# Patient Record
Sex: MALE | Race: WHITE | Employment: OTHER | URBAN - NONMETROPOLITAN AREA
[De-identification: names, ages, dates, MRNs, and addresses within clinical notes are randomized per-mention and may not be internally consistent; named-entity substitution may affect disease eponyms.]

---

## 2016-12-05 LAB
ALT SERPL-CCNC: 8 U/L
BASOPHILS ABSOLUTE: NORMAL /ΜL
BASOPHILS RELATIVE PERCENT: NORMAL %
BUN BLDV-MCNC: 20 MG/DL
CALCIUM SERPL-MCNC: 9 MG/DL
CHLORIDE BLD-SCNC: 106 MMOL/L
CO2: 23 MMOL/L
CREAT SERPL-MCNC: 1 MG/DL
EOSINOPHILS ABSOLUTE: NORMAL /ΜL
EOSINOPHILS RELATIVE PERCENT: NORMAL %
GFR CALCULATED: NORMAL
GLUCOSE BLD-MCNC: 126 MG/DL
HCT VFR BLD CALC: 43.1 % (ref 41–53)
HEMOGLOBIN: 14 G/DL (ref 13.5–17.5)
LYMPHOCYTES ABSOLUTE: 1.3 /ΜL
LYMPHOCYTES RELATIVE PERCENT: 20 %
MCH RBC QN AUTO: 30.6 PG
MCHC RBC AUTO-ENTMCNC: 32.5 G/DL
MCV RBC AUTO: 94.1 FL
MONOCYTES ABSOLUTE: NORMAL /ΜL
MONOCYTES RELATIVE PERCENT: NORMAL %
NEUTROPHILS ABSOLUTE: NORMAL /ΜL
NEUTROPHILS RELATIVE PERCENT: NORMAL %
PLATELET # BLD: 199 K/ΜL
PMV BLD AUTO: 7.2 FL
POTASSIUM SERPL-SCNC: 4.4 MMOL/L
RBC # BLD: 13.4 10^6/ΜL
SODIUM BLD-SCNC: 141 MMOL/L
TOTAL CK: 106 U/L
WBC # BLD: 6.5 10^3/ML

## 2016-12-08 LAB — HBA1C MFR BLD: 5.4 %

## 2017-01-03 ENCOUNTER — ANTI-COAG VISIT (OUTPATIENT)
Dept: OTHER | Age: 82
End: 2017-01-03

## 2017-01-03 VITALS
BODY MASS INDEX: 25.89 KG/M2 | SYSTOLIC BLOOD PRESSURE: 158 MMHG | DIASTOLIC BLOOD PRESSURE: 80 MMHG | HEART RATE: 87 BPM | WEIGHT: 160.4 LBS

## 2017-01-03 DIAGNOSIS — I63.9 CEREBROVASCULAR ACCIDENT (CVA), UNSPECIFIED MECHANISM (HCC): ICD-10-CM

## 2017-01-03 DIAGNOSIS — I82.4Y9 DEEP VEIN THROMBOSIS (DVT) OF PROXIMAL LOWER EXTREMITY, UNSPECIFIED CHRONICITY, UNSPECIFIED LATERALITY (HCC): ICD-10-CM

## 2017-01-03 LAB — POC INR: 3.9 (ref 0.8–1.2)

## 2017-01-03 PROCEDURE — 85610 PROTHROMBIN TIME: CPT | Performed by: NURSE PRACTITIONER

## 2017-01-03 PROCEDURE — 99999 PR OFFICE/OUTPT VISIT,PROCEDURE ONLY: CPT | Performed by: NURSE PRACTITIONER

## 2017-01-03 ASSESSMENT — ENCOUNTER SYMPTOMS
SHORTNESS OF BREATH: 0
BLOOD IN STOOL: 0
DIARRHEA: 0
CONSTIPATION: 0

## 2017-01-11 ENCOUNTER — TELEPHONE (OUTPATIENT)
Dept: OTHER | Age: 82
End: 2017-01-11

## 2017-01-16 ENCOUNTER — ANTI-COAG VISIT (OUTPATIENT)
Dept: OTHER | Age: 82
End: 2017-01-16

## 2017-01-16 VITALS
DIASTOLIC BLOOD PRESSURE: 65 MMHG | WEIGHT: 159.6 LBS | HEART RATE: 82 BPM | BODY MASS INDEX: 25.76 KG/M2 | SYSTOLIC BLOOD PRESSURE: 132 MMHG

## 2017-01-16 DIAGNOSIS — I82.4Y9 DEEP VEIN THROMBOSIS (DVT) OF PROXIMAL LOWER EXTREMITY, UNSPECIFIED CHRONICITY, UNSPECIFIED LATERALITY (HCC): ICD-10-CM

## 2017-01-16 DIAGNOSIS — I63.9 CEREBROVASCULAR ACCIDENT (CVA), UNSPECIFIED MECHANISM (HCC): ICD-10-CM

## 2017-01-16 LAB — POC INR: 2.9 (ref 0.8–1.2)

## 2017-01-16 PROCEDURE — 85610 PROTHROMBIN TIME: CPT | Performed by: PHARMACIST

## 2017-01-16 PROCEDURE — 1036F TOBACCO NON-USER: CPT | Performed by: PHARMACIST

## 2017-01-16 PROCEDURE — 99999 PR OFFICE/OUTPT VISIT,PROCEDURE ONLY: CPT | Performed by: PHARMACIST

## 2017-01-16 ASSESSMENT — ENCOUNTER SYMPTOMS
SHORTNESS OF BREATH: 0
CONSTIPATION: 0
BLOOD IN STOOL: 0
DIARRHEA: 0

## 2017-01-30 ENCOUNTER — ANTI-COAG VISIT (OUTPATIENT)
Dept: OTHER | Age: 82
End: 2017-01-30

## 2017-01-30 VITALS
BODY MASS INDEX: 25.76 KG/M2 | SYSTOLIC BLOOD PRESSURE: 139 MMHG | DIASTOLIC BLOOD PRESSURE: 68 MMHG | HEART RATE: 78 BPM | WEIGHT: 159.6 LBS

## 2017-01-30 DIAGNOSIS — I82.4Y9 DEEP VEIN THROMBOSIS (DVT) OF PROXIMAL LOWER EXTREMITY, UNSPECIFIED CHRONICITY, UNSPECIFIED LATERALITY (HCC): ICD-10-CM

## 2017-01-30 LAB
INTERNATIONAL NORMALIZATION RATIO, POC: 5
PROTHROMBIN TIME, POC: NORMAL

## 2017-01-30 PROCEDURE — G8420 CALC BMI NORM PARAMETERS: HCPCS | Performed by: NURSE PRACTITIONER

## 2017-01-30 PROCEDURE — G8598 ASA/ANTIPLAT THER USED: HCPCS | Performed by: NURSE PRACTITIONER

## 2017-01-30 PROCEDURE — G8482 FLU IMMUNIZE ORDER/ADMIN: HCPCS | Performed by: NURSE PRACTITIONER

## 2017-01-30 PROCEDURE — 1123F ACP DISCUSS/DSCN MKR DOCD: CPT | Performed by: NURSE PRACTITIONER

## 2017-01-30 PROCEDURE — 4040F PNEUMOC VAC/ADMIN/RCVD: CPT | Performed by: NURSE PRACTITIONER

## 2017-01-30 PROCEDURE — 99212 OFFICE O/P EST SF 10 MIN: CPT | Performed by: NURSE PRACTITIONER

## 2017-01-30 PROCEDURE — 1036F TOBACCO NON-USER: CPT | Performed by: NURSE PRACTITIONER

## 2017-01-30 PROCEDURE — 85610 PROTHROMBIN TIME: CPT | Performed by: NURSE PRACTITIONER

## 2017-01-30 PROCEDURE — G8427 DOCREV CUR MEDS BY ELIG CLIN: HCPCS | Performed by: NURSE PRACTITIONER

## 2017-01-30 ASSESSMENT — ENCOUNTER SYMPTOMS
CONSTIPATION: 0
BLOOD IN STOOL: 0
DIARRHEA: 0
SHORTNESS OF BREATH: 0

## 2017-02-01 ENCOUNTER — ANTI-COAG VISIT (OUTPATIENT)
Dept: OTHER | Age: 82
End: 2017-02-01

## 2017-02-01 VITALS
BODY MASS INDEX: 25.66 KG/M2 | DIASTOLIC BLOOD PRESSURE: 82 MMHG | SYSTOLIC BLOOD PRESSURE: 142 MMHG | HEART RATE: 75 BPM | WEIGHT: 159 LBS

## 2017-02-01 DIAGNOSIS — I82.4Y9 DEEP VEIN THROMBOSIS (DVT) OF PROXIMAL LOWER EXTREMITY, UNSPECIFIED CHRONICITY, UNSPECIFIED LATERALITY (HCC): ICD-10-CM

## 2017-02-01 LAB — POC INR: 1.7 (ref 0.8–1.2)

## 2017-02-01 PROCEDURE — G8427 DOCREV CUR MEDS BY ELIG CLIN: HCPCS | Performed by: NURSE PRACTITIONER

## 2017-02-01 PROCEDURE — G8598 ASA/ANTIPLAT THER USED: HCPCS | Performed by: NURSE PRACTITIONER

## 2017-02-01 PROCEDURE — G8482 FLU IMMUNIZE ORDER/ADMIN: HCPCS | Performed by: NURSE PRACTITIONER

## 2017-02-01 PROCEDURE — 4040F PNEUMOC VAC/ADMIN/RCVD: CPT | Performed by: NURSE PRACTITIONER

## 2017-02-01 PROCEDURE — 85610 PROTHROMBIN TIME: CPT | Performed by: NURSE PRACTITIONER

## 2017-02-01 PROCEDURE — 99212 OFFICE O/P EST SF 10 MIN: CPT | Performed by: NURSE PRACTITIONER

## 2017-02-01 PROCEDURE — G8420 CALC BMI NORM PARAMETERS: HCPCS | Performed by: NURSE PRACTITIONER

## 2017-02-01 PROCEDURE — 1036F TOBACCO NON-USER: CPT | Performed by: NURSE PRACTITIONER

## 2017-02-01 PROCEDURE — 1123F ACP DISCUSS/DSCN MKR DOCD: CPT | Performed by: NURSE PRACTITIONER

## 2017-02-01 ASSESSMENT — ENCOUNTER SYMPTOMS
DIARRHEA: 0
CONSTIPATION: 0
BLOOD IN STOOL: 0
SHORTNESS OF BREATH: 0

## 2017-02-08 ENCOUNTER — ANTI-COAG VISIT (OUTPATIENT)
Dept: OTHER | Age: 82
End: 2017-02-08

## 2017-02-08 VITALS
SYSTOLIC BLOOD PRESSURE: 166 MMHG | BODY MASS INDEX: 25.76 KG/M2 | HEART RATE: 86 BPM | WEIGHT: 159.6 LBS | DIASTOLIC BLOOD PRESSURE: 57 MMHG

## 2017-02-08 DIAGNOSIS — I63.9 CEREBROVASCULAR ACCIDENT (CVA), UNSPECIFIED MECHANISM (HCC): ICD-10-CM

## 2017-02-08 DIAGNOSIS — I82.4Y9 DEEP VEIN THROMBOSIS (DVT) OF PROXIMAL LOWER EXTREMITY, UNSPECIFIED CHRONICITY, UNSPECIFIED LATERALITY (HCC): ICD-10-CM

## 2017-02-08 LAB — POC INR: 4.1 (ref 0.8–1.2)

## 2017-02-08 PROCEDURE — 4040F PNEUMOC VAC/ADMIN/RCVD: CPT | Performed by: NURSE PRACTITIONER

## 2017-02-08 PROCEDURE — G8598 ASA/ANTIPLAT THER USED: HCPCS | Performed by: NURSE PRACTITIONER

## 2017-02-08 PROCEDURE — 99999 PR OFFICE/OUTPT VISIT,PROCEDURE ONLY: CPT | Performed by: NURSE PRACTITIONER

## 2017-02-08 PROCEDURE — G8420 CALC BMI NORM PARAMETERS: HCPCS | Performed by: NURSE PRACTITIONER

## 2017-02-08 PROCEDURE — G8428 CUR MEDS NOT DOCUMENT: HCPCS | Performed by: NURSE PRACTITIONER

## 2017-02-08 PROCEDURE — 85610 PROTHROMBIN TIME: CPT | Performed by: NURSE PRACTITIONER

## 2017-02-08 ASSESSMENT — ENCOUNTER SYMPTOMS
CONSTIPATION: 0
BLOOD IN STOOL: 0
SHORTNESS OF BREATH: 0
DIARRHEA: 0

## 2017-02-14 ENCOUNTER — ANTI-COAG VISIT (OUTPATIENT)
Dept: OTHER | Age: 82
End: 2017-02-14

## 2017-02-14 VITALS
SYSTOLIC BLOOD PRESSURE: 145 MMHG | WEIGHT: 159.4 LBS | DIASTOLIC BLOOD PRESSURE: 80 MMHG | BODY MASS INDEX: 25.73 KG/M2 | HEART RATE: 80 BPM

## 2017-02-14 DIAGNOSIS — I63.9 CEREBROVASCULAR ACCIDENT (CVA), UNSPECIFIED MECHANISM (HCC): ICD-10-CM

## 2017-02-14 DIAGNOSIS — I82.4Y9 DEEP VEIN THROMBOSIS (DVT) OF PROXIMAL LOWER EXTREMITY, UNSPECIFIED CHRONICITY, UNSPECIFIED LATERALITY (HCC): ICD-10-CM

## 2017-02-14 LAB — POC INR: 3.6 (ref 0.8–1.2)

## 2017-02-14 PROCEDURE — 4040F PNEUMOC VAC/ADMIN/RCVD: CPT | Performed by: NURSE PRACTITIONER

## 2017-02-14 PROCEDURE — G8598 ASA/ANTIPLAT THER USED: HCPCS | Performed by: NURSE PRACTITIONER

## 2017-02-14 PROCEDURE — G8420 CALC BMI NORM PARAMETERS: HCPCS | Performed by: NURSE PRACTITIONER

## 2017-02-14 PROCEDURE — 99999 PR OFFICE/OUTPT VISIT,PROCEDURE ONLY: CPT | Performed by: NURSE PRACTITIONER

## 2017-02-14 PROCEDURE — G8427 DOCREV CUR MEDS BY ELIG CLIN: HCPCS | Performed by: NURSE PRACTITIONER

## 2017-02-14 PROCEDURE — 85610 PROTHROMBIN TIME: CPT | Performed by: NURSE PRACTITIONER

## 2017-02-14 ASSESSMENT — ENCOUNTER SYMPTOMS
SHORTNESS OF BREATH: 0
CONSTIPATION: 0
BLOOD IN STOOL: 0
DIARRHEA: 0

## 2017-02-27 ENCOUNTER — ANTI-COAG VISIT (OUTPATIENT)
Dept: OTHER | Age: 82
End: 2017-02-27

## 2017-02-27 VITALS
SYSTOLIC BLOOD PRESSURE: 137 MMHG | WEIGHT: 159.2 LBS | DIASTOLIC BLOOD PRESSURE: 69 MMHG | HEART RATE: 64 BPM | BODY MASS INDEX: 25.7 KG/M2

## 2017-02-27 DIAGNOSIS — I82.4Y9 DEEP VEIN THROMBOSIS (DVT) OF PROXIMAL LOWER EXTREMITY, UNSPECIFIED CHRONICITY, UNSPECIFIED LATERALITY (HCC): ICD-10-CM

## 2017-02-27 DIAGNOSIS — I63.9 CEREBROVASCULAR ACCIDENT (CVA), UNSPECIFIED MECHANISM (HCC): ICD-10-CM

## 2017-02-27 LAB — POC INR: 3.9 (ref 0.8–1.2)

## 2017-02-27 PROCEDURE — 85610 PROTHROMBIN TIME: CPT

## 2017-02-27 PROCEDURE — 4040F PNEUMOC VAC/ADMIN/RCVD: CPT

## 2017-02-27 PROCEDURE — G8427 DOCREV CUR MEDS BY ELIG CLIN: HCPCS

## 2017-02-27 PROCEDURE — G8598 ASA/ANTIPLAT THER USED: HCPCS

## 2017-02-27 PROCEDURE — G8420 CALC BMI NORM PARAMETERS: HCPCS

## 2017-02-27 PROCEDURE — 99999 PR OFFICE/OUTPT VISIT,PROCEDURE ONLY: CPT

## 2017-02-27 ASSESSMENT — ENCOUNTER SYMPTOMS
CONSTIPATION: 0
BLOOD IN STOOL: 1
DIARRHEA: 1
SHORTNESS OF BREATH: 0

## 2017-03-06 ENCOUNTER — ANTI-COAG VISIT (OUTPATIENT)
Dept: OTHER | Age: 82
End: 2017-03-06

## 2017-03-06 VITALS
HEART RATE: 91 BPM | WEIGHT: 159.2 LBS | DIASTOLIC BLOOD PRESSURE: 74 MMHG | BODY MASS INDEX: 25.7 KG/M2 | SYSTOLIC BLOOD PRESSURE: 136 MMHG

## 2017-03-06 DIAGNOSIS — I82.4Y9 DEEP VEIN THROMBOSIS (DVT) OF PROXIMAL LOWER EXTREMITY, UNSPECIFIED CHRONICITY, UNSPECIFIED LATERALITY (HCC): ICD-10-CM

## 2017-03-06 LAB — POC INR: 1.8 (ref 0.8–1.2)

## 2017-03-06 PROCEDURE — G8427 DOCREV CUR MEDS BY ELIG CLIN: HCPCS | Performed by: NURSE PRACTITIONER

## 2017-03-06 PROCEDURE — 85610 PROTHROMBIN TIME: CPT | Performed by: NURSE PRACTITIONER

## 2017-03-06 PROCEDURE — G8598 ASA/ANTIPLAT THER USED: HCPCS | Performed by: NURSE PRACTITIONER

## 2017-03-06 PROCEDURE — 1036F TOBACCO NON-USER: CPT | Performed by: NURSE PRACTITIONER

## 2017-03-06 PROCEDURE — G8420 CALC BMI NORM PARAMETERS: HCPCS | Performed by: NURSE PRACTITIONER

## 2017-03-06 PROCEDURE — 99212 OFFICE O/P EST SF 10 MIN: CPT | Performed by: NURSE PRACTITIONER

## 2017-03-06 PROCEDURE — G8482 FLU IMMUNIZE ORDER/ADMIN: HCPCS | Performed by: NURSE PRACTITIONER

## 2017-03-06 PROCEDURE — 4040F PNEUMOC VAC/ADMIN/RCVD: CPT | Performed by: NURSE PRACTITIONER

## 2017-03-06 PROCEDURE — 1123F ACP DISCUSS/DSCN MKR DOCD: CPT | Performed by: NURSE PRACTITIONER

## 2017-03-06 ASSESSMENT — ENCOUNTER SYMPTOMS
BLOOD IN STOOL: 0
SHORTNESS OF BREATH: 0
DIARRHEA: 0
CONSTIPATION: 0

## 2017-03-09 DIAGNOSIS — I82.4Y9 DEEP VEIN THROMBOSIS (DVT) OF PROXIMAL LOWER EXTREMITY, UNSPECIFIED CHRONICITY, UNSPECIFIED LATERALITY (HCC): ICD-10-CM

## 2017-03-09 DIAGNOSIS — I63.9 CEREBROVASCULAR ACCIDENT (CVA), UNSPECIFIED MECHANISM (HCC): Primary | ICD-10-CM

## 2017-03-09 DIAGNOSIS — Z79.01 ANTICOAGULATED ON COUMADIN: ICD-10-CM

## 2017-03-13 ENCOUNTER — ANTI-COAG VISIT (OUTPATIENT)
Dept: OTHER | Age: 82
End: 2017-03-13

## 2017-03-13 VITALS
DIASTOLIC BLOOD PRESSURE: 99 MMHG | SYSTOLIC BLOOD PRESSURE: 138 MMHG | BODY MASS INDEX: 25.7 KG/M2 | WEIGHT: 159.2 LBS | HEART RATE: 75 BPM

## 2017-03-13 DIAGNOSIS — Z79.01 ANTICOAGULATED ON COUMADIN: ICD-10-CM

## 2017-03-13 DIAGNOSIS — I63.9 CEREBROVASCULAR ACCIDENT (CVA), UNSPECIFIED MECHANISM (HCC): ICD-10-CM

## 2017-03-13 DIAGNOSIS — I82.4Y9 DEEP VEIN THROMBOSIS (DVT) OF PROXIMAL LOWER EXTREMITY, UNSPECIFIED CHRONICITY, UNSPECIFIED LATERALITY (HCC): ICD-10-CM

## 2017-03-13 LAB — POC INR: 3 (ref 0.8–1.2)

## 2017-03-20 ENCOUNTER — ANTI-COAG VISIT (OUTPATIENT)
Dept: OTHER | Age: 82
End: 2017-03-20

## 2017-03-20 VITALS
HEART RATE: 89 BPM | DIASTOLIC BLOOD PRESSURE: 65 MMHG | WEIGHT: 159.6 LBS | BODY MASS INDEX: 25.76 KG/M2 | SYSTOLIC BLOOD PRESSURE: 141 MMHG

## 2017-03-20 DIAGNOSIS — Z79.01 ANTICOAGULATED ON COUMADIN: ICD-10-CM

## 2017-03-20 DIAGNOSIS — I63.9 CEREBROVASCULAR ACCIDENT (CVA), UNSPECIFIED MECHANISM (HCC): ICD-10-CM

## 2017-03-20 DIAGNOSIS — I82.4Y9 DEEP VEIN THROMBOSIS (DVT) OF PROXIMAL LOWER EXTREMITY, UNSPECIFIED CHRONICITY, UNSPECIFIED LATERALITY (HCC): ICD-10-CM

## 2017-03-20 LAB — POC INR: 2.6 (ref 0.8–1.2)

## 2017-03-20 ASSESSMENT — ENCOUNTER SYMPTOMS
DIARRHEA: 0
CONSTIPATION: 0
SHORTNESS OF BREATH: 0
BLOOD IN STOOL: 0

## 2017-03-27 ENCOUNTER — ANTI-COAG VISIT (OUTPATIENT)
Dept: OTHER | Age: 82
End: 2017-03-27

## 2017-03-27 VITALS
BODY MASS INDEX: 26.05 KG/M2 | WEIGHT: 161.4 LBS | HEART RATE: 77 BPM | DIASTOLIC BLOOD PRESSURE: 68 MMHG | SYSTOLIC BLOOD PRESSURE: 140 MMHG

## 2017-03-27 DIAGNOSIS — Z79.01 ANTICOAGULATED ON COUMADIN: ICD-10-CM

## 2017-03-27 DIAGNOSIS — I82.4Y9 DEEP VEIN THROMBOSIS (DVT) OF PROXIMAL LOWER EXTREMITY, UNSPECIFIED CHRONICITY, UNSPECIFIED LATERALITY (HCC): ICD-10-CM

## 2017-03-27 DIAGNOSIS — I63.9 CEREBROVASCULAR ACCIDENT (CVA), UNSPECIFIED MECHANISM (HCC): ICD-10-CM

## 2017-03-27 LAB — POC INR: 2.8 (ref 0.8–1.2)

## 2017-03-27 ASSESSMENT — ENCOUNTER SYMPTOMS
BLOOD IN STOOL: 0
SHORTNESS OF BREATH: 0
DIARRHEA: 0
CONSTIPATION: 0

## 2017-04-10 ENCOUNTER — ANTI-COAG VISIT (OUTPATIENT)
Dept: OTHER | Age: 82
End: 2017-04-10

## 2017-04-10 VITALS
SYSTOLIC BLOOD PRESSURE: 108 MMHG | DIASTOLIC BLOOD PRESSURE: 69 MMHG | HEART RATE: 91 BPM | WEIGHT: 159.5 LBS | BODY MASS INDEX: 25.74 KG/M2

## 2017-04-10 DIAGNOSIS — Z79.01 ANTICOAGULATED ON COUMADIN: ICD-10-CM

## 2017-04-10 DIAGNOSIS — I63.9 CEREBROVASCULAR ACCIDENT (CVA), UNSPECIFIED MECHANISM (HCC): ICD-10-CM

## 2017-04-10 DIAGNOSIS — I82.4Y9 DEEP VEIN THROMBOSIS (DVT) OF PROXIMAL LOWER EXTREMITY, UNSPECIFIED CHRONICITY, UNSPECIFIED LATERALITY (HCC): ICD-10-CM

## 2017-04-10 LAB — POC INR: 3.6 (ref 0.8–1.2)

## 2017-04-10 ASSESSMENT — ENCOUNTER SYMPTOMS
CONSTIPATION: 0
BLOOD IN STOOL: 0
DIARRHEA: 0
SHORTNESS OF BREATH: 0

## 2017-04-25 ENCOUNTER — ANTI-COAG VISIT (OUTPATIENT)
Dept: OTHER | Age: 82
End: 2017-04-25

## 2017-04-25 VITALS
BODY MASS INDEX: 25.66 KG/M2 | WEIGHT: 159 LBS | SYSTOLIC BLOOD PRESSURE: 142 MMHG | DIASTOLIC BLOOD PRESSURE: 69 MMHG | HEART RATE: 74 BPM

## 2017-04-25 DIAGNOSIS — I63.9 CEREBROVASCULAR ACCIDENT (CVA), UNSPECIFIED MECHANISM (HCC): ICD-10-CM

## 2017-04-25 DIAGNOSIS — Z79.01 ANTICOAGULATED ON COUMADIN: ICD-10-CM

## 2017-04-25 DIAGNOSIS — I82.4Y9 DEEP VEIN THROMBOSIS (DVT) OF PROXIMAL LOWER EXTREMITY, UNSPECIFIED CHRONICITY, UNSPECIFIED LATERALITY (HCC): ICD-10-CM

## 2017-04-25 LAB — POC INR: 3.6 (ref 0.8–1.2)

## 2017-04-25 ASSESSMENT — ENCOUNTER SYMPTOMS
SHORTNESS OF BREATH: 0
DIARRHEA: 0
CONSTIPATION: 0
BLOOD IN STOOL: 0

## 2017-05-08 ENCOUNTER — ANTI-COAG VISIT (OUTPATIENT)
Dept: OTHER | Age: 82
End: 2017-05-08

## 2017-05-08 VITALS
DIASTOLIC BLOOD PRESSURE: 65 MMHG | WEIGHT: 160 LBS | HEART RATE: 73 BPM | SYSTOLIC BLOOD PRESSURE: 122 MMHG | BODY MASS INDEX: 25.82 KG/M2

## 2017-05-08 DIAGNOSIS — I82.4Y9 DEEP VEIN THROMBOSIS (DVT) OF PROXIMAL LOWER EXTREMITY, UNSPECIFIED CHRONICITY, UNSPECIFIED LATERALITY (HCC): ICD-10-CM

## 2017-05-08 DIAGNOSIS — Z79.01 ANTICOAGULATED ON COUMADIN: ICD-10-CM

## 2017-05-08 DIAGNOSIS — I63.9 CEREBROVASCULAR ACCIDENT (CVA), UNSPECIFIED MECHANISM (HCC): ICD-10-CM

## 2017-05-08 LAB — POC INR: 3.4 (ref 0.8–1.2)

## 2017-05-08 ASSESSMENT — ENCOUNTER SYMPTOMS
BLOOD IN STOOL: 0
CONSTIPATION: 0
SHORTNESS OF BREATH: 0
DIARRHEA: 0

## 2017-05-30 ENCOUNTER — ANTI-COAG VISIT (OUTPATIENT)
Dept: OTHER | Age: 82
End: 2017-05-30

## 2017-05-30 VITALS
BODY MASS INDEX: 25.66 KG/M2 | DIASTOLIC BLOOD PRESSURE: 71 MMHG | SYSTOLIC BLOOD PRESSURE: 143 MMHG | HEART RATE: 86 BPM | WEIGHT: 159 LBS

## 2017-05-30 DIAGNOSIS — I82.4Y9 DEEP VEIN THROMBOSIS (DVT) OF PROXIMAL LOWER EXTREMITY, UNSPECIFIED CHRONICITY, UNSPECIFIED LATERALITY (HCC): ICD-10-CM

## 2017-05-30 DIAGNOSIS — I63.9 CEREBROVASCULAR ACCIDENT (CVA), UNSPECIFIED MECHANISM (HCC): ICD-10-CM

## 2017-05-30 DIAGNOSIS — Z79.01 ANTICOAGULATED ON COUMADIN: ICD-10-CM

## 2017-05-30 LAB — POC INR: 2.7 (ref 0.8–1.2)

## 2017-05-30 ASSESSMENT — ENCOUNTER SYMPTOMS
BLOOD IN STOOL: 0
DIARRHEA: 0
SHORTNESS OF BREATH: 0
CONSTIPATION: 0

## 2017-06-08 ENCOUNTER — TELEPHONE (OUTPATIENT)
Dept: OTHER | Age: 82
End: 2017-06-08

## 2017-06-12 ENCOUNTER — ANTI-COAG VISIT (OUTPATIENT)
Dept: OTHER | Age: 82
End: 2017-06-12

## 2017-06-12 VITALS
WEIGHT: 159.2 LBS | HEART RATE: 86 BPM | SYSTOLIC BLOOD PRESSURE: 133 MMHG | BODY MASS INDEX: 25.7 KG/M2 | DIASTOLIC BLOOD PRESSURE: 69 MMHG

## 2017-06-12 DIAGNOSIS — Z79.01 ANTICOAGULATED ON COUMADIN: ICD-10-CM

## 2017-06-12 DIAGNOSIS — I82.4Y9 DEEP VEIN THROMBOSIS (DVT) OF PROXIMAL LOWER EXTREMITY, UNSPECIFIED CHRONICITY, UNSPECIFIED LATERALITY (HCC): ICD-10-CM

## 2017-06-12 DIAGNOSIS — I63.9 CEREBROVASCULAR ACCIDENT (CVA), UNSPECIFIED MECHANISM (HCC): ICD-10-CM

## 2017-06-12 LAB — POC INR: 2.8 (ref 0.8–1.2)

## 2017-06-12 ASSESSMENT — ENCOUNTER SYMPTOMS
SHORTNESS OF BREATH: 0
CONSTIPATION: 0
BLOOD IN STOOL: 0
DIARRHEA: 0

## 2017-06-26 ENCOUNTER — ANTI-COAG VISIT (OUTPATIENT)
Dept: OTHER | Age: 82
End: 2017-06-26

## 2017-06-26 VITALS
SYSTOLIC BLOOD PRESSURE: 121 MMHG | BODY MASS INDEX: 25.34 KG/M2 | HEART RATE: 78 BPM | WEIGHT: 157 LBS | DIASTOLIC BLOOD PRESSURE: 68 MMHG

## 2017-06-26 DIAGNOSIS — I63.9 CEREBROVASCULAR ACCIDENT (CVA), UNSPECIFIED MECHANISM (HCC): ICD-10-CM

## 2017-06-26 DIAGNOSIS — I82.4Y9 DEEP VEIN THROMBOSIS (DVT) OF PROXIMAL LOWER EXTREMITY, UNSPECIFIED CHRONICITY, UNSPECIFIED LATERALITY (HCC): ICD-10-CM

## 2017-06-26 DIAGNOSIS — Z79.01 ANTICOAGULATED ON COUMADIN: ICD-10-CM

## 2017-06-26 LAB
INTERNATIONAL NORMALIZATION RATIO, POC: 3.6
POC INR: 3.6 (ref 0.8–1.2)

## 2017-06-26 RX ORDER — BENZTROPINE MESYLATE 1 MG/1
2 TABLET ORAL DAILY
Status: ON HOLD | COMMUNITY
Start: 2017-06-12 | End: 2017-09-28 | Stop reason: HOSPADM

## 2017-06-26 ASSESSMENT — ENCOUNTER SYMPTOMS
DIARRHEA: 0
CONSTIPATION: 0
BLOOD IN STOOL: 0
SHORTNESS OF BREATH: 0

## 2017-07-10 ENCOUNTER — ANTI-COAG VISIT (OUTPATIENT)
Dept: OTHER | Age: 82
End: 2017-07-10

## 2017-07-10 VITALS
BODY MASS INDEX: 25.53 KG/M2 | HEART RATE: 81 BPM | DIASTOLIC BLOOD PRESSURE: 78 MMHG | WEIGHT: 158.2 LBS | SYSTOLIC BLOOD PRESSURE: 138 MMHG

## 2017-07-10 DIAGNOSIS — I82.4Y9 DEEP VEIN THROMBOSIS (DVT) OF PROXIMAL LOWER EXTREMITY, UNSPECIFIED CHRONICITY, UNSPECIFIED LATERALITY (HCC): ICD-10-CM

## 2017-07-10 DIAGNOSIS — I63.9 CEREBROVASCULAR ACCIDENT (CVA), UNSPECIFIED MECHANISM (HCC): ICD-10-CM

## 2017-07-10 DIAGNOSIS — Z79.01 ANTICOAGULATED ON COUMADIN: ICD-10-CM

## 2017-07-10 LAB — POC INR: 2.7 (ref 0.8–1.2)

## 2017-07-10 ASSESSMENT — ENCOUNTER SYMPTOMS
DIARRHEA: 0
SHORTNESS OF BREATH: 0
BLOOD IN STOOL: 0
CONSTIPATION: 0

## 2017-07-31 ENCOUNTER — HOSPITAL ENCOUNTER (OUTPATIENT)
Dept: PHARMACY | Age: 82
Setting detail: THERAPIES SERIES
Discharge: HOME | End: 2017-07-31
Payer: MEDICARE

## 2017-07-31 VITALS
HEART RATE: 76 BPM | WEIGHT: 156.4 LBS | SYSTOLIC BLOOD PRESSURE: 123 MMHG | DIASTOLIC BLOOD PRESSURE: 66 MMHG | BODY MASS INDEX: 25.24 KG/M2

## 2017-07-31 DIAGNOSIS — I82.4Y9 DEEP VEIN THROMBOSIS (DVT) OF PROXIMAL LOWER EXTREMITY, UNSPECIFIED CHRONICITY, UNSPECIFIED LATERALITY (HCC): ICD-10-CM

## 2017-07-31 DIAGNOSIS — I63.9 CEREBROVASCULAR ACCIDENT (CVA), UNSPECIFIED MECHANISM (HCC): ICD-10-CM

## 2017-07-31 LAB — POC INR: 4.3 (ref 0.8–1.2)

## 2017-07-31 PROCEDURE — 99211 OFF/OP EST MAY X REQ PHY/QHP: CPT | Performed by: PHARMACIST

## 2017-07-31 PROCEDURE — 85610 PROTHROMBIN TIME: CPT | Performed by: PHARMACIST

## 2017-07-31 PROCEDURE — 36415 COLL VENOUS BLD VENIPUNCTURE: CPT | Performed by: PHARMACIST

## 2017-07-31 ASSESSMENT — ENCOUNTER SYMPTOMS
DIARRHEA: 0
BLOOD IN STOOL: 0
SHORTNESS OF BREATH: 0
CONSTIPATION: 0

## 2017-08-14 ENCOUNTER — HOSPITAL ENCOUNTER (OUTPATIENT)
Dept: PHARMACY | Age: 82
Setting detail: THERAPIES SERIES
Discharge: HOME | End: 2017-08-14
Payer: MEDICARE

## 2017-08-14 VITALS
WEIGHT: 155.4 LBS | HEART RATE: 72 BPM | BODY MASS INDEX: 25.08 KG/M2 | SYSTOLIC BLOOD PRESSURE: 108 MMHG | DIASTOLIC BLOOD PRESSURE: 62 MMHG

## 2017-08-14 DIAGNOSIS — I82.4Y9 DEEP VEIN THROMBOSIS (DVT) OF PROXIMAL LOWER EXTREMITY, UNSPECIFIED CHRONICITY, UNSPECIFIED LATERALITY (HCC): ICD-10-CM

## 2017-08-14 DIAGNOSIS — I63.9 CEREBROVASCULAR ACCIDENT (CVA), UNSPECIFIED MECHANISM (HCC): ICD-10-CM

## 2017-08-14 DIAGNOSIS — I82.4Z9 DEEP VEIN THROMBOSIS (DVT) OF DISTAL VEIN OF LOWER EXTREMITY, UNSPECIFIED CHRONICITY, UNSPECIFIED LATERALITY (HCC): ICD-10-CM

## 2017-08-14 LAB — POC INR: 3.9 (ref 0.8–1.2)

## 2017-08-14 PROCEDURE — 85610 PROTHROMBIN TIME: CPT

## 2017-08-14 PROCEDURE — 99211 OFF/OP EST MAY X REQ PHY/QHP: CPT

## 2017-08-14 PROCEDURE — 36415 COLL VENOUS BLD VENIPUNCTURE: CPT

## 2017-08-14 RX ORDER — WARFARIN SODIUM 7.5 MG/1
TABLET ORAL
Qty: 105 TABLET | Refills: 3 | Status: ON HOLD | OUTPATIENT
Start: 2017-08-14 | End: 2017-11-24 | Stop reason: SDUPTHER

## 2017-08-14 RX ORDER — WARFARIN SODIUM 5 MG/1
TABLET ORAL
Qty: 45 TABLET | Refills: 11 | Status: SHIPPED | OUTPATIENT
Start: 2017-08-14 | End: 2017-10-05

## 2017-08-28 ENCOUNTER — HOSPITAL ENCOUNTER (OUTPATIENT)
Dept: PHARMACY | Age: 82
Setting detail: THERAPIES SERIES
Discharge: HOME | End: 2017-08-28
Payer: MEDICARE

## 2017-08-28 VITALS
WEIGHT: 156.8 LBS | BODY MASS INDEX: 25.31 KG/M2 | HEART RATE: 85 BPM | SYSTOLIC BLOOD PRESSURE: 111 MMHG | DIASTOLIC BLOOD PRESSURE: 67 MMHG

## 2017-08-28 DIAGNOSIS — I82.4Y9 DEEP VEIN THROMBOSIS (DVT) OF PROXIMAL LOWER EXTREMITY, UNSPECIFIED CHRONICITY, UNSPECIFIED LATERALITY (HCC): ICD-10-CM

## 2017-08-28 DIAGNOSIS — I63.9 CEREBROVASCULAR ACCIDENT (CVA), UNSPECIFIED MECHANISM (HCC): ICD-10-CM

## 2017-08-28 LAB — POC INR: 3 (ref 0.8–1.2)

## 2017-08-28 PROCEDURE — 99211 OFF/OP EST MAY X REQ PHY/QHP: CPT | Performed by: PHARMACIST

## 2017-08-28 PROCEDURE — 36416 COLLJ CAPILLARY BLOOD SPEC: CPT | Performed by: PHARMACIST

## 2017-09-13 ENCOUNTER — TELEPHONE (OUTPATIENT)
Dept: PHARMACY | Age: 82
End: 2017-09-13

## 2017-09-16 ENCOUNTER — APPOINTMENT (OUTPATIENT)
Dept: CT IMAGING | Age: 82
DRG: 034 | End: 2017-09-16
Payer: MEDICARE

## 2017-09-16 ENCOUNTER — APPOINTMENT (OUTPATIENT)
Dept: GENERAL RADIOLOGY | Age: 82
DRG: 034 | End: 2017-09-16
Payer: MEDICARE

## 2017-09-16 ENCOUNTER — HOSPITAL ENCOUNTER (INPATIENT)
Age: 82
LOS: 12 days | Discharge: NURSING, REHAB OR LONG-TERM CARE | DRG: 034 | End: 2017-09-28
Attending: INTERNAL MEDICINE | Admitting: INTERNAL MEDICINE
Payer: MEDICARE

## 2017-09-16 DIAGNOSIS — R06.01 ORTHOPNEA: ICD-10-CM

## 2017-09-16 DIAGNOSIS — J90 LOCULATED PLEURAL EFFUSION: ICD-10-CM

## 2017-09-16 DIAGNOSIS — I48.91 ATRIAL FIBRILLATION, UNSPECIFIED TYPE (HCC): ICD-10-CM

## 2017-09-16 DIAGNOSIS — I50.9 CONGESTIVE HEART FAILURE, UNSPECIFIED CONGESTIVE HEART FAILURE CHRONICITY, UNSPECIFIED CONGESTIVE HEART FAILURE TYPE: ICD-10-CM

## 2017-09-16 DIAGNOSIS — R07.9 CHEST PAIN, UNSPECIFIED TYPE: Primary | ICD-10-CM

## 2017-09-16 PROBLEM — R06.02 SHORTNESS OF BREATH: Status: ACTIVE | Noted: 2017-09-16

## 2017-09-16 PROBLEM — I50.33 ACUTE ON CHRONIC DIASTOLIC HEART FAILURE (HCC): Status: ACTIVE | Noted: 2017-09-16

## 2017-09-16 PROBLEM — J94.8 PLEURAL MASS: Status: ACTIVE | Noted: 2017-09-16

## 2017-09-16 LAB
ALBUMIN SERPL-MCNC: 4.2 G/DL (ref 3.5–5.1)
ALP BLD-CCNC: 129 U/L (ref 38–126)
ALT SERPL-CCNC: < 5 U/L (ref 11–66)
ANION GAP SERPL CALCULATED.3IONS-SCNC: 13 MEQ/L (ref 8–16)
ANISOCYTOSIS: ABNORMAL
APTT: 43.4 SECONDS (ref 22–38)
AST SERPL-CCNC: 12 U/L (ref 5–40)
BASOPHILS # BLD: 0.7 %
BASOPHILS ABSOLUTE: 0 THOU/MM3 (ref 0–0.1)
BILIRUB SERPL-MCNC: 1.3 MG/DL (ref 0.3–1.2)
BILIRUBIN DIRECT: 0.3 MG/DL (ref 0–0.3)
BUN BLDV-MCNC: 27 MG/DL (ref 7–22)
CALCIUM SERPL-MCNC: 9.4 MG/DL (ref 8.5–10.5)
CHLORIDE BLD-SCNC: 103 MEQ/L (ref 98–111)
CO2: 26 MEQ/L (ref 23–33)
CREAT SERPL-MCNC: 0.8 MG/DL (ref 0.4–1.2)
EKG ATRIAL RATE: 81 BPM
EKG Q-T INTERVAL: 374 MS
EKG QRS DURATION: 98 MS
EKG QTC CALCULATION (BAZETT): 450 MS
EKG R AXIS: 27 DEGREES
EKG T AXIS: 123 DEGREES
EKG VENTRICULAR RATE: 87 BPM
EOSINOPHIL # BLD: 1.4 %
EOSINOPHILS ABSOLUTE: 0.1 THOU/MM3 (ref 0–0.4)
GFR SERPL CREATININE-BSD FRML MDRD: > 90 ML/MIN/1.73M2
GLUCOSE BLD-MCNC: 113 MG/DL (ref 70–108)
HCT VFR BLD CALC: 42.5 % (ref 42–52)
HEMOGLOBIN: 14 GM/DL (ref 14–18)
INR BLD: 4.46 (ref 0.85–1.13)
LYMPHOCYTES # BLD: 12.9 %
LYMPHOCYTES ABSOLUTE: 0.9 THOU/MM3 (ref 1–4.8)
MCH RBC QN AUTO: 28.6 PG (ref 27–31)
MCHC RBC AUTO-ENTMCNC: 32.9 GM/DL (ref 33–37)
MCV RBC AUTO: 86.8 FL (ref 80–94)
MONOCYTES # BLD: 8.7 %
MONOCYTES ABSOLUTE: 0.6 THOU/MM3 (ref 0.4–1.3)
NUCLEATED RED BLOOD CELLS: 0 /100 WBC
OSMOLALITY CALCULATION: 289 MOSMOL/KG (ref 275–300)
PDW BLD-RTO: 15.4 % (ref 11.5–14.5)
PLATELET # BLD: 163 THOU/MM3 (ref 130–400)
PMV BLD AUTO: 8.8 MCM (ref 7.4–10.4)
POTASSIUM SERPL-SCNC: 4 MEQ/L (ref 3.5–5.2)
PRO-BNP: 2231 PG/ML (ref 0–1800)
PROCALCITONIN: 0.05 NG/ML (ref 0.01–0.09)
RBC # BLD: 4.89 MILL/MM3 (ref 4.7–6.1)
RBC # BLD: NORMAL 10*6/UL
SEG NEUTROPHILS: 76.3 %
SEGMENTED NEUTROPHILS ABSOLUTE COUNT: 5.1 THOU/MM3 (ref 1.8–7.7)
SODIUM BLD-SCNC: 142 MEQ/L (ref 135–145)
TOTAL PROTEIN: 7 G/DL (ref 6.1–8)
TROPONIN T: < 0.01 NG/ML
TSH SERPL DL<=0.05 MIU/L-ACNC: 2.46 UIU/ML (ref 0.4–4.2)
WBC # BLD: 6.7 THOU/MM3 (ref 4.8–10.8)

## 2017-09-16 PROCEDURE — 71020 XR CHEST STANDARD TWO VW: CPT

## 2017-09-16 PROCEDURE — 99222 1ST HOSP IP/OBS MODERATE 55: CPT | Performed by: INTERNAL MEDICINE

## 2017-09-16 PROCEDURE — 2580000003 HC RX 258: Performed by: INTERNAL MEDICINE

## 2017-09-16 PROCEDURE — 83880 ASSAY OF NATRIURETIC PEPTIDE: CPT

## 2017-09-16 PROCEDURE — 84443 ASSAY THYROID STIM HORMONE: CPT

## 2017-09-16 PROCEDURE — 84145 PROCALCITONIN (PCT): CPT

## 2017-09-16 PROCEDURE — 71250 CT THORAX DX C-: CPT

## 2017-09-16 PROCEDURE — 2500000003 HC RX 250 WO HCPCS: Performed by: NURSE PRACTITIONER

## 2017-09-16 PROCEDURE — 85730 THROMBOPLASTIN TIME PARTIAL: CPT

## 2017-09-16 PROCEDURE — 6370000000 HC RX 637 (ALT 250 FOR IP): Performed by: INTERNAL MEDICINE

## 2017-09-16 PROCEDURE — 93005 ELECTROCARDIOGRAM TRACING: CPT

## 2017-09-16 PROCEDURE — 99285 EMERGENCY DEPT VISIT HI MDM: CPT

## 2017-09-16 PROCEDURE — 36415 COLL VENOUS BLD VENIPUNCTURE: CPT

## 2017-09-16 PROCEDURE — 85610 PROTHROMBIN TIME: CPT

## 2017-09-16 PROCEDURE — 99223 1ST HOSP IP/OBS HIGH 75: CPT | Performed by: INTERNAL MEDICINE

## 2017-09-16 PROCEDURE — 2500000003 HC RX 250 WO HCPCS: Performed by: INTERNAL MEDICINE

## 2017-09-16 PROCEDURE — 82248 BILIRUBIN DIRECT: CPT

## 2017-09-16 PROCEDURE — 80053 COMPREHEN METABOLIC PANEL: CPT

## 2017-09-16 PROCEDURE — 84484 ASSAY OF TROPONIN QUANT: CPT

## 2017-09-16 PROCEDURE — 1200000003 HC TELEMETRY R&B

## 2017-09-16 PROCEDURE — 85025 COMPLETE CBC W/AUTO DIFF WBC: CPT

## 2017-09-16 RX ORDER — ISOSORBIDE MONONITRATE 30 MG/1
30 TABLET, EXTENDED RELEASE ORAL DAILY
Status: DISCONTINUED | OUTPATIENT
Start: 2017-09-17 | End: 2017-09-19

## 2017-09-16 RX ORDER — NITROGLYCERIN 0.4 MG/1
0.4 TABLET SUBLINGUAL EVERY 5 MIN PRN
Status: DISCONTINUED | OUTPATIENT
Start: 2017-09-16 | End: 2017-09-28 | Stop reason: HOSPADM

## 2017-09-16 RX ORDER — BUMETANIDE 0.25 MG/ML
1 INJECTION, SOLUTION INTRAMUSCULAR; INTRAVENOUS ONCE
Status: COMPLETED | OUTPATIENT
Start: 2017-09-16 | End: 2017-09-16

## 2017-09-16 RX ORDER — ASPIRIN 81 MG/1
324 TABLET, CHEWABLE ORAL ONCE
Status: COMPLETED | OUTPATIENT
Start: 2017-09-16 | End: 2017-09-16

## 2017-09-16 RX ORDER — OXYCODONE HYDROCHLORIDE 5 MG/1
5 TABLET ORAL EVERY 4 HOURS PRN
Status: DISCONTINUED | OUTPATIENT
Start: 2017-09-16 | End: 2017-09-20

## 2017-09-16 RX ORDER — CARBIDOPA AND LEVODOPA 50; 200 MG/1; MG/1
1 TABLET, EXTENDED RELEASE ORAL 2 TIMES DAILY
Status: DISCONTINUED | OUTPATIENT
Start: 2017-09-16 | End: 2017-09-19

## 2017-09-16 RX ORDER — ATORVASTATIN CALCIUM 40 MG/1
40 TABLET, FILM COATED ORAL
Status: DISCONTINUED | OUTPATIENT
Start: 2017-09-16 | End: 2017-09-28 | Stop reason: HOSPADM

## 2017-09-16 RX ORDER — IPRATROPIUM BROMIDE AND ALBUTEROL SULFATE 2.5; .5 MG/3ML; MG/3ML
1 SOLUTION RESPIRATORY (INHALATION) EVERY 4 HOURS PRN
Status: DISCONTINUED | OUTPATIENT
Start: 2017-09-16 | End: 2017-09-28 | Stop reason: HOSPADM

## 2017-09-16 RX ORDER — ONDANSETRON 2 MG/ML
4 INJECTION INTRAMUSCULAR; INTRAVENOUS EVERY 6 HOURS PRN
Status: DISCONTINUED | OUTPATIENT
Start: 2017-09-16 | End: 2017-09-28 | Stop reason: HOSPADM

## 2017-09-16 RX ORDER — SODIUM CHLORIDE 0.9 % (FLUSH) 0.9 %
10 SYRINGE (ML) INJECTION PRN
Status: DISCONTINUED | OUTPATIENT
Start: 2017-09-16 | End: 2017-09-20 | Stop reason: SDUPTHER

## 2017-09-16 RX ORDER — SODIUM CHLORIDE 0.9 % (FLUSH) 0.9 %
10 SYRINGE (ML) INJECTION EVERY 12 HOURS SCHEDULED
Status: DISCONTINUED | OUTPATIENT
Start: 2017-09-16 | End: 2017-09-20 | Stop reason: SDUPTHER

## 2017-09-16 RX ORDER — LABETALOL HYDROCHLORIDE 5 MG/ML
10 INJECTION, SOLUTION INTRAVENOUS EVERY 4 HOURS PRN
Status: DISCONTINUED | OUTPATIENT
Start: 2017-09-16 | End: 2017-09-17

## 2017-09-16 RX ORDER — ASPIRIN 81 MG/1
81 TABLET, CHEWABLE ORAL DAILY
Status: DISCONTINUED | OUTPATIENT
Start: 2017-09-17 | End: 2017-09-28 | Stop reason: HOSPADM

## 2017-09-16 RX ORDER — BUMETANIDE 0.25 MG/ML
1 INJECTION, SOLUTION INTRAMUSCULAR; INTRAVENOUS 2 TIMES DAILY
Status: DISCONTINUED | OUTPATIENT
Start: 2017-09-16 | End: 2017-09-19

## 2017-09-16 RX ORDER — ACETAMINOPHEN 325 MG/1
650 TABLET ORAL EVERY 4 HOURS PRN
Status: DISCONTINUED | OUTPATIENT
Start: 2017-09-16 | End: 2017-09-27 | Stop reason: SDUPTHER

## 2017-09-16 RX ORDER — BENZTROPINE MESYLATE 2 MG/1
2 TABLET ORAL DAILY
Status: DISCONTINUED | OUTPATIENT
Start: 2017-09-16 | End: 2017-09-19

## 2017-09-16 RX ORDER — PANTOPRAZOLE SODIUM 40 MG/1
40 TABLET, DELAYED RELEASE ORAL
Status: DISCONTINUED | OUTPATIENT
Start: 2017-09-17 | End: 2017-09-28 | Stop reason: HOSPADM

## 2017-09-16 RX ADMIN — ATORVASTATIN CALCIUM 40 MG: 40 TABLET, FILM COATED ORAL at 20:05

## 2017-09-16 RX ADMIN — Medication 10 ML: at 20:06

## 2017-09-16 RX ADMIN — METOPROLOL TARTRATE 25 MG: 25 TABLET ORAL at 20:05

## 2017-09-16 RX ADMIN — ASPIRIN 81 MG 324 MG: 81 TABLET ORAL at 10:46

## 2017-09-16 RX ADMIN — BUMETANIDE 1 MG: 0.25 INJECTION, SOLUTION INTRAMUSCULAR; INTRAVENOUS at 10:48

## 2017-09-16 RX ADMIN — BUMETANIDE 1 MG: 0.25 INJECTION, SOLUTION INTRAMUSCULAR; INTRAVENOUS at 20:05

## 2017-09-16 RX ADMIN — CARBIDOPA AND LEVODOPA 1 TABLET: 50; 200 TABLET, EXTENDED RELEASE ORAL at 20:05

## 2017-09-16 ASSESSMENT — ENCOUNTER SYMPTOMS
SHORTNESS OF BREATH: 1
ABDOMINAL PAIN: 0
VOMITING: 0
BACK PAIN: 0
COUGH: 1
NAUSEA: 1

## 2017-09-16 NOTE — ED TRIAGE NOTES
In to assess pt in room 1. Pt presents to ED with c/o shortness of breath. Pt states that throughout the night he was unable to \"catch his breath\". Pt states he had to get up into the chair so he could breath. Pt states if he lays on his back or his left side he is unable to catch his breath. Pt states if he lays on his left side it isn't as bad but he breathes easier while sitting up. Currently pt is in a semi-maki's position. Pt is alert, oriented. Respirations easy, unlabored. Pt currently denies pain or sob. Pt's bilateral lower extremities is pitting edema, left worse than right.

## 2017-09-16 NOTE — PROGRESS NOTES
Clinical Pharmacy Note    Judah Mauricio is a 80 y.o. male for whom pharmacy has been asked to manage warfarin therapy. Reason for Admission: acute on chronic diastolic heart failure    Consulting Physician: Dr. Buffy Owens  Warfarin dose prior to admission: 5 mg MWF, 7.5 mg TTHSS  Warfarin indication: h/o DVT/PE  Target INR range: 2-3   Outpatient warfarin provider: [facility]    Past Medical History:   Diagnosis Date    CAD (coronary artery disease)     GERD (gastroesophageal reflux disease)     Hypertension     Myocardial infarct ([address]     Pulmonary embolism (HCC)     PVC's (premature ventricular contractions)     Retinal artery thrombosis     Thrombophlebitis     Weakness                 Recent Labs      09/16/17   0858   INR  4.46*     Recent Labs      09/16/17   0858   HGB  14.0   HCT  42.5   PLT  163       Current warfarin drug-drug interactions: aspirin      Date INR Warfarin Dose   9/16/17 4.46 No Coumadin                                   Daily PT/INR until stable within therapeutic range. Thank you for the consult.

## 2017-09-16 NOTE — IP AVS SNAPSHOT
warfarin 7.5 MG tablet   Commonly known as:  COUMADIN   Indications: Cerebrovascular Accident or Stroke Take as directed by [facility]. What changed:  additional instructions   Notes to Patient:    Date           Warfarin Dose  9/28/17       5 mg (already given at hospital)  9/29/17       INR check at [facility]                  5 mg on 9/28/2017  4:29 PM                                 These are medications you told us you were taking at home, CONTINUE taking them after you leave the hospital        Last Dose    Next Dose Due AM NOON PM NIGHT    acetaminophen 500 MG tablet   Commonly known as:  TYLENOL   Take 1,000 mg by mouth every 6 hours as needed. Don't take more than 3,000 mg per day. Indications: Pain                650 mg on 9/25/2017  9:40 PM     As Needed                       aspirin 325 MG tablet   Take 325 mg by mouth daily. With food  Indications: Anticoagulant Therapy                  Tomorrow  9/29/17                            atorvastatin 40 MG tablet   Commonly known as:  LIPITOR   Take 40 mg by mouth every 48 hours. Indications: Blood Cholesterol Abnormal                40 mg on 9/26/2017  7:49 PM     Tonight                          bismuth subsalicylate 108 SJ/28FL suspension   Commonly known as:  PEPTO BISMOL   Take 15 mLs by mouth every 6 hours as needed for Indigestion                  As Needed                       lansoprazole 15 MG delayed release capsule   Commonly known as:  PREVACID   Take 15 mg by mouth daily.  Indications: Gastroesophageal Reflux Disease                  Tomorrow  9/29/17                              These are the medications you have told us you were taking at home, STOP taking them after you leave the hospital     amLODIPine 10 MG tablet   Commonly known as:  NORVASC       benztropine 1 MG tablet   Commonly known as:  COGENTIN       carbidopa-levodopa  MG per extended release tablet   Commonly known as:  SINEMET CR Influenza Virus Vaccine 10/29/2012 -- -- --    Influenza Virus Vaccine 10/1/2011 -- -- --    Pneumococcal Conjugate 7-valent 2015 -- -- --      Last Vitals          Most Recent Value    Temp  98.4 °F (36.9 °C)    Pulse  72    Resp  18    BP  (!)  107/51         After Visit Summary    This summary was created for you. Thank you for entrusting your care to us. The following information includes details about your hospital/visit stay along with steps you should take to help with your recovery once you leave the hospital.  In this packet, you will find information about the topics listed below:    · Instructions about your medications including a list of your home medications  · A summary of your hospital visit  · Follow-up appointments once you have left the hospital  · Your care plan at home      You may receive a survey regarding the care you received during your stay. Your input is valuable to us. We encourage you to complete and return your survey in the envelope provided. We hope you will choose us in the future for your healthcare needs. Patient Information     Patient Name SIERRA Dunn 1930      Care Provided at:     Name Address Phone       [address] [phone]            Your Visit    Here you will find information about your visit, including the reason for your visit. Please take this sheet with you when you visit your doctor or other health care provider in the future. It will help determine the best possible medical care for you at that time. If you have any questions once you leave the hospital, please call the department phone number listed below.         Why you were here     Your primary diagnosis was:  Acute On Chronic Diastolic Heart Failure (Hcc)    Your diagnoses also included:  Loculated Pleural Effusion, Chest Pain, [facility]  [phone]          Follow up with Mike Arreaga MD On 10/23/2017. Specialty:  Neurology    Why:  at 8:45 am    Contact information:    [address] [facility] [place]          Follow up with Arelis Bhatti PA-C On 10/9/2017. Specialties:  Physician Assistant, Emergency Medicine    Why:  Monday October 9th, 2017 at 10:15 am    Contact information:    [place], [address] [address]  [phone]        Future Appointments     Around 9/28/2017     Imaging:  XR CHEST STANDARD (2 VW)        10/5/2017 2:00 PM     Appointment with Sae Santos MD at [facility] ([phone])   [address] [place] [address]  [address]  [facility] [address]       10/9/2017 10:15 AM     Appointment with Arelis Bhatti PA-C at [facility] ([phone])   Please arrive 15 minutes prior to appointment, bring insurance card and photo ID. Please arrive 15 minutes prior to appointment, bring insurance card and photo ID.   [address] [facility] [address]       10/23/2017 8:45 AM     Appointment with Mike Arreaga MD at [facility] ([phone])   Please arrive 15 minutes prior to appointment, bring photo ID and insurance card. [address] [facility] [address]  [facility] [address]       10/23/2017 3:00 PM     Appointment with Nohemi Solomon CNP at [address] [facility] [address] ([phone])   Please arrive 15 minutes prior to appointment, bring photo ID and insurance card. Please arrive 15 minutes prior to appointment, bring photo ID and insurance card.    [place] [address]  [address]  [facility] [address]         Preventive Care        Date Due    Tetanus Combination Vaccine (1 - Tdap) 9/18/1949    Zoster Vaccine 9/18/1990    Pneumococcal Vaccines (two) for all adults aged 72 and over (1 of 2 - PCV13) 9/18/1995                 Care Plan Once You Return Home    This section includes instructions you will need to follow once you leave  History of CVA (cerebrovascular accident)    Hx of CABG    Presence of coronary artery bypass graft stent    Dyslipidemia    Moderate aortic stenosis    Moderate mitral regurgitation    History of TIA (transient ischemic attack)    History of myocardial infarction    Aortic stenosis, severe    Anticoagulated on Coumadin    Left pleural mass    Chest pain    Shortness of breath    Atrial fibrillation (HCC)    Acute on chronic diastolic heart failure (HCC)    Elevated LFTs    Pleural effusion    Slurred speech, POA    Low vitamin D level    Congestive heart failure (HCC)    Protein-calorie malnutrition, severe (HCC)    Acute renal failure, not POA       Isolation/Infection:   Isolation     No Isolation            Nurse Assessment:  Last Vital Signs: /63  Pulse 91  Temp 98.2 °F (36.8 °C) (Oral)   Resp 20  Ht 5' 5.5\" (1.664 m)  Wt 145 lb 3.2 oz (65.9 kg)  SpO2 94%  BMI 23.8 kg/m2    Last documented pain score (0-10 scale): Pain Level: 0  Last Weight:   Wt Readings from Last 1 Encounters:   09/22/17 145 lb 3.2 oz (65.9 kg)     Mental Status:  disoriented     IV Access:  - None    Nursing Mobility/ADLs:  Walking   Assisted  Transfer  Assisted  Bathing  Assisted  Dressing  Assisted  Toileting  Assisted  Feeding  Independent  Med Admin  Assisted  Med Delivery   whole    Wound Care Documentation and Therapy:        Elimination:  Continence:   · Bowel: Yes  · Bladder: No  Urinary Catheter: Insertion Date: 09/28/2017   Colostomy/Ileostomy/Ileal Conduit: No    Date of Last BM: 09/28/2017    Intake/Output Summary (Last 24 hours) at 09/22/17 1218  Last data filed at 09/22/17 0645   Gross per 24 hour   Intake            399.2 ml   Output                0 ml   Net            399.2 ml     I/O last 3 completed shifts: In: 399.2 [P.O.:250; I.V.:149.2]  Out: 0     Safety Concerns:      At Risk for Falls    Impairments/Disabilities:      Speech    Nutrition Therapy:  Current Nutrition Therapy: Oral diet, dental soft with NO straws    Routes of Feeding: Oral  Liquids: Thin Liquids  Daily Fluid Restriction: no  Last Modified Barium Swallow with Video (Video Swallowing Test): not done    Treatments at the Time of Hospital Discharge:   Respiratory Treatments:   Oxygen Therapy:  is not on home oxygen therapy. Ventilator:    - No ventilator support    Rehab Therapies: Physical Therapy, Occupational Therapy and Speech/Language Therapy  Weight Bearing Status/Restrictions: No weight bearing restirctions  Other Medical Equipment (for information only, NOT a DME order):  walker  Other Treatments:       Patient's personal belongings (please select all that are sent with patient):  None    RN SIGNATURE:  Electronically signed by Usha Wick RN on 9/28/17 at 2:22 PM    PHYSICIAN SECTION    Prognosis: Good    Condition at Discharge: Stable    Rehab Potential (if transferring to Rehab): Good    Recommended Labs or Other Treatments After Discharge: PT/INR    Physician Certification: I certify the above information and transfer of Viviana Christy  is necessary for the continuing treatment of the diagnosis listed and that he requires [place] for less 30 days. Update Admission H&P: No change in H&P    PHYSICIAN SIGNATURE:  Electronically signed by Triny Redmond MD on 9/28/17 at 12:41 PM    CASE MANAGEMENT/SOCIAL WORK SECTION    Inpatient Status Date: 09/16/2017    [facility] Readmission Risk Assessment Score:  Risk Score: 14.75   (Score > 14= high risk for readmission)    Discharging to Facility/ Agency   · Name: [facility]  · Address: [address] [facility]  [address]  · Phone:[phone]  · Fax:[phone]    Dialysis Facility (if applicable)   · Name:  · Address:  · Dialysis Schedule:  · Phone:  · Fax:    / signature: Electronically signed by GABRIEL Banks on 9/22/17 at 1:29 PM    Follow up with Dr Cramenza Stevens 2 weeks after discharge ? Review your discharge instructions provided by your caregivers at discharge    Certain functionality such as prescription refills, scheduling appointments or sending messages to your provider are not activated if your provider does not use [employer] in his/her office    For questions regarding your [employer] account call [phone]. If you have a clinical question, please call your doctor's office. The information on all pages of the After Visit Summary has been reviewed with me, the patient and/or responsible adult, by my health care provider(s). I had the opportunity to ask questions regarding this information. I understand I should dispose of my armband safely at home to protect my health information. A complete copy of the After Visit Summary has been given to me, the patient and/or responsible adult. Patient Signature/Responsible Adult:____________________    Clinician Signature:_____________________    Date:_____________________    Time:_____________________        More Information           Heart Failure: Care Instructions  Your Care Instructions    Heart failure occurs when your heart does not pump as much blood as the body needs. Failure does not mean that the heart has stopped pumping but rather that it is not pumping as well as it should. Over time, this causes fluid buildup in your lungs and other parts of your body. Fluid buildup can cause shortness of breath, fatigue, swollen ankles, and other problems. By taking medicines regularly, reducing sodium (salt) in your diet, checking your weight every day, and making lifestyle changes, you can feel better and live longer. Follow-up care is a key part of your treatment and safety. Be sure to make and go to all appointments, and call your doctor if you are having problems. It's also a good idea to know your test results and keep a list of the medicines you take. How can you care for yourself at home?   Medicines suggest a different range of weight gain.) A sudden weight gain may mean that your heart failure is getting worse. Activity level  · Start light exercise (if your doctor says it is okay). Even if you can only do a small amount, exercise will help you get stronger, have more energy, and manage your weight and your stress. Walking is an easy way to get exercise. Start out by walking a little more than you did before. Bit by bit, increase the amount you walk. · When you exercise, watch for signs that your heart is working too hard. You are pushing yourself too hard if you cannot talk while you are exercising. If you become short of breath or dizzy or have chest pain, stop, sit down, and rest.  · If you feel \"wiped out\" the day after you exercise, walk slower or for a shorter distance until you can work up to a better pace. · Get enough rest at night. Sleeping with 1 or 2 pillows under your upper body and head may help you breathe easier. Lifestyle changes  · Do not smoke. Smoking can make a heart condition worse. If you need help quitting, talk to your doctor about stop-smoking programs and medicines. These can increase your chances of quitting for good. Quitting smoking may be the most important step you can take to protect your heart. · Limit alcohol to 2 drinks a day for men and 1 drink a day for women. Too much alcohol can cause health problems. · Avoid getting sick from colds and the flu. Get a pneumococcal vaccine shot. If you have had one before, ask your doctor whether you need another dose. Get a flu shot each year. If you must be around people with colds or the flu, wash your hands often. When should you call for help? Call 911 if you have symptoms of sudden heart failure such as:  · You have severe trouble breathing. · You cough up pink, foamy mucus. · You have a new irregular or rapid heartbeat.   Call your doctor now or seek immediate medical care if:

## 2017-09-16 NOTE — ED PROVIDER NOTES
[facility] Emergency Department    CHIEF COMPLAINT       Chief Complaint   Patient presents with    Shortness of Breath       Nurses Notes reviewed and I agree except as noted in the HPI. HISTORY OF PRESENT ILLNESS    Daphnie Estrada is a 80 y.o. male who presents to the ED for evaluation of shortness of breath and weakness for 2 days. He states that he did have left sided, non-radiating chest pain yesterday similar to chest pain that he has experienced in the past.  He did not take his nitroglycerin for the chest pain, but it went away on its own. He has also been feeling more lightheaded and dizzy and experienced nausea yesterday. He has also been experiencing orthopnea for 2 days. He states that he has had lower extremity edema for 35 years, worse in the left leg, but that it has been worse in the past few days. 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 His last appointment was 3 weeks ago and he states that he was in therapeutic range at that time. He has a history of hypercholesterolemia, multiple cardiac bypass surgeries, cardiac catherization, and cardiac stent placement. He has no history of smoking. His cardiologist is Dr. Gomez Boys    He denies abdominal pain, syncope, falls, headache, visual changes, fever, chills, congestion, emesis, neck or back pain    HPI was provided by the patient. REVIEW OF SYSTEMS     Review of Systems   Constitutional: Negative for chills and fever. HENT: Negative for congestion. Eyes: Negative for visual disturbance. Respiratory: Positive for cough and shortness of breath. Cardiovascular: Positive for chest pain and leg swelling. Gastrointestinal: Positive for nausea. Negative for abdominal pain and vomiting. Musculoskeletal: Negative for back pain and neck pain. Neurological: Positive for dizziness, weakness and light-headedness. Negative for syncope and headaches.    Hematological: Bruises/bleeds carbidopa-levodopa ER (SINEMET CR)  MG per tablet Take 1 tablet by mouth 2 times daily. Indications: Parkinson's Disease      aspirin 325 MG tablet Take 325 mg by mouth daily. With food  Indications: Anticoagulant Therapy      acetaminophen (TYLENOL) 500 MG tablet Take 1,000 mg by mouth every 6 hours as needed. Don't take more than 3,000 mg per day. Indications: Pain      nitroGLYCERIN (NITROSTAT) 0.4 MG SL tablet Place 0.4 mg under the tongue every 5 minutes as needed. Not to exceed 3 doses/15 min- seek medical attention if pain persists. Indications: Chest Pain      isosorbide mononitrate (IMDUR) 30 MG CR tablet Take 1 tablet by mouth daily. Qty: 90 tablet, Refills: 3      lansoprazole (PREVACID) 15 MG capsule Take 15 mg by mouth daily. Indications: Gastroesophageal Reflux Disease       !! - Potential duplicate medications found. Please discuss with provider. ALLERGIES     is allergic to iodine. FAMILY HISTORY     indicated that his mother is . He indicated that his father is . He indicated that both of his sisters are . He indicated that his maternal grandmother is . He indicated that his maternal grandfather is . He indicated that his paternal grandmother is . He indicated that his paternal grandfather is . family history includes Heart Disease in his father, mother, sister, and sister. SOCIAL HISTORY      reports that he has never smoked. He does not have any smokeless tobacco history on file. He reports that he does not drink alcohol or use illicit drugs. PHYSICAL EXAM     INITIAL VITALS:  height is 5' 5.5\" (1.664 m) and weight is 149 lb 4.8 oz (67.7 kg). His oral temperature is 97.8 °F (36.6 °C). His blood pressure is 151/81 (abnormal) and his pulse is 87. His respiration is 20 and oxygen saturation is 94%. Physical Exam   Constitutional: He is oriented to person, place, and time.  He appears well-developed and well-nourished. No distress. HENT:   Head: Normocephalic and atraumatic. Right Ear: External ear normal.   Left Ear: External ear normal.   Eyes: Conjunctivae are normal. Right eye exhibits no discharge. Left eye exhibits no discharge. Neck: Normal range of motion. Cardiovascular: S2 normal and intact distal pulses. An irregularly irregular rhythm present. Exam reveals no gallop and no friction rub. Murmur heard. Systolic murmur is present with a grade of 3/6   Pulses:       Radial pulses are 2+ on the right side, and 2+ on the left side. Dorsalis pedis pulses are 2+ on the right side, and 2+ on the left side. Posterior tibial pulses are 1+ on the right side Left posterior tibial pulse not accessible. Left PT pulse could not be palpated secondary to lower extremity edema    Bilateral lower extremity pitting edema, left 2+, right 1+ from the level of the ankle up to the knee. No swelling present in the feet   Pulmonary/Chest: Effort normal. No respiratory distress. He has decreased breath sounds in the right upper field, the right middle field, the right lower field, the left upper field, the left middle field and the left lower field. He has no wheezes. He has no rales. He exhibits no tenderness and no bony tenderness. Abdominal: Soft. He exhibits no distension. There is no tenderness. There is no guarding. Musculoskeletal: Normal range of motion. He exhibits no edema, tenderness or deformity. Cervical back: Normal. He exhibits normal range of motion, no tenderness, no bony tenderness and no pain. Thoracic back: Normal. He exhibits normal range of motion, no tenderness, no bony tenderness and no pain. Lumbar back: Normal. He exhibits normal range of motion, no tenderness, no bony tenderness and no pain. Neurological: He is alert and oriented to person, place, and time. Resting tremor of the right hand   Skin: Skin is warm and dry. No rash noted.  He is not 32.9 (*)     RDW 15.4 (*)     Lymphocytes # 0.9 (*)     All other components within normal limits   APTT - Abnormal; Notable for the following:     aPTT 43.4 (*)     All other components within normal limits   PROTIME-INR - Abnormal; Notable for the following:     INR 4.46 (*)     All other components within normal limits   BASIC METABOLIC PANEL - Abnormal; Notable for the following:     Glucose 113 (*)     BUN 27 (*)     All other components within normal limits   HEPATIC FUNCTION PANEL - Abnormal; Notable for the following:      Total Bilirubin 1.3 (*)     Alkaline Phosphatase 129 (*)     ALT <5 (*)     All other components within normal limits   BRAIN NATRIURETIC PEPTIDE - Abnormal; Notable for the following:     Pro-BNP 2231.0 (*)     All other components within normal limits   TROPONIN   TSH WITHOUT REFLEX   GLOMERULAR FILTRATION RATE, ESTIMATED   OSMOLALITY   ANION GAP   TROPONIN   TROPONIN       EMERGENCY DEPARTMENT COURSE:   Vitals:    Vitals:    09/16/17 0827 09/16/17 1050 09/16/17 1144   BP: (!) 142/91 117/86 (!) 151/81   Pulse: 95 94 87   Resp: 18 20 20   Temp: 97.6 °F (36.4 °C)  97.8 °F (36.6 °C)   TempSrc: Oral  Oral   SpO2: 93% 94% 94%   Weight: 155 lb (70.3 kg)  149 lb 4.8 oz (67.7 kg)   Height: 5' 5.5\" (1.664 m)  5' 5.5\" (1.664 m)       MDM    Medications   atorvastatin (LIPITOR) tablet 40 mg (not administered)   benztropine (COGENTIN) tablet 2 mg (not administered)   bismuth subsalicylate (PEPTO BISMOL) 262 MG/15ML suspension 15 mL (not administered)   carbidopa-levodopa (SINEMET CR)  MG per extended release tablet 1 tablet (not administered)   isosorbide mononitrate (IMDUR) extended release tablet 30 mg (not administered)   pantoprazole (PROTONIX) tablet 40 mg (not administered)   sodium chloride flush 0.9 % injection 10 mL (not administered)   sodium chloride flush 0.9 % injection 10 mL (not administered)   acetaminophen (TYLENOL) tablet 650 mg (not administered)   oxyCODONE (ROXICODONE) immediate release tablet 5 mg (not administered)   magnesium hydroxide (MILK OF MAGNESIA) 400 MG/5ML suspension 30 mL (not administered)   ondansetron (ZOFRAN) injection 4 mg (not administered)   aspirin chewable tablet 81 mg (not administered)   nitroGLYCERIN (NITROSTAT) SL tablet 0.4 mg (not administered)   metoprolol tartrate (LOPRESSOR) tablet 25 mg (not administered)   labetalol (NORMODYNE;TRANDATE) injection 10 mg (not administered)   bumetanide (BUMEX) injection 1 mg (not administered)   bumetanide (BUMEX) injection 1 mg (1 mg Intravenous Given 9/16/17 1048)   aspirin chewable tablet 324 mg (324 mg Oral Given 9/16/17 1046)        The patient was seen adn evaluated within the ED today with shortness of breath and weakness. Within the department I observed the patient's vital signs to be within acceptable range. On exam, I appreciated an irregularly irregular rhythm with a holosystolic murmur, decreased breath sounds during ausculation with no adventitious sounds, 2+ left lower extremity pitting edema and 1+ right lower extremity pitting edema. Radiological studies within the department reveled worsening bilateral lung parenchyma that indicates bibasilar atelectasis or bibasilar pneumonia and possible loculated pleural effusions. Laboratory work reveled BNP 2231, INR 4.46 and WBC within normal limits. I spoke with Dr. Pilo Christensen and the decision was made to admit the patient. I observed the patient's condition to remain stable during the duration of the stay and I explained my proposed course of treatement to the patient, who was amenable to my decision. Patient care was transferred over to the [address] [place] [address].  Patient was seen independently by myself. The patient's final impression and disposition and plan was determined by myself. CRITICAL CARE:   None    CONSULTS:  Dr. Vivian Arango:  None    FINAL IMPRESSION      1. Chest pain, unspecified type    2. Orthopnea    3.  Congestive heart failure, unspecified congestive heart failure chronicity, unspecified congestive heart failure type ([facility] [address]    4. Atrial fibrillation, unspecified type [facility])          DISPOSITION/PLAN   Patient admitted to Dr. Charmaine Infante in stable condition. PATIENT REFERRED TO:  Nikunj Corral MD  [facility]  [address]  [facility], [place], operated by [facility]  [phone]            DISCHARGE MEDICATIONS:  Current Discharge Medication List          (Please note that portions of this note were completed with a voice recognition program.  Efforts were made to edit the dictations but occasionally words are mis-transcribed.)      Provider:  I personally performed the services described in the documentation, reviewed and edited the documentation which was dictated to the scribe in my presence, and it accurately records my words and actions.     Mc Mena, RUSLAN 09/16/17 12:43 PM    Jacqueline Mena, NP         Jacqueline Mena, NP  09/16/17 1250

## 2017-09-16 NOTE — H&P
left.    Activities: Up with assist  Prophylaxis: On coumadin  Code status: Full    ==========================================================  Chief complaint:  Chief Complaint   Patient presents with    Shortness of Breath       History of Presenting Illness: This is a pleasant 80 y.o. male with history of thrombosis (PE and DVT), on chronic coumadin therapy (elevated INR of 4.46 today) who presents to ER with complaints of worsening shortness of breath. He describes exertional chest pain and dyspnea, started about 2 days ago. He has had orthopnea, PND, as well as worsening of baseline lower extremity edema (he generally has bilateral lower extremity edema, worse on left from venous harvest for CABG in the past). He has not had new cough or fever or chills. 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 In the ER, he was noted to have new-onset atrial fibrillation. He is quite active at baseline. Past Medical History:      Diagnosis Date    CAD (coronary artery disease)     GERD (gastroesophageal reflux disease)     Hypertension     Myocardial infarct ([address]     Pulmonary embolism (HCC)     PVC's (premature ventricular contractions)     Retinal artery thrombosis     Thrombophlebitis     Weakness        Past Surgical History:      Procedure Laterality Date    APPENDECTOMY      CARDIAC CATHETERIZATION  01/16/2012    Status post successful PCI of SVG to OM2 branch with stent in the proximal area. Stent in the  mid area and balloon angioplasty of the distal anastomotic site.  CARDIAC CATHETERIZATION      CORONARY ARTERY BYPASS GRAFT      X2 1982 1984    HERNIA REPAIR      TRANSTHORACIC ECHOCARDIOGRAM  01/10/2012    LV was mildly dilated. Systolic function was normal. EF was estimated in  the range of 55-65%.  There were no regional wall motion abnormalities. Wall thickness was normal.       Medications (prior to admission):  Prior to Admission medications    Medication Sig Start Date End Date Taking? Authorizing Provider   warfarin (COUMADIN) 5 MG tablet Take as directed by [facility], 45 tablets for 90 days 8/14/17  Yes Sheial Rey MD   warfarin (COUMADIN) 7.5 MG tablet Indications: Cerebrovascular Accident or Stroke Take as directed by [facility]. 8/14/17 8/14/18 Yes Sheila Rey MD   benztropine (COGENTIN) 1 MG tablet Take 2 mg by mouth daily Indications: Patient is on this for a 30 day trial starting 6/12/17 6/12/17  Yes Historical Provider, MD   bismuth subsalicylate (PEPTO BISMOL) 262 MG/15ML suspension Take 15 mLs by mouth every 6 hours as needed for Indigestion   Yes Historical Provider, MD   atorvastatin (LIPITOR) 40 MG tablet Take 40 mg by mouth every 48 hours. Indications: Blood Cholesterol Abnormal 10/16/14  Yes Historical Provider, MD   amLODIPine (NORVASC) 10 MG tablet Take 10 mg by mouth daily. Indications: Raised Blood Pressure 1/10/15  Yes Historical Provider, MD   carbidopa-levodopa ER (SINEMET CR)  MG per tablet Take 1 tablet by mouth 2 times daily. Indications: Parkinson's Disease 12/9/13  Yes Historical Provider, MD   aspirin 325 MG tablet Take 325 mg by mouth daily. With food  Indications: Anticoagulant Therapy   Yes Historical Provider, MD   acetaminophen (TYLENOL) 500 MG tablet Take 1,000 mg by mouth every 6 hours as needed. Don't take more than 3,000 mg per day. Indications: Pain   Yes Historical Provider, MD   nitroGLYCERIN (NITROSTAT) 0.4 MG SL tablet Place 0.4 mg under the tongue every 5 minutes as needed. Not to exceed 3 doses/15 min- seek medical attention if pain persists. Indications: Chest Pain   Yes Historical Provider, MD   isosorbide mononitrate (IMDUR) 30 MG CR tablet Take 1 tablet by mouth daily.  4/26/12  Yes Nasir Mc MD   lansoprazole (PREVACID) 15 MG capsule Take 15 mg X 2.1 CM, POSSIBLY REPRESENTING LOCULATED PLEURAL EFFUSION THOUGH POORLY DEFINED. SOME CHARACTERISTICS OF REMOTE FRACTURES OF THE ADJACENT RIBS, DIFFICULT TO CONFIRM. AGE-INDETERMINATE COMPRESSION DEFORMITY OF THE T12 VERTEBRAL BODY. **This report has been created using voice recognition software. It may contain minor errors which are inherent in voice recognition technology. ** Final report electronically signed by Dr. Otelia Boast on 9/16/2017 9:35 AM    Ct Chest Wo Contrast  Result Date: 9/16/2017  BILATERAL PLEURAL EFFUSIONS INCLUDING CHARACTERISTICS OF PARTIALLY LOCULATED PLEURAL FLUID ON THE LEFT WITHIN THE POSTERIOR MAJOR FISSURE, CORRESPONDING TO THE AREA OF CONCERN ON THE PRIOR CHEST RADIOGRAPH. NONCONTRAST IMAGING LIMITS EVALUATION OF THE PLEURAL SURFACES SUCH THAT ANALYSIS OF THE PLEURAL EFFUSIONS IS SLIGHTLY LIMITED. COMPRESSION ATELECTASIS EVIDENT IN BOTH LUNGS AND THERE ARE AREAS OF POSSIBLE FIBROSIS IN THE LEFT LOWER LOBE. THERE ARE CALCIFIC PLEURAL PLAQUE IN THE INFERIOR LATERAL LEFT HEMITHORAX TO INDICATE SEQUELAE OF REMOTE PLEURAL INFLAMMATORY PROCESS. CHARACTERISTICS OF LIKELY CONGESTIVE HEART FAILURE WITHOUT DEFINITE PULMONARY EDEMA. **This report has been created using voice recognition software. It may contain minor errors which are inherent in voice recognition technology. ** Final report electronically signed by Dr. Otelia Boast on 9/16/2017 3:23 PM    EKG: Atrial fibrillation, rate 87 BPM. No ST/T changes. I reviewed EKG. Discussed with ER provider.     Thank you Fara Swain MD for the opportunity to be involved in this patient's care.    -----------------------------  Leana Concepcion MD  [facility] Emergency Department hospitalist

## 2017-09-16 NOTE — IP AVS SNAPSHOT
Patient Information     Patient Name SIERRA Dunn 1930      WARFARIN INFORMATION       What is the most important information you should know about warfarin? Warfarin is a medicine that you take to prevent blood clots. It is often called a blood thinner. Doctors give warfarin (such as Coumadin) to reduce the risk of blood clots. Warfarin/Coumadin Instructions:  ? It is important to take your anticoagulant medication as instructed, and notify your physician if you are unable to for any reason. ? Complete any blood tests ordered for monitoring your medication; such as PT/INR, so your physician can adjust the dose if necessary. ? Eat a consistent amount of foods with Vitamin K, such as leafy greens. ? Avoid major changes in your dietary habits, or notify your health professional before changing habits. ? Follow up with your health care provider or clinic as directed by your physician for monitoring your anticoagulant medication. ? Diet and medication can affect your anticoagulant and PT/INR blood test.   ? Do not take or discontinue any medication or over-the-counter medication except on the advice of your physician or pharmacist.   ? Anticoagulants can increase the risk of bleeding.

## 2017-09-17 ENCOUNTER — APPOINTMENT (OUTPATIENT)
Dept: ULTRASOUND IMAGING | Age: 82
DRG: 034 | End: 2017-09-17
Payer: MEDICARE

## 2017-09-17 ENCOUNTER — APPOINTMENT (OUTPATIENT)
Dept: CT IMAGING | Age: 82
DRG: 034 | End: 2017-09-17
Payer: MEDICARE

## 2017-09-17 PROBLEM — R79.89 ELEVATED LFTS: Status: ACTIVE | Noted: 2017-09-17

## 2017-09-17 LAB
ALBUMIN SERPL-MCNC: 4.1 G/DL (ref 3.5–5.1)
ALP BLD-CCNC: 139 U/L (ref 38–126)
ALT SERPL-CCNC: < 5 U/L (ref 11–66)
ANION GAP SERPL CALCULATED.3IONS-SCNC: 14 MEQ/L (ref 8–16)
ANISOCYTOSIS: ABNORMAL
AST SERPL-CCNC: 15 U/L (ref 5–40)
BASOPHILS # BLD: 0.9 %
BASOPHILS ABSOLUTE: 0.1 THOU/MM3 (ref 0–0.1)
BILIRUB SERPL-MCNC: 1.6 MG/DL (ref 0.3–1.2)
BUN BLDV-MCNC: 21 MG/DL (ref 7–22)
CALCIUM SERPL-MCNC: 9.3 MG/DL (ref 8.5–10.5)
CHLORIDE BLD-SCNC: 97 MEQ/L (ref 98–111)
CHOLESTEROL, TOTAL: 139 MG/DL (ref 100–199)
CO2: 28 MEQ/L (ref 23–33)
CREAT SERPL-MCNC: 0.9 MG/DL (ref 0.4–1.2)
EOSINOPHIL # BLD: 2.3 %
EOSINOPHILS ABSOLUTE: 0.1 THOU/MM3 (ref 0–0.4)
GFR SERPL CREATININE-BSD FRML MDRD: 80 ML/MIN/1.73M2
GLUCOSE BLD-MCNC: 87 MG/DL (ref 70–108)
HAV IGM SER IA-ACNC: NEGATIVE
HCT VFR BLD CALC: 43.3 % (ref 42–52)
HDLC SERPL-MCNC: 68 MG/DL
HEMOGLOBIN: 14.6 GM/DL (ref 14–18)
HEPATITIS B CORE IGM ANTIBODY: NEGATIVE
HEPATITIS B SURFACE ANTIGEN: NEGATIVE
HEPATITIS C ANTIBODY: NEGATIVE
INR BLD: 4.16 (ref 0.85–1.13)
LDL CHOLESTEROL CALCULATED: 60 MG/DL
LYMPHOCYTES # BLD: 11.5 %
LYMPHOCYTES ABSOLUTE: 0.7 THOU/MM3 (ref 1–4.8)
MAGNESIUM: 2.1 MG/DL (ref 1.6–2.4)
MCH RBC QN AUTO: 29 PG (ref 27–31)
MCHC RBC AUTO-ENTMCNC: 33.7 GM/DL (ref 33–37)
MCV RBC AUTO: 86 FL (ref 80–94)
MONOCYTES # BLD: 9.8 %
MONOCYTES ABSOLUTE: 0.6 THOU/MM3 (ref 0.4–1.3)
NUCLEATED RED BLOOD CELLS: 0 /100 WBC
PDW BLD-RTO: 15.3 % (ref 11.5–14.5)
PLATELET # BLD: 168 THOU/MM3 (ref 130–400)
PMV BLD AUTO: 8.9 MCM (ref 7.4–10.4)
POTASSIUM SERPL-SCNC: 3.8 MEQ/L (ref 3.5–5.2)
RBC # BLD: 5.03 MILL/MM3 (ref 4.7–6.1)
RBC # BLD: NORMAL 10*6/UL
SEG NEUTROPHILS: 75.5 %
SEGMENTED NEUTROPHILS ABSOLUTE COUNT: 4.9 THOU/MM3 (ref 1.8–7.7)
SODIUM BLD-SCNC: 139 MEQ/L (ref 135–145)
TOTAL PROTEIN: 7 G/DL (ref 6.1–8)
TRIGL SERPL-MCNC: 53 MG/DL (ref 0–199)
WBC # BLD: 6.5 THOU/MM3 (ref 4.8–10.8)

## 2017-09-17 PROCEDURE — 80053 COMPREHEN METABOLIC PANEL: CPT

## 2017-09-17 PROCEDURE — 83735 ASSAY OF MAGNESIUM: CPT

## 2017-09-17 PROCEDURE — 2580000003 HC RX 258: Performed by: INTERNAL MEDICINE

## 2017-09-17 PROCEDURE — 36415 COLL VENOUS BLD VENIPUNCTURE: CPT

## 2017-09-17 PROCEDURE — 1200000003 HC TELEMETRY R&B

## 2017-09-17 PROCEDURE — 70450 CT HEAD/BRAIN W/O DYE: CPT

## 2017-09-17 PROCEDURE — 99233 SBSQ HOSP IP/OBS HIGH 50: CPT | Performed by: INTERNAL MEDICINE

## 2017-09-17 PROCEDURE — 80074 ACUTE HEPATITIS PANEL: CPT

## 2017-09-17 PROCEDURE — 6370000000 HC RX 637 (ALT 250 FOR IP): Performed by: INTERNAL MEDICINE

## 2017-09-17 PROCEDURE — 99223 1ST HOSP IP/OBS HIGH 75: CPT | Performed by: INTERNAL MEDICINE

## 2017-09-17 PROCEDURE — 85610 PROTHROMBIN TIME: CPT

## 2017-09-17 PROCEDURE — 2500000003 HC RX 250 WO HCPCS: Performed by: INTERNAL MEDICINE

## 2017-09-17 PROCEDURE — 99231 SBSQ HOSP IP/OBS SF/LOW 25: CPT | Performed by: PHYSICIAN ASSISTANT

## 2017-09-17 PROCEDURE — 80061 LIPID PANEL: CPT

## 2017-09-17 PROCEDURE — 85025 COMPLETE CBC W/AUTO DIFF WBC: CPT

## 2017-09-17 RX ORDER — LISINOPRIL 5 MG/1
5 TABLET ORAL DAILY
Status: DISCONTINUED | OUTPATIENT
Start: 2017-09-17 | End: 2017-09-18

## 2017-09-17 RX ORDER — AMLODIPINE BESYLATE 5 MG/1
5 TABLET ORAL DAILY
Status: DISCONTINUED | OUTPATIENT
Start: 2017-09-17 | End: 2017-09-18

## 2017-09-17 RX ADMIN — CARBIDOPA AND LEVODOPA 1 TABLET: 50; 200 TABLET, EXTENDED RELEASE ORAL at 10:30

## 2017-09-17 RX ADMIN — BUMETANIDE 1 MG: 0.25 INJECTION, SOLUTION INTRAMUSCULAR; INTRAVENOUS at 19:05

## 2017-09-17 RX ADMIN — METOPROLOL TARTRATE 25 MG: 25 TABLET ORAL at 10:30

## 2017-09-17 RX ADMIN — Medication 10 ML: at 20:32

## 2017-09-17 RX ADMIN — ASPIRIN 81 MG: 81 TABLET, CHEWABLE ORAL at 10:30

## 2017-09-17 RX ADMIN — Medication 10 ML: at 10:39

## 2017-09-17 RX ADMIN — PANTOPRAZOLE SODIUM 40 MG: 40 TABLET, DELAYED RELEASE ORAL at 06:25

## 2017-09-17 RX ADMIN — BENZTROPINE MESYLATE 2 MG: 2 TABLET ORAL at 10:30

## 2017-09-17 RX ADMIN — LISINOPRIL 5 MG: 5 TABLET ORAL at 12:16

## 2017-09-17 RX ADMIN — BUMETANIDE 1 MG: 0.25 INJECTION, SOLUTION INTRAMUSCULAR; INTRAVENOUS at 10:30

## 2017-09-17 NOTE — PROGRESS NOTES
Hospital Medicine Progress Note     Date:  9/17/2017    PCP: Waqas Rodriguez MD (Tel: [phone])    Date of Admission: 9/16/2017      Assessment:  Principal Problem:    Acute on chronic diastolic heart failure (HCC)  Active Problems:    Left pleural mass    Atrial fibrillation (HCC)    Chest pain    Shortness of breath    CAD (coronary artery disease)    Essential hypertension    History of thrombosis - PE, DVT    History of CVA (cerebrovascular accident)    Hx of CABG    Presence of coronary artery bypass graft stent        Plan:  1. CHF exacerbation. Continue diuretics, monitor ins/out. Overall, diuresing well. On BB. Will add low dose ACEi today. 2. Bilateral pleural effusion, secondary to CHF. Does have noted loculated pleural effusion for which pulm has been consulted to assist with eval, although I suspect this is secondary to CHF still. Procalcitonin is normal, no leukocytosis or fever or any findings consistent with infection. 3. New-onset atrial fibrillation. Rate-controlled. On BB, chronically anticoagulated with coumadin. 4. Chest pain. Serial troponin negative. Cardio considering ischemia evaluation. On aspirin, BB and statin. LDL is at goal.  5. History of CAD s/p PCI, CABG. On ASA, BB, statin. 6. Hypertension. Continue current antihypertensives as ordered. 7. 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 Has baseline left lower ext > right. Coumadin on hold per supratherapeutic INR. Diet: DIET LOW SODIUM 2 GM; 2000 ml    Code status: Full Code     ----------  Subjective  Breathing is better. No new issues. Objective  Physical exam:  Vitals: /63  Pulse 67  Temp 97.8 °F (36.6 °C) (Oral)   Resp 14  Ht 5' 5.5\" (1.664 m)  Wt 140 lb (63.5 kg)  SpO2 92%  BMI 22.94 kg/m2  Gen: Not in distress. Alert. Head: Normocephalic. Atraumatic. Eyes: EOMI. Good acuity. ENT: Oral mucosa moist  Neck: No JVD. No obvious thyromegaly.   CVS: Nml S1S2, no MRG, RRR  Pulmomary: Occasional bibasilar crackles; mostly clear otherwise. Gastrointestinal: Soft, NT/ND. Positive bowel sounds. Musculoskeletal: Bilateral LE edema, L>R, improved. Warm  Neuro: No focal deficit. Moves extremity spontaneously. Psychiatry: Appropriate affect. Not agitated. Skin: Warm, dry with normal turgor. No rash      24HR INTAKE/OUTPUT:    Intake/Output Summary (Last 24 hours) at 09/17/17 0812  Last data filed at 09/17/17 0729   Gross per 24 hour   Intake              500 ml   Output             2775 ml   Net            -2275 ml     I/O last 3 completed shifts: In: 500 [P.O.:500]  Out: 2625 [Urine:2625]  I/O this shift:  In: -   Out: 150 [Urine:150]      Meds:    atorvastatin  40 mg Oral Q48H    benztropine  2 mg Oral Daily    carbidopa-levodopa  1 tablet Oral BID    isosorbide mononitrate  30 mg Oral Daily    pantoprazole  40 mg Oral QAM AC    sodium chloride flush  10 mL Intravenous 2 times per day    aspirin  81 mg Oral Daily    metoprolol tartrate  25 mg Oral BID    bumetanide  1 mg Intravenous BID    warfarin (COUMADIN) daily dosing (placeholder)   Other RX Placeholder       Infusions:        PRN Meds: bismuth subsalicylate, sodium chloride flush, acetaminophen, oxyCODONE, magnesium hydroxide, ondansetron, nitroGLYCERIN, labetalol, ipratropium-albuterol    Labs/imaging:  CBC:   Recent Labs      09/16/17   0858  09/17/17   0508   WBC  6.7  6.5   HGB  14.0  14.6   PLT  163  168         BMP:  Recent Labs      09/16/17   0858  09/17/17   0508   NA  142  139   K  4.0  3.8   CL  103  97*   CO2  26  28   BUN  27*  21   CREATININE  0.8  0.9   GLUCOSE  113*  87         Hepatic: Recent Labs      09/16/17   0858  09/17/17   0508   AST  12  15   ALT  <5*  <5*   BILITOT  1.3*  1.6*   ALKPHOS  129*  139*       INR:   Recent Labs      09/16/17   0858  09/17/17   0508   INR  4.46*  4.16*     Reviewed imaging and reports noted.     Gadiel Matos MD  -------------------------------  [facility] hospitalist

## 2017-09-17 NOTE — CONSULTS
for: PH, PO2, PCO2, HCO3, O2SAT  No results found for: IFIO2, MODE, SETTIDVOL, SETPEEP  CBC  Recent Labs      17   0858  17   0508   WBC  6.7  6.5   RBC  4.89  5.03   HGB  14.0  14.6   HCT  42.5  43.3   MCV  86.8  86.0   MCH  28.6  29.0   MCHC  32.9*  33.7   RDW  15.4*  15.3*   PLT  163  168   MPV  8.8  8.9      BMP  Recent Labs      17   0858  17   0508  17   0829   NA  142  139   --    K  4.0  3.8   --    CL  103  97*   --    CO2  26  28   --    BUN  27*  21   --    CREATININE  0.8  0.9   --    GLUCOSE  113*  87   --    MG   --    --   2.1   CALCIUM  9.4  9.3   --      LFT  Recent Labs      17   0858  17   0508   AST  12  15   ALT  <5*  <5*   BILITOT  1.3*  1.6*   ALKPHOS  129*  139*     TROP  Lab Results   Component Value Date    TROPONINT < 0.010 2017    TROPONINT < 0.010 2017    TROPONINT < 0.010 2017     BNP     2017 08:58   Pro-BNP 2231.0 (H)     Lactic Acid  No results for input(s): LACTA in the last 72 hours. INR  Recent Labs      17   0858  17   0508   INR  4.46*  4.16*     PTT  Recent Labs      17   0858   APTT  43.4*     Glucose  No results for input(s): POCGLU in the last 72 hours. UA No results for input(s): SPECGRAV, PHUR, COLORU, CLARITYU, MUCUS, PROTEINU, BLOODU, RBCUA, WBCUA, BACTERIA, NITRU, GLUCOSEU, BILIRUBINUR, UROBILINOGEN, KETUA, LABCAST, LABCASTTY, AMORPHOS in the last 72 hours. Invalid input(s): CRYSTALS.   PFTs   Per pt - Has never had any   Echo    2013  [address] [facility]  [address] [address], [address]  Phone: [phone]  Fax: [phone]    Transthoracic Echocardiogram  2D, M-mode, Doppler, and Color Doppler    Name: Lynette Patel  : [id number]  MR #: [id number]  Study date: 2013  Account #: [de-identified]  Ht-Wt-BSA: 66 in- 164.6 lb- 1.84 mÂ²    Reading Physician: Nicki Ibarra MD  Technologist: Oren Fonseca, [facility]  Referring Physician: Nicki Ibarra MD [place] for study: aortic stenosis    HISTORY: PRIOR HISTORY: CAD, HTN, PVC's, old MI, GERD, PE  /PROCEDURE: The procedure was performed in outpatient. This was a routine study. Diastolic blood pressure was 65 mmHg, at the start of the study. Image quality  was adequate. LEFT VENTRICLE: Size was normal. Systolic function was normal. Ejection  fraction was estimated to be 55 %. There were no regional wall motion  abnormalities. DOPPLER: Features were consistent with a pseudonormal left  ventricular filling pattern, with concomitant abnormal relaxation and increased  filling pressure (grade 2 diastolic dysfunction). AORTIC VALVE: The valve was trileaflet. Leaflets exhibited moderately increased  thickness, moderate calcification, and mildly reduced cuspal separation. DOPPLER: Transaortic velocity was increased due to valvular stenosis. There was  moderate stenosis. There was no regurgitation. AORTA: The root exhibited normal size. MITRAL VALVE: There was mild calcification of the anterior and posterior  leaflets. There was normal leaflet separation. DOPPLER: There was no evidence  for stenosis. There was moderate regurgitation. Regurgitation grade was 3+ on a  scale of 0 to 4+. The regurgitant jet was eccentric. LEFT ATRIUM: The atrium was mildly to moderately dilated. RIGHT VENTRICLE: The ventricle was mildly dilated. Systolic function was  normal. Wall thickness was normal.    PULMONIC VALVE: DOPPLER: There was mild regurgitation. PULMONARY ARTERY: DOPPLER: Systolic pressure was within the normal range. TRICUSPID VALVE: There was normal leaflet separation. DOPPLER: There was no  evidence for stenosis. There was mild regurgitation. RIGHT ATRIUM: Size was normal.    SYSTEMIC VEINS: IVC: The inferior vena cava was normal in size. PERICARDIUM: There was no pericardial effusion. The pericardium was normal in  appearance.     MEASUREMENT TABLES    DOPPLER MEASUREMENTS  Aortic valve   (Reference normals)  Peak sony   320 cm/s   (--)  Peak gradient   42 mmHg   (--)  Mean gradient   22 mmHg   (--)  Valve area, cont   0.84 cmÂ²   (--)    SYSTEM MEASUREMENT TABLES    2D mode  LA Dimension (2D): 4.4 cm  FS (2D-Cubed): 26 %  FS (2D-Teich): 26 %  IVSd (2D): 1 cm  IVSd; Mean chosen (2D): 1 cm  LVIDd (2D): 5.3 cm  LVIDs (2D): 3.9 cm  LVOT Area (2D): 3.1 cm2  LVPWd (2D): 1 cm  RVIDd (2D): 3.1 cm    M mode  AoR Diam (MM): 3.4 cm  AV Cusp Sep (MM): 1.6 cm  MV D-E Exc: 1.9 cm  MV EF Power (MM): 9.6 cm/s    Unspecified Scan Mode  Vmax; Antegrade Flow: 3.2 m/s  LVOT Diam: 2 cm  LVOT Vmax: 78.3 cm/s  MV Peak A Sony; Mean: 73.4 cm/s  MV Peak E Sony; Antegrade Flow: 117 cm/s  MVA (PHT): 3.6 cm2  VTI; Antegrade Flow: 34.9 cm  VTI; Mean; Antegrade Flow: 34.9 cm  Vmax; Antegrade Flow: 124 cm/s  TV Peak A Sony: 56.2 cm/s  TV Peak E Sony; Antegrade Flow: 71.3 cm/s    SUMMARY:    Left ventricle:  Size was normal.  Systolic function was normal. Ejection fraction was estimated to be 55 %. There were no regional wall motion abnormalities. Features were consistent with a pseudonormal left ventricular filling pattern,  with concomitant abnormal relaxation and increased filling pressure (grade 2  diastolic dysfunction). New echo ordered- results pending   Cultures    Procalcitonin  Lab Results   Component Value Date    PROCAL 0.05 09/16/2017        Radiology    CXR  Sep 16, 2017  INTERVAL WORSENING OF BILATERAL LUNG PARENCHYMA TO INDICATE BIBASILAR ATELECTASIS OR BIBASILAR PNEUMONIA OR COMBINATION. SMALL BILATERAL PLEURAL EFFUSIONS ARE ALSO PRESENT.       INTERVAL DEVELOPMENT OF A PLEURAL-BASED CRESCENT-SHAPED LESION ALONG THE LATERAL LEFT HEMITHORAX, 6.0 X 2.1 CM, POSSIBLY REPRESENTING LOCULATED PLEURAL EFFUSION THOUGH POORLY DEFINED. SOME CHARACTERISTICS OF REMOTE FRACTURES OF THE ADJACENT RIBS,    DIFFICULT TO CONFIRM. AGE-INDETERMINATE COMPRESSION DEFORMITY OF THE T12 VERTEBRAL BODY.        CT Scans    CT Chest WO contrast malignancy  Will continue current interventions with diuresis  Thoracentesis to be done if need it for lack of improvement or worsening in respiratory condition  Increase activity after cardiac w/u completed    Patient seen and examined independently by me. Above discussed and I agree with Reid Nick CNP note Also see my additional comments. Labs, cultures, and radiographs when available were reviewed. Changes were made in the orders as necessary. I discussed patient concerns with Karlee DAHL and instructions were given. Respiratory care issues addressed. Please see our orders for the updated patient care plan.     Electronically signed by     Amado Covarrubias MD on 9/17/2017 at 12:07 PM

## 2017-09-17 NOTE — PROGRESS NOTES
Cardiology Progress Note      Patient:  Lucio Washington  YOB: 1930  MRN: [id number]   Acct: [de-identified]  Admit Date:  9/16/2017  Primary Cardiologist: Mima Luna MD  Seen by dr Lizette Guan    Reason for consult - new afib, chf    hpi per dr Lizette Guan \"The patient is a 80 y.o. male patient who is seen foe evaluation of CHF. Per H&P, This is a pleasant 80 y. o. male with history of thrombosis (PE and DVT), on chronic coumadin therapy (elevated INR of 4.46 today) who presents to ER with complaints of worsening shortness of breath. He describes exertional chest pain and dyspnea, started about 2 days ago. He has had orthopnea, PND, as well as worsening of baseline lower extremity edema (he generally has bilateral lower extremity edema, worse on left from venous harvest for CABG in the past). He has not had new cough or fever or chills. 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 In the ER, he was noted to have new-onset atrial fibrillation. He is quite active at baseline\"    Subjective (Events in last 24 hours): pt awake and alert. NAD. Some improvement. Cp resolved.   Breathing improved but still some PND overnight he states    I/o -2.2 L  Weight down 9 lb    Objective:   /63  Pulse 67  Temp 97.8 °F (36.6 °C) (Oral)   Resp 14  Ht 5' 5.5\" (1.664 m)  Wt 140 lb (63.5 kg)  SpO2 92%  BMI 22.94 kg/m2       TELEMETRY: afib cvr    Physical Exam:  General Appearance: alert and oriented to person, place and time, in no acute distress  Cardiovascular: irregularly irregular, +murmur  Pulmonary/Chest: clear to auscultation bilaterally- no wheezes, rales or rhonchi, normal air movement, no respiratory distress, diminished BS at bases  Abdomen: soft, non-tender, non-distended, normal bowel sounds, no masses Extremities: trace edema  Skin: warm and dry, no right coronary artery graft proximally has 60-70 percent stenosis and distally at the  anastomosis the patient had 90-95 percent stenosis with aneurysmal appearance. Left internal mammary artery to left anterior descending is patent. Left internal mammary artery to left anterior  descending is a small vessel, close to 1-1.5 mm in size. Lab Data:    Cardiac Enzymes:  No results for input(s): CKTOTAL, CKMB, CKMBINDEX, TROPONINI in the last 72 hours.     CBC:   Lab Results   Component Value Date    WBC 6.5 09/17/2017    RBC 5.03 09/17/2017    RBC 3.74 01/17/2012    HGB 14.6 09/17/2017    HCT 43.3 09/17/2017     09/17/2017       CMP:  Lab Results   Component Value Date     09/17/2017    K 3.8 09/17/2017    CL 97 09/17/2017    CO2 28 09/17/2017    BUN 21 09/17/2017    CREATININE 0.9 09/17/2017    LABGLOM 80 09/17/2017    GLUCOSE 87 09/17/2017    GLUCOSE 111 01/17/2012    CALCIUM 9.3 09/17/2017       Hepatic Function Panel:  Lab Results   Component Value Date    ALKPHOS 139 09/17/2017    ALT <5 09/17/2017    AST 15 09/17/2017    PROT 7.0 09/17/2017    BILITOT 1.6 09/17/2017    BILIDIR 0.3 09/16/2017    LABALBU 4.1 09/17/2017    LABALBU 3.9 01/11/2012       Magnesium:    Lab Results   Component Value Date    MG 2.7 01/17/2012       PT/INR:    Lab Results   Component Value Date    PROTIME 3.06 11/30/2011    INR 4.16 09/17/2017       HgBA1c:    Lab Results   Component Value Date    LABA1C 5.4 12/08/2016       FLP:  Lab Results   Component Value Date    TRIG 53 09/17/2017    HDL 68 09/17/2017    LDLCALC 60 09/17/2017       TSH:    Lab Results   Component Value Date    TSH 2.460 09/16/2017         Assessment:    Acute diastolic CHF  New onset afib - CVR - already on coumadin  Ef 55 with grade 2 DDfx per echo 1/7/17  Mod MR and mod AS  Hx CAD - s/p CABG 1982 and 1984  S/p cath 1/12/12 -    of LAD/RCA/LCX  Severe disease of SVG - RCA  Severe disease of SVG-OM  S/P PCI to SVG-RCA with 2.75-15 and 3.0-18 mm JOI.  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

## 2017-09-17 NOTE — PROGRESS NOTES
Clinical Pharmacy Note    Warfarin consult follow-up    Recent Labs      09/17/17   0508   INR  4.16*     Recent Labs      09/16/17   0858  09/17/17   0508   HGB  14.0  14.6   HCT  42.5  43.3   PLT  163  168       Significant Drug-Drug Interactions:  New warfarin drug-drug interactions: none  Discontinued drug-drug interactions: none  Current warfarin drug-drug interactions: aspirin        Date INR Warfarin Dose   9/16/17 4.46 No Coumadin    9/17/2017  4.16 No Coumadin                                                Notes:                     Daily PT/INR until stable within therapeutic range.      Gaetano DrakeD, BCPS 9/17/2017 2:42 PM

## 2017-09-17 NOTE — CONSULTS
[facility]  Cardiology Consultation      Family Physician:  Summer Durand MD  Cardiologist:  None    CHIEF COMPLAINT:  Chest pain    HISTORY OF PRESENT ILLNESS:      The patient is a 80 y.o. male patient who is seen foe evaluation of CHF. Per H&P, This is a pleasant 80 y.o. male with history of thrombosis (PE and DVT), on chronic coumadin therapy (elevated INR of 4.46 today) who presents to ER with complaints of worsening shortness of breath. He describes exertional chest pain and dyspnea, started about 2 days ago. He has had orthopnea, PND, as well as worsening of baseline lower extremity edema (he generally has bilateral lower extremity edema, worse on left from venous harvest for CABG in the past). He has not had new cough or fever or chills. 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 In the ER, he was noted to have new-onset atrial fibrillation. He is quite active at baseline       Previous cardiac testing:     Coronary angiography: Not available. Echocardiogram 2013:  Left ventricle:  Size was normal.  Systolic function was normal. Ejection fraction was estimated to be 55 %. There were no regional wall motion abnormalities. Features were consistent with a pseudonormal left ventricular filling pattern,  with concomitant abnormal relaxation and increased filling pressure (grade 2  diastolic dysfunction). Left atrium:  The atrium was mildly to moderately dilated. Right ventricle: The ventricle was mildly dilated. Mitral valve: There was moderate regurgitation. Regurgitation grade was 3+ on a scale of 0 to 4+. The regurgitant jet was eccentric. Aortic valve: The valve was trileaflet. Leaflets exhibited moderately increased thickness,  moderate calcification, and mildly reduced cuspal separation.   Transaortic velocity was increased due to valvular stenosis. There was moderate stenosis. Valve mean gradient was 22 mmHg. Aortic valve area was 0.84 cmÂ² by the continuity equation. Tricuspid valve: There was mild regurgitation. Pulmonic valve: There was mild regurgitation. EKG: Atrial fibrillation with PVC's. NS ST-T changes. Past Medical History:    Past Medical History:   Diagnosis Date    CAD (coronary artery disease)     GERD (gastroesophageal reflux disease)     Hypertension     Myocardial infarct ([address]     Pulmonary embolism (HCC)     PVC's (premature ventricular contractions)     Retinal artery thrombosis     Thrombophlebitis     Weakness        Past Surgical History:    Past Surgical History:   Procedure Laterality Date    APPENDECTOMY      CARDIAC CATHETERIZATION  01/16/2012    Status post successful PCI of SVG to OM2 branch with stent in the proximal area. Stent in the  mid area and balloon angioplasty of the distal anastomotic site.  CARDIAC CATHETERIZATION      CORONARY ARTERY BYPASS GRAFT      X2 1982 1984    HERNIA REPAIR      TRANSTHORACIC ECHOCARDIOGRAM  01/10/2012    LV was mildly dilated. Systolic function was normal. EF was estimated in  the range of 55-65%. There were no regional wall motion abnormalities. Wall thickness was normal.       Medications Prior to Admission:    Prescriptions Prior to Admission: warfarin (COUMADIN) 5 MG tablet, Take as directed by [facility], 45 tablets for 90 days (Patient taking differently: Indications: M-W-F 5 mg Take as directed by [facility], 45 tablets for 90 days)  warfarin (COUMADIN) 7.5 MG tablet, Indications: Cerebrovascular Accident or Stroke Take as directed by [facility].  (Patient taking differently: Indications: Cerebrovascular Accident or Stroke, Sunday- Tues- thurs- Sat 7.5 mg Take as directed by [facility].)  benztropine (COGENTIN) 1 MG tablet, Take 2 mg by mouth daily Indications: Patient is on CREATININE 0.8 09/16/2017    CREATININE 1.0 12/05/2016    CREATININE 0.9 01/17/2012    CREATININE 0.9 01/16/2012    CREATININE 0.8 01/15/2012     Hepatic Function Panel:    Lab Results   Component Value Date    ALKPHOS 129 09/16/2017    ALT <5 09/16/2017    AST 12 09/16/2017    PROT 7.0 09/16/2017    BILITOT 1.3 09/16/2017    BILIDIR 0.3 09/16/2017    LABALBU 4.2 09/16/2017    LABALBU 3.9 01/11/2012       Warfarin PT/INR:   Lab Results   Component Value Date    INR 4.46 09/16/2017    INR 3.00 08/28/2017    INR 3.90 08/14/2017    INR 4.30 07/31/2017    INR 3.6 06/26/2017    INR 5.0 01/30/2017    INR 3.5 11/14/2016    INR 2.60 10/17/2016    INR 2.30 12/14/2015    PROTIME 3.06 11/30/2011    PROTIME 6.37 08/29/2011    PROTIME 4.16 08/06/2011     HgBA1c:    Lab Results   Component Value Date    LABA1C 5.4 12/08/2016     FLP:    Lab Results   Component Value Date    TRIG 122 06/02/2014    HDL 50 06/02/2014    LDLCALC 119 06/02/2014     TSH:    Lab Results   Component Value Date    TSH 2.460 09/16/2017       ASSESSMENT:    80 y/p patient with h/o CAD, CABG, admitted with SOB and KM, and CHF. He was found to be in atrial fibrillation. He states this is new. He states he did have PCI 4 years ago by Dr. Neeta Chang. We will try to locate cath reports.       Patient Active Problem List    Diagnosis Date Noted    Left pleural mass 09/16/2017     Priority: Low    Chest pain 09/16/2017     Priority: Low    Shortness of breath 09/16/2017     Priority: Low    Atrial fibrillation ([facility] 09/16/2017     Priority: Low    Acute on chronic diastolic heart failure ([facility] 09/16/2017     Priority: Low    Anticoagulated on Coumadin 11/18/2014     Priority: Low    History of TIA (transient ischemic attack) 02/28/2013     Priority: Low    History of myocardial infarction 02/28/2013     Priority: Low    Severe aortic stenosis 02/28/2013     Priority: Low    Hx of CABG 09/10/2012     Priority: Low    Presence of coronary artery bypass graft stent 09/10/2012     Priority: Low    Dyslipidemia 09/10/2012     Priority: Low    Moderate aortic stenosis 09/10/2012     Priority: Low    Moderate mitral regurgitation 09/10/2012     Priority: Low    History of thrombosis - PE, DVT 08/20/2012     Priority: Low    History of CVA (cerebrovascular accident) 08/20/2012     Priority: Low    Weakness      Priority: Low    CAD (coronary artery disease)      Priority: Low    Essential hypertension      Priority: Low    PVC's (premature ventricular contractions)      Priority: Low         PLAN:    Continue treatment of CHF as outlined by IM. Consider cardioversion into SR either pharmacologically (amiodarome), or by [facility] if he continues to be in atrial flutter. Consider work up for ischemia to rule out ischemic etiology of atrial fibrillation and CHF. Replace K and mag as needed.       Franki Sevilla MD, [facility] [place]  11:15 PM  9/16/2017

## 2017-09-18 ENCOUNTER — APPOINTMENT (OUTPATIENT)
Dept: MRI IMAGING | Age: 82
DRG: 034 | End: 2017-09-18
Payer: MEDICARE

## 2017-09-18 ENCOUNTER — APPOINTMENT (OUTPATIENT)
Dept: ULTRASOUND IMAGING | Age: 82
DRG: 034 | End: 2017-09-18
Payer: MEDICARE

## 2017-09-18 ENCOUNTER — APPOINTMENT (OUTPATIENT)
Dept: GENERAL RADIOLOGY | Age: 82
DRG: 034 | End: 2017-09-18
Payer: MEDICARE

## 2017-09-18 PROBLEM — R47.81 SLURRED SPEECH: Status: ACTIVE | Noted: 2017-09-18

## 2017-09-18 LAB
ALBUMIN SERPL-MCNC: 3.6 G/DL (ref 3.5–5.1)
ALP BLD-CCNC: 130 U/L (ref 38–126)
ALT SERPL-CCNC: 6 U/L (ref 11–66)
ANION GAP SERPL CALCULATED.3IONS-SCNC: 16 MEQ/L (ref 8–16)
ANISOCYTOSIS: ABNORMAL
AST SERPL-CCNC: 13 U/L (ref 5–40)
BASOPHILS # BLD: 0.4 %
BASOPHILS ABSOLUTE: 0 THOU/MM3 (ref 0–0.1)
BILIRUB SERPL-MCNC: 1.3 MG/DL (ref 0.3–1.2)
BILIRUBIN DIRECT: 0.3 MG/DL (ref 0–0.3)
BUN BLDV-MCNC: 25 MG/DL (ref 7–22)
CALCIUM SERPL-MCNC: 9.2 MG/DL (ref 8.5–10.5)
CHLORIDE BLD-SCNC: 97 MEQ/L (ref 98–111)
CO2: 25 MEQ/L (ref 23–33)
CREAT SERPL-MCNC: 0.8 MG/DL (ref 0.4–1.2)
EOSINOPHIL # BLD: 1 %
EOSINOPHILS ABSOLUTE: 0.1 THOU/MM3 (ref 0–0.4)
GFR SERPL CREATININE-BSD FRML MDRD: > 90 ML/MIN/1.73M2
GLUCOSE BLD-MCNC: 97 MG/DL (ref 70–108)
HCT VFR BLD CALC: 44.5 % (ref 42–52)
HEMOGLOBIN: 14.9 GM/DL (ref 14–18)
INR BLD: 3.4 (ref 0.85–1.13)
LV EF: 30 %
LVEF MODALITY: NORMAL
LYMPHOCYTES # BLD: 14.7 %
LYMPHOCYTES ABSOLUTE: 1.2 THOU/MM3 (ref 1–4.8)
MAGNESIUM: 2.1 MG/DL (ref 1.6–2.4)
MCH RBC QN AUTO: 28.8 PG (ref 27–31)
MCHC RBC AUTO-ENTMCNC: 33.5 GM/DL (ref 33–37)
MCV RBC AUTO: 86.2 FL (ref 80–94)
MONOCYTES # BLD: 11.2 %
MONOCYTES ABSOLUTE: 0.9 THOU/MM3 (ref 0.4–1.3)
NUCLEATED RED BLOOD CELLS: 0 /100 WBC
PDW BLD-RTO: 15 % (ref 11.5–14.5)
PHOSPHORUS: 4 MG/DL (ref 2.4–4.7)
PLATELET # BLD: 175 THOU/MM3 (ref 130–400)
PMV BLD AUTO: 9.2 MCM (ref 7.4–10.4)
POTASSIUM SERPL-SCNC: 3.6 MEQ/L (ref 3.5–5.2)
RBC # BLD: 5.16 MILL/MM3 (ref 4.7–6.1)
SEG NEUTROPHILS: 72.7 %
SEGMENTED NEUTROPHILS ABSOLUTE COUNT: 6.1 THOU/MM3 (ref 1.8–7.7)
SODIUM BLD-SCNC: 138 MEQ/L (ref 135–145)
TOTAL PROTEIN: 6.5 G/DL (ref 6.1–8)
WBC # BLD: 8.4 THOU/MM3 (ref 4.8–10.8)

## 2017-09-18 PROCEDURE — 85610 PROTHROMBIN TIME: CPT

## 2017-09-18 PROCEDURE — 85025 COMPLETE CBC W/AUTO DIFF WBC: CPT

## 2017-09-18 PROCEDURE — 99233 SBSQ HOSP IP/OBS HIGH 50: CPT | Performed by: INTERNAL MEDICINE

## 2017-09-18 PROCEDURE — 71020 XR CHEST STANDARD TWO VW: CPT

## 2017-09-18 PROCEDURE — 2500000003 HC RX 250 WO HCPCS: Performed by: PHYSICIAN ASSISTANT

## 2017-09-18 PROCEDURE — 93306 TTE W/DOPPLER COMPLETE: CPT

## 2017-09-18 PROCEDURE — 99232 SBSQ HOSP IP/OBS MODERATE 35: CPT | Performed by: INTERNAL MEDICINE

## 2017-09-18 PROCEDURE — 2580000003 HC RX 258: Performed by: INTERNAL MEDICINE

## 2017-09-18 PROCEDURE — 6360000002 HC RX W HCPCS: Performed by: ANESTHESIOLOGY

## 2017-09-18 PROCEDURE — 84460 ALANINE AMINO (ALT) (SGPT): CPT

## 2017-09-18 PROCEDURE — 36415 COLL VENOUS BLD VENIPUNCTURE: CPT

## 2017-09-18 PROCEDURE — 92610 EVALUATE SWALLOWING FUNCTION: CPT

## 2017-09-18 PROCEDURE — 83735 ASSAY OF MAGNESIUM: CPT

## 2017-09-18 PROCEDURE — 82248 BILIRUBIN DIRECT: CPT

## 2017-09-18 PROCEDURE — 84450 TRANSFERASE (AST) (SGOT): CPT

## 2017-09-18 PROCEDURE — 84155 ASSAY OF PROTEIN SERUM: CPT

## 2017-09-18 PROCEDURE — 76705 ECHO EXAM OF ABDOMEN: CPT

## 2017-09-18 PROCEDURE — 84075 ASSAY ALKALINE PHOSPHATASE: CPT

## 2017-09-18 PROCEDURE — 80069 RENAL FUNCTION PANEL: CPT

## 2017-09-18 PROCEDURE — 70551 MRI BRAIN STEM W/O DYE: CPT

## 2017-09-18 PROCEDURE — 82247 BILIRUBIN TOTAL: CPT

## 2017-09-18 PROCEDURE — 6370000000 HC RX 637 (ALT 250 FOR IP): Performed by: INTERNAL MEDICINE

## 2017-09-18 PROCEDURE — 99232 SBSQ HOSP IP/OBS MODERATE 35: CPT | Performed by: PHYSICIAN ASSISTANT

## 2017-09-18 PROCEDURE — 1200000003 HC TELEMETRY R&B

## 2017-09-18 RX ORDER — LISINOPRIL 2.5 MG/1
2.5 TABLET ORAL DAILY
Status: DISCONTINUED | OUTPATIENT
Start: 2017-09-19 | End: 2017-09-21

## 2017-09-18 RX ORDER — HALOPERIDOL 5 MG/ML
5 INJECTION INTRAMUSCULAR EVERY 6 HOURS PRN
Status: DISCONTINUED | OUTPATIENT
Start: 2017-09-18 | End: 2017-09-20

## 2017-09-18 RX ADMIN — BENZTROPINE MESYLATE 2 MG: 2 TABLET ORAL at 07:51

## 2017-09-18 RX ADMIN — Medication 10 ML: at 07:51

## 2017-09-18 RX ADMIN — HALOPERIDOL LACTATE 5 MG: 5 INJECTION, SOLUTION INTRAMUSCULAR at 23:37

## 2017-09-18 RX ADMIN — ATORVASTATIN CALCIUM 40 MG: 40 TABLET, FILM COATED ORAL at 21:18

## 2017-09-18 RX ADMIN — PANTOPRAZOLE SODIUM 40 MG: 40 TABLET, DELAYED RELEASE ORAL at 07:51

## 2017-09-18 RX ADMIN — CARBIDOPA AND LEVODOPA 1 TABLET: 50; 200 TABLET, EXTENDED RELEASE ORAL at 07:51

## 2017-09-18 RX ADMIN — Medication 10 ML: at 21:19

## 2017-09-18 RX ADMIN — HALOPERIDOL LACTATE 5 MG: 5 INJECTION, SOLUTION INTRAMUSCULAR at 03:38

## 2017-09-18 RX ADMIN — BUMETANIDE 1 MG: 0.25 INJECTION, SOLUTION INTRAMUSCULAR; INTRAVENOUS at 17:07

## 2017-09-18 RX ADMIN — Medication 0.5 MG: at 17:07

## 2017-09-18 RX ADMIN — ASPIRIN 81 MG: 81 TABLET, CHEWABLE ORAL at 07:51

## 2017-09-18 RX ADMIN — CARBIDOPA AND LEVODOPA 1 TABLET: 50; 200 TABLET, EXTENDED RELEASE ORAL at 21:18

## 2017-09-18 NOTE — PROGRESS NOTES
Cardiology Progress Note      Patient:  Moo Blank  YOB: 1930  MRN: [id number]   Acct: [de-identified]  Admit Date:  9/16/2017  Primary Cardiologist: Chi Blount MD  Seen by dr Russel Fajardo    Reason for consult - new afib, chf    hpi per dr Russel Fajardo \"The patient is a 80 y.o. male patient who is seen foe evaluation of CHF. Per H&P, This is a pleasant 80 y. o. male with history of thrombosis (PE and DVT), on chronic coumadin therapy (elevated INR of 4.46 today) who presents to ER with complaints of worsening shortness of breath. He describes exertional chest pain and dyspnea, started about 2 days ago. He has had orthopnea, PND, as well as worsening of baseline lower extremity edema (he generally has bilateral lower extremity edema, worse on left from venous harvest for CABG in the past). He has not had new cough or fever or chills. 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 In the ER, he was noted to have new-onset atrial fibrillation. He is quite active at baseline\"    Subjective (Events in last 24 hours):   Pt awake and alert. NAD. Sob improved but states not where he would like it to be yet.   No chest pains today or yesterday  bp meds held today due to sbp 110  BB stopped yesterday - nurse does not know why    I/o -2.2 L  Weight up 5 lb but now on standing scale and not bed scale like previous weight    Objective:   /60  Pulse 65  Temp 97.8 °F (36.6 °C) (Oral)   Resp 20  Ht 5' 5.5\" (1.664 m)  Wt 145 lb 1.6 oz (65.8 kg)  SpO2 95%  BMI 23.78 kg/m2       TELEMETRY: afib cvr    Physical Exam:  General Appearance: alert and oriented to person, place and time, in no acute distress  Cardiovascular: irregularly irregular, +murmur  Pulmonary/Chest: clear to auscultation bilaterally- no wheezes, rales or rhonchi, normal air movement, no respiratory distress, diminished BS at bases  Abdomen: soft, non-tender, non-distended, normal bowel sounds, no masses Extremities: no edema  Skin: warm and dry, no rash or erythema  Head: normocephalic and atraumatic  Eyes: pupils equal, round, and reactive to light  Neck: supple and non-tender without mass, no thyromegaly   Musculoskeletal: normal range of motion, no joint swelling, deformity or tenderness  Neurological: alert, oriented, normal speech, no focal findings or movement disorder noted    Medications:    warfarin  0.5 mg Oral Once    lisinopril  5 mg Oral Daily    amLODIPine  5 mg Oral Daily    atorvastatin  40 mg Oral Q48H    benztropine  2 mg Oral Daily    carbidopa-levodopa  1 tablet Oral BID    isosorbide mononitrate  30 mg Oral Daily    pantoprazole  40 mg Oral QAM AC    sodium chloride flush  10 mL Intravenous 2 times per day    aspirin  81 mg Oral Daily    bumetanide  1 mg Intravenous BID    warfarin (COUMADIN) daily dosing (placeholder)   Other RX Placeholder          haloperidol lactate 5 mg Q6H PRN   bismuth subsalicylate 15 mL G2Y PRN   sodium chloride flush 10 mL PRN   acetaminophen 650 mg Q4H PRN   oxyCODONE 5 mg Q4H PRN   magnesium hydroxide 30 mL Daily PRN   ondansetron 4 mg Q6H PRN   nitroGLYCERIN 0.4 mg Q5 Min PRN   ipratropium-albuterol 1 ampule Q4H PRN       Diagnostics:  Cath - 1/12/12  FINDINGS:  Left main artery is large and mild disease, bifurcates in to the left anterior descending and the left circumflex artery. Left circumflex artery has proximally severe disease and after obtuse marginal branch is totally occluded. Left anterior descending artery is totally occluded after the origin from the left main. Right coronary artery is totally occluded from the mid segment and proximally has severe 80-90 percent disease.   Saphenous vein graft to obtuse marginal 2 graft has proximal 50-60 percent stenosis, mid has 50-60 percent  stenosis and distally at the anastomosis

## 2017-09-18 NOTE — PLAN OF CARE
Problem: Cardiovascular  Goal: No DVT, peripheral vascular complications  Outcome: Ongoing  Pt on coumadin. INR being monitored. Problem: Skin Integrity/Risk  Goal: No skin breakdown during hospitalization  Outcome: Ongoing  No new problem areas noted to skin. Repositions self throughout the day    Problem: Musculor/Skeletal Functional Status  Goal: Absence of falls  Outcome: Ongoing  Call light within reach. Side rails up x2. Bed alarm on. Non skid slippers available. Problem: Pain:  Goal: Pain level will decrease  Pain level will decrease   Outcome: Ongoing  Pt has PRN medication available for pain management. Comments:   Care plan reviewed with patient.  Will review and discuss care plan with family when available

## 2017-09-18 NOTE — FLOWSHEET NOTE
09/17/17 2000   Encounter Summary   Services provided to: Patient   Referral/Consult From: Nurse   Support System Children   Continue Visiting Yes  (9/17/17-Check on AD)   Complexity of Encounter Moderate   Length of Encounter 15 minutes   Routine   Type Initial   Assessment Approachable   Intervention Prayer;Explored feelings, thoughts, concerns   Outcome Expressed gratitude   Spiritual/Hinduism   Type Spiritual support   Advance Directives (For Healthcare)   Healthcare Directive No, patient does not have an advance directive for healthcare treatment   Information on Healthcare Directives Requested Yes   Patient Requests Assistance Yes, referral made to    Advance Directives Documents explained;Documents given; Unable to complete   Healthcare Agent Appointed Adult Children   9/17/17-. Advance Directive Consult: Advance Directive materials were provided to patient and explained. Patient is not able to complete today as he is confused. Patient thought he was watching TV when the TV was off and he was sitting in the dark. He also had a sitter in the room with him earlier today. Patient was assessed by [facility] and determined to wait until tomorrow to complete the docs. Gave information for Spiritual Care to be contacted when further assistance is needed.

## 2017-09-18 NOTE — PROGRESS NOTES
from a bedside swallow evaluation alone. [x] Impaired   [] Delayed Swallow  [] Decreased Hyolaryngeal Elevation [x] Audible Swallow   [] Suspected Pharyngeal Residue due to spontaneous multiple swallow. [] OTHER:    SIGNS AND SYMPTOMS OF LARYNGEAL PENETRATION / ASPIRATION:  [x] NO sign/symptoms of aspiration evident with this evaluation, but cannot rule out silent aspiration. IMPRESSIONS: Pt presents with mild oropharyngeal dysphagia due to lack of dentition impacting mastication skills. Pt with prolonged mastication of toast, but eventual successful oral clearance and no overt aspiration. Decreased coordination was observed during pharyngeal phase for thin liquids. Recommend avoidance of straws to improve tolerance for thin liquids. No overt aspiration was noted with solids or liquids. Recommend pt remain on regular and thin diet with avoidance of straws. Plan to monitor diet tolerance and pulmonary status 1-2 times. RECOMMENDATIONS:     Modified Barium Swallow: [] Is indicated to further assess    [x] Is NOT indicated at this time; Will recommend as appropriate. DIET RECOMMENDATIONS:  Regular and thin, no straws    STRATEGIES: [] Strategies pending MBS results. [x] Full upright position  [x] Small bite/sip [x] No Straw [] Multiple Swallow  [] Chin tuck [] Head turn [] Pulmonary monitoring [] Oral care after all meals  [] Supervision  [] Medication in applesauce []Direct 1:1 Supervision  [] Spoon all liquids [] Alternate solid / liquid [] Limit distractions [] Monitor for fatigue  [] PMV in place for all po [] OTHER:      EDUCATION:   Learner: [x]Patient [] Significant other [] Son/Daughter [] Parent     [] Other:   Education: [x] Reviewed results and recommendations of this evaluation     [x] Reviewed diet and strategies     [x] Reviewed signs, symptoms and risk of aspiration     [] Demonstrated how to thick liquid appropriately.      [x] Reviewed goals and Plan of Care     []

## 2017-09-18 NOTE — CARE COORDINATION
9/18/17, 10:15 AM      Carmen Salas       Admitted from: ED 9/16/2017/ 0828 Hospital day: 2   Location: -02/002-A Reason for admit: Shortness of breath [R06.02] Status: IP  Admit order signed?: yes  PMH:  has a past medical history of CAD (coronary artery disease); GERD (gastroesophageal reflux disease); Hypertension; Myocardial infarct ([facility] [address] Pulmonary embolism ([facility] [address] PVC's (premature ventricular contractions); Retinal artery thrombosis; Thrombophlebitis; and Weakness. Medications:  Scheduled Meds:   warfarin  0.5 mg Oral Once    lisinopril  5 mg Oral Daily    amLODIPine  5 mg Oral Daily    atorvastatin  40 mg Oral Q48H    benztropine  2 mg Oral Daily    carbidopa-levodopa  1 tablet Oral BID    isosorbide mononitrate  30 mg Oral Daily    pantoprazole  40 mg Oral QAM AC    sodium chloride flush  10 mL Intravenous 2 times per day    aspirin  81 mg Oral Daily    bumetanide  1 mg Intravenous BID    warfarin (COUMADIN) daily dosing (placeholder)   Other RX Placeholder     Continuous Infusions:    Pertinent Info/Orders/Treatment Plan:   INR 3.40. Bumex IV. Echo pending. MRI Brain ordered. Pulmonary,Cardiology and Neurology consult. Telemetry. PT OT. Diet: DIET LOW SODIUM 2 GM; 2000 ml   DVT Prophylaxis: Coumadin  Smoking status:  reports that he has never smoked. He does not have any smokeless tobacco history on file.    Influenza Vaccination Screening Completed: yes  Pneumonia Vaccination Screening Completed: yes  Core measures: Heart failure Core measures:  Identify type-diastolic  EF:Echo pending   ACE/ARB-Lisinopril  Education-added to discharge     PCP: Cailin Jimenez MD  Readmission:   no  Risk Score: 14.75     Discharge Planning  Current Residence:  Private Residence  Living Arrangements:  Alone   Support Systems:  Children, Family Members  Current Services PTA:     Potential Assistance Needed:  N/A  Potential Assistance Purchasing Medications:  No  Does patient want to participate in local refill/ meds to beds program?  N/A  Type of Home Care Services:  None  Patient expects to be discharged to:  home  Expected Discharge date:  09/19/17  Follow Up Appointment: Best Day/ Time: Monday AM    Discharge Plan: Talked with pt. He plans return home alone. Declines HH. Denies needs.      Evaluation: not at this time

## 2017-09-18 NOTE — PROGRESS NOTES
Hospital Medicine Progress Note     Date:  9/18/2017    PCP: Kim Bajwa MD (Tel: [phone])    Date of Admission: 9/16/2017      Assessment:  Principal Problem:    Acute on chronic diastolic heart failure (HCC)  Active Problems:    Left pleural mass    Atrial fibrillation (HCC)    Chest pain    Shortness of breath    Coronary artery disease due to lipid rich plaque    Essential hypertension    History of thrombosis - PE, DVT    History of CVA (cerebrovascular accident)    Hx of CABG    Presence of coronary artery bypass graft stent    Elevated LFTs    Pleural effusion        Plan:  1. CHF exacerbation. Continue diuretics, monitor ins/out. Overall, diuresing well. On lo dose ACEi. BB discontinued by Dr. Jameson Favre per bradycardia. 2. Bilateral pleural effusion, secondary to CHF. Does have noted loculated pleural effusion for which pulm has been consulted to assist with eval, although I suspect this is secondary to CHF still. Procalcitonin is normal, no leukocytosis or fever or any findings consistent with infection. 3. New-onset atrial fibrillation. Rate-controlled. Was on BB; now on hold per bradycardia. Anticoagulated with coumadin. 4. Chest pain. Serial troponin negative. Cardio considering ischemia evaluation - stress test ordered. On aspirin, BB and statin. LDL is at goal.  5. Slurred speech. Does have some speech tremor - suspect could be related to Parkinson's. Neuro consulted to assist with eval, adjustment of anti-Parkinson's meds. CT-head, MRI-brain with no acute findings. 6. Abnormal LFTs with slightly elevated alkaline phosphatase 130s, elevated bili 1.3 and normal AST/ALT. So far, unclear etiology. Abdominal US with biliary sludge. Viral hepatitis profile negative. Consider repeat LFTs as outpatient, in about a month. 7. History of CAD s/p PCI, CABG. On ASA, BB, statin. 8. Hypertension. Continue current antihypertensives as ordered. 9. 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

## 2017-09-18 NOTE — PROGRESS NOTES
[facility]    Patient - Yusef Goodson   MRN -  [id number]   [facility] # - [de-identified]   - 1930      Date of Admission -  2017  8:28 AM  Date of evaluation -  2017  Room - -002-A   Hospital Day - 2  Consulting - Eric Joya MD Primary Care Physician - Braydon Samson MD   Chief Complaint   SOB  Active Hospital Problem List      Active Hospital Problems    Diagnosis Date Noted    [facility] [R47.81] 2017    Elevated LFTs [R94.5] 2017    Pleural effusion [J90]     Left pleural mass [R22.2] 2017    Chest pain [R07.9] 2017    Shortness of breath [R06.02] 2017    Atrial fibrillation ([address] [I48.91] 2017    Acute on chronic diastolic heart failure (HCC) [I50.33] 2017    Hx of CABG [Z95.1] 09/10/2012    Presence of coronary artery bypass graft stent [Z95.5, Z95.1] 09/10/2012    History of thrombosis - PE, DVT [Z86.718] 2012    History of CVA (cerebrovascular accident) [Z86.73] 2012    Essential hypertension [I10]     Coronary artery disease due to lipid rich plaque [I25.10, I25.83]      HPI   Yusef Goodson is a 80 y.o. male admitted for CHF. Had been feeling short of breath for past 2 days with exertional chest pain. Has history of CAD, s/p CABG  and , history of PE and DVT. Chronic warfarin use. Denies fever, chills, cough, or malaise. Has had mild swelling of legs. Had new onset of atrial fibrillation in ED. Denies any pulmonary history. Denies tobacco use or history of use. Currently oxygenating well on room air, states SOB has resolved with diuretics. CT of chest completed revealing bilateral pleural effusions, possibly loculated and pulmonary has been consulted for further evaluation.     Events of last 24h:    Feels better less SOB  Afebrile  ECHO completed report pending  MRI completed  CXR better  Fluid balance not correct      Meds    Current Medications    warfarin  0.5 mg Oral Once    [START ON 9/19/2017] lisinopril  2.5 mg Oral Daily    atorvastatin  40 mg Oral Q48H    benztropine  2 mg Oral Daily    carbidopa-levodopa  1 tablet Oral BID    isosorbide mononitrate  30 mg Oral Daily    pantoprazole  40 mg Oral QAM AC    sodium chloride flush  10 mL Intravenous 2 times per day    aspirin  81 mg Oral Daily    bumetanide  1 mg Intravenous BID    warfarin (COUMADIN) daily dosing (placeholder)   Other RX Placeholder     haloperidol lactate, bismuth subsalicylate, sodium chloride flush, acetaminophen, oxyCODONE, magnesium hydroxide, ondansetron, nitroGLYCERIN, ipratropium-albuterol  IV Drips/Infusions     Vitals    Vitals    height is 5' 5.5\" (1.664 m) and weight is 145 lb 1.6 oz (65.8 kg). His oral temperature is 97.9 °F (36.6 °C). His blood pressure is 117/57 (abnormal) and his pulse is 58. His respiration is 18 and oxygen saturation is 96%. I/O    Intake/Output Summary (Last 24 hours) at 09/18/17 1656  Last data filed at 09/18/17 1424   Gross per 24 hour   Intake              720 ml   Output              250 ml   Net              470 ml     Patient Vitals for the past 96 hrs (Last 3 readings):   Weight   09/18/17 0402 145 lb 1.6 oz (65.8 kg)   09/17/17 0300 140 lb (63.5 kg)   09/16/17 1144 149 lb 4.8 oz (67.7 kg)     Exam   General Appearance  Looks a little confused, not in distress  Neck - No JVD  Lungs - Diminished air movement in the bases  Cardiovascular - Irrg. Abdomen - Soft (+) BS, non tender  Neurologic - .  Speech impairment , resting tremor   Extremities - No cyanosis, clubbing or edema  Labs   ABG  No results found for: PH, PO2, PCO2, HCO3, O2SAT  No results found for: TIA Aden  CBC  Recent Labs      09/16/17   0858  09/17/17   0508  09/18/17   0643   WBC  6.7  6.5  8.4   RBC  4.89  5.03  5.16   HGB  14.0  14.6  14.9   HCT  42.5  43.3  44.5   MCV  86.8  86.0  86.2   MCH  28.6  29.0  28.8   MCHC  32.9* 33.7  33.5   RDW  15.4*  15.3*  15.0*   PLT  163  168  175   MPV  8.8  8.9  9.2      BMP  Recent Labs      17   0858  17   0508  17   0829  17   0428   NA  142  139   --   138   K  4.0  3.8   --   3.6   CL  103  97*   --   97*   CO2  26  28   --   25   BUN  27*  21   --   25*   CREATININE  0.8  0.9   --   0.8   GLUCOSE  113*  87   --   97   MG   --    --   2.1  2.1   PHOS   --    --    --   4.0   CALCIUM  9.4  9.3   --   9.2     LFT  Recent Labs      17   0858  17   05017   AST  12  15  13   ALT  <5*  <5*  6*   BILITOT  1.3*  1.6*  1.3*   ALKPHOS  129*  139*  130*     TROP  Lab Results   Component Value Date    TROPONINT < 0.010 2017    TROPONINT < 0.010 2017    TROPONINT < 0.010 2017     BNP     2017 08:58   Pro-BNP 2231.0 (H)     Lactic Acid  No results for input(s): LACTA in the last 72 hours. INR  Recent Labs      178   INR  4.46*  4.16*  3.40*     PTT  Recent Labs      17   APTT  43.4*     Glucose  No results for input(s): POCGLU in the last 72 hours. UA No results for input(s): SPECGRAV, PHUR, COLORU, CLARITYU, MUCUS, PROTEINU, BLOODU, RBCUA, WBCUA, BACTERIA, NITRU, GLUCOSEU, BILIRUBINUR, UROBILINOGEN, KETUA, LABCAST, LABCASTTY, AMORPHOS in the last 72 hours. Invalid input(s): CRYSTALS.   PFTs   Per pt - Has never had any   Echo    2013  [facility]  [address] [facility], [address]  Phone: [phone]  Fax: [phone]    Transthoracic Echocardiogram  2D, M-mode, Doppler, and Color Doppler    Name: Shaquille Thapa  : [id number]  MR #: [id number]  Study date: 2013  Account #: [de-identified]  Ht-Wt-BSA: 77 in- 164.6 lb- 1.84 mÂ²    Reading Physician: Alondra Forrester MD  Technologist: Rayford Lesches, SOPHIA,RVT,RDMS  Referring Physician: Alondra Vaca for study: aortic stenosis    HISTORY: PRIOR HISTORY: CAD, HTN, PVC's, old MI, GERD, PE  /PROCEDURE: The procedure was performed in outpatient. This was a routine study. Diastolic blood pressure was 65 mmHg, at the start of the study. Image quality  was adequate. LEFT VENTRICLE: Size was normal. Systolic function was normal. Ejection  fraction was estimated to be 55 %. There were no regional wall motion  abnormalities. DOPPLER: Features were consistent with a pseudonormal left  ventricular filling pattern, with concomitant abnormal relaxation and increased  filling pressure (grade 2 diastolic dysfunction). AORTIC VALVE: The valve was trileaflet. Leaflets exhibited moderately increased  thickness, moderate calcification, and mildly reduced cuspal separation. DOPPLER: Transaortic velocity was increased due to valvular stenosis. There was  moderate stenosis. There was no regurgitation. AORTA: The root exhibited normal size. MITRAL VALVE: There was mild calcification of the anterior and posterior  leaflets. There was normal leaflet separation. DOPPLER: There was no evidence  for stenosis. There was moderate regurgitation. Regurgitation grade was 3+ on a  scale of 0 to 4+. The regurgitant jet was eccentric. LEFT ATRIUM: The atrium was mildly to moderately dilated. RIGHT VENTRICLE: The ventricle was mildly dilated. Systolic function was  normal. Wall thickness was normal.    PULMONIC VALVE: DOPPLER: There was mild regurgitation. PULMONARY ARTERY: DOPPLER: Systolic pressure was within the normal range. TRICUSPID VALVE: There was normal leaflet separation. DOPPLER: There was no  evidence for stenosis. There was mild regurgitation. RIGHT ATRIUM: Size was normal.    SYSTEMIC VEINS: IVC: The inferior vena cava was normal in size. PERICARDIUM: There was no pericardial effusion. The pericardium was normal in  appearance.     MEASUREMENT TABLES    DOPPLER MEASUREMENTS  Aortic valve   (Reference normals)  Peak junior   320 cm/s   (--)  Peak gradient   42 mmHg   (--)  Mean gradient   22 mmHg   (--)  Valve area, cont   0.84 cmÂ²   (--)    SYSTEM MEASUREMENT TABLES    2D mode  LA Dimension (2D): 4.4 cm  FS (2D-Cubed): 26 %  FS (2D-Teich): 26 %  IVSd (2D): 1 cm  IVSd; Mean chosen (2D): 1 cm  LVIDd (2D): 5.3 cm  LVIDs (2D): 3.9 cm  LVOT Area (2D): 3.1 cm2  LVPWd (2D): 1 cm  RVIDd (2D): 3.1 cm    M mode  AoR Diam (MM): 3.4 cm  AV Cusp Sep (MM): 1.6 cm  MV D-E Exc: 1.9 cm  MV EF Thomas (MM): 9.6 cm/s    Unspecified Scan Mode  Vmax; Antegrade Flow: 3.2 m/s  LVOT Diam: 2 cm  LVOT Vmax: 78.3 cm/s  MV Peak A Sony; Mean: 73.4 cm/s  MV Peak E Sony; Antegrade Flow: 117 cm/s  MVA (PHT): 3.6 cm2  VTI; Antegrade Flow: 34.9 cm  VTI; Mean; Antegrade Flow: 34.9 cm  Vmax; Antegrade Flow: 124 cm/s  TV Peak A Sony: 56.2 cm/s  TV Peak E Sony; Antegrade Flow: 71.3 cm/s    SUMMARY:    Left ventricle:  Size was normal.  Systolic function was normal. Ejection fraction was estimated to be 55 %. There were no regional wall motion abnormalities. Features were consistent with a pseudonormal left ventricular filling pattern,  with concomitant abnormal relaxation and increased filling pressure (grade 2  diastolic dysfunction). New echo ordered- results pending   Cultures    Procalcitonin  Lab Results   Component Value Date    PROCAL 0.05 09/16/2017        Radiology    CXR  9/18/17      Impression   1. Mild cardiomegaly. Metallic sternotomy sutures. Relatively large hiatus hernia. 2.. Small bilateral pleural effusions. Mild pleural calcifications left side.  Overall appearance of chest has improved since prior.                 (See actual reports for details)    Assessment   Bilateral Pleural Effusions R>L improving CXR  Chronic pleuro parenchymal lung changes from previous thoracotomy--no need for intervention  Negative Procalcitonin, no fever, no WBC, no cough- doubt infectious process  Oxygenating well on room air- SOB improving with diuretics  CHF  New onset A-fib  Recommendations   Continue treatment and optimitization of CHF  Effusions likely caused by CHF--small and improving with therapy, no need to tap  Cardiology following  Stress test in am  No thoracentesis or pigtail catheter needed at this time- will consider if worsening oxygenation   Will need outpatient f/u with PA/LAT CXR in 3 weeks with Dr Hayden Ritter or  Reid Nick CNP  to monitor effusions. supplemental O2 PRN to keep sats >92%    Thank you for the consult and allowing us to participate in the care of your patient. Case discussed with nurse and patient/family. Questions and concerns addressed. Meds and Orders reviewed.     Electronically signed by     Amado Covarrubias MD on 9/18/2017 at 4:56 PM

## 2017-09-18 NOTE — PLAN OF CARE
Problem: Cardiovascular  Goal: No DVT, peripheral vascular complications  Outcome: Ongoing  Patient on coumadin    Problem: Skin Integrity/Risk  Goal: No skin breakdown during hospitalization  Outcome: Ongoing  Patient free from skin breakdown. Patient turns self and makes frequent positional changes. Will continue to monitor.'    Problem: Musculor/Skeletal Functional Status  Goal: Absence of falls  Outcome: Ongoing  Call light within reach. Side rails up x2. Bed alarm on. Non skid slippers available. Problem: Pain:  Goal: Pain level will decrease  Pain level will decrease   Outcome: Ongoing  Patient free from pain this shift. Pain rated on 0-10 pain rating scale. Will continue to reassess. Comments:   Care plan reviewed with patient. Patient verbalize understanding of the plan of care and contribute to goal setting.

## 2017-09-19 ENCOUNTER — APPOINTMENT (OUTPATIENT)
Dept: GENERAL RADIOLOGY | Age: 82
DRG: 034 | End: 2017-09-19
Payer: MEDICARE

## 2017-09-19 PROBLEM — R79.89 LOW VITAMIN D LEVEL: Status: ACTIVE | Noted: 2017-09-19

## 2017-09-19 LAB
ALBUMIN SERPL-MCNC: 3.6 G/DL (ref 3.5–5.1)
ALP BLD-CCNC: 129 U/L (ref 38–126)
ALT SERPL-CCNC: 8 U/L (ref 11–66)
AMMONIA: 27 UMOL/L (ref 11–60)
ANION GAP SERPL CALCULATED.3IONS-SCNC: 13 MEQ/L (ref 8–16)
ANISOCYTOSIS: ABNORMAL
AST SERPL-CCNC: 11 U/L (ref 5–40)
BACTERIA: ABNORMAL /HPF
BASOPHILS # BLD: 0.4 %
BASOPHILS ABSOLUTE: 0 THOU/MM3 (ref 0–0.1)
BILIRUB SERPL-MCNC: 1.2 MG/DL (ref 0.3–1.2)
BILIRUBIN DIRECT: 0.3 MG/DL (ref 0–0.3)
BILIRUBIN URINE: NEGATIVE
BLOOD, URINE: ABNORMAL
BUN BLDV-MCNC: 20 MG/DL (ref 7–22)
CALCIUM SERPL-MCNC: 9 MG/DL (ref 8.5–10.5)
CASTS 2: ABNORMAL /LPF
CASTS UA: ABNORMAL /LPF
CHARACTER, URINE: CLEAR
CHLORIDE BLD-SCNC: 99 MEQ/L (ref 98–111)
CO2: 27 MEQ/L (ref 23–33)
COLOR: YELLOW
CREAT SERPL-MCNC: 0.8 MG/DL (ref 0.4–1.2)
CRYSTALS, UA: ABNORMAL
EOSINOPHIL # BLD: 1.9 %
EOSINOPHILS ABSOLUTE: 0.2 THOU/MM3 (ref 0–0.4)
EPITHELIAL CELLS, UA: ABNORMAL /HPF
GFR SERPL CREATININE-BSD FRML MDRD: > 90 ML/MIN/1.73M2
GLUCOSE BLD-MCNC: 95 MG/DL (ref 70–108)
GLUCOSE URINE: NEGATIVE MG/DL
HCT VFR BLD CALC: 43.8 % (ref 42–52)
HEMOGLOBIN: 14.7 GM/DL (ref 14–18)
INR BLD: 2.47 (ref 0.85–1.13)
KETONES, URINE: NEGATIVE
LEUKOCYTE ESTERASE, URINE: NEGATIVE
LYMPHOCYTES # BLD: 14.1 %
LYMPHOCYTES ABSOLUTE: 1.2 THOU/MM3 (ref 1–4.8)
MAGNESIUM: 2.1 MG/DL (ref 1.6–2.4)
MCH RBC QN AUTO: 29 PG (ref 27–31)
MCHC RBC AUTO-ENTMCNC: 33.6 GM/DL (ref 33–37)
MCV RBC AUTO: 86.3 FL (ref 80–94)
MISCELLANEOUS 2: ABNORMAL
MONOCYTES # BLD: 12 %
MONOCYTES ABSOLUTE: 1 THOU/MM3 (ref 0.4–1.3)
NITRITE, URINE: NEGATIVE
NUCLEATED RED BLOOD CELLS: 0 /100 WBC
PDW BLD-RTO: 15.3 % (ref 11.5–14.5)
PH UA: 6
PHOSPHORUS: 3.8 MG/DL (ref 2.4–4.7)
PLATELET # BLD: 156 THOU/MM3 (ref 130–400)
PMV BLD AUTO: 9.4 MCM (ref 7.4–10.4)
POTASSIUM SERPL-SCNC: 3.7 MEQ/L (ref 3.5–5.2)
PROTEIN UA: NEGATIVE
RBC # BLD: 5.08 MILL/MM3 (ref 4.7–6.1)
RBC # BLD: NORMAL 10*6/UL
RBC URINE: ABNORMAL /HPF
RENAL EPITHELIAL, UA: ABNORMAL
SEG NEUTROPHILS: 71.6 %
SEGMENTED NEUTROPHILS ABSOLUTE COUNT: 6.2 THOU/MM3 (ref 1.8–7.7)
SODIUM BLD-SCNC: 139 MEQ/L (ref 135–145)
SPECIFIC GRAVITY, URINE: 1.01 (ref 1–1.03)
TOTAL PROTEIN: 6.3 G/DL (ref 6.1–8)
UROBILINOGEN, URINE: 0.2 EU/DL
VITAMIN D 25-HYDROXY: 21 NG/ML (ref 30–100)
WBC # BLD: 8.6 THOU/MM3 (ref 4.8–10.8)
WBC UA: ABNORMAL /HPF
YEAST: ABNORMAL

## 2017-09-19 PROCEDURE — 85025 COMPLETE CBC W/AUTO DIFF WBC: CPT

## 2017-09-19 PROCEDURE — 83735 ASSAY OF MAGNESIUM: CPT

## 2017-09-19 PROCEDURE — 6370000000 HC RX 637 (ALT 250 FOR IP): Performed by: PHYSICIAN ASSISTANT

## 2017-09-19 PROCEDURE — 2580000003 HC RX 258: Performed by: INTERNAL MEDICINE

## 2017-09-19 PROCEDURE — 84155 ASSAY OF PROTEIN SERUM: CPT

## 2017-09-19 PROCEDURE — 71020 XR CHEST STANDARD TWO VW: CPT

## 2017-09-19 PROCEDURE — 6360000002 HC RX W HCPCS: Performed by: ANESTHESIOLOGY

## 2017-09-19 PROCEDURE — 93005 ELECTROCARDIOGRAM TRACING: CPT | Performed by: INTERNAL MEDICINE

## 2017-09-19 PROCEDURE — 81001 URINALYSIS AUTO W/SCOPE: CPT

## 2017-09-19 PROCEDURE — 82140 ASSAY OF AMMONIA: CPT

## 2017-09-19 PROCEDURE — 82306 VITAMIN D 25 HYDROXY: CPT

## 2017-09-19 PROCEDURE — 84450 TRANSFERASE (AST) (SGOT): CPT

## 2017-09-19 PROCEDURE — 99232 SBSQ HOSP IP/OBS MODERATE 35: CPT | Performed by: PHYSICIAN ASSISTANT

## 2017-09-19 PROCEDURE — 94761 N-INVAS EAR/PLS OXIMETRY MLT: CPT

## 2017-09-19 PROCEDURE — 99232 SBSQ HOSP IP/OBS MODERATE 35: CPT | Performed by: INTERNAL MEDICINE

## 2017-09-19 PROCEDURE — 84075 ASSAY ALKALINE PHOSPHATASE: CPT

## 2017-09-19 PROCEDURE — 2500000003 HC RX 250 WO HCPCS: Performed by: PHYSICIAN ASSISTANT

## 2017-09-19 PROCEDURE — 82247 BILIRUBIN TOTAL: CPT

## 2017-09-19 PROCEDURE — 2700000000 HC OXYGEN THERAPY PER DAY

## 2017-09-19 PROCEDURE — 1200000003 HC TELEMETRY R&B

## 2017-09-19 PROCEDURE — 99233 SBSQ HOSP IP/OBS HIGH 50: CPT | Performed by: INTERNAL MEDICINE

## 2017-09-19 PROCEDURE — 36415 COLL VENOUS BLD VENIPUNCTURE: CPT

## 2017-09-19 PROCEDURE — 82248 BILIRUBIN DIRECT: CPT

## 2017-09-19 PROCEDURE — 85610 PROTHROMBIN TIME: CPT

## 2017-09-19 PROCEDURE — 84460 ALANINE AMINO (ALT) (SGPT): CPT

## 2017-09-19 PROCEDURE — 80069 RENAL FUNCTION PANEL: CPT

## 2017-09-19 PROCEDURE — 6370000000 HC RX 637 (ALT 250 FOR IP): Performed by: INTERNAL MEDICINE

## 2017-09-19 RX ORDER — DOCUSATE SODIUM 100 MG/1
100 CAPSULE, LIQUID FILLED ORAL 2 TIMES DAILY PRN
Status: DISCONTINUED | OUTPATIENT
Start: 2017-09-19 | End: 2017-09-28 | Stop reason: HOSPADM

## 2017-09-19 RX ORDER — DIPHENHYDRAMINE HCL 25 MG
25 TABLET ORAL NIGHTLY PRN
Status: DISCONTINUED | OUTPATIENT
Start: 2017-09-19 | End: 2017-09-28 | Stop reason: HOSPADM

## 2017-09-19 RX ORDER — POLYETHYLENE GLYCOL 3350 17 G/17G
17 POWDER, FOR SOLUTION ORAL DAILY PRN
Status: DISCONTINUED | OUTPATIENT
Start: 2017-09-19 | End: 2017-09-28 | Stop reason: HOSPADM

## 2017-09-19 RX ORDER — SODIUM CHLORIDE 0.9 % (FLUSH) 0.9 %
10 SYRINGE (ML) INJECTION PRN
Status: DISCONTINUED | OUTPATIENT
Start: 2017-09-19 | End: 2017-09-28 | Stop reason: HOSPADM

## 2017-09-19 RX ORDER — CARBIDOPA AND LEVODOPA 50; 200 MG/1; MG/1
1 TABLET, EXTENDED RELEASE ORAL 3 TIMES DAILY
Status: DISCONTINUED | OUTPATIENT
Start: 2017-09-19 | End: 2017-09-19

## 2017-09-19 RX ORDER — DIPHENHYDRAMINE HYDROCHLORIDE 50 MG/ML
25 INJECTION INTRAMUSCULAR; INTRAVENOUS ONCE
Status: COMPLETED | OUTPATIENT
Start: 2017-09-20 | End: 2017-09-21

## 2017-09-19 RX ORDER — 0.9 % SODIUM CHLORIDE 0.9 %
250 INTRAVENOUS SOLUTION INTRAVENOUS ONCE
Status: COMPLETED | OUTPATIENT
Start: 2017-09-19 | End: 2017-09-19

## 2017-09-19 RX ORDER — BUMETANIDE 1 MG/1
1 TABLET ORAL 2 TIMES DAILY
Status: DISCONTINUED | OUTPATIENT
Start: 2017-09-20 | End: 2017-09-22

## 2017-09-19 RX ORDER — SODIUM CHLORIDE 9 MG/ML
INJECTION, SOLUTION INTRAVENOUS CONTINUOUS
Status: DISCONTINUED | OUTPATIENT
Start: 2017-09-20 | End: 2017-09-22

## 2017-09-19 RX ORDER — METOPROLOL SUCCINATE 25 MG/1
12.5 TABLET, EXTENDED RELEASE ORAL DAILY
Status: DISCONTINUED | OUTPATIENT
Start: 2017-09-19 | End: 2017-09-21

## 2017-09-19 RX ORDER — SODIUM CHLORIDE 0.9 % (FLUSH) 0.9 %
10 SYRINGE (ML) INJECTION EVERY 12 HOURS SCHEDULED
Status: DISCONTINUED | OUTPATIENT
Start: 2017-09-20 | End: 2017-09-28 | Stop reason: HOSPADM

## 2017-09-19 RX ADMIN — ASPIRIN 81 MG: 81 TABLET, CHEWABLE ORAL at 09:19

## 2017-09-19 RX ADMIN — HALOPERIDOL LACTATE 5 MG: 5 INJECTION, SOLUTION INTRAMUSCULAR at 21:10

## 2017-09-19 RX ADMIN — Medication 10 ML: at 09:21

## 2017-09-19 RX ADMIN — BENZTROPINE MESYLATE 2 MG: 2 TABLET ORAL at 09:20

## 2017-09-19 RX ADMIN — LISINOPRIL 2.5 MG: 2.5 TABLET ORAL at 09:20

## 2017-09-19 RX ADMIN — CARBIDOPA AND LEVODOPA 1 TABLET: 50; 200 TABLET, EXTENDED RELEASE ORAL at 13:42

## 2017-09-19 RX ADMIN — VITAMIN D, TAB 1000IU (100/BT) 2000 UNITS: 25 TAB at 15:40

## 2017-09-19 RX ADMIN — Medication 10 ML: at 21:15

## 2017-09-19 RX ADMIN — BUMETANIDE 1 MG: 0.25 INJECTION, SOLUTION INTRAMUSCULAR; INTRAVENOUS at 09:21

## 2017-09-19 RX ADMIN — OXYCODONE HYDROCHLORIDE 5 MG: 5 TABLET ORAL at 23:23

## 2017-09-19 RX ADMIN — SODIUM CHLORIDE 250 ML: 9 INJECTION, SOLUTION INTRAVENOUS at 15:02

## 2017-09-19 RX ADMIN — CARBIDOPA AND LEVODOPA 1 TABLET: 50; 200 TABLET, EXTENDED RELEASE ORAL at 09:20

## 2017-09-19 RX ADMIN — ISOSORBIDE MONONITRATE 30 MG: 30 TABLET ORAL at 09:19

## 2017-09-19 ASSESSMENT — PAIN SCALES - GENERAL
PAINLEVEL_OUTOF10: 3
PAINLEVEL_OUTOF10: 6

## 2017-09-19 NOTE — PLAN OF CARE
Problem: Cardiovascular  Goal: No DVT, peripheral vascular complications  Outcome: Ongoing  Pt is on coumadin for DVT prophylaxis. Problem: Skin Integrity/Risk  Goal: No skin breakdown during hospitalization  Outcome: Ongoing  No areas of skin breakdown, monitor every shift and as needed Pt turns and repositions self often. Problem: Musculor/Skeletal Functional Status  Goal: Absence of falls  Outcome: Ongoing  Patient remained free from falls and accidental injury this shift. Wore non-skid socks when ambulating    Problem: Pain:  Goal: Pain level will decrease  Pain level will decrease   Outcome: Ongoing   Pain meds given prn per MAR. Pain rated on 0-10 pain rating scale. Will continue to reassess. Comments:   Care plan reviewed with patient.  Will review and discuss care plan with family when available

## 2017-09-19 NOTE — PROGRESS NOTES
Second head to toe assessment complete. Vitals Temp 97.9 PO, /56, HR 88, Resp 18, SPO2 96% RA. No change from previous assessment. Assisted pt up to bathroom, voided 300ml clear, dark yellow urine. Assisted pt up to chair. Chair alarm in place, call light in reach. Denies further needs at this time.

## 2017-09-19 NOTE — PROGRESS NOTES
Hospital Medicine Progress Note     Date:  9/19/2017    PCP: Stevie Hearn MD (Tel: [phone])    Date of Admission: 9/16/2017      Assessment:  Principal Problem:    Acute on chronic diastolic heart failure (HCC)  Active Problems:    Left pleural mass    Atrial fibrillation (HCC)    Chest pain    Shortness of breath    Coronary artery disease due to lipid rich plaque    Essential hypertension    History of thrombosis - PE, DVT    History of CVA (cerebrovascular accident)    Hx of CABG    Presence of coronary artery bypass graft stent    Elevated LFTs    Pleural effusion    Slurred speech, POA    Low vitamin D level        Plan:  1. CHF exacerbation. Appears euvolemic to slight dry today. Changed to PO diuretics. On ACEi, BB.  2. Bilateral pleural effusion, secondary to CHF. Does have noted loculated pleural effusion for which pulm has been consulted to assist with eval, although I suspect this is secondary to CHF still. Procalcitonin is normal, no leukocytosis or fever or any findings consistent with infection. 3. New-onset atrial fibrillation. Rate-controlled. Anticoagulated with coumadin. 4. Chest pain. Serial troponin negative. ECHO with low EF 30%. To cardiac cath tomorrow. 5. Slurred speech. Has had intermittent episodes; appears slightly better today. Per daughter, has had intermittent episodes of this in the past. MRI-brain with no acute findings. Awaiting neuro recs. 6. Abnormal LFTs with slightly elevated alkaline phosphatase 130s, elevated bili 1.3 and normal AST/ALT. So far, unclear etiology. Abdominal US with biliary sludge. Vitamin D low; hence, elevated alk phos. Viral hepatitis profile negative. Consider repeat LFTs as outpatient, in about a month. 7. Low vitamin D. Replacement ordered. 8. History of CAD s/p PCI, CABG. On ASA, BB, statin. 9. Hypertension. Continue current antihypertensives as ordered with hold parameters. 10. Hypotension.  Low BP today due to dehydration; meds

## 2017-09-19 NOTE — FLOWSHEET NOTE
When I saw the patient , there were no family present. Patient stated that he was tired. I offered words of encouragement.  Plan: Continued support would be helpful.      09/19/17 1312   Encounter Summary   Services provided to: Patient   Referral/Consult From: [facility]   [facility]   Continue Visiting Yes  (9/19/17)   Complexity of Encounter Low   Length of Encounter 15 minutes   Routine   Type Follow up   Assessment Spiritual struggle;Coping   Intervention Active listening;Nurtured hope   Outcome Comfort;Expressed gratitude;Coping   Spiritual/Latter day   Type Spiritual support

## 2017-09-19 NOTE — PROGRESS NOTES
atraumatic  Eyes: pupils equal, round, and reactive to light  Neck: supple and non-tender without mass, no thyromegaly   Musculoskeletal: normal range of motion, no joint swelling, deformity or tenderness  Neurological: alert, oriented, normal speech, no focal findings or movement disorder noted    Medications:    carbidopa-levodopa  1 tablet Oral TID    lisinopril  2.5 mg Oral Daily    atorvastatin  40 mg Oral Q48H    benztropine  2 mg Oral Daily    isosorbide mononitrate  30 mg Oral Daily    pantoprazole  40 mg Oral QAM AC    sodium chloride flush  10 mL Intravenous 2 times per day    aspirin  81 mg Oral Daily    bumetanide  1 mg Intravenous BID    warfarin (COUMADIN) daily dosing (placeholder)   Other RX Placeholder          haloperidol lactate 5 mg Q6H PRN   bismuth subsalicylate 15 mL G8A PRN   sodium chloride flush 10 mL PRN   acetaminophen 650 mg Q4H PRN   oxyCODONE 5 mg Q4H PRN   magnesium hydroxide 30 mL Daily PRN   ondansetron 4 mg Q6H PRN   nitroGLYCERIN 0.4 mg Q5 Min PRN   ipratropium-albuterol 1 ampule Q4H PRN       Diagnostics:  Cath - 1/12/12  FINDINGS:  Left main artery is large and mild disease, bifurcates in to the left anterior descending and the left circumflex artery. Left circumflex artery has proximally severe disease and after obtuse marginal branch is totally occluded. Left anterior descending artery is totally occluded after the origin from the left main. Right coronary artery is totally occluded from the mid segment and proximally has severe 80-90 percent disease. Saphenous vein graft to obtuse marginal 2 graft has proximal 50-60 percent stenosis, mid has 50-60 percent  stenosis and distally at the anastomosis has 90-95 percent stenosis. Saphenous vein graft to obtuse marginal 1 branch has a proximal 40-50 percent stenosis and mild diffuse disease. Anastomotic site is patent.   Saphenous vein graft to right coronary artery graft proximally has 60-70 percent stenosis and

## 2017-09-19 NOTE — PROGRESS NOTES
BP 75/50. Patient asymptomatic. Updated Dr Elizabeth Eldridge. Order for bolus and changed medications- see MAR. Updated cardiology.

## 2017-09-19 NOTE — PLAN OF CARE
Problem: Cardiovascular  Goal: No DVT, peripheral vascular complications  Outcome: Ongoing  Patient on coumadin     Problem: Skin Integrity/Risk  Goal: No skin breakdown during hospitalization  Outcome: Ongoing  Patient free from skin breakdown. Patient turns self and makes frequent positional changes. Will continue to monitor. Problem: Musculor/Skeletal Functional Status  Goal: Absence of falls  Outcome: Ongoing  Call light within reach. Side rails up x2. Bed alarm on. Non skid slippers available. Problem: Pain:  Goal: Pain level will decrease  Pain level will decrease   Outcome: Ongoing  Patient free from pain this shift. Pain rated on 0-10 pain rating scale. Will continue to reassess. Comments:   Care plan reviewed with patient. Patient verbalize understanding of the plan of care and contribute to goal setting.

## 2017-09-19 NOTE — PROGRESS NOTES
[place] [facility]    Patient - Rebecca Masters   MRN -  [id number]   [facility] # - [de-identified]   - 1930      Date of Admission -  2017  8:28 AM  Date of evaluation -  2017  Room - -002-A   Hospital Day - 3  Consulting - Bill Dean MD Primary Care Physician - Davy Mendez MD   Chief Complaint   Following for SOB  CC: less short of breath   [address] Problems    Diagnosis Date Noted    [facility] [R47.81] 2017    Elevated LFTs [R94.5] 2017    Pleural effusion [J90]     Left pleural mass [R22.2] 2017    Chest pain [R07.9] 2017    Shortness of breath [R06.02] 2017    Atrial fibrillation (HCC) [I48.91] 2017    Acute on chronic diastolic heart failure (HCC) [I50.33] 2017    Hx of CABG [Z95.1] 09/10/2012    Presence of coronary artery bypass graft stent [Z95.5, Z95.1] 09/10/2012    History of thrombosis - PE, DVT [Z86.718] 2012    History of CVA (cerebrovascular accident) [Z86.73] 2012    Essential hypertension [I10]     Coronary artery disease due to lipid rich plaque [I25.10, I25.83]      HPI   Rebecca Masters is a 80 y.o. male admitted for CHF. Had been feeling short of breath for past 2 days with exertional chest pain. Has history of CAD, s/p CABG  and , history of PE and DVT. Chronic warfarin use. Denies fever, chills, cough, or malaise. Has had mild swelling of legs. Had new onset of atrial fibrillation in ED. Denies any pulmonary history. Denies tobacco use or history of use. Currently oxygenating well on room air, states SOB has resolved with diuretics. CT of chest completed revealing bilateral pleural effusions, possibly loculated and pulmonary has been consulted for further evaluation.     Events of last 24h:  Less SOB  Remains on room air  Afebrile  Cardiology planning for cardiac cath tomorrow  Garbled speech- No cyanosis, clubbing or edema    Labs   ABG  No results found for: PH, PO2, PCO2, HCO3, O2SAT  No results found for: TIA Holden  CBC  Recent Labs      09/17/17   0508  09/18/17   0643  09/19/17 0448   WBC  6.5  8.4  8.6   RBC  5.03  5.16  5.08   HGB  14.6  14.9  14.7   HCT  43.3  44.5  43.8   MCV  86.0  86.2  86.3   MCH  29.0  28.8  29.0   MCHC  33.7  33.5  33.6   RDW  15.3*  15.0*  15.3*   PLT  168  175  156   MPV  8.9  9.2  9.4      BMP  Recent Labs      09/17/17   0508  09/17/17   0829  09/18/17 0428 09/19/17 0448   NA  139   --   138  139   K  3.8   --   3.6  3.7   CL  97*   --   97*  99   CO2  28   --   25  27   BUN  21   --   25*  20   CREATININE  0.9   --   0.8  0.8   GLUCOSE  87   --   97  95   MG   --   2.1  2.1  2.1   PHOS   --    --   4.0  3.8   CALCIUM  9.3   --   9.2  9.0     LFT  Recent Labs      09/17/17   0508 09/18/17 0428 09/19/17 0448   AST  15  13  11   ALT  <5*  6*  8*   BILITOT  1.6*  1.3*  1.2   ALKPHOS  139*  130*  129*     TROP  Lab Results   Component Value Date    TROPONINT < 0.010 09/16/2017    TROPONINT < 0.010 09/16/2017    TROPONINT < 0.010 09/16/2017     BNP     9/16/2017 08:58   Pro-BNP 2231.0 (H)     Lactic Acid  No results for input(s): LACTA in the last 72 hours. INR  Recent Labs      09/17/17   0508 09/18/17 0428 09/19/17 0448   INR  4.16*  3.40*  2.47*     PTT  No results for input(s): APTT in the last 72 hours. Glucose  No results for input(s): POCGLU in the last 72 hours. UA No results for input(s): SPECGRAV, PHUR, COLORU, CLARITYU, MUCUS, PROTEINU, BLOODU, RBCUA, WBCUA, BACTERIA, NITRU, GLUCOSEU, BILIRUBINUR, UROBILINOGEN, KETUA, LABCAST, LABCASTTY, AMORPHOS in the last 72 hours. Invalid input(s): CRYSTALS.   PFTs   Per pt - Has never had any   Echo    Summary 9/18/17   Left ventricle size mildly dilated and systolic function treduced.   Ejection fraction was estimated at 30%.   Left atrial size was moderately dilated.  [facility] aortic valve was calcified with reduced excursion. DOPPLER: TUTU   0.9-1.0 cm2   The mitral valve structure was calcified with adequate leaflet separation.   DOPPLER: The transmitral velocity was within the normal range with no   evidence for mitral stenosis. There was no evidence of moderate mitral   regurgitation.      Signature      ----------------------------------------------------------------   Electronically signed by Luc Reyes MD (Interpreting   [email or web address]) on 09/18/2017 at 06:46 PM   ----------------------------------------------------------------  Cultures    Procalcitonin  Lab Results   Component Value Date    PROCAL 0.05 09/16/2017        Radiology    CXR  9/18/17      Impression   1. Mild cardiomegaly. Metallic sternotomy sutures. Relatively large hiatus hernia. 2.. Small bilateral pleural effusions. Mild pleural calcifications left side. Overall appearance of chest has improved since prior.             MRI brain 9/18/17     1. No evidence of an acute infarct. 2. Mild-moderate severity chronic small vessel ischemic changes. 3. Old hemorrhage in the right temporal lobe. There are also a few old microhemorrhages.         (See actual reports for details)    Assessment   Bilateral Pleural Effusions R>L improving CXR  Chronic pleuro parenchymal lung changes from previous thoracotomy--no need for intervention  Negative Procalcitonin, no fever, no WBC, no cough- doubt infectious process  Oxygenating well on room air- SOB improving with diuretics  CHF  New onset A-fib- cardiology following   Recommendations   Continue treatment and optimitization of CHF  Effusions likely caused by CHF--small and improving with therapy, no need to tap  Cardiology following  Cardiac cath tomorrow   Will need outpatient f/u with PA/LAT CXR in 3 weeks with Dr Nakia Scott or  Radha Yun CNP  to monitor effusions.   supplemental O2 PRN to keep sats >92%  Pulmonary will sign off, call PRN    Case discussed with Dr Charmaine Infante with

## 2017-09-20 LAB
ABO: NORMAL
ALBUMIN SERPL-MCNC: 3.7 G/DL (ref 3.5–5.1)
ANION GAP SERPL CALCULATED.3IONS-SCNC: 14 MEQ/L (ref 8–16)
ANISOCYTOSIS: ABNORMAL
ANTIBODY SCREEN: NORMAL
APTT: 125.6 SECONDS (ref 22–38)
APTT: 32.2 SECONDS (ref 22–38)
BASOPHILS # BLD: 0.3 %
BASOPHILS ABSOLUTE: 0 THOU/MM3 (ref 0–0.1)
BUN BLDV-MCNC: 20 MG/DL (ref 7–22)
CALCIUM SERPL-MCNC: 9.4 MG/DL (ref 8.5–10.5)
CHLORIDE BLD-SCNC: 98 MEQ/L (ref 98–111)
CO2: 28 MEQ/L (ref 23–33)
CREAT SERPL-MCNC: 0.8 MG/DL (ref 0.4–1.2)
EKG ATRIAL RATE: 85 BPM
EKG Q-T INTERVAL: 386 MS
EKG QRS DURATION: 106 MS
EKG QTC CALCULATION (BAZETT): 479 MS
EKG R AXIS: 44 DEGREES
EKG T AXIS: 118 DEGREES
EKG VENTRICULAR RATE: 93 BPM
EOSINOPHIL # BLD: 1.6 %
EOSINOPHILS ABSOLUTE: 0.1 THOU/MM3 (ref 0–0.4)
GFR SERPL CREATININE-BSD FRML MDRD: > 90 ML/MIN/1.73M2
GLUCOSE BLD-MCNC: 127 MG/DL (ref 70–108)
HCT VFR BLD CALC: 41.6 % (ref 42–52)
HEMOGLOBIN: 14.4 GM/DL (ref 14–18)
INR BLD: 1.95 (ref 0.85–1.13)
LYMPHOCYTES # BLD: 12.7 %
LYMPHOCYTES ABSOLUTE: 0.9 THOU/MM3 (ref 1–4.8)
MAGNESIUM: 2.1 MG/DL (ref 1.6–2.4)
MCH RBC QN AUTO: 29.8 PG (ref 27–31)
MCHC RBC AUTO-ENTMCNC: 34.6 GM/DL (ref 33–37)
MCV RBC AUTO: 85.9 FL (ref 80–94)
MONOCYTES # BLD: 10.9 %
MONOCYTES ABSOLUTE: 0.8 THOU/MM3 (ref 0.4–1.3)
NUCLEATED RED BLOOD CELLS: 0 /100 WBC
PDW BLD-RTO: 15.7 % (ref 11.5–14.5)
PHOSPHORUS: 3.6 MG/DL (ref 2.4–4.7)
PLATELET # BLD: 151 THOU/MM3 (ref 130–400)
PMV BLD AUTO: 9.1 MCM (ref 7.4–10.4)
POTASSIUM SERPL-SCNC: 3.6 MEQ/L (ref 3.5–5.2)
RBC # BLD: 4.84 MILL/MM3 (ref 4.7–6.1)
RBC # BLD: NORMAL 10*6/UL
RH FACTOR: NORMAL
SEG NEUTROPHILS: 74.5 %
SEGMENTED NEUTROPHILS ABSOLUTE COUNT: 5.3 THOU/MM3 (ref 1.8–7.7)
SODIUM BLD-SCNC: 140 MEQ/L (ref 135–145)
WBC # BLD: 7.1 THOU/MM3 (ref 4.8–10.8)

## 2017-09-20 PROCEDURE — 86901 BLOOD TYPING SEROLOGIC RH(D): CPT

## 2017-09-20 PROCEDURE — 1200000003 HC TELEMETRY R&B

## 2017-09-20 PROCEDURE — 85730 THROMBOPLASTIN TIME PARTIAL: CPT

## 2017-09-20 PROCEDURE — 80069 RENAL FUNCTION PANEL: CPT

## 2017-09-20 PROCEDURE — 6370000000 HC RX 637 (ALT 250 FOR IP): Performed by: INTERNAL MEDICINE

## 2017-09-20 PROCEDURE — 99233 SBSQ HOSP IP/OBS HIGH 50: CPT | Performed by: INTERNAL MEDICINE

## 2017-09-20 PROCEDURE — 6360000002 HC RX W HCPCS

## 2017-09-20 PROCEDURE — 99223 1ST HOSP IP/OBS HIGH 75: CPT | Performed by: INTERNAL MEDICINE

## 2017-09-20 PROCEDURE — 83735 ASSAY OF MAGNESIUM: CPT

## 2017-09-20 PROCEDURE — 2580000003 HC RX 258: Performed by: ANESTHESIOLOGY

## 2017-09-20 PROCEDURE — 93350 STRESS TTE ONLY: CPT

## 2017-09-20 PROCEDURE — 86850 RBC ANTIBODY SCREEN: CPT

## 2017-09-20 PROCEDURE — 36415 COLL VENOUS BLD VENIPUNCTURE: CPT

## 2017-09-20 PROCEDURE — 2580000003 HC RX 258: Performed by: PHYSICIAN ASSISTANT

## 2017-09-20 PROCEDURE — 6370000000 HC RX 637 (ALT 250 FOR IP): Performed by: FAMILY MEDICINE

## 2017-09-20 PROCEDURE — 2500000003 HC RX 250 WO HCPCS

## 2017-09-20 PROCEDURE — 6360000002 HC RX W HCPCS: Performed by: INTERNAL MEDICINE

## 2017-09-20 PROCEDURE — 86900 BLOOD TYPING SEROLOGIC ABO: CPT

## 2017-09-20 PROCEDURE — 6360000002 HC RX W HCPCS: Performed by: ANESTHESIOLOGY

## 2017-09-20 PROCEDURE — 93017 CV STRESS TEST TRACING ONLY: CPT | Performed by: INTERNAL MEDICINE

## 2017-09-20 PROCEDURE — 85025 COMPLETE CBC W/AUTO DIFF WBC: CPT

## 2017-09-20 PROCEDURE — 85610 PROTHROMBIN TIME: CPT

## 2017-09-20 RX ORDER — HEPARIN SODIUM 1000 [USP'U]/ML
40 INJECTION, SOLUTION INTRAVENOUS; SUBCUTANEOUS PRN
Status: DISCONTINUED | OUTPATIENT
Start: 2017-09-20 | End: 2017-09-22

## 2017-09-20 RX ORDER — HALOPERIDOL 5 MG/ML
10 INJECTION INTRAMUSCULAR ONCE
Status: COMPLETED | OUTPATIENT
Start: 2017-09-20 | End: 2017-09-20

## 2017-09-20 RX ORDER — HEPARIN SODIUM 1000 [USP'U]/ML
80 INJECTION, SOLUTION INTRAVENOUS; SUBCUTANEOUS PRN
Status: DISCONTINUED | OUTPATIENT
Start: 2017-09-20 | End: 2017-09-22

## 2017-09-20 RX ORDER — ZIPRASIDONE MESYLATE 20 MG/ML
20 INJECTION, POWDER, LYOPHILIZED, FOR SOLUTION INTRAMUSCULAR ONCE
Status: COMPLETED | OUTPATIENT
Start: 2017-09-20 | End: 2017-09-20

## 2017-09-20 RX ORDER — HYDRALAZINE HYDROCHLORIDE 20 MG/ML
10 INJECTION INTRAMUSCULAR; INTRAVENOUS ONCE
Status: DISCONTINUED | OUTPATIENT
Start: 2017-09-20 | End: 2017-09-22

## 2017-09-20 RX ORDER — HEPARIN SODIUM 1000 [USP'U]/ML
80 INJECTION, SOLUTION INTRAVENOUS; SUBCUTANEOUS ONCE
Status: COMPLETED | OUTPATIENT
Start: 2017-09-20 | End: 2017-09-20

## 2017-09-20 RX ORDER — HEPARIN SODIUM 10000 [USP'U]/100ML
18 INJECTION, SOLUTION INTRAVENOUS CONTINUOUS
Status: DISCONTINUED | OUTPATIENT
Start: 2017-09-20 | End: 2017-09-22

## 2017-09-20 RX ADMIN — WATER 1.2 ML: 1 INJECTION INTRAMUSCULAR; INTRAVENOUS; SUBCUTANEOUS at 03:55

## 2017-09-20 RX ADMIN — DIPHENHYDRAMINE HCL 25 MG: 25 TABLET ORAL at 00:50

## 2017-09-20 RX ADMIN — ATORVASTATIN CALCIUM 40 MG: 40 TABLET, FILM COATED ORAL at 21:18

## 2017-09-20 RX ADMIN — PREDNISONE 50 MG: 10 TABLET ORAL at 23:56

## 2017-09-20 RX ADMIN — HEPARIN SODIUM AND DEXTROSE 18 UNITS/KG/HR: 10000; 5 INJECTION INTRAVENOUS at 17:12

## 2017-09-20 RX ADMIN — ZIPRASIDONE MESYLATE 20 MG: 20 INJECTION, POWDER, LYOPHILIZED, FOR SOLUTION INTRAMUSCULAR at 03:55

## 2017-09-20 RX ADMIN — HEPARIN SODIUM 5300 UNITS: 1000 INJECTION, SOLUTION INTRAVENOUS; SUBCUTANEOUS at 17:41

## 2017-09-20 RX ADMIN — BUMETANIDE 1 MG: 1 TABLET ORAL at 21:18

## 2017-09-20 RX ADMIN — SODIUM CHLORIDE: 9 INJECTION, SOLUTION INTRAVENOUS at 06:17

## 2017-09-20 RX ADMIN — HALOPERIDOL LACTATE 10 MG: 5 INJECTION, SOLUTION INTRAMUSCULAR at 01:54

## 2017-09-20 ASSESSMENT — PAIN SCALES - GENERAL
PAINLEVEL_OUTOF10: 0

## 2017-09-20 NOTE — CARE COORDINATION
9/20/17, 3:04 PM      Zaira Ano day: 4  Location: [facility]02/002-A Reason for admit: Shortness of breath [R06.02]   Procedure: 9/20Dobutamine Stress Echo  Treatment Plan of Care: Confused and combative last night. INR 1.95. Heart cath tomorrow. Telemetry. PT OT  PCP: Claudean Masker, MD  Discharge Plan: Home alone.  Family assists

## 2017-09-20 NOTE — PROGRESS NOTES
Hospital Medicine Progress Note     Date:  9/20/2017    PCP: Phillips Curling, MD (Tel: [phone])    Date of Admission: 9/16/2017      Assessment:  Principal Problem:    Acute on chronic diastolic heart failure (HCC)  Active Problems:    Left pleural mass    Atrial fibrillation (HCC)    Chest pain    Shortness of breath    Coronary artery disease due to lipid rich plaque    Essential hypertension    History of thrombosis - PE, DVT    History of CVA (cerebrovascular accident)    Hx of CABG    Presence of coronary artery bypass graft stent    Elevated LFTs    Pleural effusion    Slurred speech, POA    Low vitamin D level        Plan:  1. CHF exacerbation. Appears euvolemic currently. To resume PO bumex once awake. On ACEi, BB.  2. Bilateral pleural effusion, secondary to CHF. Does have noted loculated pleural effusion; evaluated by pulm and we agreed this is likely secondary to CHF. He will have follow up with pulm, repeat imaging as outpatient. 3. New-onset atrial fibrillation. Rate-controlled. Was already on anticoagulation for hx of DVT prior to admission. 4. Chest pain. Serial troponin negative. ECHO with low EF 30%. Dobutamine stress to evaluate valve function today, cardiac cath tomorrow. 5. Slurred speech. Has had intermittent episodes; appears slightly better today. Per daughter, has had intermittent episodes of this in the past. MRI-brain with no acute findings. Neuro recommended stopping sinemet and benztropine. We will monitor closely. 6. Abnormal LFTs with slightly elevated alkaline phosphatase 130s, elevated bili 1.3 and normal AST/ALT. So far, unclear etiology. Abdominal US with biliary sludge. Vitamin D low; hence, elevated alk phos. Viral hepatitis profile negative. Consider repeat LFTs as outpatient, in about a month. 7. Low vitamin D. Replacement ordered. 8. History of CAD s/p PCI, CABG. On ASA, BB, statin. 9. Hypertension.  Continue current antihypertensives as ordered with hold Once    sodium chloride flush  10 mL Intravenous 2 times per day    diphenhydrAMINE  25 mg Intravenous Once    hydrocortisone sodium succinate PF  100 mg Intravenous Once    metoprolol succinate  12.5 mg Oral Daily    vitamin D  2,000 Units Oral Daily    bumetanide  1 mg Oral BID    lisinopril  2.5 mg Oral Daily    atorvastatin  40 mg Oral Q48H    pantoprazole  40 mg Oral QAM AC    aspirin  81 mg Oral Daily       Infusions:    sodium chloride 75 mL/hr at 09/20/17 0617         PRN Meds: sodium chloride flush, docusate sodium, polyethylene glycol, diphenhydrAMINE, bismuth subsalicylate, acetaminophen, magnesium hydroxide, ondansetron, nitroGLYCERIN, ipratropium-albuterol    Labs/imaging:  CBC:   Recent Labs      09/18/17   0643  09/19/17 0448  09/20/17   0416   WBC  8.4  8.6  7.1   HGB  14.9  14.7  14.4   PLT  175  156  151         BMP:    Recent Labs      09/18/17   0428  09/19/17 0448  09/20/17   0416   NA  138  139  140   K  3.6  3.7  3.6   CL  97*  99  98   CO2  25  27  28   BUN  25*  20  20   CREATININE  0.8  0.8  0.8   GLUCOSE  97  95  127*         Hepatic:   Recent Labs      09/18/17 0428  09/19/17   0448   AST  13  11   ALT  6*  8*   BILITOT  1.3*  1.2   ALKPHOS  130*  129*       INR:   Recent Labs      09/18/17 0428  09/19/17 0448  09/20/17   0416   INR  3.40*  2.47*  1.95*     Reviewed imaging and reports noted.     Donal Diaz MD  -------------------------------  Rounding hospitalist

## 2017-09-20 NOTE — PROGRESS NOTES
Pt alert and oriented x4. Pupils round reactive to light 3mm to 2mm. Skin turgor and cap refill less than 3 seconds. S1S2 heart sounds ascultated. Lung sounds clear. Upper extremitied warm tan and dry. Denies tingling sensation. Hand grasp strong equal bilateral. Bowel sounds active X4. Pt denied abdomin tenderness. Pt complains of a sore on the middle of his back along the spine. He rated this 6/10. Pt stated this pain is constant and has been there for 6 months. This location on his back is tan warm and intact. Denies pain meds. Lower extremities warm tan and dry. Denies tingling sensation. Pedal push and pull strong bilateral. Pedal pulses weak bilateral. Bony prominences warm and intact.

## 2017-09-20 NOTE — PROGRESS NOTES
BP-132/64 upper right arm, HR-84 irregular apically, Resp 18 Temp-98.2 oral SPO2-95% RA. Alert and oriented to name, place and birthday. Speech thick and appropriate. Respirations unlabored. Denies SOB. No cough noted. Denies chest pain. Cap refill & skin turgor < 3 seconds. No edema noted. Continent of urine and bowel. Denies S/S of UTI. Bowel sounds active x4. Hand grasp & pedal push and pull strong and equal bilaterlly. Skin warm and dry. IV site in left antecubital dry and intact. No redness, warmth or pain noted. Denies any needs at this time. Visiting with family at bedside. Call light and water within reach.

## 2017-09-20 NOTE — PLAN OF CARE
Problem: Cardiovascular  Goal: No DVT, peripheral vascular complications  Outcome: Ongoing  No signs of DVT. Patient on coumadin. Coumadin on hold due to elevated INR. Problem: Skin Integrity/Risk  Goal: No skin breakdown during hospitalization  Outcome: Ongoing  Monitoring for any signs of skin breakdown. No skin breakdown seen. Will monitor. Problem: Musculor/Skeletal Functional Status  Goal: Absence of falls  Outcome: Ongoing  Bed alarm on, Falling star program in place. Call light within reach. Ordered tele sitter for patient safety         Problem: Pain:  Goal: Pain level will decrease  Pain level will decrease   Outcome: Met This Shift  Patient sleeping. No signs of any pain. Goal: Control of acute pain  Control of acute pain   Outcome: Met This Shift  Goal: Control of chronic pain  Control of chronic pain   Outcome: Met This Shift    Problem: Falls - Risk of  Goal: Absence of falls  Outcome: Ongoing  Bed alarm on, Falling star program in place. Call light within reach. Ordered tele sitter for patient safety         Comments:   Care plan reviewed with patient. Patient verbalize understanding of the plan of care and contribute to goal setting.

## 2017-09-20 NOTE — PROGRESS NOTES
[address] [address]  SPEECH THERAPY MISSED TREATMENT NOTE  [facility] RENAL TELEMETRY 6K      Date: 2017  Patient Name: Chapincito Prasad        MRN: [id number]    : 1930  (80 y.o.)    REASON FOR MISSED TREATMENT:    Order written by physician for MBS. Pt currently NPO for multiple procedures/testing this date. Will plan on MBS for .     Vero Childs M.S. [id number]

## 2017-09-20 NOTE — PROGRESS NOTES
Patient arouses to name and falls right back to sleep. Medication given last night due to patient being combative. Vital signs stable. Will monitor.

## 2017-09-21 ENCOUNTER — PREP FOR PROCEDURE (OUTPATIENT)
Dept: CARDIOLOGY CLINIC | Age: 82
End: 2017-09-21

## 2017-09-21 ENCOUNTER — APPOINTMENT (OUTPATIENT)
Dept: CARDIAC CATH/INVASIVE PROCEDURES | Age: 82
DRG: 034 | End: 2017-09-21
Payer: MEDICARE

## 2017-09-21 LAB
ALBUMIN SERPL-MCNC: 4.3 G/DL (ref 3.5–5.1)
ANION GAP SERPL CALCULATED.3IONS-SCNC: 22 MEQ/L (ref 8–16)
ANISOCYTOSIS: ABNORMAL
APTT: 32.1 SECONDS (ref 22–38)
APTT: 32.8 SECONDS (ref 22–38)
APTT: 70.7 SECONDS (ref 22–38)
BASOPHILS # BLD: 0.3 %
BASOPHILS ABSOLUTE: 0 THOU/MM3 (ref 0–0.1)
BUN BLDV-MCNC: 17 MG/DL (ref 7–22)
CALCIUM SERPL-MCNC: 9.4 MG/DL (ref 8.5–10.5)
CHLORIDE BLD-SCNC: 95 MEQ/L (ref 98–111)
CO2: 24 MEQ/L (ref 23–33)
COLLECTED BY:: ABNORMAL
CREAT SERPL-MCNC: 1 MG/DL (ref 0.4–1.2)
EOSINOPHIL # BLD: 0.1 %
EOSINOPHILS ABSOLUTE: 0 THOU/MM3 (ref 0–0.4)
GFR SERPL CREATININE-BSD FRML MDRD: 71 ML/MIN/1.73M2
GLUCOSE BLD-MCNC: 142 MG/DL (ref 70–108)
HCT VFR BLD CALC: 49.4 % (ref 42–52)
HEMOGLOBIN: 16.6 GM/DL (ref 14–18)
INR BLD: 1.57 (ref 0.85–1.13)
LYMPHOCYTES # BLD: 4.4 %
LYMPHOCYTES ABSOLUTE: 0.5 THOU/MM3 (ref 1–4.8)
MAGNESIUM: 2.2 MG/DL (ref 1.6–2.4)
MCH RBC QN AUTO: 29.2 PG (ref 27–31)
MCHC RBC AUTO-ENTMCNC: 33.6 GM/DL (ref 33–37)
MCV RBC AUTO: 86.9 FL (ref 80–94)
MONOCYTES # BLD: 2.4 %
MONOCYTES ABSOLUTE: 0.3 THOU/MM3 (ref 0.4–1.3)
NUCLEATED RED BLOOD CELLS: 0 /100 WBC
PDW BLD-RTO: 15.2 % (ref 11.5–14.5)
PHOSPHORUS: 3.2 MG/DL (ref 2.4–4.7)
PLATELET # BLD: 188 THOU/MM3 (ref 130–400)
PMV BLD AUTO: 9.2 MCM (ref 7.4–10.4)
POC O2 SATURATION: 69 % (ref 94–97)
POC O2 SATURATION: 70 % (ref 94–97)
POC O2 SATURATION: 98 % (ref 94–97)
POTASSIUM SERPL-SCNC: 3.9 MEQ/L (ref 3.5–5.2)
RBC # BLD: 5.69 MILL/MM3 (ref 4.7–6.1)
RBC # BLD: NORMAL 10*6/UL
SEG NEUTROPHILS: 92.8 %
SEGMENTED NEUTROPHILS ABSOLUTE COUNT: 10.4 THOU/MM3 (ref 1.8–7.7)
SODIUM BLD-SCNC: 141 MEQ/L (ref 135–145)
SOURCE, BLOOD GAS: ABNORMAL
WBC # BLD: 11.2 THOU/MM3 (ref 4.8–10.8)

## 2017-09-21 PROCEDURE — 99223 1ST HOSP IP/OBS HIGH 75: CPT | Performed by: THORACIC SURGERY (CARDIOTHORACIC VASCULAR SURGERY)

## 2017-09-21 PROCEDURE — 2580000003 HC RX 258: Performed by: PHYSICIAN ASSISTANT

## 2017-09-21 PROCEDURE — B2151ZZ FLUOROSCOPY OF LEFT HEART USING LOW OSMOLAR CONTRAST: ICD-10-PCS | Performed by: INTERNAL MEDICINE

## 2017-09-21 PROCEDURE — 85610 PROTHROMBIN TIME: CPT

## 2017-09-21 PROCEDURE — 6370000000 HC RX 637 (ALT 250 FOR IP): Performed by: PHYSICIAN ASSISTANT

## 2017-09-21 PROCEDURE — 2780000010 HC IMPLANT OTHER

## 2017-09-21 PROCEDURE — 6360000002 HC RX W HCPCS

## 2017-09-21 PROCEDURE — 99223 1ST HOSP IP/OBS HIGH 75: CPT | Performed by: PSYCHIATRY & NEUROLOGY

## 2017-09-21 PROCEDURE — 6370000000 HC RX 637 (ALT 250 FOR IP): Performed by: INTERNAL MEDICINE

## 2017-09-21 PROCEDURE — 80069 RENAL FUNCTION PANEL: CPT

## 2017-09-21 PROCEDURE — 4A023N8 MEASUREMENT OF CARDIAC SAMPLING AND PRESSURE, BILATERAL, PERCUTANEOUS APPROACH: ICD-10-PCS | Performed by: INTERNAL MEDICINE

## 2017-09-21 PROCEDURE — 85730 THROMBOPLASTIN TIME PARTIAL: CPT

## 2017-09-21 PROCEDURE — 6360000002 HC RX W HCPCS: Performed by: PHYSICIAN ASSISTANT

## 2017-09-21 PROCEDURE — 85025 COMPLETE CBC W/AUTO DIFF WBC: CPT

## 2017-09-21 PROCEDURE — 2720000010 HC SURG SUPPLY STERILE

## 2017-09-21 PROCEDURE — C1769 GUIDE WIRE: HCPCS

## 2017-09-21 PROCEDURE — 83735 ASSAY OF MAGNESIUM: CPT

## 2017-09-21 PROCEDURE — C1894 INTRO/SHEATH, NON-LASER: HCPCS

## 2017-09-21 PROCEDURE — A6258 TRANSPARENT FILM >16<=48 IN: HCPCS

## 2017-09-21 PROCEDURE — 99233 SBSQ HOSP IP/OBS HIGH 50: CPT | Performed by: INTERNAL MEDICINE

## 2017-09-21 PROCEDURE — B2131ZZ FLUOROSCOPY OF MULTIPLE CORONARY ARTERY BYPASS GRAFTS USING LOW OSMOLAR CONTRAST: ICD-10-PCS | Performed by: INTERNAL MEDICINE

## 2017-09-21 PROCEDURE — B2181ZZ FLUOROSCOPY OF LEFT INTERNAL MAMMARY BYPASS GRAFT USING LOW OSMOLAR CONTRAST: ICD-10-PCS | Performed by: INTERNAL MEDICINE

## 2017-09-21 PROCEDURE — 82810 BLOOD GASES O2 SAT ONLY: CPT

## 2017-09-21 PROCEDURE — 2500000003 HC RX 250 WO HCPCS

## 2017-09-21 PROCEDURE — B3101ZZ FLUOROSCOPY OF THORACIC AORTA USING LOW OSMOLAR CONTRAST: ICD-10-PCS | Performed by: INTERNAL MEDICINE

## 2017-09-21 PROCEDURE — 1200000003 HC TELEMETRY R&B

## 2017-09-21 PROCEDURE — 93457 R HRT ART/GRFT ANGIO: CPT

## 2017-09-21 PROCEDURE — 36415 COLL VENOUS BLD VENIPUNCTURE: CPT

## 2017-09-21 RX ORDER — WARFARIN SODIUM 3 MG/1
6 TABLET ORAL DAILY
Status: DISCONTINUED | OUTPATIENT
Start: 2017-09-22 | End: 2017-09-21 | Stop reason: ALTCHOICE

## 2017-09-21 RX ORDER — WARFARIN SODIUM 7.5 MG/1
7.5 TABLET ORAL ONCE
Status: DISCONTINUED | OUTPATIENT
Start: 2017-09-21 | End: 2017-09-22

## 2017-09-21 RX ORDER — METOPROLOL SUCCINATE 25 MG/1
25 TABLET, EXTENDED RELEASE ORAL DAILY
Status: DISCONTINUED | OUTPATIENT
Start: 2017-09-22 | End: 2017-09-28 | Stop reason: HOSPADM

## 2017-09-21 RX ORDER — WARFARIN SODIUM 7.5 MG/1
7.5 TABLET ORAL
Status: DISCONTINUED | OUTPATIENT
Start: 2017-09-21 | End: 2017-09-21 | Stop reason: ALTCHOICE

## 2017-09-21 RX ORDER — LISINOPRIL 2.5 MG/1
2.5 TABLET ORAL ONCE
Status: COMPLETED | OUTPATIENT
Start: 2017-09-21 | End: 2017-09-21

## 2017-09-21 RX ORDER — LISINOPRIL 5 MG/1
5 TABLET ORAL DAILY
Status: DISCONTINUED | OUTPATIENT
Start: 2017-09-22 | End: 2017-09-22

## 2017-09-21 RX ORDER — METOPROLOL SUCCINATE 25 MG/1
12.5 TABLET, EXTENDED RELEASE ORAL ONCE
Status: COMPLETED | OUTPATIENT
Start: 2017-09-21 | End: 2017-09-21

## 2017-09-21 RX ORDER — QUETIAPINE FUMARATE 25 MG/1
25 TABLET, FILM COATED ORAL 2 TIMES DAILY
Status: DISCONTINUED | OUTPATIENT
Start: 2017-09-21 | End: 2017-09-22

## 2017-09-21 RX ADMIN — ASPIRIN 81 MG: 81 TABLET, CHEWABLE ORAL at 10:48

## 2017-09-21 RX ADMIN — LISINOPRIL 2.5 MG: 2.5 TABLET ORAL at 20:43

## 2017-09-21 RX ADMIN — Medication 10 ML: at 20:44

## 2017-09-21 RX ADMIN — METOPROLOL SUCCINATE 12.5 MG: 25 TABLET, FILM COATED, EXTENDED RELEASE ORAL at 10:48

## 2017-09-21 RX ADMIN — DIPHENHYDRAMINE HYDROCHLORIDE 25 MG: 50 INJECTION, SOLUTION INTRAMUSCULAR; INTRAVENOUS at 10:25

## 2017-09-21 RX ADMIN — METOPROLOL SUCCINATE 12.5 MG: 25 TABLET, FILM COATED, EXTENDED RELEASE ORAL at 20:42

## 2017-09-21 RX ADMIN — HYDROCORTISONE SODIUM SUCCINATE 100 MG: 100 INJECTION, POWDER, FOR SOLUTION INTRAMUSCULAR; INTRAVENOUS at 10:28

## 2017-09-21 RX ADMIN — LISINOPRIL 2.5 MG: 2.5 TABLET ORAL at 10:48

## 2017-09-21 RX ADMIN — PREDNISONE 50 MG: 10 TABLET ORAL at 03:17

## 2017-09-21 ASSESSMENT — PAIN SCALES - GENERAL
PAINLEVEL_OUTOF10: 0

## 2017-09-21 NOTE — CONSULTS
in the lateral leads  Echo:   Results for orders placed during the hospital encounter of 09/16/17   Echocardiogram 2D/ M-Mode/ Colorflow/ Do    Narrative Transthoracic Echocardiography Report (TTE)     Demographics      Patient Name    Alissa Oshea  Gender               Male                   E      MR #            [id number]       Race                                                       Ethnicity      Account #       [de-identified]       Room Number          0002      Accession       [id number]       Date of Study        09/18/2017   Number      Date of Birth   09/18/1930      Referring Physician  Paolo Roman MD      Age             80 year(s)      Mamta Kowalski MD                                   Physician     Procedure    Type of Study      TTE procedure:ECHOCARDIOGRAM COMPLETE 2D W DOPPLER W COLOR. Procedure Date  Date: 09/18/2017 Start: 02:25 PM    Study Location: Bedside  Technical Quality: Adequate visualization    Indications:Shortness of breath. Additional Medical History:Hypertension, PVC's, pulmonary embolism, CAD,  remote MI, GERD, remote CVA, CABG, family history of CAD, atrial  fibrillation, CHF, moderate aortic stenosis, moderate mitral regurgitation    Patient Status: Routine    Height: 65.35 inches Weight: 145.51 pounds BSA: 1.73 m^2 BMI: 23.95 kg/m^2    BP: 136/64 mmHg     Conclusions      Summary   Left ventricle size mildly dilated and systolic function treduced. Ejection fraction was estimated at 30%. Left atrial size was moderately dilated. The aortic valve was calcified with reduced excursion. DOPPLER: TUTU   0.9-1.0 cm2   The mitral valve structure was calcified with adequate leaflet separation. DOPPLER: The transmitral velocity was within the normal range with no   evidence for mitral stenosis. There was no evidence of moderate mitral   regurgitation. Signature      ----------------------------------------------------------------   Electronically signed by Hilary Jasso MD (Interpreting   physician) on 09/18/2017 at 06:46 PM   ----------------------------------------------------------------      Findings      Mitral Valve   The mitral valve structure was calcified with adequate leaflet separation. DOPPLER: The transmitral velocity was within the normal range with no   evidence for mitral stenosis. There was no evidence of moderate mitral   regurgitation. Aortic Valve   The aortic valve was calcified with reduced excursion. DOPPLER: TUTU   0.9-1.0 cm2      Tricuspid Valve   The tricuspid valve structure was normal with normal leaflet separation. DOPPLER: There was no evidence of tricuspid stenosis. There was no   evidence of tricuspid regurgitation. Pulmonic Valve   The pulmonic valve leaflets exhibited normal thickness, no calcification,   and normal cuspal separation. DOPPLER: The transpulmonic velocity was   within the normal range with no evidence for regurgitation. Left Atrium   Left atrial size was moderately dilated. Left Ventricle   Left ventricle size mildly dilated and systolic function treduced. Ejection fraction was estimated at 30%. Right Atrium   Right atrial size was normal.      Right Ventricle   The right ventricular size was normal with normal systolic function and   wall thickness. Pericardial Effusion   The pericardium was normal in appearance with no evidence of a pericardial   effusion. Pleural Effusion   No evidence of pleural effusion. Aorta / Great Vessels   -Aortic root dimension within normal limits.   -The Pulmonary artery is within normal limits. -IVC size is within normal limits with normal respiratory phasic changes.      M-Mode/2D Measurements & Calculations      LV Diastolic   LV Systolic Dimension:    AV Cusp Separation: 1.3 cmLA   Dimension: 4.1 3.4 cm                    Dimension: 5.3 cmAO Root   cm             LV Volume Diastolic: 99.8 Dimension: 3.8 cmLA Area: 30.5   LV FS:17.1 %   ml                        cm^2   LV PW          LV Volume Systolic: 63.4   Diastolic: 0.8 ml   cm             LV EDV/LV EDV Index: 74.2   Septum         ml/43 m^2LV ESV/LV ESV    RV Diastolic Dimension: 3.4 cm   Diastolic: 1   Index: 03.0 ml/27 m^2   cm             EF Calculated: 36.1 %     LA/Aorta: 1.39                     LV Length: 8 cm           LA volume/Index: 119.6 ml /69m^2      LV Area        LVOT: 2 cm   Diastolic:   67.6 cm^2   LV Area   Systolic: 85.3   cm^2     Doppler Measurements & Calculations      MV Peak E-Wave: 120 AV Peak Velocity: 293    LVOT Peak Velocity: 93.3 cm/s   cm/s                cm/s                     LVOT Mean Velocity: 72.2 cm/s                       AV Peak Gradient: 34.34  LVOT Peak Gradient: 3   MV Peak Gradient:   mmHg                     mmHgLVOT Mean Gradient: 2   5.76 mmHg           AV Mean Velocity: 233    mmHg                       cm/s   MV Deceleration     AV Mean Gradient: 24     TV Peak E-Wave: 52.3 cm/s   Time: 194 msec      mmHg                       AV VTI: 67.6 cm          TV Peak Gradient: 1.09 mmHg                       AV Area                  TR Velocity:263 cm/s                       (Continuity):0.95 cm^2   TR Gradient:27.67 mmHg                                                PV Peak Velocity: 74 cm/s                       LVOT VTI: 20.5 cm        PV Peak Gradient: 2.19 mmHg   MR Velocity: 461    AV P1/2t: 595 msec   cm/s                                                [place] ED Velocity: 132 cm/s                          AV DVI (VTI): 0.3AV DVI                       (Vmax):0.32     http://[employer]/MDWeb? DocKey=[id number]%3d       Physical Exam:  Vitals:    09/20/17 2000   BP: (!) 142/64   Pulse: 88

## 2017-09-21 NOTE — PROGRESS NOTES
[address] [address]  SPEECH THERAPY MISSED TREATMENT NOTE  [facility] RENAL TELEMETRY 6K      Date: 2017  Patient Name: Jeremiah Armijo        MRN: [id number]    : 1930  (80 y.o.)    REASON FOR MISSED TREATMENT:  ST plans for MBS to be completed this date at 36. Spoke with RN, Curtis Wolfe who reports pt with plans for heart cath this date. Pt to remain NPO prior to procedure and required to lay flat for 4 hours after procedure. Further MBS will need to hold until tomorrow, .      Jose Ortiz M.S. Kalin Katz 2017

## 2017-09-21 NOTE — PLAN OF CARE
Problem: DISCHARGE BARRIERS  Goal: Patients continuum of care needs are met  Outcome: Ongoing  Anticipated need for ECF for rehab

## 2017-09-21 NOTE — PROGRESS NOTES
[address] 60  OCCUPATIONAL THERAPY MISSED TREATMENT NOTE  ACUTE CARE    Date: 2017  Patient Name: Duke Ortiz        CSN: [id number]   : 1930  (80 y.o.)  Gender: male                REASON FOR MISSED TREATMENT:  Hold treatment per nursing request.  Pt had a heart cath this date and is on bedrest until 530. Will check back on 17.

## 2017-09-21 NOTE — PROGRESS NOTES
Hospital Medicine Progress Note     Date:  9/21/2017    PCP: Cindy De La Torre MD (Tel: [phone])    Date of Admission: 9/16/2017      Assessment:  Principal Problem:    Acute on chronic diastolic heart failure (HCC)  Active Problems:    Left pleural mass    Atrial fibrillation (HCC)    Chest pain    Shortness of breath    Coronary artery disease due to lipid rich plaque    Essential hypertension    History of thrombosis - PE, DVT    History of CVA (cerebrovascular accident)    Hx of CABG    Presence of coronary artery bypass graft stent    Elevated LFTs    Pleural effusion    Slurred speech, POA    Low vitamin D level        Plan:  1. CHF exacerbation. Appears euvolemic currently. S/p appropriate diuresis via IV route. On bumex, ACEi, BB.  2. Bilateral pleural effusion, secondary to CHF. Does have noted loculated pleural effusion; evaluated by pulm and we agreed this is likely secondary to CHF. He will have follow up with pulm, repeat imaging as outpatient. 3. New-onset atrial fibrillation. Rate-controlled. Was already on anticoagulation for hx of DVT prior to admission. 4. Chest pain. Serial troponin negative. ECHO with low EF 30%. Dobutamine stress result pending. To cardiac cath, hopefully today. 5. Slurred speech. Has had intermittent episodes; appears slightly better today. Per daughter, has had intermittent episodes of this in the past. MRI-brain with no acute findings. Neuro recommended stopping sinemet and benztropine. We will monitor closely. 6. Abnormal LFTs with slightly elevated alkaline phosphatase 130s, elevated bili 1.3 and normal AST/ALT. So far, unclear etiology. Abdominal US with biliary sludge. Vitamin D low; hence, elevated alk phos. Viral hepatitis profile negative. Consider repeat LFTs as outpatient, in about a month. 7. Low vitamin D. Replacement ordered. 8. History of CAD s/p PCI, CABG. On ASA, BB, statin. 9. Hypertension.  Continue current antihypertensives as ordered with hold parameters. 10. 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 INR on hold for pending procedure - continue heparin infusion; to hold for 4 hours prior to cardiac cath. 11. Intermittent episodes of confusion. Suspect sundowning. Reported to have had these usually at night. Per daughter, has had this in the past. MRI-brain, CXR, UA, ammonia unremarkable. We will avoid use of anxiolytics, haldol or geodon. Instead, sitter to bedside, reorientation may be considered. Continue seroquel as ordered. Diet: DIET LOW SODIUM 2 GM; 2000 ml    Code status: Full Code     ----------  Subjective  Per nursing staff, had some confusion last night; improved with sitter to bedside. Did not sleep most of the night. Objective  Physical exam:  Vitals: BP (!) 154/89  Pulse 96  Temp 97.3 °F (36.3 °C) (Oral)   Resp 18  Ht 5' 5.5\" (1.664 m)  Wt 145 lb 9.6 oz (66 kg)  SpO2 96%  BMI 23.86 kg/m2  Gen/overall appearance: Alert, has some confusion. Head: Normocephalic, atraumatic  Eyes: Pupils equal bilaterally  ENT:- Oral mucosa dry  Neck: No JVD, thyromegaly  CVS: Nml S1S2, no MRG, RRR  Pulm: Clear bilaterally. No crackles/wheezes  Gastrointestinal: Soft, NT/ND, +BS  Musculoskeletal: No edema. Warm  Neuro: No focal deficits. Moves all extremities. Psychiatry: Not agitated. Affect is normal.  Skin: No obvious rashes noted      24HR INTAKE/OUTPUT:      Intake/Output Summary (Last 24 hours) at 09/21/17 7498  Last data filed at 09/21/17 5865   Gross per 24 hour   Intake           404.45 ml   Output              100 ml   Net           304.45 ml     I/O last 3 completed shifts:   In: 341.5 [P.O.:160; I.V.:181.5]  Out: -   I/O this shift:  In: 61 [I.V.:63]  Out: 100 [Urine:100]      Meds:    hydrALAZINE  10 mg Intravenous Once    predniSONE  50 mg Oral Once    sodium chloride flush  10 mL Intravenous 2 times per day    diphenhydrAMINE  25 mg Intravenous Once    hydrocortisone sodium succinate PF  100 mg Intravenous Once  metoprolol succinate  12.5 mg Oral Daily    vitamin D  2,000 Units Oral Daily    bumetanide  1 mg Oral BID    lisinopril  2.5 mg Oral Daily    atorvastatin  40 mg Oral Q48H    pantoprazole  40 mg Oral QAM AC    aspirin  81 mg Oral Daily       Infusions:    heparin (porcine) 15 Units/kg/hr (09/21/17 0034)    sodium chloride 75 mL/hr at 09/20/17 0617         PRN Meds: heparin (porcine), heparin (porcine), sodium chloride flush, docusate sodium, polyethylene glycol, diphenhydrAMINE, bismuth subsalicylate, acetaminophen, magnesium hydroxide, ondansetron, nitroGLYCERIN, ipratropium-albuterol    Labs/imaging:  CBC:   Recent Labs      09/19/17 0448 09/20/17 0416 09/21/17   0353   WBC  8.6  7.1  11.2*   HGB  14.7  14.4  16.6   PLT  156  151  188         BMP:    Recent Labs      09/19/17 0448 09/20/17 0416 09/21/17   0353   NA  139  140  141   K  3.7  3.6  3.9   CL  99  98  95*   CO2  27  28  24   BUN  20 20  17   CREATININE  0.8  0.8  1.0   GLUCOSE  95  127*  142*         Hepatic:   Recent Labs      09/19/17 0448   AST  11   ALT  8*   BILITOT  1.2   ALKPHOS  129*       INR:   Recent Labs      09/19/17 0448 09/20/17 0416 09/21/17   0353   INR  2.47*  1.95*  1.57*     Reviewed imaging and reports noted.     Daryl De La Torre MD  -------------------------------  Rounding hospitalist

## 2017-09-21 NOTE — CONSULTS
Myocardial infarct ([address]     Pulmonary embolism (HCC)     PVC's (premature ventricular contractions)     Retinal artery thrombosis     Thrombophlebitis     Weakness      Past Surgical History:   Procedure Laterality Date    APPENDECTOMY      CARDIAC CATHETERIZATION  01/16/2012    Status post successful PCI of SVG to OM2 branch with stent in the proximal area. Stent in the  mid area and balloon angioplasty of the distal anastomotic site.  CARDIAC CATHETERIZATION      CORONARY ARTERY BYPASS GRAFT      X2 1982 1984    HERNIA REPAIR      TRANSTHORACIC ECHOCARDIOGRAM  01/10/2012    LV was mildly dilated. Systolic function was normal. EF was estimated in  the range of 55-65%. There were no regional wall motion abnormalities.  Wall thickness was normal.     Current Facility-Administered Medications   Medication Dose Route Frequency Provider Last Rate Last Dose    QUEtiapine (SEROQUEL) tablet 25 mg  25 mg Oral BID Antionette Meier MD        hydrALAZINE (APRESOLINE) injection 10 mg  10 mg Intravenous Once Orestes Will MD        heparin (porcine) injection 5,300 Units  80 Units/kg Intravenous PRN Antionette Meier MD        heparin (porcine) injection 2,600 Units  40 Units/kg Intravenous PRN Antionette Meier MD        heparin 25,000 units in dextrose 5% 250 mL infusion  18 Units/kg/hr Intravenous Continuous Antionette Meier MD 9.9 mL/hr at 09/21/17 0034 15 Units/kg/hr at 09/21/17 0034    predniSONE (DELTASONE) tablet 50 mg  50 mg Oral Once Orestes Will MD        0.9 % sodium chloride infusion   Intravenous Continuous Vladimir Regulus, PA-C 75 mL/hr at 09/20/17 0617      sodium chloride flush 0.9 % injection 10 mL  10 mL Intravenous 2 times per day Vladimir Regulus, PA-C        sodium chloride flush 0.9 % injection 10 mL  10 mL Intravenous PRN Vladimir Regulus, PA-C        metoprolol succinate (TOPROL XL) extended release tablet 12.5 mg  12.5 mg Oral Daily Vladimir Regulus, PA-C   12.5 mg Intravenous Once    predniSONE  50 mg Oral Once    sodium chloride flush  10 mL Intravenous 2 times per day    metoprolol succinate  12.5 mg Oral Daily    vitamin D  2,000 Units Oral Daily    bumetanide  1 mg Oral BID    lisinopril  2.5 mg Oral Daily    atorvastatin  40 mg Oral Q48H    pantoprazole  40 mg Oral QAM AC    aspirin  81 mg Oral Daily     Continuous Infusions:   heparin (porcine) 15 Units/kg/hr (09/21/17 0034)    sodium chloride 75 mL/hr at 09/20/17 0617     PRN Meds:.heparin (porcine), heparin (porcine), sodium chloride flush, docusate sodium, polyethylene glycol, diphenhydrAMINE, bismuth subsalicylate, acetaminophen, magnesium hydroxide, ondansetron, nitroGLYCERIN, ipratropium-albuterol    Allergies   Allergen Reactions    Iodine Hives     Family History   Problem Relation Age of Onset    Heart Disease Mother     Heart Disease Father     Heart Disease Sister     Heart Disease Sister      Social History     Social History    Marital status:      Spouse name: N/A    Number of children: N/A    Years of education: N/A     Occupational History    Not on file. Social History Main Topics    Smoking status: Never Smoker    Smokeless tobacco: Not on file    Alcohol use No    Drug use: No    Sexual activity: Not on file     Other Topics Concern    Not on file     Social History Narrative     ROS: Obtained via family  Constitutional: Denies any recent wt change, fever or fatigue  Eyes:  Denies any blurring or double vision, no glaucoma  Ears/Nose/Mouth/Throat:  Denies any chronic sinus/rhinitis, nosebleeds or bleeding gums  Cardiovascular:  As described above.   Denies any orthopnea, paroxysmal nocturnal dyspnea or claudication  Respiratory:  Denies any frequent cough, wheezing or coughing up blood  Genitourinary:  Denies difficulty with urination and kidney stones  Gastrointestinal:  Denies any chronic problems with abdominal pain, nausea, vomiting or diarrhea  Musculoskeletal: Signature      ----------------------------------------------------------------   Electronically signed by Cristhian Marks MD (Interpreting   physician) on 09/18/2017 at 06:46 PM   ----------------------------------------------------------------      Findings      Mitral Valve   The mitral valve structure was calcified with adequate leaflet separation. DOPPLER: The transmitral velocity was within the normal range with no   evidence for mitral stenosis. There was no evidence of moderate mitral   regurgitation. Aortic Valve   The aortic valve was calcified with reduced excursion. DOPPLER: TUTU   0.9-1.0 cm2      Tricuspid Valve   The tricuspid valve structure was normal with normal leaflet separation. DOPPLER: There was no evidence of tricuspid stenosis. There was no   evidence of tricuspid regurgitation. Pulmonic Valve   The pulmonic valve leaflets exhibited normal thickness, no calcification,   and normal cuspal separation. DOPPLER: The transpulmonic velocity was   within the normal range with no evidence for regurgitation. Left Atrium   Left atrial size was moderately dilated. Left Ventricle   Left ventricle size mildly dilated and systolic function treduced. Ejection fraction was estimated at 30%. Right Atrium   Right atrial size was normal.      Right Ventricle   The right ventricular size was normal with normal systolic function and   wall thickness. Pericardial Effusion   The pericardium was normal in appearance with no evidence of a pericardial   effusion. Pleural Effusion   No evidence of pleural effusion. Aorta / Great Vessels   -Aortic root dimension within normal limits.   -The Pulmonary artery is within normal limits. -IVC size is within normal limits with normal respiratory phasic changes.      M-Mode/2D Measurements & Calculations      LV Diastolic   LV Systolic Dimension:    AV Cusp Separation: 1.3 cmLA   Dimension: 4.1 3.4 cm                    Dimension: 5.3 cmAO Root   cm             LV Volume Diastolic: 91.8 Dimension: 3.8 cmLA Area: 30.5   LV FS:17.1 %   ml                        cm^2   LV PW          LV Volume Systolic: 48.4   Diastolic: 0.8 ml   cm             LV EDV/LV EDV Index: 74.2   Septum         ml/43 m^2LV ESV/LV ESV    RV Diastolic Dimension: 3.4 cm   Diastolic: 1   Index: 13.1 ml/27 m^2   cm             EF Calculated: 36.1 %     LA/Aorta: 1.39                     LV Length: 8 cm           LA volume/Index: 119.6 ml /69m^2      LV Area        LVOT: 2 cm   Diastolic:   99.5 cm^2   LV Area   Systolic: 39.4   cm^2     Doppler Measurements & Calculations      MV Peak E-Wave: 120 AV Peak Velocity: 293    LVOT Peak Velocity: 93.3 cm/s   cm/s                cm/s                     LVOT Mean Velocity: 72.2 cm/s                       AV Peak Gradient: 34.34  LVOT Peak Gradient: 3   MV Peak Gradient:   mmHg                     mmHgLVOT Mean Gradient: 2   5.76 mmHg           AV Mean Velocity: 233    mmHg                       cm/s   MV Deceleration     AV Mean Gradient: 24     TV Peak E-Wave: 52.3 cm/s   Time: 194 msec      mmHg                       AV VTI: 67.6 cm          TV Peak Gradient: 1.09 mmHg                       AV Area                  TR Velocity:263 cm/s                       (Continuity):0.95 cm^2   TR Gradient:27.67 mmHg                                                PV Peak Velocity: 74 cm/s                       LVOT VTI: 20.5 cm        PV Peak Gradient: 2.19 mmHg   MR Velocity: 461    AV P1/2t: 595 msec   cm/s                                                VA ED Velocity: 132 cm/s                          AV DVI (VTI): 0.3AV DVI                       (Vmax):0.32     [id number]       Physical Exam:  Vitals:    09/21/17 1629   BP: (!) 175/81   Pulse: 100   Resp: 18   Temp:    SpO2:         Intake/Output Summary (Last 24 hours) at 09/21/17 1733  Last data filed at 09/21/17 1516   Gross per 24 hour   Intake           391.23 ml   Output              100 ml   Net           291.23 ml      General:  Sleeping in no acute distress  Neck: Supple, no JVD, no carotid bruits  Heart: Irreg-irreg, normal S1 and S2, no rubs, or gallops; 3/6 ABDOULAYE in the 2nd ANDREA, radiates to bilateral carotids, no systolic ejection click, no associated diastolic murmur  Lungs: Mild crackles bilaterally  Abdomen: positive bowel sounds, soft, non-tender, non-distended, no bruits, no masses  Extremities:no clubbing, cyanosis; 1-2+ edema bilaterally of the LE  Neurologic: sleeping, unable to participate in the exam  Skin: No rashes    Assessment/Plan:    80y.o. year-old male with severe symptomatic aortic stenosis. Patient clearly is at high risk for surgical aortic valve replacement, for reasons of frailty, severe protein-calorie malnutrition, and propensity for in-hospital confusion. Recommend proceed with evaluation for TAVR.       Electronically signed by Alber Saldivar MD on 9/21/2017 at 5:33 PM

## 2017-09-21 NOTE — CARE COORDINATION
DISCHARGE BARRIERS  9/21/17, 2:42 PM    Reason for Referral: discharge planning  Mental Status: pt is pleasantly confused at this time  Decision Making: pt normally is capable of making own decisions, daughter Leanne Villa  Family/Social/Home Environment: pt resides alone in a one story home. Assessment completed with pts two daughters Paige Melo and Lanette Zhu. Daughters report that pt was quite independent prior to admission. 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 Pt is independent of cooking, cleaning and personal care. Pt has 2 daughters and one son. Spouse passed away 7 years ago. Current Services: none  Current Equipment:none  Payment Source:Medicare  Concerns or Barriers to Discharge: daughter report that pt becomes very confused when hospitalized- has happened in the past  Collabrative List of ECF/HH were provided:[facility] provided lists for [facility] E[facility]. Teach Back Method used with pt regarding care plan and ECF vs HH  Patient and daughter Saige Hawkins verbalize understanding of the plan of care and contribute to goal setting. Anticipated Needs/Discharge Plan: SW met with pts george, Natasha & Maria Eugenia- discussed discharge needs. Paige Melo stated that pt becomes confused every time he is hospitalized, but is beto to get back to normal once home. SW discussed pt going home with 24 hr supervision to see if he would go back to [facility]. [facility]RA[facility]N explained Medicare ECF benefit that pt could go to an ECF from home within 30 days of hospitalization. Saige Hawkins would like to discuss options and meet with SW 9/22. CM Andreas Dandy updated.      Electronically signed by GABRIEL Sadler on 9/21/2017 at 2:42 PM

## 2017-09-21 NOTE — PROGRESS NOTES
1:1 attention provided by this nurse for confusion. No change in assessment. Sitting in chair watching tv.

## 2017-09-21 NOTE — CARE COORDINATION
9/21/17, 10:29 AM    DISCHARGE BARRIERS    SW order received for discharge planning. Pt is pleasantly confused. SW met briefly with pts daughter Wagner Marcus and daughter in law. Pt is scheduled for heart cath today. SW discussed ECF for rehab , explained Medicare benefits. Wagner Marcus requested ECF lists for [facility] and [facility]. SW will revisit post heart cath to finalize discharge POC. Will complete full assessment at that time.

## 2017-09-21 NOTE — PLAN OF CARE
Problem: Cardiovascular  Goal: No DVT, peripheral vascular complications  Outcome: Ongoing  Pt on heparin gtt. Aptt being drawn q 6hrs. Will continue to monitor    Problem: Skin Integrity/Risk  Goal: No skin breakdown during hospitalization  Outcome: Ongoing  Patient able to make changes in position independently. Encouraged to reposition often to prevent pressure ulcer formation. Problem: Musculor/Skeletal Functional Status  Goal: Absence of falls  Outcome: Ongoing  Patient remained free from falls and accidental injury this shift. Heavy x2 assist. Sitter at bedside. Problem: Pain:  Goal: Pain level will decrease  Pain level will decrease   Outcome: Ongoing  Pt pain free at this time. PRN medication available for pain management. Problem: Falls - Risk of  Goal: Absence of falls  Outcome: Ongoing  Patient remained free from falls and accidental injury this shift. Heavy x2 assist. Sitter at bedside. Comments:   Care plan reviewed with patient.  Will review and discuss care plan with family when available

## 2017-09-22 ENCOUNTER — APPOINTMENT (OUTPATIENT)
Dept: GENERAL RADIOLOGY | Age: 82
DRG: 034 | End: 2017-09-22
Payer: MEDICARE

## 2017-09-22 PROBLEM — N17.9 ACUTE RENAL FAILURE (ARF) (HCC): Status: ACTIVE | Noted: 2017-09-22

## 2017-09-22 PROBLEM — E43 SEVERE MALNUTRITION (HCC): Status: ACTIVE | Noted: 2017-09-22

## 2017-09-22 PROBLEM — J90 LOCULATED PLEURAL EFFUSION: Status: ACTIVE | Noted: 2017-09-16

## 2017-09-22 PROBLEM — Z91.89 AT RISK FOR ASPIRATION: Status: ACTIVE | Noted: 2017-09-22

## 2017-09-22 LAB
ALBUMIN SERPL-MCNC: 4 G/DL (ref 3.5–5.1)
ANION GAP SERPL CALCULATED.3IONS-SCNC: 17 MEQ/L (ref 8–16)
ANISOCYTOSIS: ABNORMAL
APTT: 85.8 SECONDS (ref 22–38)
BASOPHILS # BLD: 0.4 %
BASOPHILS ABSOLUTE: 0 THOU/MM3 (ref 0–0.1)
BUN BLDV-MCNC: 37 MG/DL (ref 7–22)
CALCIUM SERPL-MCNC: 9.3 MG/DL (ref 8.5–10.5)
CHLORIDE BLD-SCNC: 99 MEQ/L (ref 98–111)
CO2: 24 MEQ/L (ref 23–33)
CREAT SERPL-MCNC: 1.3 MG/DL (ref 0.4–1.2)
EOSINOPHIL # BLD: 0.1 %
EOSINOPHILS ABSOLUTE: 0 THOU/MM3 (ref 0–0.4)
GFR SERPL CREATININE-BSD FRML MDRD: 52 ML/MIN/1.73M2
GLUCOSE BLD-MCNC: 120 MG/DL (ref 70–108)
HCT VFR BLD CALC: 44.7 % (ref 42–52)
HEMOGLOBIN: 15 GM/DL (ref 14–18)
INR BLD: 1.77 (ref 0.85–1.13)
LYMPHOCYTES # BLD: 13.5 %
LYMPHOCYTES ABSOLUTE: 1.5 THOU/MM3 (ref 1–4.8)
MAGNESIUM: 2.4 MG/DL (ref 1.6–2.4)
MCH RBC QN AUTO: 29.3 PG (ref 27–31)
MCHC RBC AUTO-ENTMCNC: 33.5 GM/DL (ref 33–37)
MCV RBC AUTO: 87.5 FL (ref 80–94)
MONOCYTES # BLD: 13.9 %
MONOCYTES ABSOLUTE: 1.6 THOU/MM3 (ref 0.4–1.3)
NUCLEATED RED BLOOD CELLS: 0 /100 WBC
PDW BLD-RTO: 15.3 % (ref 11.5–14.5)
PHOSPHORUS: 4.8 MG/DL (ref 2.4–4.7)
PLATELET # BLD: 167 THOU/MM3 (ref 130–400)
PMV BLD AUTO: 9.5 MCM (ref 7.4–10.4)
POTASSIUM SERPL-SCNC: 4.1 MEQ/L (ref 3.5–5.2)
RBC # BLD: 5.11 MILL/MM3 (ref 4.7–6.1)
RBC # BLD: NORMAL 10*6/UL
SEG NEUTROPHILS: 72.1 %
SEGMENTED NEUTROPHILS ABSOLUTE COUNT: 8.1 THOU/MM3 (ref 1.8–7.7)
SODIUM BLD-SCNC: 140 MEQ/L (ref 135–145)
WBC # BLD: 11.3 THOU/MM3 (ref 4.8–10.8)

## 2017-09-22 PROCEDURE — 83735 ASSAY OF MAGNESIUM: CPT

## 2017-09-22 PROCEDURE — 85025 COMPLETE CBC W/AUTO DIFF WBC: CPT

## 2017-09-22 PROCEDURE — 97162 PT EVAL MOD COMPLEX 30 MIN: CPT

## 2017-09-22 PROCEDURE — 74230 X-RAY XM SWLNG FUNCJ C+: CPT

## 2017-09-22 PROCEDURE — 97116 GAIT TRAINING THERAPY: CPT

## 2017-09-22 PROCEDURE — 80069 RENAL FUNCTION PANEL: CPT

## 2017-09-22 PROCEDURE — 85730 THROMBOPLASTIN TIME PARTIAL: CPT

## 2017-09-22 PROCEDURE — 1200000003 HC TELEMETRY R&B

## 2017-09-22 PROCEDURE — G8979 MOBILITY GOAL STATUS: HCPCS

## 2017-09-22 PROCEDURE — 2500000003 HC RX 250 WO HCPCS: Performed by: INTERNAL MEDICINE

## 2017-09-22 PROCEDURE — 6370000000 HC RX 637 (ALT 250 FOR IP)

## 2017-09-22 PROCEDURE — 92611 MOTION FLUOROSCOPY/SWALLOW: CPT

## 2017-09-22 PROCEDURE — G8978 MOBILITY CURRENT STATUS: HCPCS

## 2017-09-22 PROCEDURE — 36415 COLL VENOUS BLD VENIPUNCTURE: CPT

## 2017-09-22 PROCEDURE — 6360000002 HC RX W HCPCS: Performed by: INTERNAL MEDICINE

## 2017-09-22 PROCEDURE — 2580000003 HC RX 258: Performed by: INTERNAL MEDICINE

## 2017-09-22 PROCEDURE — 85610 PROTHROMBIN TIME: CPT

## 2017-09-22 PROCEDURE — 6370000000 HC RX 637 (ALT 250 FOR IP): Performed by: INTERNAL MEDICINE

## 2017-09-22 PROCEDURE — 99232 SBSQ HOSP IP/OBS MODERATE 35: CPT | Performed by: NURSE PRACTITIONER

## 2017-09-22 PROCEDURE — 2580000003 HC RX 258: Performed by: PHYSICIAN ASSISTANT

## 2017-09-22 RX ORDER — SODIUM CHLORIDE 9 MG/ML
INJECTION, SOLUTION INTRAVENOUS CONTINUOUS
Status: DISCONTINUED | OUTPATIENT
Start: 2017-09-22 | End: 2017-09-25

## 2017-09-22 RX ORDER — WARFARIN SODIUM 5 MG/1
5 TABLET ORAL ONCE
Status: COMPLETED | OUTPATIENT
Start: 2017-09-22 | End: 2017-09-22

## 2017-09-22 RX ADMIN — VITAMIN D, TAB 1000IU (100/BT) 2000 UNITS: 25 TAB at 08:24

## 2017-09-22 RX ADMIN — QUETIAPINE FUMARATE 25 MG: 25 TABLET ORAL at 08:24

## 2017-09-22 RX ADMIN — ENOXAPARIN SODIUM 70 MG: 80 INJECTION SUBCUTANEOUS at 08:36

## 2017-09-22 RX ADMIN — BARIUM SULFATE 40 ML: 400 SUSPENSION ORAL at 09:51

## 2017-09-22 RX ADMIN — ATORVASTATIN CALCIUM 40 MG: 40 TABLET, FILM COATED ORAL at 21:10

## 2017-09-22 RX ADMIN — HEPARIN SODIUM AND DEXTROSE 18 UNITS/KG/HR: 10000; 5 INJECTION INTRAVENOUS at 02:40

## 2017-09-22 RX ADMIN — BUMETANIDE 1 MG: 1 TABLET ORAL at 08:24

## 2017-09-22 RX ADMIN — ACETAMINOPHEN 650 MG: 325 TABLET ORAL at 21:15

## 2017-09-22 RX ADMIN — ASPIRIN 81 MG: 81 TABLET, CHEWABLE ORAL at 08:24

## 2017-09-22 RX ADMIN — Medication 10 ML: at 12:39

## 2017-09-22 RX ADMIN — BARIUM SULFATE 40 ML: 0.81 POWDER, FOR SUSPENSION ORAL at 09:51

## 2017-09-22 RX ADMIN — WARFARIN SODIUM 5 MG: 5 TABLET ORAL at 16:42

## 2017-09-22 RX ADMIN — BARIUM SULFATE 20 ML: 400 PASTE ORAL at 09:51

## 2017-09-22 RX ADMIN — ENOXAPARIN SODIUM 70 MG: 80 INJECTION SUBCUTANEOUS at 21:10

## 2017-09-22 RX ADMIN — Medication 10 ML: at 21:11

## 2017-09-22 RX ADMIN — SODIUM CHLORIDE: 9 INJECTION, SOLUTION INTRAVENOUS at 12:38

## 2017-09-22 RX ADMIN — METOPROLOL SUCCINATE 25 MG: 25 TABLET, FILM COATED, EXTENDED RELEASE ORAL at 08:24

## 2017-09-22 RX ADMIN — LISINOPRIL 5 MG: 5 TABLET ORAL at 08:24

## 2017-09-22 ASSESSMENT — PAIN SCALES - PAIN ASSESSMENT IN ADVANCED DEMENTIA (PAINAD)
BODYLANGUAGE: 0
BREATHING: 0
TOTALSCORE: 0
BODYLANGUAGE: 0
BODYLANGUAGE: 0
CONSOLABILITY: 0
TOTALSCORE: 0
CONSOLABILITY: 0
BODYLANGUAGE: 0
TOTALSCORE: 0
BREATHING: 0
FACIALEXPRESSION: 0
NEGVOCALIZATION: 0
FACIALEXPRESSION: 0
BREATHING: 0
TOTALSCORE: 0
FACIALEXPRESSION: 0
NEGVOCALIZATION: 0
BODYLANGUAGE: 0
CONSOLABILITY: 0
BREATHING: 0
CONSOLABILITY: 0
FACIALEXPRESSION: 0
TOTALSCORE: 0
NEGVOCALIZATION: 0
NEGVOCALIZATION: 0
BREATHING: 0
CONSOLABILITY: 0
NEGVOCALIZATION: 0
FACIALEXPRESSION: 0
CONSOLABILITY: 0
FACIALEXPRESSION: 0
BREATHING: 0
CONSOLABILITY: 0
NEGVOCALIZATION: 0
TOTALSCORE: 0
FACIALEXPRESSION: 0
BODYLANGUAGE: 0
BREATHING: 0
TOTALSCORE: 0
NEGVOCALIZATION: 0
BODYLANGUAGE: 0

## 2017-09-22 ASSESSMENT — PAIN SCALES - GENERAL
PAINLEVEL_OUTOF10: 0
PAINLEVEL_OUTOF10: 6
PAINLEVEL_OUTOF10: 0

## 2017-09-22 NOTE — PROGRESS NOTES
alkaline phosphatase 130s, elevated bili 1.3 and normal AST/ALT. So far, unclear etiology. Abdominal US with biliary sludge. Vitamin D low; hence, elevated alk phos. Viral hepatitis profile negative. Consider repeat LFTs as outpatient, in about a month. 8. Hypovitaminosis D. Replacement ordered. 9. History of CAD s/p PCI, CABG. On ASA, BB, statin. 10. Hypertension. Continue current antihypertensives as ordered with hold parameters. 11. 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 Full dose SC lovenox pending therapeutic INR on coumadin. 12. Intermittent episodes of confusion. Suspect sundowning. Reported to have had these usually at night. Per daughter, has had this in the past. MRI-brain, CXR, UA, ammonia unremarkable. We will avoid use of anxiolytics, haldol or geodon. Instead, sitter to bedside, reorientation may be considered. Will stop seroquel, given increased sleepiness. Diet: DIET DYSPHAGIA II MECHANICALLY ALTERED; Daily Fluid Restriction: 2000 ml    Code status: Full Code     ----------  Subjective  Did not sleep most of the night. Objective  Physical exam:  Vitals: /63  Pulse 91  Temp 98.2 °F (36.8 °C) (Oral)   Resp 20  Ht 5' 5.5\" (1.664 m)  Wt 145 lb 3.2 oz (65.9 kg)  SpO2 94%  BMI 23.8 kg/m2  Gen/overall appearance: Sleeping but occasionally responds to questions - did not sleep much overnight  Head: Normocephalic, atraumatic  Eyes: Pupils equal bilaterally  ENT:- Oral mucosa dry  Neck: No JVD, thyromegaly  CVS: Nml S1S2, no MRG, RRR  Pulm: Clear bilaterally. No crackles/wheezes  Gastrointestinal: Soft, NT/ND, +BS  Musculoskeletal: No edema.  Warm  Neuro: Unable to assess at this time  Psychiatry: Unable to assess  Skin: No obvious rashes noted      24HR INTAKE/OUTPUT:      Intake/Output Summary (Last 24 hours) at 09/22/17 1310  Last data filed at 09/22/17 0645   Gross per 24 hour   Intake            399.2 ml   Output                0 ml   Net            399.2 ml     I/O

## 2017-09-22 NOTE — PROGRESS NOTES
Nutrition Assessment    Type and Reason for Visit: Initial (Length of Stay)    Nutrition Recommendations: Further diet recommendations per SLP. Continue ONS. Consider a multivitamin. Malnutrition Assessment:  · Malnutrition Status: Meets the criteria for severe malnutrition  · Context: Acute illness or injury  · Findings of the 6 clinical characteristics of malnutrition (Minimum of 2 out of 6 clinical characteristics is required to make the diagnosis of moderate or severe Protein Calorie Malnutrition based on AND/ASPEN Guidelines):  1. Energy Intake-Less than or equal to 50%, greater than or equal to 5 days    2. Weight Loss-5% loss or greater (7.4% loss ), in 1 month  3. Fat Loss-Moderate subcutaneous fat loss, Orbital  4. Muscle Loss-Moderate muscle mass loss, Temples (temporalis muscle), Clavicles (pectoralis and deltoids)    Nutrition Diagnosis:   · Problem: Severe malnutrition, in context of acute illness or injury  · Etiology: related to Insufficient energy/nutrient consumption     Signs and symptoms:  as evidenced by Diet history of poor intake, Weight loss, Moderate muscle loss, Moderate loss of subcutaneous fat    Nutrition Assessment:  · Subjective Assessment: Pt. seen, 2 daughters present. He has had some confusion in the last few days,typically isn't, still works as a . Pt. typically eats 2 meals/day at home. Decline in oral intake since admit due to confusion. Usual weight~ 155-160# (generally carries some edema in lower  extremities), realize diuresis this admit but also lost true wt. Daughters report he physically looks thinner. Ate a little better today. Will provide magic cup TID. Note SLP-did MBS today-rec Dysphagia II, thin liquids, no straw. Planning ECF at discharge.   · Wound Type: None  · Current Nutrition Therapies:  · Oral Diet Orders: Dysphagia 2 (thin liquids, NO straw)   · Oral Diet intake: 0%, 1-25%, 51-75%  · Oral Nutrition Supplement (ONS) Orders: Frozen Oral Supplement (magic cup TID)  · ONS intake: Unable to assess (just initiated)  · Anthropometric Measures:  · Ht: 5' 5.5\" (166.4 cm)   · Current Body Wt: 145 lb 3.2 oz (65.9 kg) (9/22/17 no edema, wt. loss-diuresis and poor oral intake)  · Admission Body Wt: 149 lb 4.8 oz (67.7 kg) (9/16/17 +3-4 LE edema, trace upper)  · Usual Body Wt:  (155-160# per daughters. 7/31/17:156#6. 4oz, 8/28/17:156#12.8oz)  · % Weight Change: 7.4% loss (some likely edema/fluid loss, but true wt. loss as well) ,  1 month  · Ideal Body Wt: 139 lb (63 kg),   · BMI Classification: BMI 18.5 - 24.9 Normal Weight  · Comparative Standards (Estimated Nutrition Needs):  · Estimated Daily Total Kcal: 4240-9486 kcals  · Estimated Daily Protein (g): 53-79 grams pending renal status    Estimated Intake vs Estimated Needs: Intake Improving    Nutrition Risk Level: High    Nutrition Interventions:   Continue current diet, Start ONS  Continued Inpatient Monitoring, Education Initiated (Encouraged oral intake. Discussed ONS options and home use after ECF stay as needed.)    Nutrition Evaluation:   · Evaluation: Goals set   · Goals: Pt. will safely consume 75% or more of meals during LOS. · Monitoring: Meal Intake, Supplement Intake, Diet Tolerance, Ascites/Edema, Mental Status/Confusion, Weight, Pertinent Labs, Chewing/Swallowing    See Adult Nutrition Doc Flowsheet for more detail.      Electronically signed by Lou Cottrell RD, LD on 9/22/17 at 2:54 PM    Contact Number: [phone]

## 2017-09-22 NOTE — PROGRESS NOTES
[address]    General:       Pain:  Denies. Pain Assessment  Pain Level: 0       Social/Functional History:    Lives With: Alone  Type of Home: House  Home Layout: One level  Home Equipment:  (no AD used PTA)     Ambulation Assistance: Independent  Transfer Assistance: Independent       Additional Comments: was working at [employer] PTA    Objective:       RLE AROM: WFL         LLE AROM : WFL      Strength RLE: WFL  Comment: grossly generalized weakness, around 4/5    Strength LLE: WFL  Comment: grossly generalized weakness, around 4/5              Balance  Sitting - Static: Good  Sitting - Dynamic: Fair  Standing - Static: Fair  Standing - Dynamic: Fair, -    Supine to Sit: Minimal assistance (per nursing assist of 2 with heavy assist initially and then pt becoming more alert and less assist)    Transfers  Sit to Stand: Minimal Assistance, Contact guard assistance (x2, bedside chair, cues for hand placement)  Stand to sit: Contact guard assistance, Minimal Assistance (x2, cues to turn fully to chair and for hadn placement prior to sitting)       Ambulation 1  Surface: level tile  Device: No Device  Assistance: Contact guard assistance (x2, to minAx2 with 1 LOB)  Quality of Gait: pt with fwd flexed posture, decreased step length and heelstrike, when surface changed from tile to low carpet pt with LOB and when turning outside from the doorframe of room to hallway with minAx1-2 to correct balance  Distance: 75'x1, see below for increased distance          Exercises:  Comments: none          Activity Tolerance:  Activity Tolerance: Patient Tolerated treatment well    Treatment Initiated: amb 75'x1, 10'x1 without AD and CGAx2 see above for deviations    Assessment:   Body structures, Functions, Activity limitations: Decreased functional mobility , Decreased balance, Decreased strength  Assessment: pt with improving cognition, decreased balance with 2 LOB during amb, increased need for assist with mobility, recommend cont PT to improve strength/balance/endurance for increased independence with functional mobility  Prognosis: Good    Clinical Presentation: Moderate - Evolving with Changing Characteristics: pt with increase confusion, unsteady, generalized weakness and decreased balance requiring increased assist for safe mobility and cont PT    Decision Making: High Complexitybased on patient assessment and decision making process of determining plan of care and establishing reasonable expectations for measurable functional outcomes    REQUIRES PT FOLLOW UP: Yes  Discharge Recommendations: Continue to assess pending progress, Patient would benefit from continued therapy after discharge    Patient Education:  Patient Education: POC    Equipment Recommendations:  Equipment Needed: No    Safety:  Type of devices:  All fall risk precautions in place, Gait belt, Nurse notified, Patient at risk for falls, Call light within reach, Chair alarm in place, Left in chair, Telesitter in use    Plan:  Times per week: 3-5X GM  Times per day: Daily  Specific instructions for Next Treatment: therex and mobility  Current Treatment Recommendations: Strengthening, Transfer Training, Endurance Training, Patient/Caregiver Education & Training, Equipment Evaluation, Education, & procurement, Home Exercise Program, Safety Education & Training, Stair training, Functional Mobility Training, Balance Training, Gait Training    Goals:  Patient goals : return home  Short term goals  Time Frame for Short term goals: 1 week  Short term goal 1: bed mobility with  S to get in/out of bed  Short term goal 2: transfer with SBA to get in/out of chairs  Short term goal 3: amb 100'x1 with/without AD and SBA to walk safely in home  Long term goals  Time Frame for Long term goals : no LTGs set secondary to short ELOS    Evaluation Complexity: Based on the findings of patient history, examination, clinical presentation, and decision making during this evaluation, the evaluation of Linda Dias  is of medium complexity. PT G-Codes  Functional Assessment Tool Used: professional judgement  Functional Limitation: Mobility: Walking and moving around  Mobility: Walking and Moving Around Current Status (): At least 20 percent but less than 40 percent impaired, limited or restricted  Mobility: Walking and Moving Around Goal Status ():  At least 1 percent but less than 20 percent impaired, limited or restricted

## 2017-09-22 NOTE — PROGRESS NOTES
Clinical Pharmacy Note    Warfarin consult follow-up    Recent Labs      09/22/17   0756   INR  1.77*     Recent Labs      09/20/17   0416  09/21/17   0353  09/22/17   0756   HGB  14.4  16.6  15.0   HCT  41.6*  49.4  44.7   PLT  151  188  167       Significant Drug-Drug Interactions:  New warfarin drug-drug interactions: enoxaparin   Discontinued drug-drug interactions: none  Current warfarin drug-drug interactions: aspirin (home), enoxaparin     Date INR Warfarin Dose   9/16/2017 4.46 No Coumadin   9/17/2017 4.16 No Coumadin   9/18/2017 3.40 0.5 mg   9/19/2017 2.47 No Coumadin   9/20/2017 1.95 No Coumadin   9/21/2017 1.57  - Patient spit out   9/22/2017 1.77 5 mg       Notes:                     Daily PT/INR until stable within therapeutic range.

## 2017-09-22 NOTE — FLOWSHEET NOTE
09/22/17 1037   Provider Notification   Reason for Communication Review case   Provider Name Dr Nova Palacio   Provider Notification Physician   Method of Communication Page   Response Waiting for response   Notification Time [id number]   Sent message to clarify if ok for discharge.

## 2017-09-22 NOTE — PLAN OF CARE
Problem: Cardiovascular  Goal: No DVT, peripheral vascular complications  Outcome: Ongoing  Noted that EF is now 30% patient [facility] seeing     Problem: Skin Integrity/Risk  Goal: No skin breakdown during hospitalization  Outcome: Ongoing  No sign of skin breakdown present    Problem: Musculor/Skeletal Functional Status  Goal: Absence of falls  Outcome: Ongoing  Fall protocol present falling star at door and fall risk band on    Problem: Discharge Planning  Intervention: Discharge to appropriate level of care  Family at bedside talking with social work regarding home going plans tentative plans to go to nursing home then home      Problem: Pain:  Goal: Pain level will decrease  Pain level will decrease   Outcome: Ongoing  No pain at this time    Problem: Nutrition  Intervention: Swallowing evaluation  Swallow test done and pt able to swallow do not use straw      Problem: Falls - Risk of  Goal: Absence of falls  Outcome: Ongoing  Fall protocol present falling star at door and fall risk band on

## 2017-09-22 NOTE — PROGRESS NOTES
enters the airway, passes below the vocal folds, and is ejected into the larynx or out of the airway  [] 7 = Material enters the airway, passes below the vocal folds, and is not ejected from the trachea despite effort  [] 8 = Material enters the airway, passes below the vocal folds, and no effort is made to eject    ESOPHAGEAL PHASE:   [] No significant findings  [x] See Radiology Report for details  [] Recommend further esophageal testing    ATTEMPTED TECHNIQUES:      Comments:  [x]Small bolus size [x] Effective [] Not Effective    []Straw [] Effective [x] Not Effective    [x]Cup [x] Effective [] Not Effective    []Chin tuck [] Effective [] Not Effective    []Head Turn [] Effective [] Not Effective    []Spoon presentations [] Effective [] Not Effective    [] Volitional cough [] Effective [] Not Effective    [] Spontaneous cough [] Effective [] Not Effective    []OTHER: [] Effective [] Not Effective      DIAGNOSTIC IMPRESSIONS:  Pt presents with mild to moderate oral pharyngeal dysphagia with poor mastication skills and poor bolus formation orally. Delayed pharyngeal trigger with premature spillage to the pyriforms prior to swallow trigger. Diffuse residue in pyriforms, vallecula, and posterior pharyngeal wall observed with all consistencies. Liquid wash was partially successful in clearing residue. Thin liquids by cup with 1 instance of penetration. Thin liquids by straw with consistent penetration and at least one episode of audible aspiration. No aspiration was noted with puree or soft solids although build up of pharygneal residue does place pt at risk for aspiration of this residue. Recommend downgrade to Dysphagia II and thin liquids with strict avoidance of straws. Also recommend supervision and assist with meals with use of alternating solids and liquids to reduce pharyngeal residue.             DIET RECOMMENDATIONS:  Dysphagia II and thin liquids, NO STRAWS    STRATEGIES:   [x] Full upright [id number]

## 2017-09-22 NOTE — PLAN OF CARE
Problem: Cardiovascular  Goal: No DVT, peripheral vascular complications  Outcome: Ongoing  No DVT noted. Problem: Skin Integrity/Risk  Goal: No skin breakdown during hospitalization  Outcome: Ongoing  No skin breakdown noted. Patient moves self in bed as needed. Problem: Musculor/Skeletal Functional Status  Goal: Absence of falls  Outcome: Ongoing  Falling star program in place. Call light within reach. Bed alarm on at this time. Tele-sitter in room. Bed rails up x2. Over bed table within reach. Personal possessions in reach. Problem: Pain:  Goal: Pain level will decrease  Pain level will decrease   Outcome: Ongoing  No c/o pain noted. No s/s of pain noted. Goal: Control of acute pain  Control of acute pain   Outcome: Ongoing  Goal: Control of chronic pain  Control of chronic pain   Outcome: Ongoing    Problem: Falls - Risk of  Goal: Absence of falls  Outcome: Ongoing  Falling star program in place. Call light within reach. Bed alarm on at this time. Tele-sitter in room. Bed rails up x2. Over bed table within reach. Personal possessions in reach. Problem: DISCHARGE BARRIERS  Goal: Patients continuum of care needs are met  Outcome: Ongoing    Comments:   Care plan reviewed with patient. Patient verbalize understanding of the plan of care and contribute to goal setting.

## 2017-09-22 NOTE — PROGRESS NOTES
Clinical Pharmacy Note    Warfarin consult follow-up    Recent Labs      09/21/17   0353   INR  1.57*     Recent Labs      09/19/17   0448  09/20/17   0416  09/21/17   0353   HGB  14.7  14.4  16.6   HCT  43.8  41.6*  49.4   PLT  156  151  188       Significant Drug-Drug Interactions:  New warfarin drug-drug interactions: Heparin gtt  Discontinued drug-drug interactions: none  Current warfarin drug-drug interactions: aspirin (home)    Date INR Warfarin Dose   9/16/2017 4.46 No Coumadin   9/17/2017 4.16 No Coumadin   9/18/2017 3.40 0.5 mg   9/19/2017  2.47   No Coumadin    9/20/2017  1.95  No Coumadin    9/21/2017 1.57  7.5 mg                Notes: s/p heart cath 9/21/17         Daily PT/INR until stable within therapeutic range.      Tom Dyer, PharmD 9/21/2017  8:52 PM

## 2017-09-22 NOTE — PROGRESS NOTES
Ventricle   Left ventricle size mildly dilated and systolic function treduced.   Ejection fraction was estimated at 30%.     Right Atrium   Right atrial size was normal.      Right Ventricle   The right ventricular size was normal with normal systolic function and   wall thickness.      Pericardial Effusion   The pericardium was normal in appearance with no evidence of a pericardial   effusion.      Pleural Effusion   No evidence of pleural effusion.      Aorta / Great Vessels   -Aortic root dimension within normal limits.   -The Pulmonary artery is within normal limits.   -IVC size is within normal limits with normal respiratory phasic changes        Stress: Summary   This was a low-dose DSE for the evaluation of aortic stenosis.   At this workload, there were no stress-induced ischemic ECG changes or   wall motion abnormalities. There was no LV dilation observed with stress.   There is severe aortic stenosis (low flow, low gradient).  LVEF and LV contractility improved at the peak dose of dobutamine for the   low-dose protocol.      Signature      ----------------------------------------------------------------   Electronically signed by Jody Greenfield MD (Interpreting  Noman Callahan) on 09/21/2017         Left Heart Cath: not dictated but - per note  LHC - Patent grafts with restenosis of prior intervention. Graft disease is heavy and risk of intervention with loss of grafts with manipulation is high. Given that there is flow in the grafts, will treat medically for now    Lab Data:    Cardiac Enzymes:  No results for input(s): CKTOTAL, CKMB, CKMBINDEX, TROPONINI in the last 72 hours.     CBC:   Lab Results   Component Value Date    WBC 11.3 09/22/2017    RBC 5.11 09/22/2017    RBC 3.74 01/17/2012    HGB 15.0 09/22/2017    HCT 44.7 09/22/2017     09/22/2017       CMP:  Lab Results   Component Value Date     09/22/2017    K 4.1 09/22/2017    CL 99 09/22/2017    CO2 24 09/22/2017    BUN 37 09/22/2017 CREATININE 1.3 09/22/2017    LABGLOM 52 09/22/2017    GLUCOSE 120 09/22/2017    GLUCOSE 111 01/17/2012    CALCIUM 9.3 09/22/2017       Hepatic Function Panel:  Lab Results   Component Value Date    ALKPHOS 129 09/19/2017    ALT 8 09/19/2017    AST 11 09/19/2017    PROT 6.3 09/19/2017    BILITOT 1.2 09/19/2017    BILIDIR 0.3 09/19/2017    LABALBU 4.0 09/22/2017    LABALBU 3.9 01/11/2012       Magnesium:    Lab Results   Component Value Date    MG 2.4 09/22/2017       PT/INR:    Lab Results   Component Value Date    PROTIME 3.06 11/30/2011    INR 1.77 09/22/2017       HgBA1c:    Lab Results   Component Value Date    LABA1C 5.4 12/08/2016       FLP:  Lab Results   Component Value Date    TRIG 53 09/17/2017    HDL 68 09/17/2017    LDLCALC 60 09/17/2017       TSH:    Lab Results   Component Value Date    TSH 2.460 09/16/2017         Assessment:    Admission with Acute systolic chf   Resolved     New AFB of unknown duration - rate control    [id number]  At least 6  On Coumadin for PE / DVT    Severe AS with  valve area 0.9-1.0 cm2   For heart team approach as OP    ABIMAEL   likely combination of cath procedure / ACE    S\p cath Patent grafts with restenosis of prior intervention. Graft disease is heavy and risk of intervention with loss of grafts with manipulation is high. 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     Plan:  Stop ACE at present DT ABIMAEL    Hold diuretics for ABIMAEL    Gentle fliuds- watch for fluid overload    Re-evaluate in am    Bmp am    Rate control AFB for now         Electronically signed by Luis M Gore CNP on 9/22/2017 at 10:33 AM

## 2017-09-22 NOTE — PROGRESS NOTES
Change heparin drip to full dose SC lovenox. On coumadin - monitor INR. Will likely need SNF at this point.   SIGNED: Antionette Meier MD . 9/22/2017

## 2017-09-22 NOTE — CARE COORDINATION
9/22/17, 12:16 PM    DISCHARGE BARRIERS    SW met with pt and both daughters Jad Dorman and Roro Peters, they are wanting referral to [facility]. SW completed referral with Ravinder Carey with [facility] for [facility], face sheet faxed for them to review. Await call back. Anticipated weekend discharge.

## 2017-09-22 NOTE — PROGRESS NOTES
Larry [address]ino 60  PHYSICAL THERAPY MISSED TREATMENT NOTE  ACUTE CARE    Date: 2017  Patient Name: Daphnie Estrada        MRN: [id number]   : 1930  (80 y.o.)  Gender: male   Referring Practitioner: Dr. Ayanna Saeed  Diagnosis: SOB- Short of breath         REASON FOR MISSED TREATMENT:  Missed Treat. Attempted to see the patient 2x this date for PT evaluation. On the first attempt at [facility], the patient had just gotten breakfast and wanted to finish eating. On the second attempt at [facility], the patient was very lethargic and unable to be aroused. RN notified. Will re-attempt PT evaluation at a later time when able.

## 2017-09-22 NOTE — PROCEDURES
anastomosis of the  SVG-RCA into the aorta. However, this was present previously and does  Not result in any impediment to flow. LIMA to LAD patent without any  ostial, mid, or anastomotic lesions noted. Could not directly  cannulate the LIMA due to tortuous subclavian artery and aorta. 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 Patent LIMA to LAD without any disease. Extremely  complex venous graft disease that was previously intervened upon, but  has had progression of disease despite intervention. At this point,  any further intervention is likely to result in graft loss. Given that all of the vein grafts while they may have disease are patent and  provide reasonable flow and the fact that the LIMA to LAD is open, we  would opt for medical therapy and further management of the aortic  stenosis. PLAN:  1. Manual pull for the arterial sheath and 4 to 6 hours bed rest  post hemostasis. 2.  Continued inpatient management of medical issues including  delirium. 3.  Discussed with the family regarding TAVR and management of his  aortic stenosis. 4.  Continue optimal medical therapy for coronary artery disease. 5.  At this point, we will hold on any revascularization of his graft  disease given the previous interventions, the moderate disease, the  risk of graft loss with the complexity of the plaque and lesions. 6.  CT Surgery consultation for TAVR evaluation. All the above was explained to the patient/the patient's family and  they were agreeable and amenable to the plan.         Ron Black    D: 09/22/2017 7:47:27       T: 09/22/2017 9:05:24     [email or web address]  Job#: [id number]     Doc#: [id number]

## 2017-09-23 ENCOUNTER — APPOINTMENT (OUTPATIENT)
Dept: CT IMAGING | Age: 82
DRG: 034 | End: 2017-09-23
Payer: MEDICARE

## 2017-09-23 ENCOUNTER — APPOINTMENT (OUTPATIENT)
Dept: ULTRASOUND IMAGING | Age: 82
DRG: 034 | End: 2017-09-23
Payer: MEDICARE

## 2017-09-23 ENCOUNTER — APPOINTMENT (OUTPATIENT)
Dept: GENERAL RADIOLOGY | Age: 82
DRG: 034 | End: 2017-09-23
Payer: MEDICARE

## 2017-09-23 LAB
ALBUMIN SERPL-MCNC: 3.9 G/DL (ref 3.5–5.1)
ANION GAP SERPL CALCULATED.3IONS-SCNC: 17 MEQ/L (ref 8–16)
ANISOCYTOSIS: ABNORMAL
BACTERIA: ABNORMAL /HPF
BASOPHILS # BLD: 0.3 %
BASOPHILS ABSOLUTE: 0 THOU/MM3 (ref 0–0.1)
BILIRUBIN URINE: ABNORMAL
BLOOD, URINE: ABNORMAL
BUN BLDV-MCNC: 45 MG/DL (ref 7–22)
CALCIUM SERPL-MCNC: 9.3 MG/DL (ref 8.5–10.5)
CASTS 2: ABNORMAL /LPF
CASTS UA: ABNORMAL /LPF
CHARACTER, URINE: ABNORMAL
CHLORIDE BLD-SCNC: 101 MEQ/L (ref 98–111)
CO2: 26 MEQ/L (ref 23–33)
COLOR: ABNORMAL
CREAT SERPL-MCNC: 1.5 MG/DL (ref 0.4–1.2)
CRYSTALS, UA: ABNORMAL
EOSINOPHIL # BLD: 0 %
EOSINOPHILS ABSOLUTE: 0 THOU/MM3 (ref 0–0.4)
EPITHELIAL CELLS, UA: ABNORMAL /HPF
GFR SERPL CREATININE-BSD FRML MDRD: 44 ML/MIN/1.73M2
GLUCOSE BLD-MCNC: 133 MG/DL (ref 70–108)
GLUCOSE URINE: NEGATIVE MG/DL
HCT VFR BLD CALC: 44.4 % (ref 42–52)
HEMOGLOBIN: 14.9 GM/DL (ref 14–18)
ICTOTEST: NEGATIVE
INR BLD: 2.64 (ref 0.85–1.13)
KETONES, URINE: ABNORMAL
LEUKOCYTE ESTERASE, URINE: ABNORMAL
LYMPHOCYTES # BLD: 6.5 %
LYMPHOCYTES ABSOLUTE: 0.8 THOU/MM3 (ref 1–4.8)
MAGNESIUM: 2.4 MG/DL (ref 1.6–2.4)
MCH RBC QN AUTO: 29.2 PG (ref 27–31)
MCHC RBC AUTO-ENTMCNC: 33.5 GM/DL (ref 33–37)
MCV RBC AUTO: 87.3 FL (ref 80–94)
MISCELLANEOUS 2: ABNORMAL
MONOCYTES # BLD: 11.6 %
MONOCYTES ABSOLUTE: 1.4 THOU/MM3 (ref 0.4–1.3)
NITRITE, URINE: POSITIVE
NUCLEATED RED BLOOD CELLS: 0 /100 WBC
PDW BLD-RTO: 15.4 % (ref 11.5–14.5)
PH UA: 5
PHOSPHORUS: 5.6 MG/DL (ref 2.4–4.7)
PLATELET # BLD: 157 THOU/MM3 (ref 130–400)
PMV BLD AUTO: 9.8 MCM (ref 7.4–10.4)
POTASSIUM SERPL-SCNC: 3.8 MEQ/L (ref 3.5–5.2)
PROCALCITONIN: 0.08 NG/ML (ref 0.01–0.09)
PROTEIN UA: 30
RBC # BLD: 5.08 MILL/MM3 (ref 4.7–6.1)
RBC # BLD: NORMAL 10*6/UL
RBC URINE: > 200 /HPF
RENAL EPITHELIAL, UA: ABNORMAL
SEG NEUTROPHILS: 81.6 %
SEGMENTED NEUTROPHILS ABSOLUTE COUNT: 10.1 THOU/MM3 (ref 1.8–7.7)
SODIUM BLD-SCNC: 144 MEQ/L (ref 135–145)
SPECIFIC GRAVITY, URINE: 1.03 (ref 1–1.03)
UROBILINOGEN, URINE: 0.2 EU/DL
WBC # BLD: 12.4 THOU/MM3 (ref 4.8–10.8)
WBC UA: ABNORMAL /HPF
YEAST: ABNORMAL

## 2017-09-23 PROCEDURE — 85025 COMPLETE CBC W/AUTO DIFF WBC: CPT

## 2017-09-23 PROCEDURE — 6370000000 HC RX 637 (ALT 250 FOR IP): Performed by: INTERNAL MEDICINE

## 2017-09-23 PROCEDURE — 84145 PROCALCITONIN (PCT): CPT

## 2017-09-23 PROCEDURE — B3251ZZ COMPUTERIZED TOMOGRAPHY (CT SCAN) OF BILATERAL COMMON CAROTID ARTERIES USING LOW OSMOLAR CONTRAST: ICD-10-PCS | Performed by: RADIOLOGY

## 2017-09-23 PROCEDURE — B3201ZZ COMPUTERIZED TOMOGRAPHY (CT SCAN) OF THORACIC AORTA USING LOW OSMOLAR CONTRAST: ICD-10-PCS | Performed by: RADIOLOGY

## 2017-09-23 PROCEDURE — 6360000004 HC RX CONTRAST MEDICATION: Performed by: INTERNAL MEDICINE

## 2017-09-23 PROCEDURE — 6360000002 HC RX W HCPCS: Performed by: INTERNAL MEDICINE

## 2017-09-23 PROCEDURE — 81001 URINALYSIS AUTO W/SCOPE: CPT

## 2017-09-23 PROCEDURE — 87086 URINE CULTURE/COLONY COUNT: CPT

## 2017-09-23 PROCEDURE — 36415 COLL VENOUS BLD VENIPUNCTURE: CPT

## 2017-09-23 PROCEDURE — 1200000003 HC TELEMETRY R&B

## 2017-09-23 PROCEDURE — 2580000003 HC RX 258: Performed by: INTERNAL MEDICINE

## 2017-09-23 PROCEDURE — 83735 ASSAY OF MAGNESIUM: CPT

## 2017-09-23 PROCEDURE — B32R1ZZ COMPUTERIZED TOMOGRAPHY (CT SCAN) OF INTRACRANIAL ARTERIES USING LOW OSMOLAR CONTRAST: ICD-10-PCS | Performed by: RADIOLOGY

## 2017-09-23 PROCEDURE — 80069 RENAL FUNCTION PANEL: CPT

## 2017-09-23 PROCEDURE — B32G1ZZ COMPUTERIZED TOMOGRAPHY (CT SCAN) OF BILATERAL VERTEBRAL ARTERIES USING LOW OSMOLAR CONTRAST: ICD-10-PCS | Performed by: RADIOLOGY

## 2017-09-23 PROCEDURE — B325ZZZ COMPUTERIZED TOMOGRAPHY (CT SCAN) OF BILATERAL COMMON CAROTID ARTERIES: ICD-10-PCS | Performed by: RADIOLOGY

## 2017-09-23 PROCEDURE — B320ZZZ COMPUTERIZED TOMOGRAPHY (CT SCAN) OF THORACIC AORTA: ICD-10-PCS | Performed by: RADIOLOGY

## 2017-09-23 PROCEDURE — 70496 CT ANGIOGRAPHY HEAD: CPT

## 2017-09-23 PROCEDURE — 71010 XR CHEST PORTABLE: CPT

## 2017-09-23 PROCEDURE — B32GZZZ COMPUTERIZED TOMOGRAPHY (CT SCAN) OF BILATERAL VERTEBRAL ARTERIES: ICD-10-PCS | Performed by: RADIOLOGY

## 2017-09-23 PROCEDURE — 99024 POSTOP FOLLOW-UP VISIT: CPT | Performed by: INTERNAL MEDICINE

## 2017-09-23 PROCEDURE — B3281ZZ COMPUTERIZED TOMOGRAPHY (CT SCAN) OF BILATERAL INTERNAL CAROTID ARTERIES USING LOW OSMOLAR CONTRAST: ICD-10-PCS | Performed by: RADIOLOGY

## 2017-09-23 PROCEDURE — 99221 1ST HOSP IP/OBS SF/LOW 40: CPT | Performed by: INTERNAL MEDICINE

## 2017-09-23 PROCEDURE — B32RZZZ COMPUTERIZED TOMOGRAPHY (CT SCAN) OF INTRACRANIAL ARTERIES: ICD-10-PCS | Performed by: RADIOLOGY

## 2017-09-23 PROCEDURE — 99233 SBSQ HOSP IP/OBS HIGH 50: CPT | Performed by: INTERNAL MEDICINE

## 2017-09-23 PROCEDURE — 6360000002 HC RX W HCPCS: Performed by: RADIOLOGY

## 2017-09-23 PROCEDURE — B328ZZZ COMPUTERIZED TOMOGRAPHY (CT SCAN) OF BILATERAL INTERNAL CAROTID ARTERIES: ICD-10-PCS | Performed by: RADIOLOGY

## 2017-09-23 PROCEDURE — 6370000000 HC RX 637 (ALT 250 FOR IP): Performed by: PHARMACIST

## 2017-09-23 PROCEDURE — 85610 PROTHROMBIN TIME: CPT

## 2017-09-23 PROCEDURE — 70498 CT ANGIOGRAPHY NECK: CPT

## 2017-09-23 RX ORDER — METHYLPREDNISOLONE SODIUM SUCCINATE 125 MG/2ML
125 INJECTION, POWDER, LYOPHILIZED, FOR SOLUTION INTRAMUSCULAR; INTRAVENOUS ONCE
Status: DISCONTINUED | OUTPATIENT
Start: 2017-09-23 | End: 2017-09-23

## 2017-09-23 RX ORDER — METHYLPREDNISOLONE SODIUM SUCCINATE 40 MG/ML
40 INJECTION, POWDER, LYOPHILIZED, FOR SOLUTION INTRAMUSCULAR; INTRAVENOUS ONCE
Status: COMPLETED | OUTPATIENT
Start: 2017-09-23 | End: 2017-09-23

## 2017-09-23 RX ORDER — DIPHENHYDRAMINE HYDROCHLORIDE 50 MG/ML
50 INJECTION INTRAMUSCULAR; INTRAVENOUS ONCE
Status: COMPLETED | OUTPATIENT
Start: 2017-09-23 | End: 2017-09-23

## 2017-09-23 RX ORDER — DIPHENHYDRAMINE HYDROCHLORIDE 50 MG/ML
25 INJECTION INTRAMUSCULAR; INTRAVENOUS ONCE
Status: DISCONTINUED | OUTPATIENT
Start: 2017-09-23 | End: 2017-09-23

## 2017-09-23 RX ORDER — WARFARIN SODIUM 2 MG/1
2 TABLET ORAL
Status: COMPLETED | OUTPATIENT
Start: 2017-09-23 | End: 2017-09-23

## 2017-09-23 RX ADMIN — SODIUM CHLORIDE: 9 INJECTION, SOLUTION INTRAVENOUS at 16:30

## 2017-09-23 RX ADMIN — PANTOPRAZOLE SODIUM 40 MG: 40 TABLET, DELAYED RELEASE ORAL at 04:01

## 2017-09-23 RX ADMIN — IOPAMIDOL 80 ML: 755 INJECTION, SOLUTION INTRAVENOUS at 21:58

## 2017-09-23 RX ADMIN — ASPIRIN 81 MG: 81 TABLET, CHEWABLE ORAL at 08:45

## 2017-09-23 RX ADMIN — METHYLPREDNISOLONE SODIUM SUCCINATE 40 MG: 40 INJECTION, POWDER, FOR SOLUTION INTRAMUSCULAR; INTRAVENOUS at 16:30

## 2017-09-23 RX ADMIN — VITAMIN D, TAB 1000IU (100/BT) 1000 UNITS: 25 TAB at 08:45

## 2017-09-23 RX ADMIN — ONDANSETRON 4 MG: 2 INJECTION INTRAMUSCULAR; INTRAVENOUS at 10:49

## 2017-09-23 RX ADMIN — ENOXAPARIN SODIUM 70 MG: 80 INJECTION SUBCUTANEOUS at 08:44

## 2017-09-23 RX ADMIN — WARFARIN SODIUM 2 MG: 2 TABLET ORAL at 16:31

## 2017-09-23 RX ADMIN — SODIUM CHLORIDE: 9 INJECTION, SOLUTION INTRAVENOUS at 04:01

## 2017-09-23 RX ADMIN — DIPHENHYDRAMINE HYDROCHLORIDE 50 MG: 50 INJECTION, SOLUTION INTRAMUSCULAR; INTRAVENOUS at 16:30

## 2017-09-23 RX ADMIN — METOPROLOL SUCCINATE 25 MG: 25 TABLET, FILM COATED, EXTENDED RELEASE ORAL at 08:44

## 2017-09-23 RX ADMIN — SODIUM CHLORIDE: 9 INJECTION, SOLUTION INTRAVENOUS at 20:16

## 2017-09-23 ASSESSMENT — PAIN SCALES - GENERAL
PAINLEVEL_OUTOF10: 0
PAINLEVEL_OUTOF10: 0

## 2017-09-23 NOTE — CONSULTS
Provider, MD   bismuth subsalicylate (PEPTO BISMOL) 262 MG/15ML suspension Take 15 mLs by mouth every 6 hours as needed for Indigestion    Historical Provider, MD   acetaminophen (TYLENOL) 500 MG tablet Take 1,000 mg by mouth every 6 hours as needed. Don't take more than 3,000 mg per day. Indications: Pain    Historical Provider, MD   nitroGLYCERIN (NITROSTAT) 0.4 MG SL tablet Place 0.4 mg under the tongue every 5 minutes as needed. Not to exceed 3 doses/15 min- seek medical attention if pain persists. Indications: Chest Pain    Historical Provider, MD       Past Medical History:   has a past medical history of CAD (coronary artery disease); GERD (gastroesophageal reflux disease); History of CVA (cerebrovascular accident) - noted per MRI-brain on 9/18/2017 which demonstrates old strokes; Hypertension; Myocardial infarct ([facility] [address] Pulmonary embolism ([facility] [address] PVC's (premature ventricular contractions); Retinal artery thrombosis; Thrombophlebitis; and Weakness. Past Surgical History:   has a past surgical history that includes Coronary artery bypass graft; Appendectomy; hernia repair; transthoracic echocardiogram (01/10/2012); Cardiac catheterization (01/16/2012); and Cardiac catheterization. Social History:  no Tobacco, no EtOH, no Illicit drugs    Family History:  No compelling family history of early neurodegenerative disease or cryptogenic stroke.     Physical Exam:  Vitals:    09/23/17 0817   BP: 134/74   Pulse: 91   Resp: 18   Temp: 97.9 °F (36.6 °C)   SpO2: 96%     NAD  NC/AT, Midline nasal septum  No scleral icterus  RRR  No increased work of breathing  +BS, NT/ND  No clubbing/cyanosis  No palpable lymphadenopathy appreciated  Neurologic:   A+Ox4  Hypophonic speech, otherwise CN2-12 intact  5/5 motor    Brief labs  Lab Results   Component Value Date    WBC 12.4 09/23/2017    HGB 14.9 09/23/2017    HCT 44.4 09/23/2017    MCV 87.3 09/23/2017     09/23/2017     Lab Results   Component Value Date     09/23/2017    K 3.8 09/23/2017     09/23/2017    CO2 26 09/23/2017    PHOS 5.6 09/23/2017    BUN 45 09/23/2017    CREATININE 1.5 09/23/2017    CALCIUM 9.3 09/23/2017     Lab Results   Component Value Date    PROTIME 3.06 11/30/2011    INR 2.64 09/23/2017     Lab Results   Component Value Date    APTT 85.8 09/22/2017     Lab Results   Component Value Date    ALKPHOS 129 09/19/2017    ALT 8 09/19/2017    AST 11 09/19/2017    BILITOT 1.2 09/19/2017    BILIDIR 0.3 09/19/2017    LABALBU 3.9 09/23/2017    LABALBU 3.9 01/11/2012     Lab Results   Component Value Date    TROPONINI 0.197 01/10/2012      Lab Results   Component Value Date    LABA1C 5.4 12/08/2016       No results found for: CHARCSF, CULTURE  No results found for: PHENYTOIN, CARBTOT, PHENOBARB, VALPROATE  No results found for: [facility]    Lab Results   Component Value Date    NITRU NEGATIVE 09/19/2017    COLORU YELLOW 09/19/2017    PHUR 6.0 09/19/2017    WBCUA 0-2 09/19/2017    RBCUA 15-25 09/19/2017    YEAST NONE SEEN 09/19/2017    BACTERIA NONE 09/19/2017    LEUKOCYTESUR NEGATIVE 09/19/2017    UROBILINOGEN 0.2 09/19/2017    BILIRUBINUR NEGATIVE 09/19/2017    BLOODU LARGE 09/19/2017    GLUCOSEU NEGATIVE 09/19/2017    KETUA NEGATIVE 09/19/2017

## 2017-09-23 NOTE — PROGRESS NOTES
phosphatase 130s, elevated bili 1.3 and normal AST/ALT. So far, unclear etiology. Abdominal US with biliary sludge. Vitamin D low; hence, elevated alk phos. Viral hepatitis profile negative. Consider repeat LFTs as outpatient, in about a month. 8. Hypovitaminosis D. Replacement ordered. 9. History of CAD s/p PCI, CABG. On ASA, BB, statin. 10. Hypertension. Continue current antihypertensives as ordered with hold parameters. 11. 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 Back on coumadin therapy with therapeutic INR. 12. Leukocytosis. Likely reactive. Rule out occult infection - CXR, UA, procalcitonin. 13. Intermittent episodes of confusion. Suspect sundowning. Now improving - avoid sedating meds. If needed, sitter to bedside. MRI-brain, CXR, UA, ammonia unremarkable. Diet: DIET DYSPHAGIA II MECHANICALLY ALTERED; Daily Fluid Restriction: 2000 ml  Dietary Nutrition Supplements: Frozen Oral Supplement    Code status: Full Code     ----------  Subjective  No chest pain or shortness of breath. Has nausea. Objective  Physical exam:  Vitals: /74  Pulse 91  Temp 97.9 °F (36.6 °C) (Oral)   Resp 18  Ht 5' 5.5\" (1.664 m)  Wt 146 lb 3.2 oz (66.3 kg)  SpO2 96%  BMI 23.96 kg/m2  Gen/overall appearance: Alert and oriented. Head: Normocephalic, atraumatic  Eyes: Pupils equal bilaterally  ENT:- Oral mucosa moist  Neck: No JVD, thyromegaly  CVS: Nml S1S2, no MRG. Irregular  Pulm: Clear bilaterally. No crackles/wheezes  Gastrointestinal: Soft, NT/ND, +BS  Musculoskeletal: No edema. Warm  Neuro: No focal deficits. Moves all extremities. Psychiatry: Not agitated. Affect is normal.  Skin: No obvious rashes noted        24HR INTAKE/OUTPUT:      Intake/Output Summary (Last 24 hours) at 09/23/17 0924  Last data filed at 09/23/17 0545   Gross per 24 hour   Intake           1751.5 ml   Output                0 ml   Net           1751.5 ml     I/O last 3 completed shifts:   In: 1871.5 [P.O.:700; I.V.:1171.5]  Out: 0          Meds:    vitamin D  1,000 Units Oral Daily    metoprolol succinate  25 mg Oral Daily    warfarin (COUMADIN) daily dosing (placeholder)   Other RX Placeholder    sodium chloride flush  10 mL Intravenous 2 times per day    atorvastatin  40 mg Oral Q48H    pantoprazole  40 mg Oral QAM AC    aspirin  81 mg Oral Daily       Infusions:    sodium chloride 66 mL/hr at 09/23/17 0401         PRN Meds: sodium chloride flush, docusate sodium, polyethylene glycol, diphenhydrAMINE, bismuth subsalicylate, acetaminophen, magnesium hydroxide, ondansetron, nitroGLYCERIN, ipratropium-albuterol    Labs/imaging:  CBC:   Recent Labs      09/21/17 0353 09/22/17 0756 09/23/17   0558   WBC  11.2*  11.3*  12.4*   HGB  16.6  15.0  14.9   PLT  188  167  157         BMP:    Recent Labs      09/21/17 0353 09/22/17   0756  09/23/17   0558   NA  141  140  144   K  3.9  4.1  3.8   CL  95*  99  101   CO2  24  24  26   BUN  17  37*  45*   CREATININE  1.0  1.3*  1.5*   GLUCOSE  142*  120*  133*         Hepatic:   No results for input(s): AST, ALT, ALB, BILITOT, ALKPHOS in the last 72 hours. INR:   Recent Labs      09/21/17 0353 09/22/17 0756 09/23/17   0558   INR  1.57*  1.77*  2.64*     Reviewed imaging and reports noted.     Eric Joya MD  -------------------------------  [facility] hospitalist

## 2017-09-23 NOTE — CONSULTS
when I saw him. He appeared comfortable. He has had normal renal function until yesterday when serum creatinine was noted to be 1.3 mg/dL. Today it jabari to 1.5 mg/dL. As a result I was asked to see him. Past Medical History:   Diagnosis Date    CAD (coronary artery disease)     GERD (gastroesophageal reflux disease)     History of CVA (cerebrovascular accident) - noted per MRI-brain on 9/18/2017 which demonstrates old strokes     Hypertension     Myocardial infarct ([address]     Pulmonary embolism ([address]     PVC's (premature ventricular contractions)     Retinal artery thrombosis     Thrombophlebitis     Weakness        Past Surgical History:   Procedure Laterality Date    APPENDECTOMY      CARDIAC CATHETERIZATION  01/16/2012    Status post successful PCI of SVG to OM2 branch with stent in the proximal area. Stent in the  mid area and balloon angioplasty of the distal anastomotic site.  CARDIAC CATHETERIZATION      CORONARY ARTERY BYPASS GRAFT      X2 1982 1984    HERNIA REPAIR      TRANSTHORACIC ECHOCARDIOGRAM  01/10/2012    LV was mildly dilated. Systolic function was normal. EF was estimated in  the range of 55-65%. There were no regional wall motion abnormalities. Wall thickness was normal.       Family History   Problem Relation Age of Onset    Heart Disease Mother     Heart Disease Father     Heart Disease Sister     Heart Disease Sister         reports that he has never smoked. He does not have any smokeless tobacco history on file. He reports that he does not drink alcohol or use illicit drugs.     Allergies:  Iodine    Current Medications:      warfarin (COUMADIN) tablet 2 mg Once   methylPREDNISolone sodium (SOLU-MEDROL) injection 125 mg Once   diphenhydrAMINE (BENADRYL) injection 25 mg Once   0.9 % sodium chloride infusion Continuous   vitamin D (CHOLECALCIFEROL) tablet 1,000 Units Daily   metoprolol succinate (TOPROL XL) extended release tablet 25 mg Daily   warfarin (COUMADIN) daily dosing (placeholder) RX Placeholder   sodium chloride flush 0.9 % injection 10 mL 2 times per day   sodium chloride flush 0.9 % injection 10 mL PRN   docusate sodium (COLACE) capsule 100 mg BID PRN   polyethylene glycol (GLYCOLAX) packet 17 g Daily PRN   diphenhydrAMINE (BENADRYL) tablet 25 mg Nightly PRN   atorvastatin (LIPITOR) tablet 40 mg H24V   bismuth subsalicylate (PEPTO BISMOL) 262 MG/15ML suspension 15 mL Q6H PRN   pantoprazole (PROTONIX) tablet 40 mg QAM AC   acetaminophen (TYLENOL) tablet 650 mg Q4H PRN   magnesium hydroxide (MILK OF MAGNESIA) 400 MG/5ML suspension 30 mL Daily PRN   ondansetron (ZOFRAN) injection 4 mg Q6H PRN   aspirin chewable tablet 81 mg Daily   nitroGLYCERIN (NITROSTAT) SL tablet 0.4 mg Q5 Min PRN   ipratropium-albuterol (DUONEB) nebulizer solution 1 ampule Q4H PRN       Review of Systems:   Pertinent positives stated above in HPI. All other systems were reviewed and were negative. ROS:Constitutional: negative  Eyes: negative  Ears, nose, mouth, throat, and face: negative  Respiratory: positive for dyspnea on exertion  Cardiovascular: positive for chest pain  Gastrointestinal: negative  Genitourinary:negative  Integument/breast: negative  Hematologic/lymphatic: negative  Musculoskeletal:positive for arthralgias and stiff joints  Neurological: negative  Behavioral/Psych: negative  Endocrine: negative  Allergic/Immunologic: negative    Physical exam:   Constitutional:  Well-developed elderly gentleman in no distress. Vitals:   Vitals:    09/23/17 1510   BP: 132/68   Pulse: 77   Resp: 18   Temp: 97.6 °F (36.4 °C)   SpO2: 96%       Skin: no rash, turgor wnl  Heent:Pupils are reactive. Throat is clear. Neck: no bruits or jvd noted  Cardiovascular:  Irregular heart rhythm.   Respiratory: CTA B without w/r/r  Abdomen:  +bs, soft, non tender  Ext: No lower extremity edema  Psychiatric: Deferred  Musculoskeletal:  intact  CNS: Deferred  Data:   Labs:  Lab Results   Component Value Date     09/23/2017     09/22/2017     09/21/2017    K 3.8 09/23/2017    K 4.1 09/22/2017    K 3.9 09/21/2017     09/23/2017    CO2 26 09/23/2017    CO2 24 09/22/2017    CO2 24 09/21/2017    CREATININE 1.5 (H) 09/23/2017    CREATININE 1.3 (H) 09/22/2017    CREATININE 1.0 09/21/2017    BUN 45 (H) 09/23/2017    BUN 37 (H) 09/22/2017    BUN 17 09/21/2017    GLUCOSE 133 (H) 09/23/2017    GLUCOSE 120 (H) 09/22/2017    GLUCOSE 142 (H) 09/21/2017    PHOS 5.6 (H) 09/23/2017    PHOS 4.8 (H) 09/22/2017    PHOS 3.2 09/21/2017    WBC 12.4 (H) 09/23/2017    WBC 11.3 (H) 09/22/2017    WBC 11.2 (H) 09/21/2017    HGB 14.9 09/23/2017    HGB 15.0 09/22/2017    HGB 16.6 09/21/2017    HCT 44.4 09/23/2017    HCT 44.7 09/22/2017    HCT 49.4 09/21/2017    MCV 87.3 09/23/2017     09/23/2017     Labs reviewed    Imaging:  CXR results: Diagnostic images and reports reviewed        Thank you Dr. Reynaldo Buerger, MD for allowing us to participate in care of Mary Jo Forde       Electronically signed by Juan Amaya MD on 9/23/2017 at 3:14 PM

## 2017-09-23 NOTE — CONSULTS
Seen and examined   Seeing for acute kidney injury   Acute kidney injury likely due to dehydration  Continue IV fluid   Baseline kidney ultrasound   Will follow   Discussed with family

## 2017-09-24 ENCOUNTER — APPOINTMENT (OUTPATIENT)
Dept: ULTRASOUND IMAGING | Age: 82
DRG: 034 | End: 2017-09-24
Payer: MEDICARE

## 2017-09-24 PROBLEM — I65.21 STENOSIS OF RIGHT INTERNAL CAROTID ARTERY: Status: ACTIVE | Noted: 2017-09-24

## 2017-09-24 LAB
ALBUMIN SERPL-MCNC: 3.3 G/DL (ref 3.5–5.1)
ANION GAP SERPL CALCULATED.3IONS-SCNC: 14 MEQ/L (ref 8–16)
ANISOCYTOSIS: ABNORMAL
BACTERIA: ABNORMAL
BASOPHILS # BLD: 0.1 %
BASOPHILS ABSOLUTE: 0 THOU/MM3 (ref 0–0.1)
BILIRUBIN URINE: NEGATIVE
BLOOD, URINE: ABNORMAL
BUN BLDV-MCNC: 42 MG/DL (ref 7–22)
CALCIUM SERPL-MCNC: 8.7 MG/DL (ref 8.5–10.5)
CASTS: ABNORMAL /LPF
CASTS: ABNORMAL /LPF
CHARACTER, URINE: ABNORMAL
CHLORIDE BLD-SCNC: 104 MEQ/L (ref 98–111)
CO2: 27 MEQ/L (ref 23–33)
COLOR: YELLOW
CREAT SERPL-MCNC: 1.2 MG/DL (ref 0.4–1.2)
CRYSTALS: ABNORMAL
D-DIMER QUANTITATIVE: 2101 NG/ML FEU (ref 0–500)
EOSINOPHIL # BLD: 0 %
EOSINOPHILS ABSOLUTE: 0 THOU/MM3 (ref 0–0.4)
EPITHELIAL CELLS, UA: ABNORMAL /HPF
GFR SERPL CREATININE-BSD FRML MDRD: 57 ML/MIN/1.73M2
GLUCOSE BLD-MCNC: 118 MG/DL (ref 70–108)
GLUCOSE, URINE: NEGATIVE MG/DL
HCT VFR BLD CALC: 42.7 % (ref 42–52)
HEMOGLOBIN: 14.2 GM/DL (ref 14–18)
INR BLD: 3.8 (ref 0.85–1.13)
KETONES, URINE: NEGATIVE
LACTIC ACID: 1 MMOL/L (ref 0.5–2.2)
LEUKOCYTE EST, POC: NEGATIVE
LIPASE: 18.5 U/L (ref 5.6–51.3)
LYMPHOCYTES # BLD: 7 %
LYMPHOCYTES ABSOLUTE: 0.7 THOU/MM3 (ref 1–4.8)
MAGNESIUM: 2.5 MG/DL (ref 1.6–2.4)
MCH RBC QN AUTO: 29 PG (ref 27–31)
MCHC RBC AUTO-ENTMCNC: 33.1 GM/DL (ref 33–37)
MCV RBC AUTO: 87.5 FL (ref 80–94)
MISCELLANEOUS LAB TEST RESULT: ABNORMAL
MONOCYTES # BLD: 10.6 %
MONOCYTES ABSOLUTE: 1.1 THOU/MM3 (ref 0.4–1.3)
MYOGLOBIN URINE: POSITIVE
NITRITE, URINE: NEGATIVE
NUCLEATED RED BLOOD CELLS: 0 /100 WBC
PDW BLD-RTO: 16 % (ref 11.5–14.5)
PH UA: 5.5
PHOSPHORUS: 4.6 MG/DL (ref 2.4–4.7)
PLATELET # BLD: 143 THOU/MM3 (ref 130–400)
PMV BLD AUTO: 9.9 MCM (ref 7.4–10.4)
POTASSIUM SERPL-SCNC: 4.7 MEQ/L (ref 3.5–5.2)
PROTEIN UA: NEGATIVE MG/DL
RBC # BLD: 4.88 MILL/MM3 (ref 4.7–6.1)
RBC # BLD: NORMAL 10*6/UL
RBC URINE: ABNORMAL /HPF
RENAL EPITHELIAL, UA: ABNORMAL
SEG NEUTROPHILS: 82.3 %
SEGMENTED NEUTROPHILS ABSOLUTE COUNT: 8.8 THOU/MM3 (ref 1.8–7.7)
SODIUM BLD-SCNC: 145 MEQ/L (ref 135–145)
SPECIFIC GRAVITY UA: 1.02 (ref 1–1.03)
TOTAL CK: 42 U/L (ref 55–170)
UROBILINOGEN, URINE: 0.2 EU/DL
WBC # BLD: 10.7 THOU/MM3 (ref 4.8–10.8)
WBC UA: ABNORMAL /HPF
YEAST: ABNORMAL

## 2017-09-24 PROCEDURE — 83735 ASSAY OF MAGNESIUM: CPT

## 2017-09-24 PROCEDURE — 82550 ASSAY OF CK (CPK): CPT

## 2017-09-24 PROCEDURE — 83874 ASSAY OF MYOGLOBIN: CPT

## 2017-09-24 PROCEDURE — 99232 SBSQ HOSP IP/OBS MODERATE 35: CPT | Performed by: PHYSICIAN ASSISTANT

## 2017-09-24 PROCEDURE — G8987 SELF CARE CURRENT STATUS: HCPCS

## 2017-09-24 PROCEDURE — 2580000003 HC RX 258: Performed by: INTERNAL MEDICINE

## 2017-09-24 PROCEDURE — 76775 US EXAM ABDO BACK WALL LIM: CPT

## 2017-09-24 PROCEDURE — 83690 ASSAY OF LIPASE: CPT

## 2017-09-24 PROCEDURE — 6370000000 HC RX 637 (ALT 250 FOR IP): Performed by: INTERNAL MEDICINE

## 2017-09-24 PROCEDURE — 85379 FIBRIN DEGRADATION QUANT: CPT

## 2017-09-24 PROCEDURE — 2580000003 HC RX 258: Performed by: PHYSICIAN ASSISTANT

## 2017-09-24 PROCEDURE — 6370000000 HC RX 637 (ALT 250 FOR IP): Performed by: NURSE PRACTITIONER

## 2017-09-24 PROCEDURE — 99232 SBSQ HOSP IP/OBS MODERATE 35: CPT | Performed by: PSYCHIATRY & NEUROLOGY

## 2017-09-24 PROCEDURE — 36415 COLL VENOUS BLD VENIPUNCTURE: CPT

## 2017-09-24 PROCEDURE — 85025 COMPLETE CBC W/AUTO DIFF WBC: CPT

## 2017-09-24 PROCEDURE — 97530 THERAPEUTIC ACTIVITIES: CPT

## 2017-09-24 PROCEDURE — 97166 OT EVAL MOD COMPLEX 45 MIN: CPT

## 2017-09-24 PROCEDURE — 80069 RENAL FUNCTION PANEL: CPT

## 2017-09-24 PROCEDURE — 81001 URINALYSIS AUTO W/SCOPE: CPT

## 2017-09-24 PROCEDURE — 6370000000 HC RX 637 (ALT 250 FOR IP): Performed by: PSYCHIATRY & NEUROLOGY

## 2017-09-24 PROCEDURE — 99232 SBSQ HOSP IP/OBS MODERATE 35: CPT | Performed by: INTERNAL MEDICINE

## 2017-09-24 PROCEDURE — 85610 PROTHROMBIN TIME: CPT

## 2017-09-24 PROCEDURE — G8988 SELF CARE GOAL STATUS: HCPCS

## 2017-09-24 PROCEDURE — 83605 ASSAY OF LACTIC ACID: CPT

## 2017-09-24 PROCEDURE — 99221 1ST HOSP IP/OBS SF/LOW 40: CPT | Performed by: NURSE PRACTITIONER

## 2017-09-24 PROCEDURE — 1200000003 HC TELEMETRY R&B

## 2017-09-24 PROCEDURE — 51702 INSERT TEMP BLADDER CATH: CPT

## 2017-09-24 PROCEDURE — 99233 SBSQ HOSP IP/OBS HIGH 50: CPT | Performed by: INTERNAL MEDICINE

## 2017-09-24 RX ORDER — TAMSULOSIN HYDROCHLORIDE 0.4 MG/1
0.4 CAPSULE ORAL NIGHTLY
Status: DISCONTINUED | OUTPATIENT
Start: 2017-09-24 | End: 2017-09-28 | Stop reason: HOSPADM

## 2017-09-24 RX ORDER — CLOPIDOGREL BISULFATE 75 MG/1
75 TABLET ORAL DAILY
Status: DISCONTINUED | OUTPATIENT
Start: 2017-09-24 | End: 2017-09-28 | Stop reason: HOSPADM

## 2017-09-24 RX ADMIN — Medication 10 ML: at 20:06

## 2017-09-24 RX ADMIN — SODIUM CHLORIDE: 9 INJECTION, SOLUTION INTRAVENOUS at 12:24

## 2017-09-24 RX ADMIN — PANTOPRAZOLE SODIUM 40 MG: 40 TABLET, DELAYED RELEASE ORAL at 03:54

## 2017-09-24 RX ADMIN — DOCUSATE SODIUM 100 MG: 100 CAPSULE ORAL at 08:29

## 2017-09-24 RX ADMIN — ATORVASTATIN CALCIUM 40 MG: 40 TABLET, FILM COATED ORAL at 20:06

## 2017-09-24 RX ADMIN — ACETAMINOPHEN 650 MG: 325 TABLET ORAL at 20:05

## 2017-09-24 RX ADMIN — VITAMIN D, TAB 1000IU (100/BT) 1000 UNITS: 25 TAB at 08:29

## 2017-09-24 RX ADMIN — TAMSULOSIN HYDROCHLORIDE 0.4 MG: 0.4 CAPSULE ORAL at 20:06

## 2017-09-24 RX ADMIN — CLOPIDOGREL 75 MG: 75 TABLET, FILM COATED ORAL at 20:05

## 2017-09-24 RX ADMIN — METOPROLOL SUCCINATE 25 MG: 25 TABLET, FILM COATED, EXTENDED RELEASE ORAL at 08:29

## 2017-09-24 RX ADMIN — ASPIRIN 81 MG: 81 TABLET, CHEWABLE ORAL at 08:29

## 2017-09-24 ASSESSMENT — PAIN SCALES - GENERAL
PAINLEVEL_OUTOF10: 0
PAINLEVEL_OUTOF10: 6
PAINLEVEL_OUTOF10: 0

## 2017-09-24 NOTE — PROGRESS NOTES
PVC's (premature ventricular contractions); Retinal artery thrombosis; Thrombophlebitis; and Weakness. Moo Blank  has a past surgical history that includes Coronary artery bypass graft; Appendectomy; hernia repair; transthoracic echocardiogram (01/10/2012); Cardiac catheterization (01/16/2012); and Cardiac catheterization. Subjective  Chart Reviewed: Yes (H&P, notes, )  Patient assessed for rehabilitation services?: Yes  Family / Caregiver Present: Yes    Subjective: This is my best day so far!  Agreeable to OT eval    General:       Vision: Impaired    Hearing: Within functional limits         Pain:  Pain Assessment  Patient Currently in Pain: Denies       Social/Functional:  Lives With: Alone  Type of Home: House  Home Layout: One level  Home Access: Stairs to enter without rails  Entrance Stairs - Number of Steps: 1-2  Home Equipment:  (no AD used PTA)     Bathroom Shower/Tub: Tub/Shower unit (pt reports taking baths)  Bathroom Toilet: Standard  Bathroom Equipment: Grab bars in shower       ADL Assistance: Independent  Homemaking Assistance: Independent (daughters cooked weekly, able to reheat, goes out to eat on Saturdays)  Homemaking Responsibilities: Yes    Ambulation Assistance: Independent  Transfer Assistance: Independent    Active : Yes  Mode of Transportation: Car  Occupation: Full time employment  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  Additional Comments: Per pt family plans to d/c to rehab with further planning TBD    Objective              LUE AROM (degrees)  LUE AROM : WFL          RUE AROM (degrees)  RUE AROM : WFL       LUE Strength  Gross LUE Strength: WFL  L Hand Grasp: 4/5                RUE Strength  Gross RUE Strength: WFL  R Hand Grasp: 4/5       ADL  Toileting: Dependent/Total (catheter)     Bed mobility  Supine to Sit: Contact guard assistance  Scooting: Contact guard assistance    Transfers  Sit to stand: Minimal assistance  Stand to sit: Contact guard assistance (verbal cues for safety and technique)    Balance  Sitting Balance: Contact guard assistance  Standing Balance: Minimal assistance     Time: x45 seconds at EOB for preparation of functional mobility     Functional Mobility  Functional - Mobility Device: Rolling Walker  Activity:  (within room)  Assist Level: Minimal assistance  Functional Mobility Comments: retropulsive lean, cues for forward flexion, to bear weight through toes and not only heels            Activity Tolerance:  Activity Tolerance: Patient Tolerated treatment well  Activity Tolerance: Treatment initiated: Pt required several VCs for safety and technique in preparation of functional mobility. Pt with posterior lean in standing, requiring therapist intervention for standing balance. Pt demonstrated good understanding to cues for proper stand to sit technique in bedside chair, continue to reinforce. Assessment:     Performance deficits / Impairments: Decreased functional mobility , Decreased ADL status, Decreased safe awareness, Decreased strength, Decreased endurance, Decreased balance  Prognosis: Good  Discharge Recommendations: [address], Patient would benefit from continued therapy after discharge    Clinical Decision Making: Clinical Decision making was of Moderate Complexity as the result of analysis of data from a detailed assessment, a consideration of several treatment options, the presence of comorbidities affecting the plan of care and the need for minimal to moderate modifications or assistance required to complete the evaluation. Patient Education:  Patient Education: OT POC and goals, safety, t/f technique    Equipment Recommendations:   Other: continue to assess    Safety:  Safety Devices in place: Yes  Type of devices: Nurse notified, Left in chair, Chair alarm in place, Patient at risk for falls (family with pt, telesitter)    Plan:  Times per week: 3-5x  Current Treatment Recommendations:

## 2017-09-24 NOTE — PLAN OF CARE
Problem: Cardiovascular  Goal: No DVT, peripheral vascular complications  Outcome: Ongoing  lovenox    Problem: DISCHARGE BARRIERS  Goal: Patients continuum of care needs are met  Outcome: Ongoing  Plan discharge to rehab

## 2017-09-24 NOTE — PROGRESS NOTES
Renal Progress Note    Assessment and Plan: 1. Acute kidney injury from volume contraction improved   2. HYpertension primary  3. Right carotid artery stenosis   4. Deconditioning   5. Dehydration better   PLAN:  Reviewed labs and result discussed with family   Reviewed medications   Continue IV fluid   AM labs   Discussed with patient and family   Kidney ultrasound official report pending       Patient Active Problem List:     Frailty     Coronary artery disease due to lipid rich plaque     Essential hypertension     PVC's (premature ventricular contractions)     History of thrombosis - PE, DVT     History of CVA (cerebrovascular accident)     Hx of CABG     Presence of coronary artery bypass graft stent     Dyslipidemia     Moderate aortic stenosis     Moderate mitral regurgitation     History of TIA (transient ischemic attack)     History of myocardial infarction     Nonrheumatic aortic valve stenosis     Anticoagulated on Coumadin     Loculated pleural effusion on left, likely from CHF     Chest pain     Shortness of breath     Atrial fibrillation (HCC)     Acute on chronic diastolic heart failure (HCC)     Elevated LFTs     Pleural effusion     Slurred speech, POA     Low vitamin D level     Congestive heart failure (HCC)     Protein-calorie malnutrition, severe (HCC)     Acute renal failure, not POA     At risk for aspiration     Severe malnutrition ([address]     KIRSTIE (acute kidney injury) ([facility]     Urinary retention     Neurogenic bladder     Microscopic hematuria      Subjective:   Admit Date: 9/16/2017    Interval History:   Seeing for acute kidney injury   Very awake and alert today  Eating lunch  Feels good with no concern  Admitted for weakness and acute kidney injury       Medications:   Scheduled Meds:   tamsulosin  0.4 mg Oral Nightly    vitamin D  1,000 Units Oral Daily    metoprolol succinate  25 mg Oral Daily    warfarin (COUMADIN) daily dosing (placeholder)   Other RX Placeholder    sodium chloride

## 2017-09-24 NOTE — PROGRESS NOTES
Neurology Inpatient Progress Note:  Date of Service: 09/24/17  Chief Complaint:   Chief Complaint   Patient presents with    Shortness of Breath       ASSESSMENT AND PLAN:  1. High grade severe R ICA stenosis in setting of prior CRAO. This is symptomatic and warrants treatment.    -plan on carotid stenting on Tuesday.     -hold warfarin, pending stent   -start plavix 75mg daily  2. Persistent hypophonic speech/flaccid dysarthria-concern for bulbar process--severe posterior circulation compromise on CTA head/neck. Will evaluate this further with angiogram at time of carotid stenting. If his symptoms do not improve after carotid stenting, may need to intervene on vertebral stenosis. -DVT prophylaxis  At least 25 minutes have been spent on the care of this patient. Greater than 50% of the floor time has been spent providing counseling/coordination of care with patient, family, and/or other providers/agencies involved with the patient's care. Aiyana Cash M.D., J.D. Vascular Neurology and Endovascular Surgical Neuroradiology  Cell: [phone]      S: No acute events overnight. Neurologic status is stable. ROS was reviewed today and unchanged since last review. Namely, there are no new rashes, no new symptoms concerning for anaphylaxis, no new black/tarry stools, no new symptoms of temperature intolerance.     Medications:  Current Facility-Administered Medications: tamsulosin (FLOMAX) capsule 0.4 mg, 0.4 mg, Oral, Nightly  0.9 % sodium chloride infusion, , Intravenous, Continuous  vitamin D (CHOLECALCIFEROL) tablet 1,000 Units, 1,000 Units, Oral, Daily  metoprolol succinate (TOPROL XL) extended release tablet 25 mg, 25 mg, Oral, Daily  warfarin (COUMADIN) daily dosing (placeholder), , Other, RX Placeholder  sodium chloride flush 0.9 % injection 10 mL, 10 mL, Intravenous, 2 times per day  sodium chloride flush 0.9 % injection 10 mL, 10 mL, Intravenous, PRN  docusate sodium (COLACE) capsule 100 mg, 100 mg, 09/24/2017     Lab Results   Component Value Date    TROPONINI 0.197 01/10/2012      Lab Results   Component Value Date    LABA1C 5.4 12/08/2016       No results found for: CHARCSF, CULTURE  No results found for: PHENYTOIN, CARBTOT, PHENOBARB, VALPROATE  No results found for: [facility]    Lab Results   Component Value Date    NITRU NEGATIVE 09/24/2017    COLORU YELLOW 09/24/2017    PHUR 5.5 09/24/2017    LABCAST NONE SEEN 09/24/2017    LABCAST NONE SEEN 09/24/2017    WBCUA NONE SEEN 09/24/2017    RBCUA 15-25 09/24/2017    YEAST NONE SEEN 09/24/2017    BACTERIA NONE 09/24/2017    SPECGRAV 1.020 09/24/2017    LEUKOCYTESUR NEGATIVE 09/24/2017    LEUKOCYTESUR SMALL 09/23/2017    UROBILINOGEN 0.2 09/24/2017    BILIRUBINUR NEGATIVE 09/24/2017    BLOODU LARGE 09/24/2017    GLUCOSEU NEGATIVE 09/23/2017    KETUA NEGATIVE 09/24/2017

## 2017-09-24 NOTE — PROGRESS NOTES
Warm  Neuro: No focal deficits. Moves all extremities. Psychiatry: Not agitated. Affect is normal.  Skin: No obvious rashes noted        24HR INTAKE/OUTPUT:      Intake/Output Summary (Last 24 hours) at 09/24/17 0951  Last data filed at 09/24/17 3387   Gross per 24 hour   Intake          2062.11 ml   Output             1800 ml   Net           262.11 ml     I/O last 3 completed shifts: In: 2062.1 [P.O.:400; I.V.:1662.1]  Out: 1800 [Urine:1800]         Meds:    vitamin D  1,000 Units Oral Daily    metoprolol succinate  25 mg Oral Daily    warfarin (COUMADIN) daily dosing (placeholder)   Other RX Placeholder    sodium chloride flush  10 mL Intravenous 2 times per day    atorvastatin  40 mg Oral Q48H    pantoprazole  40 mg Oral QAM AC    aspirin  81 mg Oral Daily       Infusions:    sodium chloride 75 mL/hr at 09/23/17 2016         PRN Meds: sodium chloride flush, docusate sodium, polyethylene glycol, diphenhydrAMINE, bismuth subsalicylate, acetaminophen, magnesium hydroxide, ondansetron, nitroGLYCERIN, ipratropium-albuterol    Labs/imaging:  CBC:   Recent Labs      09/22/17   0756  09/23/17   0558  09/24/17   0501   WBC  11.3*  12.4*  10.7   HGB  15.0  14.9  14.2   PLT  167  157  143         BMP:    Recent Labs      09/22/17   0756  09/23/17   0558  09/24/17   0501   NA  140  144  145   K  4.1  3.8  4.7   CL  99  101  104   CO2  24  26  27   BUN  37*  45*  42*   CREATININE  1.3*  1.5*  1.2   GLUCOSE  120*  133*  118*     INR:   Recent Labs      09/22/17   0756  09/23/17   0558  09/24/17   0501   INR  1.77*  2.64*  3.80*     Reviewed imaging and reports noted.     Katarzyna Mathias MD  -------------------------------  Rounding hospitalist

## 2017-09-24 NOTE — PROGRESS NOTES
CTA head/neck reviewed. Await final report, however, there appears to be significant R ICA stenosis. Perhaps more interesting is the fact that there is significant posterior circulation stenosis which could very well account for this pt's recent decline including his hypophonia and generalized weakness (motor fibers are in close proximity in brainstem and involvement of reticular activating system can result in fatigue/neurovegetative type symptoms). Will d/w family potential treatment options, including stenting. Please feel free to call with any questions. Deonna Metz M.D., J.D.   Vascular Neurology and Endovascular Surgical Neuroradiology  Cell Number: [phone]

## 2017-09-25 ENCOUNTER — APPOINTMENT (OUTPATIENT)
Dept: GENERAL RADIOLOGY | Age: 82
DRG: 034 | End: 2017-09-25
Payer: MEDICARE

## 2017-09-25 LAB
ALBUMIN SERPL-MCNC: 3.3 G/DL (ref 3.5–5.1)
ANION GAP SERPL CALCULATED.3IONS-SCNC: 8 MEQ/L (ref 8–16)
ANISOCYTOSIS: ABNORMAL
BASOPHILS # BLD: 0.7 %
BASOPHILS ABSOLUTE: 0.1 THOU/MM3 (ref 0–0.1)
BUN BLDV-MCNC: 34 MG/DL (ref 7–22)
CALCIUM SERPL-MCNC: 8.5 MG/DL (ref 8.5–10.5)
CHLORIDE BLD-SCNC: 105 MEQ/L (ref 98–111)
CO2: 30 MEQ/L (ref 23–33)
CREAT SERPL-MCNC: 0.9 MG/DL (ref 0.4–1.2)
EOSINOPHIL # BLD: 6.2 %
EOSINOPHILS ABSOLUTE: 0.5 THOU/MM3 (ref 0–0.4)
GFR SERPL CREATININE-BSD FRML MDRD: 80 ML/MIN/1.73M2
GLUCOSE BLD-MCNC: 104 MG/DL (ref 70–108)
HCT VFR BLD CALC: 41.6 % (ref 42–52)
HEMOGLOBIN: 14 GM/DL (ref 14–18)
INR BLD: 3.56 (ref 0.85–1.13)
LYMPHOCYTES # BLD: 19.5 %
LYMPHOCYTES ABSOLUTE: 1.5 THOU/MM3 (ref 1–4.8)
MAGNESIUM: 2.5 MG/DL (ref 1.6–2.4)
MCH RBC QN AUTO: 29.5 PG (ref 27–31)
MCHC RBC AUTO-ENTMCNC: 33.5 GM/DL (ref 33–37)
MCV RBC AUTO: 88.1 FL (ref 80–94)
MONOCYTES # BLD: 13.1 %
MONOCYTES ABSOLUTE: 1 THOU/MM3 (ref 0.4–1.3)
NUCLEATED RED BLOOD CELLS: 0 /100 WBC
ORGANISM: ABNORMAL
PDW BLD-RTO: 15.7 % (ref 11.5–14.5)
PHOSPHORUS: 1.8 MG/DL (ref 2.4–4.7)
PLATELET # BLD: 116 THOU/MM3 (ref 130–400)
PMV BLD AUTO: 9.6 MCM (ref 7.4–10.4)
POTASSIUM SERPL-SCNC: 4.5 MEQ/L (ref 3.5–5.2)
PROSTATE SPECIFIC ANTIGEN: 0.96 NG/ML (ref 0–1)
RBC # BLD: 4.72 MILL/MM3 (ref 4.7–6.1)
RBC # BLD: NORMAL 10*6/UL
SEG NEUTROPHILS: 60.5 %
SEGMENTED NEUTROPHILS ABSOLUTE COUNT: 4.7 THOU/MM3 (ref 1.8–7.7)
SODIUM BLD-SCNC: 143 MEQ/L (ref 135–145)
URINE CULTURE REFLEX: ABNORMAL
WBC # BLD: 7.8 THOU/MM3 (ref 4.8–10.8)

## 2017-09-25 PROCEDURE — 83735 ASSAY OF MAGNESIUM: CPT

## 2017-09-25 PROCEDURE — 85610 PROTHROMBIN TIME: CPT

## 2017-09-25 PROCEDURE — 97116 GAIT TRAINING THERAPY: CPT

## 2017-09-25 PROCEDURE — 6370000000 HC RX 637 (ALT 250 FOR IP): Performed by: NURSE PRACTITIONER

## 2017-09-25 PROCEDURE — 71010 XR CHEST PORTABLE: CPT

## 2017-09-25 PROCEDURE — 99232 SBSQ HOSP IP/OBS MODERATE 35: CPT | Performed by: INTERNAL MEDICINE

## 2017-09-25 PROCEDURE — 80069 RENAL FUNCTION PANEL: CPT

## 2017-09-25 PROCEDURE — 2580000003 HC RX 258: Performed by: INTERNAL MEDICINE

## 2017-09-25 PROCEDURE — 6370000000 HC RX 637 (ALT 250 FOR IP): Performed by: INTERNAL MEDICINE

## 2017-09-25 PROCEDURE — 6370000000 HC RX 637 (ALT 250 FOR IP): Performed by: PSYCHIATRY & NEUROLOGY

## 2017-09-25 PROCEDURE — 1200000003 HC TELEMETRY R&B

## 2017-09-25 PROCEDURE — 97110 THERAPEUTIC EXERCISES: CPT

## 2017-09-25 PROCEDURE — 36415 COLL VENOUS BLD VENIPUNCTURE: CPT

## 2017-09-25 PROCEDURE — 85025 COMPLETE CBC W/AUTO DIFF WBC: CPT

## 2017-09-25 PROCEDURE — 2580000003 HC RX 258: Performed by: PHYSICIAN ASSISTANT

## 2017-09-25 PROCEDURE — 6360000002 HC RX W HCPCS: Performed by: INTERNAL MEDICINE

## 2017-09-25 PROCEDURE — G0103 PSA SCREENING: HCPCS

## 2017-09-25 RX ORDER — FUROSEMIDE 10 MG/ML
20 INJECTION INTRAMUSCULAR; INTRAVENOUS ONCE
Status: COMPLETED | OUTPATIENT
Start: 2017-09-25 | End: 2017-09-25

## 2017-09-25 RX ADMIN — FUROSEMIDE 20 MG: 10 INJECTION, SOLUTION INTRAMUSCULAR; INTRAVENOUS at 15:20

## 2017-09-25 RX ADMIN — VITAMIN D, TAB 1000IU (100/BT) 1000 UNITS: 25 TAB at 08:27

## 2017-09-25 RX ADMIN — DOCUSATE SODIUM 100 MG: 100 CAPSULE ORAL at 08:27

## 2017-09-25 RX ADMIN — Medication 10 ML: at 15:20

## 2017-09-25 RX ADMIN — METOPROLOL SUCCINATE 25 MG: 25 TABLET, FILM COATED, EXTENDED RELEASE ORAL at 08:27

## 2017-09-25 RX ADMIN — ACETAMINOPHEN 650 MG: 325 TABLET ORAL at 21:40

## 2017-09-25 RX ADMIN — TAMSULOSIN HYDROCHLORIDE 0.4 MG: 0.4 CAPSULE ORAL at 21:40

## 2017-09-25 RX ADMIN — SODIUM CHLORIDE: 9 INJECTION, SOLUTION INTRAVENOUS at 03:46

## 2017-09-25 RX ADMIN — PANTOPRAZOLE SODIUM 40 MG: 40 TABLET, DELAYED RELEASE ORAL at 03:47

## 2017-09-25 RX ADMIN — CLOPIDOGREL 75 MG: 75 TABLET, FILM COATED ORAL at 08:27

## 2017-09-25 RX ADMIN — Medication 10 ML: at 21:40

## 2017-09-25 RX ADMIN — ASPIRIN 81 MG: 81 TABLET, CHEWABLE ORAL at 08:27

## 2017-09-25 RX ADMIN — Medication 10 ML: at 08:27

## 2017-09-25 ASSESSMENT — PAIN SCALES - GENERAL
PAINLEVEL_OUTOF10: 0
PAINLEVEL_OUTOF10: 0
PAINLEVEL_OUTOF10: 6
PAINLEVEL_OUTOF10: 0
PAINLEVEL_OUTOF10: 6

## 2017-09-25 NOTE — PROGRESS NOTES
Physical Therapy   [address]  INPATIENT PHYSICAL THERAPY  DAILYNOTE  [facility]    Time In: 7258  Time Out: 3747  Timed Code Treatment Minutes: 21 Minutes  Minutes: 23          Date: 2017  Patient Name: Lucio Washington,  Gender:  male        MRN: [id number]  : 1930  (80 y.o.)     Referring Practitioner: Dr. Lili Diop  Diagnosis: shortness of breath  Additional Pertinent Hx: admit with above diagnosis, symptomatic aortic stenosis, hx Parkinson's     Past Medical History:   Diagnosis Date    CAD (coronary artery disease)     GERD (gastroesophageal reflux disease)     History of CVA (cerebrovascular accident) - noted per MRI-brain on 2017 which demonstrates old strokes     Hypertension     Myocardial infarct ([address]     Pulmonary embolism ([address]     PVC's (premature ventricular contractions)     Retinal artery thrombosis     Thrombophlebitis     Weakness      Past Surgical History:   Procedure Laterality Date    APPENDECTOMY      CARDIAC CATHETERIZATION  2012    Status post successful PCI of SVG to OM2 branch with stent in the proximal area. Stent in the  mid area and balloon angioplasty of the distal anastomotic site.  CARDIAC CATHETERIZATION      CORONARY ARTERY BYPASS GRAFT      X2 1982    HERNIA REPAIR      TRANSTHORACIC ECHOCARDIOGRAM  01/10/2012    LV was mildly dilated. Systolic function was normal. EF was estimated in  the range of 55-65%. There were no regional wall motion abnormalities. Wall thickness was normal.       Restrictions/Precautions:  Restrictions/Precautions: General Precautions, Fall Risk                      Subjective:     Subjective: RN approved session. Pt resting in bed at arrival agreeable to amb and perform therex. Pt very appropratie, actually pt become very excited wthen he realized he knew some of therapist family.      Pain:   .  Pain Assessment  Pain Level: 0       Social/Functional:  Lives With: Alone  Type of Home: House  Home Layout: One level  Home Access: Stairs to enter without rails  Entrance Stairs - Number of Steps: 1-2  Home Equipment:  (no AD used PTA)     Objective:  Supine to Sit: Stand by assistance  Scooting: Stand by assistance    Transfers  Sit to Stand: Contact guard assistance (from EOB to walker, cueing for hand placement)  Stand to sit: Contact guard assistance (into bedside chair, cues to keep walker close until seated)       Ambulation 1  Surface: level tile  Device: Rolling Walker  Assistance: Contact guard assistance  Quality of Gait: pt with fwd flexed posture, decreased step length and heelstrike, slightly unsteady, no LOB  Distance: [id number]    Exercises:  Exercises  Comments: Pt completed BLE seated ex x10;heel/toe raises, glut set, marches, hip abd/add, and long arch quads all to increase LE strength for improved functional mobility. Activity Tolerance:  Activity Tolerance: Patient Tolerated treatment well    Assessment: Body structures, Functions, Activity limitations: Decreased functional mobility , Decreased balance, Decreased strength  Assessment: Pt tolerated session well at this time. Pt oriented at the time of session. Pt up in bedside chair at end of session. Pt very pleasant and talkative during session. Prognosis: Good  REQUIRES PT FOLLOW UP: Yes  Discharge Recommendations: Continue to assess pending progress, Patient would benefit from continued therapy after discharge    Patient Education:  Patient Education: ambulation, POC    Equipment Recommendations:  Equipment Needed: No    Safety:  Type of devices:  All fall risk precautions in place, Gait belt, Nurse notified, Patient at risk for falls, Call light within reach, Chair alarm in place, Left in chair, Telesitter in use    Plan:  Times per week: 3-5X GM  Times per day: Daily  Specific instructions for Next Treatment: therex and mobility  Current Treatment Recommendations: Strengthening, Transfer Training, Endurance Training, Patient/Caregiver Education & Training, Equipment Evaluation, Education, & procurement, Home Exercise Program, Safety Education & Training, Stair training, Functional Mobility Training, Balance Training, Gait Training    Goals:  Patient goals : return home    Short term goals  Time Frame for Short term goals: 1 week  Short term goal 1: bed mobility with  S to get in/out of bed  Short term goal 2: transfer with SBA to get in/out of chairs  Short term goal 3: amb 100'x1 with/without AD and SBA to walk safely in home    Long term goals  Time Frame for Long term goals : no LTGs set secondary to short ELOS    PT G-Codes  Functional Assessment Tool Used: professional judgement  Functional Limitation: Mobility: Walking and moving around  Mobility: Walking and Moving Around Current Status (): At least 20 percent but less than 40 percent impaired, limited or restricted  Mobility: Walking and Moving Around Goal Status ():  At least 1 percent but less than 20 percent impaired, limited or restricted

## 2017-09-25 NOTE — PROGRESS NOTES
Renal Progress Note    Assessment and Plan: 1. Acute kidney injury from dehydration improving  2. 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 Atonic bladder  4. Right carotid artery stenosis  5. Hypertension  6.   Deconditioning  PLAN:  Labs reviewed  Medications reviewed  Urology following for urinary retention  Physical therapy to evaluate and treat  Labs in the morning  We'll follow    Patient Active Problem List:     Frailty     Coronary artery disease due to lipid rich plaque     Essential hypertension     PVC's (premature ventricular contractions)     History of thrombosis - PE, DVT     History of CVA (cerebrovascular accident)     Hx of CABG     Presence of coronary artery bypass graft stent     Dyslipidemia     Severe aortic stenosis     Moderate mitral regurgitation     History of TIA (transient ischemic attack)     History of myocardial infarction     Nonrheumatic aortic valve stenosis     Anticoagulated on Coumadin     Loculated pleural effusion on left, likely from CHF     Chest pain     Shortness of breath     Atrial fibrillation (HCC)     Acute on chronic diastolic heart failure (HCC)     Elevated LFTs     Pleural effusion     Slurred speech, POA     Low vitamin D level     Congestive heart failure (HCC)     Protein-calorie malnutrition, severe (HCC)     Acute renal failure, not POA     At risk for aspiration     Severe malnutrition (HCC)     KIRSTIE (acute kidney injury) ([facility]     Urinary retention     Neurogenic bladder     Microscopic hematuria     Dehydration     Stenosis of right internal carotid artery, POA      Subjective:   Admit Date: 9/16/2017    Interval History:   Seeing for acute kidney injury  Very awake and very alert  No complaint  Blood pressure is good  Urine output is good      Medications:   Scheduled Meds:   tamsulosin  0.4 mg Oral Nightly    clopidogrel  75 mg Oral Daily    vitamin D  1,000 Units Oral Daily    metoprolol succinate  25 mg Oral Daily    sodium chloride flush  10 mL Intravenous 2 times per day    atorvastatin  40 mg Oral Q48H    pantoprazole  40 mg Oral QAM AC    aspirin  81 mg Oral Daily     Continuous Infusions:   sodium chloride 75 mL/hr at 09/25/17 0346       CBC:   Recent Labs      09/23/17   0558  09/24/17   0501  09/25/17   0517   WBC  12.4*  10.7  7.8   HGB  14.9  14.2  14.0   PLT  157  143  116*     CMP:  Recent Labs      09/23/17   0558  09/24/17   0501  09/25/17   0517   NA  144  145  143   K  3.8  4.7  4.5   CL  101  104  105   CO2  26  27  30   BUN  45*  42*  34*   CREATININE  1.5*  1.2  0.9   GLUCOSE  133*  118*  104   CALCIUM  9.3  8.7  8.5   LABGLOM  44*  57*  80*     Troponin: No results for input(s): TROPONINI in the last 72 hours. BNP: No results for input(s): BNP in the last 72 hours. INR:   Recent Labs      09/23/17   0558  09/24/17   0501  09/25/17   0517   INR  2.64*  3.80*  3.56*     Lipids: Recent Labs      09/24/17   0501   LIPASE  18.5     Liver: Recent Labs      09/25/17   0517   LABALBU  3.3*     Iron:  No results for input(s): IRONS, FERRITIN in the last 72 hours. Invalid input(s): LABIRONS    Objective:   Vitals: BP (!) 143/77  Pulse 78  Temp 97.6 °F (36.4 °C) (Oral)   Resp 18  Ht 5' 5.5\" (1.664 m)  Wt 146 lb 14.4 oz (66.6 kg)  SpO2 97%  BMI 24.07 kg/m2   Wt Readings from Last 3 Encounters:   09/25/17 146 lb 14.4 oz (66.6 kg)   08/28/17 156 lb 12.8 oz (71.1 kg)   08/14/17 155 lb 6.4 oz (70.5 kg)      24HR INTAKE/OUTPUT:    Intake/Output Summary (Last 24 hours) at 09/25/17 1102  Last data filed at 09/25/17 0345   Gross per 24 hour   Intake          2842.34 ml   Output             1000 ml   Net          1842.34 ml       Constitutional:  Alert, awake, no apparent distress   Skin:normal   HEENT:Pupils are reactive . Throat is clear   Neck:supple with no thyromegally  Cardiovascular:  S1, S2 without m/r/g   Respiratory:  CTA B without w/r/r   Abdomen: +bs, soft, nt  Ext: No LE edema  Musculoskeletal:Intact  Neuro:Alert and oriented with no deficit      Electronically signed by Ami Childs MD on 9/25/2017 at 11:02 AM

## 2017-09-25 NOTE — PROGRESS NOTES
Hospitalist Progress Note    Patient:  Monalisa Elizabeth      Unit/Bed:6K-02/002-A    YOB: 1930    MRN: [id number]       Acct: [de-identified]     PCP: Summer Durand MD    Date of Admission: 9/16/2017    Chief Complaint:   Chief Complaint   Patient presents with    Shortness of Breath         Subjective:   Denies acute complaints at this time. AMS resolved per nursing. Planned for stent tomorrow. Medications:  Reviewed    Infusion Medications    Scheduled Medications    tamsulosin  0.4 mg Oral Nightly    clopidogrel  75 mg Oral Daily    vitamin D  1,000 Units Oral Daily    metoprolol succinate  25 mg Oral Daily    sodium chloride flush  10 mL Intravenous 2 times per day    atorvastatin  40 mg Oral Q48H    pantoprazole  40 mg Oral QAM AC    aspirin  81 mg Oral Daily     PRN Meds: sodium chloride flush, docusate sodium, polyethylene glycol, diphenhydrAMINE, bismuth subsalicylate, acetaminophen, magnesium hydroxide, ondansetron, nitroGLYCERIN, ipratropium-albuterol      Intake/Output Summary (Last 24 hours) at 09/25/17 1406  Last data filed at 09/25/17 0345   Gross per 24 hour   Intake          1805.34 ml   Output              650 ml   Net          1155.34 ml       Diet:  Dietary Nutrition Supplements: Frozen Oral Supplement  DIET DENTAL SOFT; Daily Fluid Restriction: 2000 ml    Exam:  BP (!) 143/77  Pulse 78  Temp 97.6 °F (36.4 °C) (Oral)   Resp 18  Ht 5' 5.5\" (1.664 m)  Wt 146 lb 14.4 oz (66.6 kg)  SpO2 97%  BMI 24.07 kg/m2    Gen/overall appearance: Not in acute distress. Alert. Head: Normocephalic, atraumatic  Eyes: EOMI, no scleral icterus  CVS: irregular, Normal S1S2  Pulm: Coarse breath sounds, no wheezing  Gastrointestinal: Soft, nontender, nondistended, no guarding or rebound  Extremities: No edema.  No erythema or warmth  Neuro: No gross focal deficits noted  Skin: Warm, dry    Labs:   Recent Labs      09/23/17   0558  09/24/17   0501  09/25/17   0517   WBC  12.4*  10.7 7. 8   HGB  14.9  14.2  14.0   HCT  44.4  42.7  41.6*   PLT  157  143  116*     Recent Labs      09/23/17   0558  09/24/17   0501  09/25/17   0517   NA  144  145  143   K  3.8  4.7  4.5   CL  101  104  105   CO2  26  27  30   BUN  45*  42*  34*   CREATININE  1.5*  1.2  0.9   CALCIUM  9.3  8.7  8.5   PHOS  5.6*  4.6  1.8*     No results for input(s): AST, ALT, BILIDIR, BILITOT, ALKPHOS in the last 72 hours. Recent Labs      09/23/17   0558  09/24/17   0501  09/25/17   0517   INR  2.64*  3.80*  3.56*     Recent Labs      09/24/17   0501   CKTOTAL  42*       Urinalysis:    Lab Results   Component Value Date    NITRU NEGATIVE 09/24/2017    WBCUA NONE SEEN 09/24/2017    BACTERIA NONE 09/24/2017    RBCUA 15-25 09/24/2017    BLOODU LARGE 09/24/2017    SPECGRAV 1.020 09/24/2017    GLUCOSEU NEGATIVE 09/23/2017       Radiology:  Cta Head W Wo Contrast    Result Date: 9/24/2017  PROCEDURE: CTA HEAD W WO CONTRAST, CTA NECK W WO CONTRAST CLINICAL INFORMATION: CAROTID STENOSIS, . Slurred speech, weakness and confusion. COMPARISON: Brain MRI 9/18/2017. TECHNIQUE: 1 mm axial images were obtained through the head and neck after the fast bolus administration of contrast. A noncontrast localizer was obtained. 3-D reconstructions were performed on a dedicated 3-D workstation. These include multiplanar MPR images and multiplanar MIP images. Centerline reconstructions were obtained of the carotid systems. Isovue intravenous contrast was given. All CT scans at this facility use dose modulation, iterative reconstruction, and/or weight-based dosing when appropriate to reduce radiation dose to as low as reasonably achievable. FINDINGS: CTA NECK: Aortic arch and branches: There is calcified atherosclerosis of the aortic arch. There is atherosclerosis of the origins of the innominate artery, left common carotid artery and left subclavian artery. There is no associated stenosis.  There is multifocal  atherosclerosis of both subclavian arteries technology. ** Final report electronically signed by Dr. Glee Canavan on 9/19/2017 1:44 PM    Xr Chest Standard Two Vw    Result Date: 9/18/2017  PROCEDURE: XR CHEST STANDARD TWO VW CLINICAL INFORMATION: Pleural effusion. Follow-up. COMPARISON: 9/16/2017 TECHNIQUE: PA and lateral views of the chest were obtained. 1. Mild cardiomegaly. Metallic sternotomy sutures. Relatively large hiatus hernia. 2.. Small bilateral pleural effusions. Mild pleural calcifications left side. Overall appearance of chest has improved since prior. **This report has been created using voice recognition software. It may contain minor errors which are inherent in voice recognition technology. ** Final report electronically signed by Dr. Glee Canavan on 9/18/2017 3:47 PM    Xr Chest Standard Two Vw    Result Date: 9/16/2017  PROCEDURE: XR CHEST STANDARD TWO VW CLINICAL INFORMATION: Shortness of Breath, COMPARISON: Chest radiograph, 17 January 2012. TECHNIQUE: Erect AP and lateral views the chest provided, timed at 0910 hours. FINDINGS: Lung volumes are slightly diminished on both sides, with interval development of bibasilar alveolar abnormalities, partially confluent in the left lower lobe. Blunted bilateral costophrenic angles, left more than right, also present. There is a crescent-shaped abnormal density along the lateral superior left hemithorax in the pleural space extending craniocaudally 6 cm, transversely 2.1 cm. This has developed since the prior exam and could represent loculated pleural effusion or pleural based mass. Poorly defined osseous framework in the region, with some characteristics of poorly defined incompletely fused fractures. This is difficult to confirm. No pneumothorax. Stable appearance of the cardiac silhouette. No evidence of pulmonary vascular congestion. Age-indeterminate compression deformity of the T12 vertebral body incidentally noted. Osseous framework is grossly stable otherwise.      INTERVAL WORSENING OF BILATERAL LUNG PARENCHYMA TO INDICATE BIBASILAR ATELECTASIS OR BIBASILAR PNEUMONIA OR COMBINATION. SMALL BILATERAL PLEURAL EFFUSIONS ARE ALSO PRESENT. INTERVAL DEVELOPMENT OF A PLEURAL-BASED CRESCENT-SHAPED LESION ALONG THE LATERAL LEFT HEMITHORAX, 6.0 X 2.1 CM, POSSIBLY REPRESENTING LOCULATED PLEURAL EFFUSION THOUGH POORLY DEFINED. SOME CHARACTERISTICS OF REMOTE FRACTURES OF THE ADJACENT RIBS, DIFFICULT TO CONFIRM. AGE-INDETERMINATE COMPRESSION DEFORMITY OF THE T12 VERTEBRAL BODY. **This report has been created using voice recognition software. It may contain minor errors which are inherent in voice recognition technology. ** Final report electronically signed by Dr. Otelia Boast on 9/16/2017 9:35 AM    Ct Head Wo Contrast    Result Date: 9/17/2017  PROCEDURE: CT HEAD WO CONTRAST CLINICAL INFORMATION: Speech tremor TECHNIQUE: CT scan of the head was performed from the vertex to the skull base without use of intravenous contrast. All CT scans at this facility use dose modulation, iterative reconstruction, and/or weight-based dosing when appropriate to reduce radiation dose to as low as reasonably achievable. COMPARISON: CT head 8/12/2016 FINDINGS: There is no mass effect, midline shift or acute hemorrhage. Ventricles and sulci are prominent. Diffuse periventricular white matter hypoattenuation is present. Cerebral vascular calcifications are noted. There is a small mucous retention cyst or polyp in the right maxillary sinus. Visualized orbits and mastoid air cells are unremarkable. 1. No mass effect or acute hemorrhage. 2. Chronic periventricular small vessel ischemic changes and cerebral atrophy. **This report has been created using voice recognition software. It may contain minor errors which are inherent in voice recognition technology. ** Final report electronically signed by Dr. Zay Renee on 9/17/2017 6:51 PM    Cta Neck W Wo Contrast    Result Date: 9/24/2017  PROCEDURE: CTA HEAD W WO CONTRAST, CTA intracranial cerebral circulation most likely related to intracranial atherosclerosis. **This report has been created using voice recognition software. It may contain minor errors which are inherent in voice recognition technology. ** Final report electronically signed by Dr. Vivian Tanner on 9/24/2017 11:18 AM    Ct Chest Wo Contrast    Result Date: 9/16/2017  PROCEDURE: CT CHEST WO CONTRAST CLINICAL INFORMATION: Possible pleural based mass, per imaging COMPARISON: CT chest, 4 December 2008; chest radiographs, 16 September 2017. TECHNIQUE: With the patient in supine position, axial images were obtained extending from the base of the neck to the superior abdomen. No IV contrast administered. Images were reconstructed in the axial, coronal, and sagittal planes at 5 mm thickness. All CT scans at this facility use dose modulation, iterative reconstruction, and/or weight-based dosing when appropriate to reduce the radiation dose to as low as reasonably achievable FINDINGS: This study is slightly degraded by motion. Previously demonstrated pleural effusions are larger than suggested, the right pleural effusion layering over the posterior right pneumothorax, maximal thickness of 4.1 cm. On the left, there is minimal pleural effusion with poorly defined areas of possible fluid loculation involving the posterior left major fissure. There is also mildly prominent thickening of the subpleural regions of the posterior lateral left upper lobe which also could represent pleural fluid, difficult to confirm. The attenuation of these effusions is homogeneous, consistent with simple effusion. There are poorly defined areas of compression atelectasis indicated in the areas of abnormal alveolar density in the right lower lobe less so involving the left lower lobe. There is no evidence of focal consolidation within the lung parenchyma to specifically indicate definite alveolar pneumonia. Interstitium is nonspecific.  The pulmonary vessels are prominent. There are areas of pleural calcifications involving the posterior lateral and inferior left hemithorax. There are no pathologically enlarged hilar or mediastinal lymph nodes, to the limits of noncontrast imaging. There are diffuse calcific atherosclerotic characteristics through the thoracic aorta. There is mild cardiomegaly. Likely coronary arterial calcifications also though postsurgical changes cannot be excluded. Tracheobronchial structures show nothing concerning grossly. There is a large hiatal hernia containing the majority of the cardiac portion of the stomach. Images of the superior abdomen have no finding to indicate acute abnormality. There is diffuse osteopenia. There are age-indeterminate compression deformities of the T12 and L2 vertebral bodies. Nothing to indicate acute compromise of the spinal canal. Surgical changes evident in the sternum. An area of concern for possible remote  fracture of the left ribs on the prior radiograph, there are areas of remote diffuse fractures involving the posterior lateral left third rib and the mid axillary left fourth rib. No convincing evidence of fracture of the right ribs. BILATERAL PLEURAL EFFUSIONS INCLUDING CHARACTERISTICS OF PARTIALLY LOCULATED PLEURAL FLUID ON THE LEFT WITHIN THE POSTERIOR MAJOR FISSURE, CORRESPONDING TO THE AREA OF CONCERN ON THE PRIOR CHEST RADIOGRAPH. NONCONTRAST IMAGING LIMITS EVALUATION OF THE PLEURAL SURFACES SUCH THAT ANALYSIS OF THE PLEURAL EFFUSIONS IS SLIGHTLY LIMITED. COMPRESSION ATELECTASIS EVIDENT IN BOTH LUNGS AND THERE ARE AREAS OF POSSIBLE FIBROSIS IN THE LEFT LOWER LOBE. THERE ARE CALCIFIC PLEURAL PLAQUE IN THE INFERIOR LATERAL LEFT HEMITHORAX TO INDICATE SEQUELAE OF REMOTE PLEURAL INFLAMMATORY PROCESS. CHARACTERISTICS OF LIKELY CONGESTIVE HEART FAILURE WITHOUT DEFINITE PULMONARY EDEMA. **This report has been created using voice recognition software.  It may contain minor errors which are inherent the renal parenchyma bilaterally. The resistive indices are elevated. There is no hydronephrosis. There is fullness of the left renal pelvis. This does not extend into the calyces. There are no renal calculi. No mass lesions are noted. The bladder is markedly distended. Both ureteral jets are documented. There is layering debris in the urinary bladder. There is a prevoid bladder volume of 1325 mL. The patient states he is unable to void. 1. No evidence of hydronephrosis. 2. Mild fullness of the left renal pelvis. 3. Markedly distended urinary bladder with a bladder volume of 1325 mL. The patient states he is unable to void. **This report has been created using voice recognition software. It may contain minor errors which are inherent in voice recognition technology. ** Final report electronically signed by Dr. Brenna Morley on 9/24/2017 3:50 PM    Xr Chest Portable    Result Date: 9/25/2017  PROCEDURE: XR CHEST PORTABLE CLINICAL INFORMATION: Chest congestion. Dyspnea. COMPARISON: 9/23/2017 TECHNIQUE: A single mobile view of the chest was obtained. 1. Moderate cardiac megaly. Metallic sternotomy sutures. Multiple vascular clips project over the mediastinum from prior surgery. 2. Moderate size hiatus hernia. Small lateral pleural effusions. Mild left basilar atelectasis/pneumonia. **This report has been created using voice recognition software. It may contain minor errors which are inherent in voice recognition technology. ** Final report electronically signed by Dr. Gracia Kern on 9/25/2017 1:35 PM    Xr Chest Portable    Result Date: 9/23/2017  PROCEDURE: XR CHEST PORTABLE CLINICAL INFORMATION: Rule out occult infiltrate, . Shortness of breath. COMPARISON: Chest x-ray 9/19/2017. TECHNIQUE: AP upright view of the chest. FINDINGS: The patient's had a prior median sternotomy and CABG. He is rotated. There is cardiomegaly. There is a large hiatal hernia. The mediastinum is not widened.   There are no definite pulmonary infiltrates. There are no effusions. The pulmonary vascularity is normal. No suspicious osseous lesions are present. The hepatic flexure of the colon is distended with gas. 1. Hiatal hernia. 2. Low lung volumes. 3. No pulmonary infiltrates. **This report has been created using voice recognition software. It may contain minor errors which are inherent in voice recognition technology. ** Final report electronically signed by Dr. Asher Chawla on 9/23/2017 11:35 AM    Mri Brain Wo Contrast    Result Date: 9/18/2017  PROCEDURE: MRI BRAIN WO CONTRAST CLINICAL INFORMATION Speech difficulties. R/o CVA. COMPARISON: Head CT 9/17/2017. TECHNIQUE: Multiplanar and multiple spin echo MRI images were obtained of the brain without contrast. FINDINGS: The diffusion-weighted images are normal.  The brain volume is reduced. There is a mild-moderate amount of signal hyperintensity on the FLAIR and T2-weighted sequences in the white matter of the brain. This is consistent with chronic small vessel ischemic changes. There is a small old infarct in the tony. There are no intra-or extra-axial collections. There is no hydrocephalus, midline shift or mass effect. There is evidence of old hemorrhage in the right temporal lobe. There is some hemosiderin staining. There are small old microhemorrhages in the cerebellar vermis, the left occipital lobe and the right cerebellum. The major intracranial vascular flow voids are present. The midline craniocervical junction structures are normal.  The pituitary gland and brainstem are normal.      1. No evidence of an acute infarct. 2. Mild-moderate severity chronic small vessel ischemic changes. 3. Old hemorrhage in the right temporal lobe. There are also a few old microhemorrhages. **This report has been created using voice recognition software. It may contain minor errors which are inherent in voice recognition technology. ** Final report electronically signed by Dr. Rony camarillo Daquan Amin on 9/18/2017 1:37 PM    Fl Modified Barium Swallow W Video    Result Date: 9/22/2017  PROCEDURE: FL MODIFIED BARIUM SWALLOW W VIDEO CLINICAL INFORMATION: Dysphasia TECHNIQUE: Fluoroscopy was provided for modified barium swallowing study performed by speech therapy. With the patient in the lateral projection, swallowing mechanism was evaluated using barium of various consistencies. The radiologist was present for the entire examination. One spot image was obtained. Total fluoroscopy time 3 minutes 29 seconds. Speech therapist: Antolin Perez COMPARISON: None. FINDINGS: Oral, pharyngeal and esophageal structures appear normal. There is laryngeal penetration and aspiration of thin barium. 1. Laryngeal penetration and aspiration of thin barium. 2. Additional recommendations from the speech therapist will follow. **This report has been created using voice recognition software. It may contain minor errors which are inherent in voice recognition technology. ** Final report electronically signed by Dr. Kelley Brunner on 9/22/2017 10:58 AM      Diet: Dietary Nutrition Supplements: Frozen Oral Supplement  DIET DENTAL SOFT; Daily Fluid Restriction: 2000 ml     Disposition:    [x] Home       [] TCU       [] Rehab       [] Psych       [x] SNF       [] [place]       [] Other-    Code Status: Full Code    Assessment/Plan:    Active Hospital Problems    Diagnosis Date Noted    Stenosis of right internal carotid artery, POA [I65.21] 09/24/2017    Urinary retention [R33.9]     Neurogenic bladder [N31.9]     Microscopic hematuria [R31.29]     Dehydration [E86.0]     KIRSTIE (acute kidney injury) ([address] [N17.9]     Acute renal failure, not POA [N17.9] 09/22/2017    At risk for aspiration [Z91.89] 09/22/2017    Severe malnutrition ([address] [E43] 09/22/2017     Class: Acute    Congestive heart failure ([address] [I50.9]     Protein-calorie malnutrition, severe (HCC) [E43]     Low vitamin D level [E55.9] 09/19/2017  Slurred speech, POA [R47.81] 09/18/2017    Elevated LFTs [R94.5] 09/17/2017    Pleural effusion [J90]     Loculated pleural effusion on left, likely from CHF [J90] 09/16/2017    Chest pain [R07.9] 09/16/2017    Shortness of breath [R06.02] 09/16/2017    Atrial fibrillation ([address] [I48.91] 09/16/2017    Acute on chronic diastolic heart failure (HCC) [I50.33] 09/16/2017    Hx of CABG [Z95.1] 09/10/2012    Presence of coronary artery bypass graft stent [Z95.5, Z95.1] 09/10/2012    Severe aortic stenosis [I35.0] 09/10/2012    History of thrombosis - PE, DVT [Z86.718] 08/20/2012    History of CVA (cerebrovascular accident) [Z86.73] 08/20/2012    Essential hypertension [I10]     Coronary artery disease due to lipid rich plaque [I25.10, I25.83]        1. Acute systolic CHF exacerbation. Improved with diuresis. Cardio on board. Some coarse breath sounds. D/c IVFs. Check CXR  2. Bilateral pleural effusion, secondary to CHF. Does have noted loculated pleural effusion; evaluated by pulm and we agreed this is likely secondary to CHF. He will have follow up with pulm, repeat imaging as outpatient. 3. New-onset atrial fibrillation. Rate-controlled. Was already on anticoagulation for hx of DVT prior to admission. 4. Supratherapeutic INR. To hold anticoagulation now - also has pending carotid stent on Tuesday with Dr. Matti Mcarthur.   5. Chest pain. Serial troponin negative. ECHO with low EF 30%. Dobutamine stress demonstrates severe aortic stenosis. S/p cardiac cath with obstructive disease not amenable to PCI; to continue medical management. 6. Slurred speech. Has had intermittent episodes; appears much better now. Per daughter, has had intermittent episodes of this in the past. MRI-brain with no acute findings. Neuro recommended stopping sinemet and benztropine. CTA-head/neck with right internal carotid stenosis, posterior circulation stenosis. Stent placement per neuro  7. Acute renal failure, not POA.  Likely secondary to diuretics, recent contrast with cardiac cath. Creatinine improving with fluid. Nephro on board. 8. Abnormal LFTs with slightly elevated alkaline phosphatase 130s, elevated bili 1.3 and normal AST/ALT. So far, unclear etiology. Abdominal US with biliary sludge. Vitamin D low; hence, elevated alk phos. Viral hepatitis profile negative. Consider repeat LFTs as outpatient, in about a month. 9. Hypovitaminosis D. Replacement ordered. 10. History of CAD s/p PCI, CABG  11. Hypertension. Continue current antihypertensives as ordered with hold parameters. 12. 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 13. Leukocytosis. Likely reactive; resolved. Procalcitonin low; CXR, UA with no findings suggestive of infectious process. 14. Intermittent episodes of confusion. Suspect sundowning. This appears to have resolved at this time. Avoid sedating meds. If needed, sitter to bedside. MRI-brain, CXR, UA, ammonia unremarkable.     Electronically signed by John Thomas MD on 9/25/2017 at 2:06 PM

## 2017-09-26 LAB
ALBUMIN SERPL-MCNC: 3.2 G/DL (ref 3.5–5.1)
ANION GAP SERPL CALCULATED.3IONS-SCNC: 13 MEQ/L (ref 8–16)
ANISOCYTOSIS: ABNORMAL
BASOPHILS # BLD: 0.5 %
BASOPHILS ABSOLUTE: 0 THOU/MM3 (ref 0–0.1)
BUN BLDV-MCNC: 27 MG/DL (ref 7–22)
CALCIUM SERPL-MCNC: 8.4 MG/DL (ref 8.5–10.5)
CHLORIDE BLD-SCNC: 101 MEQ/L (ref 98–111)
CO2: 27 MEQ/L (ref 23–33)
CREAT SERPL-MCNC: 0.8 MG/DL (ref 0.4–1.2)
EOSINOPHIL # BLD: 7.4 %
EOSINOPHILS ABSOLUTE: 0.7 THOU/MM3 (ref 0–0.4)
GFR SERPL CREATININE-BSD FRML MDRD: > 90 ML/MIN/1.73M2
GLUCOSE BLD-MCNC: 110 MG/DL (ref 70–108)
HCT VFR BLD CALC: 43.6 % (ref 42–52)
HEMOGLOBIN: 14.9 GM/DL (ref 14–18)
INR BLD: 2.09 (ref 0.85–1.13)
LYMPHOCYTES # BLD: 15.8 %
LYMPHOCYTES ABSOLUTE: 1.5 THOU/MM3 (ref 1–4.8)
MAGNESIUM: 2.3 MG/DL (ref 1.6–2.4)
MCH RBC QN AUTO: 29.8 PG (ref 27–31)
MCHC RBC AUTO-ENTMCNC: 34.1 GM/DL (ref 33–37)
MCV RBC AUTO: 87.2 FL (ref 80–94)
MONOCYTES # BLD: 10.6 %
MONOCYTES ABSOLUTE: 1 THOU/MM3 (ref 0.4–1.3)
NUCLEATED RED BLOOD CELLS: 0 /100 WBC
PDW BLD-RTO: 15.4 % (ref 11.5–14.5)
PHOSPHORUS: 2.3 MG/DL (ref 2.4–4.7)
PLATELET # BLD: 131 THOU/MM3 (ref 130–400)
PMV BLD AUTO: 9.8 MCM (ref 7.4–10.4)
POTASSIUM SERPL-SCNC: 4.5 MEQ/L (ref 3.5–5.2)
RBC # BLD: 5 MILL/MM3 (ref 4.7–6.1)
RBC # BLD: NORMAL 10*6/UL
SEG NEUTROPHILS: 65.7 %
SEGMENTED NEUTROPHILS ABSOLUTE COUNT: 6.1 THOU/MM3 (ref 1.8–7.7)
SODIUM BLD-SCNC: 141 MEQ/L (ref 135–145)
WBC # BLD: 9.3 THOU/MM3 (ref 4.8–10.8)

## 2017-09-26 PROCEDURE — 80069 RENAL FUNCTION PANEL: CPT

## 2017-09-26 PROCEDURE — 83735 ASSAY OF MAGNESIUM: CPT

## 2017-09-26 PROCEDURE — 6370000000 HC RX 637 (ALT 250 FOR IP): Performed by: NURSE PRACTITIONER

## 2017-09-26 PROCEDURE — 85610 PROTHROMBIN TIME: CPT

## 2017-09-26 PROCEDURE — 2580000003 HC RX 258: Performed by: PHYSICIAN ASSISTANT

## 2017-09-26 PROCEDURE — 97110 THERAPEUTIC EXERCISES: CPT

## 2017-09-26 PROCEDURE — 99232 SBSQ HOSP IP/OBS MODERATE 35: CPT | Performed by: NURSE PRACTITIONER

## 2017-09-26 PROCEDURE — 6370000000 HC RX 637 (ALT 250 FOR IP): Performed by: PSYCHIATRY & NEUROLOGY

## 2017-09-26 PROCEDURE — 6370000000 HC RX 637 (ALT 250 FOR IP): Performed by: INTERNAL MEDICINE

## 2017-09-26 PROCEDURE — 99232 SBSQ HOSP IP/OBS MODERATE 35: CPT | Performed by: INTERNAL MEDICINE

## 2017-09-26 PROCEDURE — 1200000003 HC TELEMETRY R&B

## 2017-09-26 PROCEDURE — 85025 COMPLETE CBC W/AUTO DIFF WBC: CPT

## 2017-09-26 PROCEDURE — 97116 GAIT TRAINING THERAPY: CPT

## 2017-09-26 PROCEDURE — 36415 COLL VENOUS BLD VENIPUNCTURE: CPT

## 2017-09-26 RX ADMIN — ATORVASTATIN CALCIUM 40 MG: 40 TABLET, FILM COATED ORAL at 19:49

## 2017-09-26 RX ADMIN — TAMSULOSIN HYDROCHLORIDE 0.4 MG: 0.4 CAPSULE ORAL at 19:49

## 2017-09-26 RX ADMIN — Medication 10 ML: at 19:49

## 2017-09-26 RX ADMIN — VITAMIN D, TAB 1000IU (100/BT) 1000 UNITS: 25 TAB at 09:12

## 2017-09-26 RX ADMIN — CLOPIDOGREL 75 MG: 75 TABLET, FILM COATED ORAL at 09:12

## 2017-09-26 RX ADMIN — Medication 10 ML: at 09:12

## 2017-09-26 RX ADMIN — PANTOPRAZOLE SODIUM 40 MG: 40 TABLET, DELAYED RELEASE ORAL at 05:00

## 2017-09-26 RX ADMIN — METOPROLOL SUCCINATE 25 MG: 25 TABLET, FILM COATED, EXTENDED RELEASE ORAL at 09:12

## 2017-09-26 RX ADMIN — ASPIRIN 81 MG: 81 TABLET, CHEWABLE ORAL at 09:12

## 2017-09-26 ASSESSMENT — PAIN SCALES - GENERAL: PAINLEVEL_OUTOF10: 0

## 2017-09-26 NOTE — PROGRESS NOTES
Spoke with Dr. Nedra Webster. Stated procedure will be moved to tomorrow and patient is able to eat today. Will update Mike Galvan RN.

## 2017-09-26 NOTE — PROGRESS NOTES
Plan on angiogram and stenting tomorrow. Please feel free to call with any questions. Ned Degroot M.D., J.D.   Vascular Neurology and Endovascular Surgical Neuroradiology  Cell Number: [phone]

## 2017-09-26 NOTE — PROGRESS NOTES
Renal Progress Note    Patient - Ilda Aragon   MRN -  [id number]   [place] # - [de-identified]      - 1930    80 y.o. Admit Date: 2017  Hospital Day: 10  Location: [facility]  Date of evaluation -  2017    Subjective:   CC:  shortness of breath   -140/  UOP 2150/24h  Wt stable  Denies sob or cough. Room air     Objective:   VITALS:  /62  Pulse 78  Temp 97.9 °F (36.6 °C) (Oral)   Resp 20  Ht 5' 5.5\" (1.664 m)  Wt 147 lb 14.4 oz (67.1 kg)  SpO2 97%  BMI 24.24 kg/m2   Patient Vitals for the past 24 hrs:   BP Temp Temp src Pulse Resp SpO2 Weight   17 0907 136/62 97.9 °F (36.6 °C) Oral 78 20 97 % -   17 0400 136/85 98.9 °F (37.2 °C) Oral 68 18 96 % 147 lb 14.4 oz (67.1 kg)   17 2130 130/60 97.9 °F (36.6 °C) Oral 72 18 96 % -   17 1527 133/63 97.9 °F (36.6 °C) Oral 67 18 97 % -        Patient Vitals for the past 96 hrs (Last 3 readings):   Weight   17 0400 147 lb 14.4 oz (67.1 kg)   17 0345 146 lb 14.4 oz (66.6 kg)   17 0345 144 lb 11.2 oz (65.6 kg)       Intake/Output Summary (Last 24 hours) at 17 0924  Last data filed at 17 1927   Gross per 24 hour   Intake              120 ml   Output             2150 ml   Net            -2030 ml     EXAM:  CONSTITUTIONAL:  No acute distress. Lying comfortable in bed. HEENT:  Head is normocephalic, Extraocular movement intact, Mucus membranes moist. Neck is supple. Voice is clear. CARDIOVASCULAR:  S1, S2  regular rate and rhythm. RESPIRATORY: Clear to ausculation bilaterally. Equal breath sounds. No wheezes. No shortness of breath noted at rest.  ABDOMEN: soft, non tender  NEUROLOGICAL: Patient is alert and oriented to person, place, and time. Recent and remote memory intact. Pt is attentive. Thought is coherant. SKIN: no rash, No significant bruises  MUSCULOSKELETAL: Movement is coordinated. Moves all extremities   EXTREMITIES: Distal lower extremity temp is warm, No lower extremity edema. stenosis    Loculated pleural effusion on left, likely from CHF    Chest pain    Shortness of breath    Atrial fibrillation (HCC)    Elevated LFTs    Pleural effusion    Slurred speech, POA    Low vitamin D level    Congestive heart failure (HCC)    Protein-calorie malnutrition, severe (HCC)    Acute renal failure, not POA    At risk for aspiration    Severe malnutrition (HCC)    KIRSTIE (acute kidney injury) ([facility]    Urinary retention    Neurogenic bladder    Microscopic hematuria    Dehydration    Stenosis of right internal carotid artery, POA       Discussed with Dr. Олег Hough, CNP 9/26/2017 9:24 AM

## 2017-09-26 NOTE — PLAN OF CARE
Problem: Cardiovascular  Goal: No DVT, peripheral vascular complications  Outcome: Ongoing  Coumadin on hold so procedures. Urine bloody. Problem: Skin Integrity/Risk  Goal: No skin breakdown during hospitalization  Outcome: Ongoing  No new breakdown this shift. Pillow support. Encourage repositioning. Up to chair. Problem: Musculor/Skeletal Functional Status  Goal: Absence of falls  Outcome: Ongoing  No falls this shift. Call light within reach. Bed and chair alarm in use. Goal: Highest potential functional level  Outcome: Ongoing  Moving well and doing well for self. Problem: Nutrition  Goal: Optimal nutrition therapy  Outcome: Ongoing  Eating well. Problem: Falls - Risk of  Goal: Absence of falls  Outcome: Ongoing  No falls this shift. Call light within reach. Bed and chair alarm in use. Problem: DISCHARGE BARRIERS  Goal: Patients continuum of care needs are met  Outcome: Ongoing  Plans for return home. Comments:   Care plan reviewed with patient. Patient verbalize understanding of the plan of care and contribute to goal setting.

## 2017-09-26 NOTE — PROGRESS NOTES
upper)  · Usual Body Wt:  (155-160# per daughters. 7/31/17:156#6. 4oz, 8/28/17:156#12.8oz)  · % Weight Change: 7.4% loss (some likely edema/fluid loss, but true wt. loss as well) ,  1 month  · Ideal Body Wt: 139 lb (63 kg),   · BMI Classification: BMI 18.5 - 24.9 Normal Weight  · Comparative Standards (Estimated Nutrition Needs):  · Estimated Daily Total Kcal: 3885-5627 kcals  · Estimated Daily Protein (g): 53-79 grams pending renal status    Estimated Intake vs Estimated Needs: Intake Improving    Nutrition Risk Level: Moderate    Nutrition Interventions:   Continue current diet, Continue current ONS  Continued Inpatient Monitoring, Education Initiated (Encouraged oral intake. Discussed ONS options and home use after ECF stay as needed.)    Nutrition Evaluation:   · Evaluation: Progressing toward goals   · Goals: Pt. will safely consume 75% or more of meals during LOS. · Monitoring: Meal Intake, Supplement Intake, Diet Tolerance, Ascites/Edema, Mental Status/Confusion, Weight, Pertinent Labs, Chewing/Swallowing    See Adult Nutrition Doc Flowsheet for more detail.      Electronically signed by Noam Avalos RD, LD on 9/26/17 at 12:11 PM    Contact Number: [phone]

## 2017-09-26 NOTE — PROGRESS NOTES
Good  Discharge Recommendations: [address] [facility], Patient would benefit from continued therapy after discharge    Patient Education:  Patient Education: transfer training, HEP    Equipment Recommendations: Other: continue to assess    Safety:  Safety Devices in place: Yes  Type of devices: Nurse notified, Left in chair, Chair alarm in place, Patient at risk for falls    Plan:  Times per week: 3-5x  Current Treatment Recommendations: Strengthening, Balance Training, Functional Mobility Training, Endurance Training, Patient/Caregiver Education & Training, Safety Education & Training, Self-Care / ADL, Neuromuscular Re-education    Goals:  Patient goals : To return to work    Short term goals  Time Frame for Short term goals:  One week  Short term goal 1: Patient to perform dynamic standing balance with CGA x3 minutes for increased safety performing grooming tasks at sink  Short term goal 2: Patient to complete functional mobility with RW and CGA and no VCs for safety to increase independence with ADL routines  Short term goal 3: Pt to demonstrate independence with BUE HEP for increased endurance and functional strength with ADL and IADL tasks  Long term goals  Time Frame for Long term goals : No LTG d/t short estimated LOS

## 2017-09-26 NOTE — PROGRESS NOTES
[address] [address]  SPEECH THERAPY MISSED TREATMENT NOTE  [facility] RENAL TELEMETRY 6K      Date: 2017  Patient Name: Moo Blank        MRN: [id number]    : 1930  (80 y.o.)    REASON FOR MISSED TREATMENT:  Attempted to see patient for dysphagia treatment. Per chart review, MBS completed  recommending dysphagia II with thin liquids. YULIANA Garvin reports since  patient with significantly improved alertness compared to  secondary to improved medical status. Per physician patient with approved upgraded diet to soft with thin liquids. YULIANA Garvin reports good tolerance of dental soft diet with thin liquids, no s/s of aspiration. ST entered patient's room to attempt PO trials to determine safety of dental soft diet; however, patient politely declined all PO consistencies offered d/t just finishing lunch. Will check back  to determine safety of dental soft diet and thin liquids and further determine POC.      MARY Liao 23

## 2017-09-26 NOTE — PROGRESS NOTES
[address] [address]  INPATIENT PHYSICAL THERAPY  DAILYNOTE  [facility] RENAL TELEMETRY 6K    Time In: 0915  Time Out: 09  Timed Code Treatment Minutes: 25 Minutes  Minutes: 25          Date: 2017  Patient Name: Adolph Caal,  Gender:  male        MRN: [id number]  : 1930  (80 y.o.)     Referring Practitioner: Dr. Polina Frances  Diagnosis: shortness of breath  Additional Pertinent Hx: admit with above diagnosis, symptomatic aortic stenosis, hx Parkinson's     Past Medical History:   Diagnosis Date    CAD (coronary artery disease)     GERD (gastroesophageal reflux disease)     History of CVA (cerebrovascular accident) - noted per MRI-brain on 2017 which demonstrates old strokes     Hypertension     Myocardial infarct ([address]     Pulmonary embolism ([address]     PVC's (premature ventricular contractions)     Retinal artery thrombosis     Thrombophlebitis     Weakness      Past Surgical History:   Procedure Laterality Date    APPENDECTOMY      CARDIAC CATHETERIZATION  2012    Status post successful PCI of SVG to OM2 branch with stent in the proximal area. Stent in the  mid area and balloon angioplasty of the distal anastomotic site.  CARDIAC CATHETERIZATION      CORONARY ARTERY BYPASS GRAFT      X2 1982    HERNIA REPAIR      TRANSTHORACIC ECHOCARDIOGRAM  01/10/2012    LV was mildly dilated. Systolic function was normal. EF was estimated in  the range of 55-65%. There were no regional wall motion abnormalities. Wall thickness was normal.       Restrictions/Precautions:  Restrictions/Precautions: General Precautions, Fall Risk                      Subjective:     Subjective: Pt pleasant and cooeprative. Motivated.      Pain:   .      denies    Social/Functional:  Lives With: Alone  Type of Home: House  Home Layout: One level  Home Access: Stairs to enter without rails  Entrance Stairs - Number of Steps: 1-2  Home Equipment:  (no AD used PTA)     Objective:       Transfers  Sit to of chairs  Short term goal 3: amb 100'x1 with/without AD and SBA to walk safely in home    Long term goals  Time Frame for Long term goals : no LTGs set secondary to short ELOS    PT G-Codes  Functional Assessment Tool Used: professional judgement  Functional Limitation: Mobility: Walking and moving around  Mobility: Walking and Moving Around Current Status (): At least 20 percent but less than 40 percent impaired, limited or restricted  Mobility: Walking and Moving Around Goal Status ():  At least 1 percent but less than 20 percent impaired, limited or restricted

## 2017-09-26 NOTE — PROGRESS NOTES
Hospitalist Progress Note    Patient:  Jessica Gage      Unit/Bed:6K-02/002-A    YOB: 1930    MRN: [id number]       Acct: [de-identified]     PCP: Alethea Coles MD    Date of Admission: 9/16/2017    Chief Complaint:   Chief Complaint   Patient presents with    Shortness of Breath         Subjective:   Denies acute complaints at this time. Denies dyspnea or CP  Carotid stent held for tomorrow 2/2 elevated INR. Tolerating PT well    Medications:  Reviewed    Infusion Medications    Scheduled Medications    tamsulosin  0.4 mg Oral Nightly    clopidogrel  75 mg Oral Daily    vitamin D  1,000 Units Oral Daily    metoprolol succinate  25 mg Oral Daily    sodium chloride flush  10 mL Intravenous 2 times per day    atorvastatin  40 mg Oral Q48H    pantoprazole  40 mg Oral QAM AC    aspirin  81 mg Oral Daily     PRN Meds: sodium chloride flush, docusate sodium, polyethylene glycol, diphenhydrAMINE, bismuth subsalicylate, acetaminophen, magnesium hydroxide, ondansetron, nitroGLYCERIN, ipratropium-albuterol      Intake/Output Summary (Last 24 hours) at 09/26/17 1430  Last data filed at 09/26/17 1402   Gross per 24 hour   Intake              480 ml   Output             2450 ml   Net            -1970 ml       Diet:  DIET DENTAL SOFT; Daily Fluid Restriction: 2000 ml  Dietary Nutrition Supplements: Frozen Oral Supplement    Exam:  /65  Pulse 68  Temp 97.5 °F (36.4 °C) (Oral)   Resp 16  Ht 5' 5.5\" (1.664 m)  Wt 147 lb 14.4 oz (67.1 kg)  SpO2 97%  BMI 24.24 kg/m2    Gen/overall appearance: Not in acute distress. Alert. Head: Normocephalic, atraumatic  Eyes: EOMI, no scleral icterus  CVS: irregular, Normal S1S2  Pulm: CTAB, no wheezing  Gastrointestinal: Soft, nontender, nondistended, no guarding or rebound  Extremities: No edema.  No erythema or warmth  Neuro: No gross focal deficits noted  Skin: Warm, dry    Labs:   Recent Labs      09/24/17   0501  09/25/17   0517  09/26/17   0418   WBC cerebral artery has a normal origin. There is multi focal irregularity of both posterior cerebral arteries. Multifocal stenoses are present. No aneurysms or occlusions are noted. The superior sagittal sinus, vein of Serafin, internal cerebral veins, straight sinus, transverse sinuses and sigmoid sinuses are patent. Axial source data: The lung apices are clear. There is no cervical adenopathy. There is a small area of mucosal thickening in the right maxillary sinus. The other paranasal sinuses are normally aerated. There are degenerative changes in the cervical spine. There are no gross abnormalities in the brain. 1. Heavy calcified atherosclerosis of the right carotid bulb with at least an 80 % stenosis at the origin of the right internal carotid artery. 2. No stenosis of the left carotid system. 3. Moderate-severe stenoses of the vertebral arteries as they enter the dural ring. 4. Multiple areas of irregularity in the intracranial cerebral circulation most likely related to intracranial atherosclerosis. **This report has been created using voice recognition software. It may contain minor errors which are inherent in voice recognition technology. ** Final report electronically signed by Dr. Evelin Washington on 9/24/2017 11:18 AM    Xr Chest Standard Two Vw    Result Date: 9/19/2017  PROCEDURE: XR CHEST STANDARD TWO VW CLINICAL INFORMATION: Confusion. Altered mental status. COMPARISON: 9/18/2017 TECHNIQUE: PA and lateral views of the chest were obtained. 1. Normal heart size. Moderate size hiatus hernia. Metallic sternotomy sutures. Kyphotic positioning of the patient. 2. Tiny bilateral pleural effusions versus pleural thickening. Chronic pleural calcification lateral aspect left lung base, likely related to prior trauma or pleuritis although cannot exclude occupational exposure to asbestos. . **This report has been created using voice recognition software.   It may contain minor errors which are inherent in voice HEAD W WO CONTRAST, CTA NECK W WO CONTRAST CLINICAL INFORMATION: CAROTID STENOSIS, . Slurred speech, weakness and confusion. COMPARISON: Brain MRI 9/18/2017. TECHNIQUE: 1 mm axial images were obtained through the head and neck after the fast bolus administration of contrast. A noncontrast localizer was obtained. 3-D reconstructions were performed on a dedicated 3-D workstation. These include multiplanar MPR images and multiplanar MIP images. Centerline reconstructions were obtained of the carotid systems. Isovue intravenous contrast was given. All CT scans at this facility use dose modulation, iterative reconstruction, and/or weight-based dosing when appropriate to reduce radiation dose to as low as reasonably achievable. FINDINGS: CTA NECK: Aortic arch and branches: There is calcified atherosclerosis of the aortic arch. There is atherosclerosis of the origins of the innominate artery, left common carotid artery and left subclavian artery. There is no associated stenosis. There is multifocal  atherosclerosis of both subclavian arteries without stenosis. Right common carotid artery/ICA: There is mild calcified atherosclerosis of the right common carotid artery. There is no stenosis. There is heavy calcified atherosclerosis of the right carotid bulb. This makes measuring the lumen difficult due to blooming artifact. Using NASCET criteria and the distal right internal carotid artery as the reference, there is at least an 80 % stenosis at the origin of the right internal carotid artery. There is no distal stenosis. Left common carotid artery/ICA: There is mild atherosclerosis of the left common carotid artery. There is no stenosis. There is calcified atherosclerosis of the left carotid bulb. There is no significant narrowing of the lumen. This is assessed and multiple planes. There is no significant stenosis of the left internal carotid artery. Vertebral arteries: The vertebral arteries are codominant.  There is no stenosis in the cervical segments. CTA HEAD: Internal carotid arteries: There is heavy calcified atherosclerosis of both cavernous segments. There is no severe stenosis. Middle cerebral arteries: There is no stenosis at the origins. There is no occlusion. There is multifocal irregularity. No severe stenoses are noted. Anterior cerebral arteries: The right A1 segment is aplastic. There is a dominant left A1 segment. There is a normal anterior communicating artery. There is irregularity of the A2 segments consistent with intracranial atherosclerosis. Vertebral arteries: There is severe atherosclerosis of both vertebral arteries as they enter the dural ring. There are multifocal moderate to severe stenoses bilaterally. Basilar artery: There is mild irregularity. Superior cerebellar arteries: Normal. Posterior cerebral arteries: There is a aplasia of the right P1 segment. There is a moderate-sized right posterior communicating artery. There is irregularity of this vessel suggestive of atherosclerosis. . The left posterior cerebral artery has a normal origin. There is multi focal irregularity of both posterior cerebral arteries. Multifocal stenoses are present. No aneurysms or occlusions are noted. The superior sagittal sinus, vein of Serafin, internal cerebral veins, straight sinus, transverse sinuses and sigmoid sinuses are patent. Axial source data: The lung apices are clear. There is no cervical adenopathy. There is a small area of mucosal thickening in the right maxillary sinus. The other paranasal sinuses are normally aerated. There are degenerative changes in the cervical spine. There are no gross abnormalities in the brain. 1. Heavy calcified atherosclerosis of the right carotid bulb with at least an 80 % stenosis at the origin of the right internal carotid artery. 2. No stenosis of the left carotid system. 3. Moderate-severe stenoses of the vertebral arteries as they enter the dural ring.  4. Multiple areas of irregularity in the intracranial cerebral circulation most likely related to intracranial atherosclerosis. **This report has been created using voice recognition software. It may contain minor errors which are inherent in voice recognition technology. ** Final report electronically signed by Dr. Evelin Washington on 9/24/2017 11:18 AM    Ct Chest Wo Contrast    Result Date: 9/16/2017  PROCEDURE: CT CHEST WO CONTRAST CLINICAL INFORMATION: Possible pleural based mass, per imaging COMPARISON: CT chest, 4 December 2008; chest radiographs, 16 September 2017. TECHNIQUE: With the patient in supine position, axial images were obtained extending from the base of the neck to the superior abdomen. No IV contrast administered. Images were reconstructed in the axial, coronal, and sagittal planes at 5 mm thickness. All CT scans at this facility use dose modulation, iterative reconstruction, and/or weight-based dosing when appropriate to reduce the radiation dose to as low as reasonably achievable FINDINGS: This study is slightly degraded by motion. Previously demonstrated pleural effusions are larger than suggested, the right pleural effusion layering over the posterior right pneumothorax, maximal thickness of 4.1 cm. On the left, there is minimal pleural effusion with poorly defined areas of possible fluid loculation involving the posterior left major fissure. There is also mildly prominent thickening of the subpleural regions of the posterior lateral left upper lobe which also could represent pleural fluid, difficult to confirm. The attenuation of these effusions is homogeneous, consistent with simple effusion. There are poorly defined areas of compression atelectasis indicated in the areas of abnormal alveolar density in the right lower lobe less so involving the left lower lobe. There is no evidence of focal consolidation within the lung parenchyma to specifically indicate definite alveolar pneumonia. Interstitium is nonspecific. The pulmonary vessels are prominent. There are areas of pleural calcifications involving the posterior lateral and inferior left hemithorax. There are no pathologically enlarged hilar or mediastinal lymph nodes, to the limits of noncontrast imaging. There are diffuse calcific atherosclerotic characteristics through the thoracic aorta. There is mild cardiomegaly. Likely coronary arterial calcifications also though postsurgical changes cannot be excluded. Tracheobronchial structures show nothing concerning grossly. There is a large hiatal hernia containing the majority of the cardiac portion of the stomach. Images of the superior abdomen have no finding to indicate acute abnormality. There is diffuse osteopenia. There are age-indeterminate compression deformities of the T12 and L2 vertebral bodies. Nothing to indicate acute compromise of the spinal canal. Surgical changes evident in the sternum. An area of concern for possible remote  fracture of the left ribs on the prior radiograph, there are areas of remote diffuse fractures involving the posterior lateral left third rib and the mid axillary left fourth rib. No convincing evidence of fracture of the right ribs. BILATERAL PLEURAL EFFUSIONS INCLUDING CHARACTERISTICS OF PARTIALLY LOCULATED PLEURAL FLUID ON THE LEFT WITHIN THE POSTERIOR MAJOR FISSURE, CORRESPONDING TO THE AREA OF CONCERN ON THE PRIOR CHEST RADIOGRAPH. NONCONTRAST IMAGING LIMITS EVALUATION OF THE PLEURAL SURFACES SUCH THAT ANALYSIS OF THE PLEURAL EFFUSIONS IS SLIGHTLY LIMITED. COMPRESSION ATELECTASIS EVIDENT IN BOTH LUNGS AND THERE ARE AREAS OF POSSIBLE FIBROSIS IN THE LEFT LOWER LOBE. THERE ARE CALCIFIC PLEURAL PLAQUE IN THE INFERIOR LATERAL LEFT HEMITHORAX TO INDICATE SEQUELAE OF REMOTE PLEURAL INFLAMMATORY PROCESS. CHARACTERISTICS OF LIKELY CONGESTIVE HEART FAILURE WITHOUT DEFINITE PULMONARY EDEMA. **This report has been created using voice recognition software.  It may contain minor errors which thinning of the renal parenchyma bilaterally. The resistive indices are elevated. There is no hydronephrosis. There is fullness of the left renal pelvis. This does not extend into the calyces. There are no renal calculi. No mass lesions are noted. The bladder is markedly distended. Both ureteral jets are documented. There is layering debris in the urinary bladder. There is a prevoid bladder volume of 1325 mL. The patient states he is unable to void. 1. No evidence of hydronephrosis. 2. Mild fullness of the left renal pelvis. 3. Markedly distended urinary bladder with a bladder volume of 1325 mL. The patient states he is unable to void. **This report has been created using voice recognition software. It may contain minor errors which are inherent in voice recognition technology. ** Final report electronically signed by Dr. Juventino Gonzales on 9/24/2017 3:50 PM    Xr Chest Portable    Result Date: 9/25/2017  PROCEDURE: XR CHEST PORTABLE CLINICAL INFORMATION: Chest congestion. Dyspnea. COMPARISON: 9/23/2017 TECHNIQUE: A single mobile view of the chest was obtained. 1. Moderate cardiac megaly. Metallic sternotomy sutures. Multiple vascular clips project over the mediastinum from prior surgery. 2. Moderate size hiatus hernia. Small lateral pleural effusions. Mild left basilar atelectasis/pneumonia. **This report has been created using voice recognition software. It may contain minor errors which are inherent in voice recognition technology. ** Final report electronically signed by Dr. Brigido Wood on 9/25/2017 1:35 PM    Xr Chest Portable    Result Date: 9/23/2017  PROCEDURE: XR CHEST PORTABLE CLINICAL INFORMATION: Rule out occult infiltrate, . Shortness of breath. COMPARISON: Chest x-ray 9/19/2017. TECHNIQUE: AP upright view of the chest. FINDINGS: The patient's had a prior median sternotomy and CABG. He is rotated. There is cardiomegaly. There is a large hiatal hernia. The mediastinum is not widened.   There

## 2017-09-27 LAB
ALBUMIN SERPL-MCNC: 3.2 G/DL (ref 3.5–5.1)
ANION GAP SERPL CALCULATED.3IONS-SCNC: 8 MEQ/L (ref 8–16)
ANISOCYTOSIS: ABNORMAL
BASOPHILS # BLD: 0.5 %
BASOPHILS ABSOLUTE: 0.1 THOU/MM3 (ref 0–0.1)
BUN BLDV-MCNC: 24 MG/DL (ref 7–22)
CALCIUM SERPL-MCNC: 8.6 MG/DL (ref 8.5–10.5)
CHLORIDE BLD-SCNC: 103 MEQ/L (ref 98–111)
CO2: 29 MEQ/L (ref 23–33)
CREAT SERPL-MCNC: 0.9 MG/DL (ref 0.4–1.2)
EOSINOPHIL # BLD: 6.6 %
EOSINOPHILS ABSOLUTE: 0.7 THOU/MM3 (ref 0–0.4)
GFR SERPL CREATININE-BSD FRML MDRD: 80 ML/MIN/1.73M2
GLUCOSE BLD-MCNC: 113 MG/DL (ref 70–108)
HCT VFR BLD CALC: 41.4 % (ref 42–52)
HEMOGLOBIN: 13.8 GM/DL (ref 14–18)
INR BLD: 1.49 (ref 0.85–1.13)
LYMPHOCYTES # BLD: 16.7 %
LYMPHOCYTES ABSOLUTE: 1.7 THOU/MM3 (ref 1–4.8)
MAGNESIUM: 2.3 MG/DL (ref 1.6–2.4)
MCH RBC QN AUTO: 29 PG (ref 27–31)
MCHC RBC AUTO-ENTMCNC: 33.3 GM/DL (ref 33–37)
MCV RBC AUTO: 87.1 FL (ref 80–94)
MONOCYTES # BLD: 12.8 %
MONOCYTES ABSOLUTE: 1.3 THOU/MM3 (ref 0.4–1.3)
MRSA SCREEN RT-PCR: NEGATIVE
MYOGLOBIN URINE: < 1 MG/L (ref 0–1)
NUCLEATED RED BLOOD CELLS: 0 /100 WBC
PDW BLD-RTO: 15.6 % (ref 11.5–14.5)
PHOSPHORUS: 2.8 MG/DL (ref 2.4–4.7)
PLATELET # BLD: 149 THOU/MM3 (ref 130–400)
PMV BLD AUTO: 9.3 MCM (ref 7.4–10.4)
POC ACTIVATED CLOTTING TIME KAOLIN: 136 SECONDS (ref 1–150)
POC ACTIVATED CLOTTING TIME KAOLIN: 153 SECONDS (ref 1–150)
POTASSIUM SERPL-SCNC: 4.9 MEQ/L (ref 3.5–5.2)
RBC # BLD: 4.75 MILL/MM3 (ref 4.7–6.1)
RBC # BLD: NORMAL 10*6/UL
SEG NEUTROPHILS: 63.4 %
SEGMENTED NEUTROPHILS ABSOLUTE COUNT: 6.3 THOU/MM3 (ref 1.8–7.7)
SODIUM BLD-SCNC: 140 MEQ/L (ref 135–145)
WBC # BLD: 10 THOU/MM3 (ref 4.8–10.8)

## 2017-09-27 PROCEDURE — 97116 GAIT TRAINING THERAPY: CPT

## 2017-09-27 PROCEDURE — 85610 PROTHROMBIN TIME: CPT

## 2017-09-27 PROCEDURE — 99232 SBSQ HOSP IP/OBS MODERATE 35: CPT | Performed by: INTERNAL MEDICINE

## 2017-09-27 PROCEDURE — 2720000010 HC SURG SUPPLY STERILE

## 2017-09-27 PROCEDURE — 2780000010 HC IMPLANT OTHER

## 2017-09-27 PROCEDURE — 6360000002 HC RX W HCPCS

## 2017-09-27 PROCEDURE — 2580000003 HC RX 258: Performed by: PSYCHIATRY & NEUROLOGY

## 2017-09-27 PROCEDURE — A6258 TRANSPARENT FILM >16<=48 IN: HCPCS

## 2017-09-27 PROCEDURE — 37218 STENT PLACEMT ANTE CAROTID: CPT

## 2017-09-27 PROCEDURE — A6212 FOAM DRG <=16 SQ IN W/BORDER: HCPCS

## 2017-09-27 PROCEDURE — 36415 COLL VENOUS BLD VENIPUNCTURE: CPT

## 2017-09-27 PROCEDURE — C1725 CATH, TRANSLUMIN NON-LASER: HCPCS

## 2017-09-27 PROCEDURE — 85347 COAGULATION TIME ACTIVATED: CPT

## 2017-09-27 PROCEDURE — 2000000000 HC ICU R&B

## 2017-09-27 PROCEDURE — 99231 SBSQ HOSP IP/OBS SF/LOW 25: CPT | Performed by: NURSE PRACTITIONER

## 2017-09-27 PROCEDURE — C1876 STENT, NON-COA/NON-COV W/DEL: HCPCS

## 2017-09-27 PROCEDURE — 85025 COMPLETE CBC W/AUTO DIFF WBC: CPT

## 2017-09-27 PROCEDURE — 2580000003 HC RX 258: Performed by: PHYSICIAN ASSISTANT

## 2017-09-27 PROCEDURE — 2500000003 HC RX 250 WO HCPCS

## 2017-09-27 PROCEDURE — 6370000000 HC RX 637 (ALT 250 FOR IP): Performed by: PSYCHIATRY & NEUROLOGY

## 2017-09-27 PROCEDURE — 87081 CULTURE SCREEN ONLY: CPT

## 2017-09-27 PROCEDURE — C1769 GUIDE WIRE: HCPCS

## 2017-09-27 PROCEDURE — 36223 PLACE CATH CAROTID/INOM ART: CPT

## 2017-09-27 PROCEDURE — 83735 ASSAY OF MAGNESIUM: CPT

## 2017-09-27 PROCEDURE — 87641 MR-STAPH DNA AMP PROBE: CPT

## 2017-09-27 PROCEDURE — 037K3DZ DILATION OF RIGHT INTERNAL CAROTID ARTERY WITH INTRALUMINAL DEVICE, PERCUTANEOUS APPROACH: ICD-10-PCS | Performed by: PSYCHIATRY & NEUROLOGY

## 2017-09-27 PROCEDURE — 80069 RENAL FUNCTION PANEL: CPT

## 2017-09-27 PROCEDURE — 6370000000 HC RX 637 (ALT 250 FOR IP): Performed by: INTERNAL MEDICINE

## 2017-09-27 PROCEDURE — C1894 INTRO/SHEATH, NON-LASER: HCPCS

## 2017-09-27 RX ORDER — HYDROCODONE BITARTRATE AND ACETAMINOPHEN 5; 325 MG/1; MG/1
1 TABLET ORAL EVERY 4 HOURS PRN
Status: DISCONTINUED | OUTPATIENT
Start: 2017-09-27 | End: 2017-09-28 | Stop reason: HOSPADM

## 2017-09-27 RX ORDER — SODIUM CHLORIDE 9 MG/ML
INJECTION, SOLUTION INTRAVENOUS CONTINUOUS
Status: DISCONTINUED | OUTPATIENT
Start: 2017-09-27 | End: 2017-09-28 | Stop reason: HOSPADM

## 2017-09-27 RX ORDER — ATROPINE SULFATE 0.1 MG/ML
INJECTION INTRAVENOUS
Status: DISPENSED
Start: 2017-09-27 | End: 2017-09-28

## 2017-09-27 RX ORDER — ONDANSETRON 2 MG/ML
4 INJECTION INTRAMUSCULAR; INTRAVENOUS EVERY 8 HOURS PRN
Status: DISCONTINUED | OUTPATIENT
Start: 2017-09-27 | End: 2017-09-28 | Stop reason: HOSPADM

## 2017-09-27 RX ORDER — ACETAMINOPHEN 325 MG/1
650 TABLET ORAL EVERY 4 HOURS PRN
Status: DISCONTINUED | OUTPATIENT
Start: 2017-09-27 | End: 2017-09-28 | Stop reason: HOSPADM

## 2017-09-27 RX ADMIN — Medication 10 ML: at 08:12

## 2017-09-27 RX ADMIN — ASPIRIN 81 MG: 81 TABLET, CHEWABLE ORAL at 08:13

## 2017-09-27 RX ADMIN — Medication 10 ML: at 21:00

## 2017-09-27 RX ADMIN — SODIUM CHLORIDE: 9 INJECTION, SOLUTION INTRAVENOUS at 19:00

## 2017-09-27 RX ADMIN — CLOPIDOGREL 75 MG: 75 TABLET, FILM COATED ORAL at 08:13

## 2017-09-27 RX ADMIN — VITAMIN D, TAB 1000IU (100/BT) 1000 UNITS: 25 TAB at 08:13

## 2017-09-27 RX ADMIN — METOPROLOL SUCCINATE 25 MG: 25 TABLET, FILM COATED, EXTENDED RELEASE ORAL at 08:13

## 2017-09-27 ASSESSMENT — PAIN SCALES - GENERAL
PAINLEVEL_OUTOF10: 0
PAINLEVEL_OUTOF10: 0

## 2017-09-27 NOTE — PROGRESS NOTES
[facility]  INPATIENT PHYSICAL THERAPY  DAILYNOTE  [facility] RENAL TELEMETRY 6K    Time In: 1310  Time Out: 1325  Timed Code Treatment Minutes: 15 Minutes  Minutes: 15          Date: 2017  Patient Name: Vj Head,  Gender:  male        MRN: [id number]  : 1930  (80 y.o.)     Referring Practitioner: Dr. Yao Ulrich  Diagnosis: shortness of breath  Additional Pertinent Hx: admit with above diagnosis, symptomatic aortic stenosis, hx Parkinson's     Past Medical History:   Diagnosis Date    CAD (coronary artery disease)     GERD (gastroesophageal reflux disease)     History of CVA (cerebrovascular accident) - noted per MRI-brain on 2017 which demonstrates old strokes     Hypertension     Myocardial infarct ([address]     Pulmonary embolism ([address]     PVC's (premature ventricular contractions)     Retinal artery thrombosis     Thrombophlebitis     Weakness      Past Surgical History:   Procedure Laterality Date    APPENDECTOMY      CARDIAC CATHETERIZATION  2012    Status post successful PCI of SVG to OM2 branch with stent in the proximal area. Stent in the  mid area and balloon angioplasty of the distal anastomotic site.  CARDIAC CATHETERIZATION      CORONARY ARTERY BYPASS GRAFT      X2 1982    HERNIA REPAIR      TRANSTHORACIC ECHOCARDIOGRAM  01/10/2012    LV was mildly dilated. Systolic function was normal. EF was estimated in  the range of 55-65%. There were no regional wall motion abnormalities. Wall thickness was normal.       Restrictions/Precautions:  Restrictions/Precautions: General Precautions, Fall Risk                      Subjective:     Subjective: Pt very pleasant and coeprative. Youngest daughter present at end of session and she confirms pt is now planning to go home with older daughter for a few days and then go home at discharge.      Pain:   .      denies    Social/Functional:  Lives With: Alone  Type of Home: House  Home Layout: One level  Home

## 2017-09-27 NOTE — PROGRESS NOTES
atherosclerosis of both subclavian arteries without stenosis. Right common carotid artery/ICA: There is mild calcified atherosclerosis of the right common carotid artery. There is no stenosis. There is heavy calcified atherosclerosis of the right carotid bulb. This makes measuring the lumen difficult due to blooming artifact. Using NASCET criteria and the distal right internal carotid artery as the reference, there is at least an 80 % stenosis at the origin of the right internal carotid artery. There is no distal stenosis. Left common carotid artery/ICA: There is mild atherosclerosis of the left common carotid artery. There is no stenosis. There is calcified atherosclerosis of the left carotid bulb. There is no significant narrowing of the lumen. This is assessed and multiple planes. There is no significant stenosis of the left internal carotid artery. Vertebral arteries: The vertebral arteries are codominant. There is no stenosis in the cervical segments. CTA HEAD: Internal carotid arteries: There is heavy calcified atherosclerosis of both cavernous segments. There is no severe stenosis. Middle cerebral arteries: There is no stenosis at the origins. There is no occlusion. There is multifocal irregularity. No severe stenoses are noted. Anterior cerebral arteries: The right A1 segment is aplastic. There is a dominant left A1 segment. There is a normal anterior communicating artery. There is irregularity of the A2 segments consistent with intracranial atherosclerosis. Vertebral arteries: There is severe atherosclerosis of both vertebral arteries as they enter the dural ring. There are multifocal moderate to severe stenoses bilaterally. Basilar artery: There is mild irregularity. Superior cerebellar arteries: Normal. Posterior cerebral arteries: There is a aplasia of the right P1 segment. There is a moderate-sized right posterior communicating artery. There is irregularity of this vessel suggestive of atherosclerosis. William José otherwise. INTERVAL WORSENING OF BILATERAL LUNG PARENCHYMA TO INDICATE BIBASILAR ATELECTASIS OR BIBASILAR PNEUMONIA OR COMBINATION. SMALL BILATERAL PLEURAL EFFUSIONS ARE ALSO PRESENT. INTERVAL DEVELOPMENT OF A PLEURAL-BASED CRESCENT-SHAPED LESION ALONG THE LATERAL LEFT HEMITHORAX, 6.0 X 2.1 CM, POSSIBLY REPRESENTING LOCULATED PLEURAL EFFUSION THOUGH POORLY DEFINED. SOME CHARACTERISTICS OF REMOTE FRACTURES OF THE ADJACENT RIBS, DIFFICULT TO CONFIRM. AGE-INDETERMINATE COMPRESSION DEFORMITY OF THE T12 VERTEBRAL BODY. **This report has been created using voice recognition software. It may contain minor errors which are inherent in voice recognition technology. ** Final report electronically signed by Dr. Eric Esparza on 9/16/2017 9:35 AM    Ct Head Wo Contrast    Result Date: 9/17/2017  PROCEDURE: CT HEAD WO CONTRAST CLINICAL INFORMATION: Speech tremor TECHNIQUE: CT scan of the head was performed from the vertex to the skull base without use of intravenous contrast. All CT scans at this facility use dose modulation, iterative reconstruction, and/or weight-based dosing when appropriate to reduce radiation dose to as low as reasonably achievable. COMPARISON: CT head 8/12/2016 FINDINGS: There is no mass effect, midline shift or acute hemorrhage. Ventricles and sulci are prominent. Diffuse periventricular white matter hypoattenuation is present. Cerebral vascular calcifications are noted. There is a small mucous retention cyst or polyp in the right maxillary sinus. Visualized orbits and mastoid air cells are unremarkable. 1. No mass effect or acute hemorrhage. 2. Chronic periventricular small vessel ischemic changes and cerebral atrophy. **This report has been created using voice recognition software. It may contain minor errors which are inherent in voice recognition technology. ** Final report electronically signed by Dr. Álvaro Corcoran on 9/17/2017 6:51 PM    Cta Neck W LoÃ­za Backer Contrast    Result Date: 9/24/2017  PROCEDURE: CTA HEAD W WO CONTRAST, CTA NECK W WO CONTRAST CLINICAL INFORMATION: CAROTID STENOSIS, . Slurred speech, weakness and confusion. COMPARISON: Brain MRI 9/18/2017. TECHNIQUE: 1 mm axial images were obtained through the head and neck after the fast bolus administration of contrast. A noncontrast localizer was obtained. 3-D reconstructions were performed on a dedicated 3-D workstation. These include multiplanar MPR images and multiplanar MIP images. Centerline reconstructions were obtained of the carotid systems. Isovue intravenous contrast was given. All CT scans at this facility use dose modulation, iterative reconstruction, and/or weight-based dosing when appropriate to reduce radiation dose to as low as reasonably achievable. FINDINGS: CTA NECK: Aortic arch and branches: There is calcified atherosclerosis of the aortic arch. There is atherosclerosis of the origins of the innominate artery, left common carotid artery and left subclavian artery. There is no associated stenosis. There is multifocal  atherosclerosis of both subclavian arteries without stenosis. Right common carotid artery/ICA: There is mild calcified atherosclerosis of the right common carotid artery. There is no stenosis. There is heavy calcified atherosclerosis of the right carotid bulb. This makes measuring the lumen difficult due to blooming artifact. Using NASCET criteria and the distal right internal carotid artery as the reference, there is at least an 80 % stenosis at the origin of the right internal carotid artery. There is no distal stenosis. Left common carotid artery/ICA: There is mild atherosclerosis of the left common carotid artery. There is no stenosis. There is calcified atherosclerosis of the left carotid bulb. There is no significant narrowing of the lumen. This is assessed and multiple planes. There is no significant stenosis of the left internal carotid artery. Vertebral arteries:  The vertebral arteries are codominant. There is no stenosis in the cervical segments. CTA HEAD: Internal carotid arteries: There is heavy calcified atherosclerosis of both cavernous segments. There is no severe stenosis. Middle cerebral arteries: There is no stenosis at the origins. There is no occlusion. There is multifocal irregularity. No severe stenoses are noted. Anterior cerebral arteries: The right A1 segment is aplastic. There is a dominant left A1 segment. There is a normal anterior communicating artery. There is irregularity of the A2 segments consistent with intracranial atherosclerosis. Vertebral arteries: There is severe atherosclerosis of both vertebral arteries as they enter the dural ring. There are multifocal moderate to severe stenoses bilaterally. Basilar artery: There is mild irregularity. Superior cerebellar arteries: Normal. Posterior cerebral arteries: There is a aplasia of the right P1 segment. There is a moderate-sized right posterior communicating artery. There is irregularity of this vessel suggestive of atherosclerosis. . The left posterior cerebral artery has a normal origin. There is multi focal irregularity of both posterior cerebral arteries. Multifocal stenoses are present. No aneurysms or occlusions are noted. The superior sagittal sinus, vein of Serafin, internal cerebral veins, straight sinus, transverse sinuses and sigmoid sinuses are patent. Axial source data: The lung apices are clear. There is no cervical adenopathy. There is a small area of mucosal thickening in the right maxillary sinus. The other paranasal sinuses are normally aerated. There are degenerative changes in the cervical spine. There are no gross abnormalities in the brain. 1. Heavy calcified atherosclerosis of the right carotid bulb with at least an 80 % stenosis at the origin of the right internal carotid artery. 2. No stenosis of the left carotid system.  3. Moderate-severe stenoses of the vertebral arteries as they enter the dural ring. 4. Multiple areas of irregularity in the intracranial cerebral circulation most likely related to intracranial atherosclerosis. **This report has been created using voice recognition software. It may contain minor errors which are inherent in voice recognition technology. ** Final report electronically signed by Dr. Indigo Lorenzana on 9/24/2017 11:18 AM    Ct Chest Wo Contrast    Result Date: 9/16/2017  PROCEDURE: CT CHEST WO CONTRAST CLINICAL INFORMATION: Possible pleural based mass, per imaging COMPARISON: CT chest, 4 December 2008; chest radiographs, 16 September 2017. TECHNIQUE: With the patient in supine position, axial images were obtained extending from the base of the neck to the superior abdomen. No IV contrast administered. Images were reconstructed in the axial, coronal, and sagittal planes at 5 mm thickness. All CT scans at this facility use dose modulation, iterative reconstruction, and/or weight-based dosing when appropriate to reduce the radiation dose to as low as reasonably achievable FINDINGS: This study is slightly degraded by motion. Previously demonstrated pleural effusions are larger than suggested, the right pleural effusion layering over the posterior right pneumothorax, maximal thickness of 4.1 cm. On the left, there is minimal pleural effusion with poorly defined areas of possible fluid loculation involving the posterior left major fissure. There is also mildly prominent thickening of the subpleural regions of the posterior lateral left upper lobe which also could represent pleural fluid, difficult to confirm. The attenuation of these effusions is homogeneous, consistent with simple effusion. There are poorly defined areas of compression atelectasis indicated in the areas of abnormal alveolar density in the right lower lobe less so involving the left lower lobe. There is no evidence of focal consolidation within the lung parenchyma to specifically indicate definite alveolar pneumonia. may contain minor errors which are inherent in voice recognition technology. ** Final report electronically signed by Dr. Rick Cornell on 9/16/2017 3:23 PM    Us Liver    Result Date: 9/18/2017  PROCEDURE: US LIVER CLINICAL INFORMATION: Elevated LFTs COMPARISON: No prior study. TECHNIQUE: Multiple grayscale and color Doppler images in longitudinal and transverse planes. FINDINGS: Liver - 13.8 cm Gallbladder - 8.5 x 3.1 x 2.6 cm Gallbladder Wall - 0.2 cm Common Duct - 0.5 cm The study is limited secondary to the patient's condition, inability to suspend respirations as well as overlying bowel. No focal liver abnormality is demonstrated. The liver is not enlarged. There is no ascites. Incidental note is made of a right pleural effusion. The gallbladder was displayed revealing layering sludge. The gallbladder wall is not thickened and there is no free pericholecystic fluid. There is no intra or extrahepatic biliary dilatation. The pancreas could not be visualized secondary to overlying bowel. The visualized right kidney is unremarkable. 1. Limited study. No definite focal liver abnormality. 2. Probable gallbladder sludge. 3. The pancreas cannot be visualized secondary to overlying bowel. **This report has been created using voice recognition software. It may contain minor errors which are inherent in voice recognition technology. ** Final report electronically signed by Dr. Phill Young on 9/18/2017 9:19 AM    Us Renal Limited    Result Date: 9/24/2017  PROCEDURE: US RENAL LIMITED CLINICAL INFORMATION: acute kidney injury. Prior CT 2008. COMPARISON: CT abdomen with contrast 12/4/2008. TECHNIQUE: Grayscale and color images were obtained of both kidneys.   FINDINGS: RIGHT KIDNEY - 11.9 x 5.6 x 6.4 cm Resistive Index - 0.77 Cortical Thickness - 1.1 cm LEFT KIDNEY - 12 x 5.7 x 5.7 cm Resistive Index - 0.77 Cortical Thickness - 1.1 cm URINARY BLADDER Pre-Void - 1326 mL  There is normal echogenicity of the renal parenchyma bilaterally. There is mild thinning of the renal parenchyma bilaterally. The resistive indices are elevated. There is no hydronephrosis. There is fullness of the left renal pelvis. This does not extend into the calyces. There are no renal calculi. No mass lesions are noted. The bladder is markedly distended. Both ureteral jets are documented. There is layering debris in the urinary bladder. There is a prevoid bladder volume of 1325 mL. The patient states he is unable to void. 1. No evidence of hydronephrosis. 2. Mild fullness of the left renal pelvis. 3. Markedly distended urinary bladder with a bladder volume of 1325 mL. The patient states he is unable to void. **This report has been created using voice recognition software. It may contain minor errors which are inherent in voice recognition technology. ** Final report electronically signed by Dr. Evelin Washington on 9/24/2017 3:50 PM    Xr Chest Portable    Result Date: 9/25/2017  PROCEDURE: XR CHEST PORTABLE CLINICAL INFORMATION: Chest congestion. Dyspnea. COMPARISON: 9/23/2017 TECHNIQUE: A single mobile view of the chest was obtained. 1. Moderate cardiac megaly. Metallic sternotomy sutures. Multiple vascular clips project over the mediastinum from prior surgery. 2. Moderate size hiatus hernia. Small lateral pleural effusions. Mild left basilar atelectasis/pneumonia. **This report has been created using voice recognition software. It may contain minor errors which are inherent in voice recognition technology. ** Final report electronically signed by Dr. Kayley Stauffer on 9/25/2017 1:35 PM    Xr Chest Portable    Result Date: 9/23/2017  PROCEDURE: XR CHEST PORTABLE CLINICAL INFORMATION: Rule out occult infiltrate, . Shortness of breath. COMPARISON: Chest x-ray 9/19/2017. TECHNIQUE: AP upright view of the chest. FINDINGS: The patient's had a prior median sternotomy and CABG. He is rotated. There is cardiomegaly. There is a large hiatal hernia. The mediastinum is not widened. There are no definite pulmonary infiltrates. There are no effusions. The pulmonary vascularity is normal. No suspicious osseous lesions are present. The hepatic flexure of the colon is distended with gas. 1. Hiatal hernia. 2. Low lung volumes. 3. No pulmonary infiltrates. **This report has been created using voice recognition software. It may contain minor errors which are inherent in voice recognition technology. ** Final report electronically signed by Dr. Imer Lambert on 9/23/2017 11:35 AM    Mri Brain Wo Contrast    Result Date: 9/18/2017  PROCEDURE: MRI BRAIN WO CONTRAST CLINICAL INFORMATION Speech difficulties. R/o CVA. COMPARISON: Head CT 9/17/2017. TECHNIQUE: Multiplanar and multiple spin echo MRI images were obtained of the brain without contrast. FINDINGS: The diffusion-weighted images are normal.  The brain volume is reduced. There is a mild-moderate amount of signal hyperintensity on the FLAIR and T2-weighted sequences in the white matter of the brain. This is consistent with chronic small vessel ischemic changes. There is a small old infarct in the tony. There are no intra-or extra-axial collections. There is no hydrocephalus, midline shift or mass effect. There is evidence of old hemorrhage in the right temporal lobe. There is some hemosiderin staining. There are small old microhemorrhages in the cerebellar vermis, the left occipital lobe and the right cerebellum. The major intracranial vascular flow voids are present. The midline craniocervical junction structures are normal.  The pituitary gland and brainstem are normal.      1. No evidence of an acute infarct. 2. Mild-moderate severity chronic small vessel ischemic changes. 3. Old hemorrhage in the right temporal lobe. There are also a few old microhemorrhages. **This report has been created using voice recognition software. It may contain minor errors which are inherent in voice recognition technology. ** [E43]     Low vitamin D level [E55.9] 09/19/2017    Slurred speech, POA [R47.81] 09/18/2017    Elevated LFTs [R94.5] 09/17/2017    Pleural effusion [J90]     Loculated pleural effusion on left, likely from CHF [J90] 09/16/2017    Chest pain [R07.9] 09/16/2017    Shortness of breath [R06.02] 09/16/2017    Atrial fibrillation ([facility] [address] [I48.91] 09/16/2017    Acute on chronic diastolic heart failure (HCC) [I50.33] 09/16/2017    Hx of CABG [Z95.1] 09/10/2012    Presence of coronary artery bypass graft stent [Z95.5, Z95.1] 09/10/2012    Severe aortic stenosis [I35.0] 09/10/2012    History of thrombosis - PE, DVT [Z86.718] 08/20/2012    History of CVA (cerebrovascular accident) [Z86.73] 08/20/2012    Essential hypertension [I10]     Coronary artery disease due to lipid rich plaque [I25.10, I25.83]        1. Carotid artery stenosis. Planned for carotid stent placement 9/27.  2. Acute systolic CHF exacerbation. Improved with diuresis. Cardio on board. 3. Bilateral pleural effusion, secondary to CHF. Does have noted loculated pleural effusion; evaluated by pulm and we agreed this is likely secondary to CHF. He will have follow up with pulm, repeat imaging as outpatient. 4. New-onset atrial fibrillation. Rate-controlled. Was already on anticoagulation for hx of DVT prior to admission. 5. Supratherapeutic INR. To hold anticoagulation for surgery  6. Chest pain. Serial troponin negative. ECHO with low EF 30%. Dobutamine stress demonstrates severe aortic stenosis. S/p cardiac cath with obstructive disease not amenable to PCI; to continue medical management. 7. Slurred speech. Has had intermittent episodes; appears much better now. Per daughter, has had intermittent episodes of this in the past. MRI-brain with no acute findings. Neuro recommended stopping sinemet and benztropine. CTA-head/neck with right internal carotid stenosis, posterior circulation stenosis. 8. Acute renal failure, not POA.  Likely secondary to diuretics, recent contrast with cardiac cath. Creatinine improving. Nephro on board. 9. Abnormal LFTs with slightly elevated alkaline phosphatase 130s, elevated bili 1.3 and normal AST/ALT. So far, unclear etiology. Abdominal US with biliary sludge. Vitamin D low; hence, elevated alk phos. Viral hepatitis profile negative. Consider repeat LFTs as outpatient, in about a month. 10. Hypovitaminosis D. Replacement ordered. 11. History of CAD s/p PCI, CABG  12. Hypertension. Continue current antihypertensives as ordered with hold parameters. 13. 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 14. Leukocytosis. Likely reactive; resolved. Procalcitonin low; CXR, UA with no findings suggestive of infectious process. 15. Intermittent episodes of confusion. Suspect sundowning. This appears to have resolved at this time. Avoid sedating meds. If needed, sitter to bedside. MRI-brain, CXR, UA, ammonia unremarkable.     Electronically signed by Amelia Luna MD on 9/27/2017 at 3:00 PM

## 2017-09-27 NOTE — PLAN OF CARE
Problem: Cardiovascular  Goal: No DVT, peripheral vascular complications  Outcome: Ongoing  No signs of clot. INR 1.48 today. Coumadin on hold for carotid stenting today. Problem: Skin Integrity/Risk  Goal: No skin breakdown during hospitalization  Outcome: Ongoing  No new breakdown. Patient moving self well. Problem: Musculor/Skeletal Functional Status  Goal: Absence of falls  Outcome: Ongoing  No falls this shift. Call light within reach. Bed and chair alarm in use. Up with assist and walker. Goal: Highest potential functional level  Outcome: Ongoing  Working with therapy. Appears to be at his baseline. Problem: Nutrition  Goal: Optimal nutrition therapy  Outcome: Ongoing  NPO this shift for carotid stenting. Problem: Falls - Risk of  Goal: Absence of falls  Outcome: Ongoing  No falls this shift. Call light within reach. Bed and chair alarm in use. Up with assist and walker. Problem: DISCHARGE BARRIERS  Goal: Patients continuum of care needs are met  Outcome: Ongoing  Plans for return home with daughter and then move back to house alone. Comments:   Care plan reviewed with patient and daughters. Patient and daughters verbalize understanding of the plan of care and contribute to goal setting.

## 2017-09-27 NOTE — FLOWSHEET NOTE
09/26/17 2120   Encounter Summary   Services provided to: Patient   Referral/Consult From: [facility] Family members   Continue Visiting Yes  (09/26/17 Pt miracle )   Complexity of Encounter Low   Length of Encounter 15 minutes   Routine   Type Follow up   Assessment Sleeping   Intervention Prayer;Sustaining presence/ Ministry of presence   Outcome Did not respond   Spiritual/Yazidism   Type Spiritual support     At the time of entry in the room, I found patient asleep. I offered word of prayer and provided ministry of sustaining presence. Will follow up for continue emotional and pastoral support for patient and family.

## 2017-09-27 NOTE — PROGRESS NOTES
[facility]  OCCUPATIONAL THERAPY MISSED TREATMENT NOTE  ACUTE CARE    Date: 2017  Patient Name: Mitra Leahy        CSN: [id number]   : 1930  (80 y.o.)  Gender: male   Referring Practitioner: Dr. Conner Shah  Diagnosis: SOB- Short of breath         REASON FOR MISSED TREATMENT:  Missed Treat. Pt had just finished with PT and declined to participate in therapy at this time and also stated was going down for a procedure later today.  Will attempt again later as time allows

## 2017-09-27 NOTE — PROGRESS NOTES
Renal Progress Note    Patient - Rafi Marcus   MRN -  [id number]   [place] # - [de-identified]      - 1930    80 y.o. Admit Date: 2017  Hospital Day: 11  Location: -[facility] [place]  Date of evaluation -  2017    Subjective:   CC:  shortness of breath   -140/  UOP 2150/24h  Wt stable  Denies sob or cough. Room air     Objective:   VITALS:  /60  Pulse 68  Temp 98 °F (36.7 °C) (Oral)   Resp 18  Ht 5' 5.5\" (1.664 m)  Wt 148 lb 6.4 oz (67.3 kg)  SpO2 96%  BMI 24.32 kg/m2   Patient Vitals for the past 24 hrs:   BP Temp Temp src Pulse Resp SpO2 Weight   17 0805 129/60 98 °F (36.7 °C) Oral 68 18 96 % -   17 0421 138/65 97.6 °F (36.4 °C) Oral 68 16 96 % 148 lb 6.4 oz (67.3 kg)   17 195 (!) 141/67 - - 76 - - -   17 1948 (!) 164/83 97.4 °F (36.3 °C) Oral 87 16 93 % -   17 1600 (!) 113/56 98.2 °F (36.8 °C) Oral 70 16 97 % -   17 1228 128/65 97.5 °F (36.4 °C) Oral 68 16 97 % -        Patient Vitals for the past 96 hrs (Last 3 readings):   Weight   17 0421 148 lb 6.4 oz (67.3 kg)   17 0400 147 lb 14.4 oz (67.1 kg)   17 0345 146 lb 14.4 oz (66.6 kg)       Intake/Output Summary (Last 24 hours) at 17 0910  Last data filed at 17 042   Gross per 24 hour   Intake              440 ml   Output              825 ml   Net             -385 ml     EXAM:  CONSTITUTIONAL:  No acute distress. Lying comfortable in bed. HEENT:  Head is normocephalic, Extraocular movement intact, Mucus membranes moist. Neck is supple. Voice is clear. CARDIOVASCULAR:  S1, S2  regular rate and rhythm. RESPIRATORY: Clear to ausculation bilaterally. Equal breath sounds. No wheezes. No shortness of breath noted at rest.  ABDOMEN: soft, non tender  NEUROLOGICAL: Patient is alert and oriented to person, place, and time. Recent and remote memory intact. Pt is attentive. Thought is coherant. SKIN: no rash, No significant bruises  MUSCULOSKELETAL: Movement is coordinated. Moves all extremities   EXTREMITIES: Distal lower extremity temp is warm, No lower extremity edema. Tremor Rt arm  PSYCHIATRIC: mood and affect appropriate. Medications:   Scheduled Meds:   tamsulosin  0.4 mg Oral Nightly    clopidogrel  75 mg Oral Daily    vitamin D  1,000 Units Oral Daily    metoprolol succinate  25 mg Oral Daily    sodium chloride flush  10 mL Intravenous 2 times per day    atorvastatin  40 mg Oral Q48H    pantoprazole  40 mg Oral QAM AC    aspirin  81 mg Oral Daily     Continuous Infusions:     Labs:     Recent Labs      09/25/17 0517 09/26/17 0418 09/27/17   0424   NA  143  141  140   K  4.5  4.5  4.9   CL  105  101  103   CO2  30  27  29   BUN  34*  27*  24*   CREATININE  0.9  0.8  0.9   LABGLOM  80*  >90  80*   GLUCOSE  104  110*  113*   MG  2.5*  2.3  2.3   CALCIUM  8.5  8.4*  8.6     Recent Labs      09/25/17 0517 09/26/17 0418 09/27/17   0522   WBC  7.8  9.3  10.0   RBC  4.72  5.00  4.75   HGB  14.0  14.9  13.8*   HCT  41.6*  43.6  41.4*   PLT  116*  131  149        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 Patent LIMA to LAD without any disease. Extremely  complex venous graft disease that was previously intervened upon, but  has had progression of disease despite intervention. At this point,  any further intervention is likely to result in graft loss. Given that all of the vein grafts while they may have disease are patent and  provide reasonable flow and the fact that the LIMA to LAD is open, we  would opt for medical therapy and further management of the aortic  stenosis. PLAN:  1. Manual pull for the arterial sheath and 4 to 6 hours bed rest  post hemostasis. 2.  Continued inpatient management of medical issues including  delirium. 3.  Discussed with the family regarding TAVR and management of his  aortic stenosis. 4.  Continue optimal medical therapy for coronary artery disease.   5.  At this point, we will hold on any revascularization of his graft  disease given the previous interventions, the moderate disease, the  risk of graft loss with the complexity of the plaque and lesions. 6.  CT Surgery consultation for TAVR evaluation. ASSESSMENT:  1. Acute Kidney Injury resolved, Baseline creatinine 0.8  2. Urinary retention, Indwelling mccann, Flomax. FU as out pt with Urology  3. Rt carotid artery stenosis, Angiogram and stenting planned 9/28 per Neurology  4. Dysphagia  5.  Aortic stenosis, Consult for TAVR  Will follow prn   Meds and Labs reviewed  Principal Problem:    Acute on chronic diastolic heart failure (HCC)  Active Problems:    Coronary artery disease due to lipid rich plaque    Essential hypertension    History of thrombosis - PE, DVT    History of CVA (cerebrovascular accident)    Hx of CABG    Presence of coronary artery bypass graft stent    Severe aortic stenosis    Loculated pleural effusion on left, likely from CHF    Chest pain    Shortness of breath    Atrial fibrillation (HCC)    Elevated LFTs    Pleural effusion    Slurred speech, POA    Low vitamin D level    Congestive heart failure (HCC)    Protein-calorie malnutrition, severe (HCC)    Acute renal failure, not POA    At risk for aspiration    Severe malnutrition (HCC)    KIRSTIE (acute kidney injury) ([address]    Urinary retention    Neurogenic bladder    Microscopic hematuria    Dehydration    Stenosis of right internal carotid artery, POA       Discussed with Dr. Joanna De La Torre, CNP 9/27/2017 9:10 AM

## 2017-09-27 NOTE — PROGRESS NOTES
exam & review of systems for this patient (see notes).     Mallampati Airway Assessment:  Mallampati Class II - (soft palate, fauces & uvula are visible)    Prior History of Anesthesia Complications:   none     ASA Classification:  Class 2  A normal healthy patient with mild systemic disease    Sedation/ Anesthesia Plan:   intravenous sedation    Medications Planned:   midazolam (Versed) intravenously    Patient is an appropriate candidate for plan of sedation: yes    Penelope Shultz M.D., J.DTae  [phone]

## 2017-09-27 NOTE — PROGRESS NOTES
Brief Post-Procedure/Operative Note    Procedure Performed: Cerebral angiogram and R ICA stenting  Pre-Procedure Diagnosis: R ICA stenosis  Post-Procedure Diagnosis: Same  Specimens Removed: None  Procedural Complications: No immediate procedural complication  Blood Loss: Minimal  Surgeon/Physician:  Ronald Fisher MD, Sarah Bianchi        Consent:  Material risks/benefits/indications reviewed with patient and/or family including but not limited to a 6 % risk of stroke (mild, moderate, or fatal) which may be related to the actual procedure and/or equipment malfunction, a 0.5% risk of femoral artery injury (possibly requiring blood transfusion and/or surgery), dye-related nephropathy, and dye-related anaphylaxis. Patient and/or family understood these risks and wished to proceed. Moreover, diagnostic and treatment alternatives were discussed in depth with patient and/or family including conservative medical management. Decision was made to proceed. Witnessed written consent was obtained. Findings:  Successful stenting of R ICA stenosis which was flow limiting. This has resulted in significantly improved blood flow to the posterior circulation via PCOM. Johnson Rojas M.D., J.D.   Vascular Neurology and Endovascular Surgical Neuroradiology  Cell Number: [phone]

## 2017-09-27 NOTE — PROGRESS NOTES
[address] [address]  SPEECH THERAPY MISSED TREATMENT NOTE  [facility] RENAL TELEMETRY 6K      Date: 2017  Patient Name: Monalisa Elizabeth        MRN: [id number]    : 1930  (80 y.o.)    REASON FOR MISSED TREATMENT:    Attempted diet monitor and dysphagia treatment, but pt currently NPO for carotid stenting later this date. Will check back on .     Annetta Brambila M.S. [id number]

## 2017-09-28 VITALS
SYSTOLIC BLOOD PRESSURE: 107 MMHG | HEART RATE: 72 BPM | TEMPERATURE: 98.4 F | RESPIRATION RATE: 18 BRPM | DIASTOLIC BLOOD PRESSURE: 51 MMHG | HEIGHT: 66 IN | WEIGHT: 145.06 LBS | OXYGEN SATURATION: 93 % | BODY MASS INDEX: 23.31 KG/M2

## 2017-09-28 PROCEDURE — 2580000003 HC RX 258: Performed by: PSYCHIATRY & NEUROLOGY

## 2017-09-28 PROCEDURE — 37215 TRANSCATH STENT CCA W/EPS: CPT | Performed by: PSYCHIATRY & NEUROLOGY

## 2017-09-28 PROCEDURE — 92526 ORAL FUNCTION THERAPY: CPT

## 2017-09-28 PROCEDURE — 99232 SBSQ HOSP IP/OBS MODERATE 35: CPT | Performed by: PSYCHIATRY & NEUROLOGY

## 2017-09-28 PROCEDURE — 6370000000 HC RX 637 (ALT 250 FOR IP): Performed by: INTERNAL MEDICINE

## 2017-09-28 PROCEDURE — 36223 PLACE CATH CAROTID/INOM ART: CPT | Performed by: PSYCHIATRY & NEUROLOGY

## 2017-09-28 PROCEDURE — 99231 SBSQ HOSP IP/OBS SF/LOW 25: CPT | Performed by: INTERNAL MEDICINE

## 2017-09-28 PROCEDURE — 6370000000 HC RX 637 (ALT 250 FOR IP): Performed by: PSYCHIATRY & NEUROLOGY

## 2017-09-28 PROCEDURE — 99239 HOSP IP/OBS DSCHRG MGMT >30: CPT | Performed by: HOSPITALIST

## 2017-09-28 RX ORDER — CLOPIDOGREL BISULFATE 75 MG/1
75 TABLET ORAL DAILY
Qty: 30 TABLET | Refills: 3 | Status: ON HOLD | OUTPATIENT
Start: 2017-09-28 | End: 2017-11-18

## 2017-09-28 RX ORDER — METOPROLOL SUCCINATE 25 MG/1
25 TABLET, EXTENDED RELEASE ORAL DAILY
Qty: 30 TABLET | Refills: 3 | Status: ON HOLD | OUTPATIENT
Start: 2017-09-28 | End: 2017-11-18

## 2017-09-28 RX ORDER — WARFARIN SODIUM 5 MG/1
5 TABLET ORAL
Status: COMPLETED | OUTPATIENT
Start: 2017-09-28 | End: 2017-09-28

## 2017-09-28 RX ORDER — TAMSULOSIN HYDROCHLORIDE 0.4 MG/1
0.4 CAPSULE ORAL NIGHTLY
Qty: 30 CAPSULE | Refills: 3
Start: 2017-09-28 | End: 2017-10-23

## 2017-09-28 RX ORDER — HYDROCODONE BITARTRATE AND ACETAMINOPHEN 5; 325 MG/1; MG/1
1 TABLET ORAL EVERY 6 HOURS PRN
Qty: 10 TABLET | Refills: 0 | Status: SHIPPED | OUTPATIENT
Start: 2017-09-28 | End: 2017-10-05

## 2017-09-28 RX ADMIN — VITAMIN D, TAB 1000IU (100/BT) 1000 UNITS: 25 TAB at 09:29

## 2017-09-28 RX ADMIN — METOPROLOL SUCCINATE 25 MG: 25 TABLET, FILM COATED, EXTENDED RELEASE ORAL at 09:29

## 2017-09-28 RX ADMIN — CLOPIDOGREL 75 MG: 75 TABLET, FILM COATED ORAL at 09:29

## 2017-09-28 RX ADMIN — PANTOPRAZOLE SODIUM 40 MG: 40 TABLET, DELAYED RELEASE ORAL at 09:29

## 2017-09-28 RX ADMIN — WARFARIN SODIUM 5 MG: 5 TABLET ORAL at 16:29

## 2017-09-28 RX ADMIN — ASPIRIN 81 MG: 81 TABLET, CHEWABLE ORAL at 09:29

## 2017-09-28 RX ADMIN — SODIUM CHLORIDE: 9 INJECTION, SOLUTION INTRAVENOUS at 11:22

## 2017-09-28 ASSESSMENT — PAIN SCALES - GENERAL
PAINLEVEL_OUTOF10: 0
PAINLEVEL_OUTOF10: 0

## 2017-09-28 NOTE — CARE COORDINATION
9/28/17, 9:20 AM    DISCHARGE BARRIERS      Patient accepted to [facility]. Patient is agreeable to discharge plan. Update: 12:30 PM- Spoke to Melissa Solis, daughter-in-law who states family will be able to transport. Melissa Solis and patient are agreeable to discharge plan.

## 2017-09-28 NOTE — PROGRESS NOTES
Renal Progress Note    Assessment and Plan: 1. Acute kidney injury resolved  2. Hypotension resolved   3. Left carotid artery stenosis S/P stenting   4. Deconditioning   PLAN:  Reviewed labs   Reviewed medications   Ok for telemetry  Will see prn    Patient Active Problem List:     Frailty     Coronary artery disease due to lipid rich plaque     Essential hypertension     PVC's (premature ventricular contractions)     History of thrombosis - PE, DVT     History of CVA (cerebrovascular accident)     Hx of CABG     Presence of coronary artery bypass graft stent     Dyslipidemia     Severe aortic stenosis     Moderate mitral regurgitation     History of TIA (transient ischemic attack)     History of myocardial infarction     Nonrheumatic aortic valve stenosis     Anticoagulated on Coumadin     Loculated pleural effusion on left, likely from CHF     Chest pain     Shortness of breath     Atrial fibrillation (HCC)     Acute on chronic diastolic heart failure (HCC)     Elevated LFTs     Pleural effusion     Slurred speech, POA     Low vitamin D level     Congestive heart failure (HCC)     Protein-calorie malnutrition, severe (HCC)     Acute renal failure, not POA     At risk for aspiration     Severe malnutrition (HCC)     KIRSTIE (acute kidney injury) ([facility] [address]     Urinary retention     Neurogenic bladder     Microscopic hematuria     Dehydration     Stenosis of right internal carotid artery, POA      Subjective:   Admit Date: 9/16/2017    Interval History:   Seeing for acute kidney injury   Awake and alert   Denies any concern  Stable blood pressure now  Admitted for weakness   Has left carotid artery artery stenosis   Had stenting left carotid artery   Transferred to ICU post operatively       Medications:   Scheduled Meds:   atropine        tamsulosin  0.4 mg Oral Nightly    clopidogrel  75 mg Oral Daily    vitamin D  1,000 Units Oral Daily    metoprolol succinate  25 mg Oral Daily    sodium chloride flush  10 mL Intravenous 2 times per day    atorvastatin  40 mg Oral Q48H    pantoprazole  40 mg Oral QAM AC    aspirin  81 mg Oral Daily     Continuous Infusions:   sodium chloride 125 mL/hr at 09/27/17 1900       CBC:   Recent Labs      09/26/17   0418  09/27/17   0522   WBC  9.3  10.0   HGB  14.9  13.8*   PLT  131  149     CMP:  Recent Labs      09/26/17   0418  09/27/17   0424   NA  141  140   K  4.5  4.9   CL  101  103   CO2  27  29   BUN  27*  24*   CREATININE  0.8  0.9   GLUCOSE  110*  113*   CALCIUM  8.4*  8.6   LABGLOM  >90  80*     Troponin: No results for input(s): TROPONINI in the last 72 hours. BNP: No results for input(s): BNP in the last 72 hours. INR:   Recent Labs      09/26/17   0419 09/27/17 0424   INR  2.09*  1.49*     Lipids: No results for input(s): CHOL, LDLDIRECT, TRIG, HDL, AMYLASE, LIPASE in the last 72 hours. Liver: Recent Labs      09/27/17 0424   LABALBU  3.2*     Iron:  No results for input(s): IRONS, FERRITIN in the last 72 hours. Invalid input(s): LABIRONS    Objective:   Vitals: BP (!) 147/115  Pulse 77  Temp 98.2 °F (36.8 °C) (Oral)   Resp 18  Ht 5' 5.5\" (1.664 m)  Wt 145 lb 1 oz (65.8 kg)  SpO2 97%  BMI 23.77 kg/m2   Wt Readings from Last 3 Encounters:   09/27/17 145 lb 1 oz (65.8 kg)   08/28/17 156 lb 12.8 oz (71.1 kg)   08/14/17 155 lb 6.4 oz (70.5 kg)      24HR INTAKE/OUTPUT:    Intake/Output Summary (Last 24 hours) at 09/28/17 0717  Last data filed at 09/28/17 0600   Gross per 24 hour   Intake              589 ml   Output             1050 ml   Net             -461 ml       Constitutional:  Alert, awake, no apparent distress   Skin:normal   HEENT:Pupils are reactive . Throat is clear   Neck:supple with no thyromegally  Cardiovascular:  S1, S2 without m/r/g   Respiratory:  CTA B without w/r/r   Abdomen: +bs, soft,non tender   Ext: No LE edema  Musculoskeletal:Intact  Neuro:Alert and awake with no deficit      Electronically signed by Abhilash Barnes MD on 9/28/2017 at 7:17 AM

## 2017-09-28 NOTE — FLOWSHEET NOTE
Prayer and encouragement     09/28/17 1450   Encounter Summary   Services provided to: Patient and family together   Referral/Consult From: [address] Visiting Yes  (9/28 )   Complexity of Encounter Low   Length of Encounter 15 minutes   Routine   Type Follow up   Assessment Approachable;Calm; Hopeful   Intervention Active listening;Prayer;[place];Nurtured hope   Outcome Acceptance;Expressed gratitude;Encouraged; Hopeful;Receptive

## 2017-09-28 NOTE — DISCHARGE INSTR - PHARMACY
Clinical Pharmacy Note                                               Warfarin Discharge Recommendations      Pt discharged from [address] [place] [address] today after admission for acute on chronic diastolic heart failure    INR today:  Recent Labs      09/27/17   0424   INR  1.49*       Interacting medications at discharge: aspirin, clopidogrel    INR goal during admission: 3-3.5    Recommendations for discharge:   Date Warfarin Dose   9/28/17 5 mg (already given at hospital)   9/29/17 INR check at [facility]       Provider dosing warfarin: [facility] physician    Recheck INR:  9/29/17 with results to [facility] physician

## 2017-09-28 NOTE — PROGRESS NOTES
[address]   OCCUPATIONAL THERAPY MISSED TREATMENT NOTE  ACUTE CARE    Date: 2017  Patient Name: Judah Mauricio        CSN: [id number]   : 1930  (80 y.o.)  Gender: male   Referring Practitioner: Dr. Nidia Hair  Diagnosis: SOB- Short of breath         REASON FOR MISSED TREATMENT:  Patient unable to participate.   Pt eating lunch upon arrival.

## 2017-09-28 NOTE — PROGRESS NOTES
Clinical Pharmacy Note                                               Warfarin Discharge Recommendations      Pt discharged from [facility] today after admission for acute on chronic diastolic heart failure    INR today:  Recent Labs      09/27/17   0424   INR  1.49*       Interacting medications at discharge: aspirin, clopidogrel    INR goal during admission: 3-3.5    Recommendations for discharge:   Date Warfarin Dose   9/28/17 5 mg   9/29/17 INR check at [facility]       Provider dosing warfarin: [facility] physician    Recheck INR:  9/29/17 with results to [facility] physician    Ming Yung, PharmD  9/28/2017 2:23 PM

## 2017-09-28 NOTE — PROGRESS NOTES
CLINICAL PHARMACY: DISCHARGE MED RECONCILIATION/REVIEW    [facility]) Select Patient?: No  Total # of Interventions Recommended: 0   - Increased Dose #: 0  Total # Interventions Accepted: 0  Intervention Severity:   - Level 1 Intervention Present?: No   - Level 2 #: 0   - Level 3 #: 0   Time Spent (min): 15    Jeyson Milan, PharmD  9/28/2017 2:27 PM

## 2017-09-28 NOTE — CARE COORDINATION
Patient transferred to  from ICU this morning. Discharge orders on chart. Met with Raquel Kaufman and he is in agreement for discharge to [facility] today, Medicare Rights updated. 9/28/17, 1:52 PM    Discharge plan discussed by  and . Discharge plan reviewed with patient/ family. Patient/ family verbalize understanding of discharge plan and are in agreement with plan. Understanding was demonstrated using the teach back method.    Services After Discharge  Services At/After Discharge: Skilled Therapy, Nursing Services, Aide Services   IMM Letter  IMM Letter given to Patient/Family/Significant other/Guardian/POA/by[de-identified] updated  IMM Letter date given[de-identified] 09/28/17  IMM Letter time given[de-identified] [id number]

## 2017-09-28 NOTE — PROGRESS NOTES
Neurology Inpatient Progress Note:  Date of Service: 09/28/17  Chief Complaint:   Chief Complaint   Patient presents with    Shortness of Breath       ASSESSMENT AND PLAN:  Successful stenting of R ICA stenosis. Pt doing well post-op. Continue aspirin/plavix. Okay to resume coumadin. Okay to dc from neuro standpoint. Will see pt in clinic in 1 month. -DVT prophylaxis  At least 25 minutes have been spent on the care of this patient. Greater than 50% of the floor time has been spent providing counseling/coordination of care with patient, family, and/or other providers/agencies involved with the patient's care. Adelaide Ybarra M.D., J.D. Vascular Neurology and Endovascular Surgical Neuroradiology  Cell: [phone]      S: No acute events overnight. Neurologic status is stable. ROS was reviewed today and unchanged since last review. Namely, there are no new rashes, no new symptoms concerning for anaphylaxis, no new black/tarry stools, no new symptoms of temperature intolerance.     Medications:  Current Facility-Administered Medications: 0.9 % sodium chloride infusion, , Intravenous, Continuous  acetaminophen (TYLENOL) tablet 650 mg, 650 mg, Oral, Q4H PRN  HYDROcodone-acetaminophen (NORCO) 5-325 MG per tablet 1 tablet, 1 tablet, Oral, Q4H PRN  ondansetron (ZOFRAN) injection 4 mg, 4 mg, Intravenous, Q8H PRN  tamsulosin (FLOMAX) capsule 0.4 mg, 0.4 mg, Oral, Nightly  clopidogrel (PLAVIX) tablet 75 mg, 75 mg, Oral, Daily  vitamin D (CHOLECALCIFEROL) tablet 1,000 Units, 1,000 Units, Oral, Daily  metoprolol succinate (TOPROL XL) extended release tablet 25 mg, 25 mg, Oral, Daily  sodium chloride flush 0.9 % injection 10 mL, 10 mL, Intravenous, 2 times per day  sodium chloride flush 0.9 % injection 10 mL, 10 mL, Intravenous, PRN  docusate sodium (COLACE) capsule 100 mg, 100 mg, Oral, BID PRN  polyethylene glycol (GLYCOLAX) packet 17 g, 17 g, Oral, Daily PRN  diphenhydrAMINE (BENADRYL) tablet 25 mg, 25 mg, Oral, Nightly PRN  atorvastatin (LIPITOR) tablet 40 mg, 40 mg, Oral, M23J  bismuth subsalicylate (PEPTO BISMOL) 262 MG/15ML suspension 15 mL, 15 mL, Oral, Q6H PRN  pantoprazole (PROTONIX) tablet 40 mg, 40 mg, Oral, QAM AC  magnesium hydroxide (MILK OF MAGNESIA) 400 MG/5ML suspension 30 mL, 30 mL, Oral, Daily PRN  ondansetron (ZOFRAN) injection 4 mg, 4 mg, Intravenous, Q6H PRN  aspirin chewable tablet 81 mg, 81 mg, Oral, Daily  nitroGLYCERIN (NITROSTAT) SL tablet 0.4 mg, 0.4 mg, Sublingual, Q5 Min PRN  ipratropium-albuterol (DUONEB) nebulizer solution 1 ampule, 1 ampule, Inhalation, Q4H PRN    O:   Vitals:    09/28/17 1135   BP: (!) 107/51   Pulse: (!) 236   Resp: 18   Temp: 98.4 °F (36.9 °C)   SpO2: (!) 83%     NAD   NC/AT  No scleral icterus  No increased work of breathing  NT/ND/+BS  No clubbing/cyanosis  Neuro: A+Ox4  CN 2-12 intact hypophonic voice better today  Moves all extremities w/ antigravity strength  Sensory/Reflexes unchanged      Labs and imaging have been personally reviewed.   Lab Results   Component Value Date    WBC 10.0 09/27/2017    HGB 13.8 09/27/2017    HCT 41.4 09/27/2017    MCV 87.1 09/27/2017     09/27/2017     Lab Results   Component Value Date     09/27/2017    K 4.9 09/27/2017     09/27/2017    CO2 29 09/27/2017    PHOS 2.8 09/27/2017    BUN 24 09/27/2017    CREATININE 0.9 09/27/2017    CALCIUM 8.6 09/27/2017     Lab Results   Component Value Date    PROTIME 3.06 11/30/2011    INR 1.49 09/27/2017     Lab Results   Component Value Date    APTT 85.8 09/22/2017     Lab Results   Component Value Date    ALKPHOS 129 09/19/2017    ALT 8 09/19/2017    AST 11 09/19/2017    BILITOT 1.2 09/19/2017    BILIDIR 0.3 09/19/2017    LABALBU 3.2 09/27/2017    LABALBU 3.9 01/11/2012    LIPASE 18.5 09/24/2017     Lab Results   Component Value Date    TROPONINI 0.197 01/10/2012      Lab Results   Component Value Date    LABA1C 5.4 12/08/2016       No results found for: CHARCSF, CULTURE  No results found for: PHENYTOIN, CARBTOT, PHENOBARB, VALPROATE  No results found for: [facility]    Lab Results   Component Value Date    NITRU NEGATIVE 09/24/2017    COLORU YELLOW 09/24/2017    PHUR 5.5 09/24/2017    LABCAST NONE SEEN 09/24/2017    LABCAST NONE SEEN 09/24/2017    WBCUA NONE SEEN 09/24/2017    RBCUA 15-25 09/24/2017    YEAST NONE SEEN 09/24/2017    BACTERIA NONE 09/24/2017    SPECGRAV 1.020 09/24/2017    LEUKOCYTESUR NEGATIVE 09/24/2017    LEUKOCYTESUR SMALL 09/23/2017    UROBILINOGEN 0.2 09/24/2017    BILIRUBINUR NEGATIVE 09/24/2017    BLOODU LARGE 09/24/2017    GLUCOSEU NEGATIVE 09/23/2017    KETUA NEGATIVE 09/24/2017

## 2017-09-28 NOTE — PLAN OF CARE
Problem: Cardiovascular  Goal: No DVT, peripheral vascular complications  Outcome: Ongoing  Po plavix        Problem: Skin Integrity/Risk  Goal: No skin breakdown during hospitalization  Outcome: Ongoing  wnl    Problem: Musculor/Skeletal Functional Status  Goal: Absence of falls  Outcome: Ongoing  Will continue to monitor. Pt bedrest, Pt high fall risk, bed lowest position, hourly visual rounding completed, fall band on, fall magnet on door, call light within reach, bed alarm set. Goal: Highest potential functional level  Outcome: Ongoing    Problem: Nutrition  Goal: Optimal nutrition therapy  Outcome: Ongoing  Low carb diet    Problem: Falls - Risk of  Goal: Absence of falls  Outcome: Ongoing  Will continue to monitor. Pt bedrest, Pt high fall risk, bed lowest position, hourly visual rounding completed, fall band on, fall magnet on door, call light within reach, bed alarm set. Problem: DISCHARGE BARRIERS  Goal: Patients continuum of care needs are met  Outcome: Ongoing  Pt and family involved in d/c planning, pt plans to d/c to home. Comments:   Care plan reviewed with patient and family. Patient and family verbalize understanding of the plan of care and contribute to goal setting.

## 2017-09-28 NOTE — CARE COORDINATION
9/28/17, 9:37 AM      Angela Anton day: 12  Location: [facility]/004 Reason for admit: Shortness of breath [R06.02]   Procedure:   9/27 Cerebral angiogram and R ICA stenting  Treatment Plan of Care: Oriented to person. Follows commands. Slurred speech. PT/OT. Nephrology and Neurology consulted. Telemetry, I&O, neuro checks, n/v checks, wound care, mccann care. IVF, asa, lipitor, plavix, toprol xl, protonix. Core Measures: CHF  PCP: Misti Quezada MD  Discharge Plan: Plan for [facility]. SW on case.

## 2017-09-28 NOTE — PROGRESS NOTES
2030: dr. Herrera Valero at bedside, orders for when ACT is normal, to pull sheath. 2045: resp at bedside to run ACT, .      2150: resp at bedside to run ACT, .     2245:sheath pulled from right groin site per dr. Shanna Torres request.  Nancy Mercado in room to assist pedal pulses and checking for hematomas. Quick clot dsg and manual pressure applied. 2300: right fem site WNL, no hematoma, bleeding. Pedal pulses palpable. Pt denies pain. Transparent dsg applied. 2315: right fem site WNL, no hematoma, bleeding. Pedal pulses palpable. Pt denies pain. 2330: right fem site WNL, no hematoma, bleeding. Pedal pulses palpable. Pt denies pain. 2345: right fem site WNL, no hematoma, bleeding. Pedal pulses palpable. Pt denies pain. 0000: right fem site WNL, no hematoma, bleeding. Pedal pulses palpable. Pt denies pain. 0030: right fem site WNL, no hematoma, bleeding. Pedal pulses palpable. Pt denies pain. 0100: right fem site WNL, no hematoma, bleeding. Pedal pulses palpable. Pt denies pain. 0130: right fem site WNL, no hematoma, bleeding. Pedal pulses palpable. Pt denies pain. 0200: right fem site WNL, no hematoma, bleeding. Pedal pulses palpable. Pt denies pain.

## 2017-09-28 NOTE — DISCHARGE SUMMARY
fibrillation. He is quite active at baseline. Hospital Course:  80year old admitted for CHF exacerbation and bilateral pleural effusion. He was also noted to be confused and had slurred speech. He was treated with IV diuresis and had a cardiac cath on 9/23 and was found to have severe aortic stenosis and will be followed up as outpatient for TAVR. He was seen by neurology and was taken off of sinimet for lack of symptoms consistent with parkinsons. 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 He is on coumadin for history of PE. He was treated for right ICA stenosis with stent on 9/27/2017 and tolerated it well. He is being dc to [facility] for further rehabilitation with PT/OT        Consultants:  Patient Care Team:  Misti Quezada MD as PCP - General    Discharge Medications:     Medication List      START taking these medications          clopidogrel 75 MG tablet   Commonly known as:  PLAVIX   Take 1 tablet by mouth daily       HYDROcodone-acetaminophen 5-325 MG per tablet   Commonly known as:  NORCO   Take 1 tablet by mouth every 6 hours as needed for Pain . Earliest Fill Date: 9/28/17       metoprolol succinate 25 MG extended release tablet   Commonly known as:  TOPROL XL   Take 1 tablet by mouth daily       tamsulosin 0.4 MG capsule   Commonly known as:  FLOMAX   Take 1 capsule by mouth nightly         CHANGE how you take these medications          * warfarin 5 MG tablet   Commonly known as:  COUMADIN   Take as directed by [address] [place] [address] [facility], 45 tablets for 90 days   What changed:  additional instructions       * warfarin 7.5 MG tablet   Commonly known as:  COUMADIN   Indications: Cerebrovascular Accident or Stroke Take as directed by [address] [place] [address] [facility]. What changed:  additional instructions       * Notice:   This list has 2 medication(s) that are the same as intake/output:  Intake/Output Summary (Last 24 hours) at 09/28/17 1206  Last data filed at 09/28/17 0600   Gross per 24 hour   Intake              589 ml   Output             1050 ml   Net             -461 ml       General appearance -   awake appears to be in no acute distress  Chest - Bilateral air entry, no wheeze  Heart - S1S2 RRR, no murmurs or gallops  Abdomen - soft, non tender, normoactive bowel sounds  Extremities - no edema  Skin - no rashes or lesions    Procedures:    Cardiac cath. Right ICA stenosis    Diagnostic Test:  MRI brain    Radiology reports as per the Radiologist  Radiology:  [facility] Wo Contrast    Result Date: 9/24/2017  PROCEDURE: CTA HEAD W WO CONTRAST, CTA NECK W WO CONTRAST CLINICAL INFORMATION: CAROTID STENOSIS, . Slurred speech, weakness and confusion. COMPARISON: Brain MRI 9/18/2017. TECHNIQUE: 1 mm axial images were obtained through the head and neck after the fast bolus administration of contrast. A noncontrast localizer was obtained. 3-D reconstructions were performed on a dedicated 3-D workstation. These include multiplanar MPR images and multiplanar MIP images. Centerline reconstructions were obtained of the carotid systems. Isovue intravenous contrast was given. All CT scans at this facility use dose modulation, iterative reconstruction, and/or weight-based dosing when appropriate to reduce radiation dose to as low as reasonably achievable. FINDINGS: CTA NECK: Aortic arch and branches: There is calcified atherosclerosis of the aortic arch. There is atherosclerosis of the origins of the innominate artery, left common carotid artery and left subclavian artery. There is no associated stenosis. There is multifocal  atherosclerosis of both subclavian arteries without stenosis. Right common carotid artery/ICA: There is mild calcified atherosclerosis of the right common carotid artery. There is no stenosis. There is heavy calcified atherosclerosis of the right carotid bulb.  This makes measuring the lumen difficult due to blooming artifact. Using NASCET criteria and the distal right internal carotid artery as the reference, there is at least an 80 % stenosis at the origin of the right internal carotid artery. There is no distal stenosis. Left common carotid artery/ICA: There is mild atherosclerosis of the left common carotid artery. There is no stenosis. There is calcified atherosclerosis of the left carotid bulb. There is no significant narrowing of the lumen. This is assessed and multiple planes. There is no significant stenosis of the left internal carotid artery. Vertebral arteries: The vertebral arteries are codominant. There is no stenosis in the cervical segments. CTA HEAD: Internal carotid arteries: There is heavy calcified atherosclerosis of both cavernous segments. There is no severe stenosis. Middle cerebral arteries: There is no stenosis at the origins. There is no occlusion. There is multifocal irregularity. No severe stenoses are noted. Anterior cerebral arteries: The right A1 segment is aplastic. There is a dominant left A1 segment. There is a normal anterior communicating artery. There is irregularity of the A2 segments consistent with intracranial atherosclerosis. Vertebral arteries: There is severe atherosclerosis of both vertebral arteries as they enter the dural ring. There are multifocal moderate to severe stenoses bilaterally. Basilar artery: There is mild irregularity. Superior cerebellar arteries: Normal. Posterior cerebral arteries: There is a aplasia of the right P1 segment. There is a moderate-sized right posterior communicating artery. There is irregularity of this vessel suggestive of atherosclerosis. . The left posterior cerebral artery has a normal origin. There is multi focal irregularity of both posterior cerebral arteries. Multifocal stenoses are present. No aneurysms or occlusions are noted.  The superior sagittal sinus, vein of Serafin, internal cerebral Follow-up. COMPARISON: 9/16/2017 TECHNIQUE: PA and lateral views of the chest were obtained. 1. Mild cardiomegaly. Metallic sternotomy sutures. Relatively large hiatus hernia. 2.. Small bilateral pleural effusions. Mild pleural calcifications left side. Overall appearance of chest has improved since prior. **This report has been created using voice recognition software. It may contain minor errors which are inherent in voice recognition technology. ** Final report electronically signed by Dr. Rocael Lomax on 9/18/2017 3:47 PM    Xr Chest Standard Two Vw    Result Date: 9/16/2017  PROCEDURE: XR CHEST STANDARD TWO VW CLINICAL INFORMATION: Shortness of Breath, COMPARISON: Chest radiograph, 17 January 2012. TECHNIQUE: Erect AP and lateral views the chest provided, timed at 0910 hours. FINDINGS: Lung volumes are slightly diminished on both sides, with interval development of bibasilar alveolar abnormalities, partially confluent in the left lower lobe. Blunted bilateral costophrenic angles, left more than right, also present. There is a crescent-shaped abnormal density along the lateral superior left hemithorax in the pleural space extending craniocaudally 6 cm, transversely 2.1 cm. This has developed since the prior exam and could represent loculated pleural effusion or pleural based mass. Poorly defined osseous framework in the region, with some characteristics of poorly defined incompletely fused fractures. This is difficult to confirm. No pneumothorax. Stable appearance of the cardiac silhouette. No evidence of pulmonary vascular congestion. Age-indeterminate compression deformity of the T12 vertebral body incidentally noted. Osseous framework is grossly stable otherwise. INTERVAL WORSENING OF BILATERAL LUNG PARENCHYMA TO INDICATE BIBASILAR ATELECTASIS OR BIBASILAR PNEUMONIA OR COMBINATION. SMALL BILATERAL PLEURAL EFFUSIONS ARE ALSO PRESENT.  INTERVAL DEVELOPMENT OF A PLEURAL-BASED CRESCENT-SHAPED LESION ALONG THE LATERAL LEFT HEMITHORAX, 6.0 X 2.1 CM, POSSIBLY REPRESENTING LOCULATED PLEURAL EFFUSION THOUGH POORLY DEFINED. SOME CHARACTERISTICS OF REMOTE FRACTURES OF THE ADJACENT RIBS, DIFFICULT TO CONFIRM. AGE-INDETERMINATE COMPRESSION DEFORMITY OF THE T12 VERTEBRAL BODY. **This report has been created using voice recognition software. It may contain minor errors which are inherent in voice recognition technology. ** Final report electronically signed by Dr. Alex Schwartz on 9/16/2017 9:35 AM    Ct Head Wo Contrast    Result Date: 9/17/2017  PROCEDURE: CT HEAD WO CONTRAST CLINICAL INFORMATION: Speech tremor TECHNIQUE: CT scan of the head was performed from the vertex to the skull base without use of intravenous contrast. All CT scans at this facility use dose modulation, iterative reconstruction, and/or weight-based dosing when appropriate to reduce radiation dose to as low as reasonably achievable. COMPARISON: CT head 8/12/2016 FINDINGS: There is no mass effect, midline shift or acute hemorrhage. Ventricles and sulci are prominent. Diffuse periventricular white matter hypoattenuation is present. Cerebral vascular calcifications are noted. There is a small mucous retention cyst or polyp in the right maxillary sinus. Visualized orbits and mastoid air cells are unremarkable. 1. No mass effect or acute hemorrhage. 2. Chronic periventricular small vessel ischemic changes and cerebral atrophy. **This report has been created using voice recognition software. It may contain minor errors which are inherent in voice recognition technology. ** Final report electronically signed by Dr. Irineo Crum on 9/17/2017 6:51 PM    Cta Neck W Wo Contrast    Result Date: 9/24/2017  PROCEDURE: CTA HEAD W WO CONTRAST, CTA NECK W WO CONTRAST CLINICAL INFORMATION: CAROTID STENOSIS, . Slurred speech, weakness and confusion. COMPARISON: Brain MRI 9/18/2017.  TECHNIQUE: 1 mm axial images were obtained through the head and neck after the fast bolus administration of contrast. A noncontrast localizer was obtained. 3-D reconstructions were performed on a dedicated 3-D workstation. These include multiplanar MPR images and multiplanar MIP images. Centerline reconstructions were obtained of the carotid systems. Isovue intravenous contrast was given. All CT scans at this facility use dose modulation, iterative reconstruction, and/or weight-based dosing when appropriate to reduce radiation dose to as low as reasonably achievable. FINDINGS: CTA NECK: Aortic arch and branches: There is calcified atherosclerosis of the aortic arch. There is atherosclerosis of the origins of the innominate artery, left common carotid artery and left subclavian artery. There is no associated stenosis. There is multifocal  atherosclerosis of both subclavian arteries without stenosis. Right common carotid artery/ICA: There is mild calcified atherosclerosis of the right common carotid artery. There is no stenosis. There is heavy calcified atherosclerosis of the right carotid bulb. This makes measuring the lumen difficult due to blooming artifact. Using NASCET criteria and the distal right internal carotid artery as the reference, there is at least an 80 % stenosis at the origin of the right internal carotid artery. There is no distal stenosis. Left common carotid artery/ICA: There is mild atherosclerosis of the left common carotid artery. There is no stenosis. There is calcified atherosclerosis of the left carotid bulb. There is no significant narrowing of the lumen. This is assessed and multiple planes. There is no significant stenosis of the left internal carotid artery. Vertebral arteries: The vertebral arteries are codominant. There is no stenosis in the cervical segments. CTA HEAD: Internal carotid arteries: There is heavy calcified atherosclerosis of both cavernous segments. There is no severe stenosis. Middle cerebral arteries: There is no stenosis at the origins.  There is no occlusion. There is multifocal irregularity. No severe stenoses are noted. Anterior cerebral arteries: The right A1 segment is aplastic. There is a dominant left A1 segment. There is a normal anterior communicating artery. There is irregularity of the A2 segments consistent with intracranial atherosclerosis. Vertebral arteries: There is severe atherosclerosis of both vertebral arteries as they enter the dural ring. There are multifocal moderate to severe stenoses bilaterally. Basilar artery: There is mild irregularity. Superior cerebellar arteries: Normal. Posterior cerebral arteries: There is a aplasia of the right P1 segment. There is a moderate-sized right posterior communicating artery. There is irregularity of this vessel suggestive of atherosclerosis. . The left posterior cerebral artery has a normal origin. There is multi focal irregularity of both posterior cerebral arteries. Multifocal stenoses are present. No aneurysms or occlusions are noted. The superior sagittal sinus, vein of Serafin, internal cerebral veins, straight sinus, transverse sinuses and sigmoid sinuses are patent. Axial source data: The lung apices are clear. There is no cervical adenopathy. There is a small area of mucosal thickening in the right maxillary sinus. The other paranasal sinuses are normally aerated. There are degenerative changes in the cervical spine. There are no gross abnormalities in the brain. 1. Heavy calcified atherosclerosis of the right carotid bulb with at least an 80 % stenosis at the origin of the right internal carotid artery. 2. No stenosis of the left carotid system. 3. Moderate-severe stenoses of the vertebral arteries as they enter the dural ring. 4. Multiple areas of irregularity in the intracranial cerebral circulation most likely related to intracranial atherosclerosis. **This report has been created using voice recognition software.  It may contain minor errors which are inherent in voice recognition technology. ** Final report electronically signed by Dr. Juventino Gonzales on 9/24/2017 11:18 AM    Ct Chest Wo Contrast    Result Date: 9/16/2017  PROCEDURE: CT CHEST WO CONTRAST CLINICAL INFORMATION: Possible pleural based mass, per imaging COMPARISON: CT chest, 4 December 2008; chest radiographs, 16 September 2017. TECHNIQUE: With the patient in supine position, axial images were obtained extending from the base of the neck to the superior abdomen. No IV contrast administered. Images were reconstructed in the axial, coronal, and sagittal planes at 5 mm thickness. All CT scans at this facility use dose modulation, iterative reconstruction, and/or weight-based dosing when appropriate to reduce the radiation dose to as low as reasonably achievable FINDINGS: This study is slightly degraded by motion. Previously demonstrated pleural effusions are larger than suggested, the right pleural effusion layering over the posterior right pneumothorax, maximal thickness of 4.1 cm. On the left, there is minimal pleural effusion with poorly defined areas of possible fluid loculation involving the posterior left major fissure. There is also mildly prominent thickening of the subpleural regions of the posterior lateral left upper lobe which also could represent pleural fluid, difficult to confirm. The attenuation of these effusions is homogeneous, consistent with simple effusion. There are poorly defined areas of compression atelectasis indicated in the areas of abnormal alveolar density in the right lower lobe less so involving the left lower lobe. There is no evidence of focal consolidation within the lung parenchyma to specifically indicate definite alveolar pneumonia. Interstitium is nonspecific. The pulmonary vessels are prominent. There are areas of pleural calcifications involving the posterior lateral and inferior left hemithorax.  There are no pathologically enlarged hilar or mediastinal lymph nodes, to the limits of noncontrast noted. The bladder is markedly distended. Both ureteral jets are documented. There is layering debris in the urinary bladder. There is a prevoid bladder volume of 1325 mL. The patient states he is unable to void. 1. No evidence of hydronephrosis. 2. Mild fullness of the left renal pelvis. 3. Markedly distended urinary bladder with a bladder volume of 1325 mL. The patient states he is unable to void. **This report has been created using voice recognition software. It may contain minor errors which are inherent in voice recognition technology. ** Final report electronically signed by Dr. Bethena Favre on 9/24/2017 3:50 PM    Xr Chest Portable    Result Date: 9/25/2017  PROCEDURE: XR CHEST PORTABLE CLINICAL INFORMATION: Chest congestion. Dyspnea. COMPARISON: 9/23/2017 TECHNIQUE: A single mobile view of the chest was obtained. 1. Moderate cardiac megaly. Metallic sternotomy sutures. Multiple vascular clips project over the mediastinum from prior surgery. 2. Moderate size hiatus hernia. Small lateral pleural effusions. Mild left basilar atelectasis/pneumonia. **This report has been created using voice recognition software. It may contain minor errors which are inherent in voice recognition technology. ** Final report electronically signed by Dr. Tawana Ortiz on 9/25/2017 1:35 PM    Xr Chest Portable    Result Date: 9/23/2017  PROCEDURE: XR CHEST PORTABLE CLINICAL INFORMATION: Rule out occult infiltrate, . Shortness of breath. COMPARISON: Chest x-ray 9/19/2017. TECHNIQUE: AP upright view of the chest. FINDINGS: The patient's had a prior median sternotomy and CABG. He is rotated. There is cardiomegaly. There is a large hiatal hernia. The mediastinum is not widened. There are no definite pulmonary infiltrates. There are no effusions. The pulmonary vascularity is normal. No suspicious osseous lesions are present. The hepatic flexure of the colon is distended with gas. 1. Hiatal hernia.  2. Low lung volumes. 3. No pulmonary infiltrates. **This report has been created using voice recognition software. It may contain minor errors which are inherent in voice recognition technology. ** Final report electronically signed by Dr. Pushpa Juares on 9/23/2017 11:35 AM    Mri Brain Wo Contrast    Result Date: 9/18/2017  PROCEDURE: MRI BRAIN WO CONTRAST CLINICAL INFORMATION Speech difficulties. R/o CVA. COMPARISON: Head CT 9/17/2017. TECHNIQUE: Multiplanar and multiple spin echo MRI images were obtained of the brain without contrast. FINDINGS: The diffusion-weighted images are normal.  The brain volume is reduced. There is a mild-moderate amount of signal hyperintensity on the FLAIR and T2-weighted sequences in the white matter of the brain. This is consistent with chronic small vessel ischemic changes. There is a small old infarct in the tony. There are no intra-or extra-axial collections. There is no hydrocephalus, midline shift or mass effect. There is evidence of old hemorrhage in the right temporal lobe. There is some hemosiderin staining. There are small old microhemorrhages in the cerebellar vermis, the left occipital lobe and the right cerebellum. The major intracranial vascular flow voids are present. The midline craniocervical junction structures are normal.  The pituitary gland and brainstem are normal.      1. No evidence of an acute infarct. 2. Mild-moderate severity chronic small vessel ischemic changes. 3. Old hemorrhage in the right temporal lobe. There are also a few old microhemorrhages. **This report has been created using voice recognition software. It may contain minor errors which are inherent in voice recognition technology. ** Final report electronically signed by Dr. Pushpa Juares on 9/18/2017 1:37 PM    Fl Modified Barium Swallow W Video    Result Date: 9/22/2017  PROCEDURE: FL MODIFIED BARIUM SWALLOW W VIDEO CLINICAL INFORMATION: Dysphasia TECHNIQUE: Fluoroscopy was provided for modified barium swallowing study performed by speech therapy. With the patient in the lateral projection, swallowing mechanism was evaluated using barium of various consistencies. The radiologist was present for the entire examination. One spot image was obtained. Total fluoroscopy time 3 minutes 29 seconds. Speech therapist: Lucía Perez COMPARISON: None. FINDINGS: Oral, pharyngeal and esophageal structures appear normal. There is laryngeal penetration and aspiration of thin barium. 1. Laryngeal penetration and aspiration of thin barium. 2. Additional recommendations from the speech therapist will follow. **This report has been created using voice recognition software. It may contain minor errors which are inherent in voice recognition technology. ** Final report electronically signed by Dr. Jose Manuel Cook on 9/22/2017 10:58 AM      Results for orders placed or performed during the hospital encounter of 09/16/17   Urine Culture, Reflexed   Result Value Ref Range    Urine Culture Reflex (A)      No growth-preliminary  Growth of Contaminants. The mixture of organisms present  are not a common cause of urinary tract infections and  probably represent skin peewee or distal urethral peewee.       Organism Growth of Contaminants (A)    CBC auto differential   Result Value Ref Range    WBC 6.7 4.8 - 10.8 thou/mm3    RBC 4.89 4.70 - 6.10 mill/mm3    Hemoglobin 14.0 14.0 - 18.0 gm/dl    Hematocrit 42.5 42.0 - 52.0 %    MCV 86.8 80.0 - 94.0 fL    MCH 28.6 27.0 - 31.0 pg    MCHC 32.9 (L) 33.0 - 37.0 gm/dl    RDW 15.4 (H) 11.5 - 14.5 %    Platelets 612 148 - 185 thou/mm3    MPV 8.8 7.4 - 10.4 mcm    RBC Morphology NORMAL     Seg Neutrophils 76.3 %    Lymphocytes 12.9 %    Monocytes 8.7 %    Eosinophils 1.4 %    Basophils 0.7 %    nRBC 0 /100 wbc    Anisocytosis 1+     Segs Absolute 5.1 1.8 - 7.7 thou/mm3    Lymphocytes # 0.9 (L) 1.0 - 4.8 thou/mm3    Monocytes # 0.6 0.4 - 1.3 thou/mm3    Eosinophils # 0.1 0.0 - 0.4 thou/mm3    Basophils # thou/mm3    RBC 5.16 4.70 - 6.10 mill/mm3    Hemoglobin 14.9 14.0 - 18.0 gm/dl    Hematocrit 44.5 42.0 - 52.0 %    MCV 86.2 80.0 - 94.0 fL    MCH 28.8 27.0 - 31.0 pg    MCHC 33.5 33.0 - 37.0 gm/dl    RDW 15.0 (H) 11.5 - 14.5 %    Platelets 945 200 - 348 thou/mm3    MPV 9.2 7.4 - 10.4 mcm    Seg Neutrophils 72.7 %    Lymphocytes 14.7 %    Monocytes 11.2 %    Eosinophils 1.0 %    Basophils 0.4 %    nRBC 0 /100 wbc    Anisocytosis 1+     Segs Absolute 6.1 1.8 - 7.7 thou/mm3    Lymphocytes # 1.2 1.0 - 4.8 thou/mm3    Monocytes # 0.9 0.4 - 1.3 thou/mm3    Eosinophils # 0.1 0.0 - 0.4 thou/mm3    Basophils # 0.0 0.0 - 0.1 thou/mm3   Hepatic Function Panel   Result Value Ref Range    Total Bilirubin 1.3 (H) 0.3 - 1.2 mg/dL    Bilirubin, Direct 0.3 0.0 - 0.3 mg/dL    Alkaline Phosphatase 130 (H) 38 - 126 U/L    AST 13 5 - 40 U/L    ALT 6 (L) 11 - 66 U/L    Total Protein 6.5 6.1 - 8.0 g/dL   Anion Gap   Result Value Ref Range    Anion Gap 16.0 8.0 - 16.0 meq/L   Glomerular Filtration Rate, Estimated   Result Value Ref Range    Est, Glom Filt Rate >90 ml/min/1.73m2   Protime-INR   Result Value Ref Range    INR 2.47 (H) 0.85 - 1.13   Renal Function Panel   Result Value Ref Range    Glucose 95 70 - 108 mg/dL    BUN 20 7 - 22 mg/dL    CREATININE 0.8 0.4 - 1.2 mg/dL    Sodium 139 135 - 145 meq/L    Potassium 3.7 3.5 - 5.2 meq/L    Chloride 99 98 - 111 meq/L    CO2 27 23 - 33 meq/L    Calcium 9.0 8.5 - 10.5 mg/dL    Phosphorus 3.8 2.4 - 4.7 mg/dL    Alb 3.6 3.5 - 5.1 g/dL   Magnesium   Result Value Ref Range    Magnesium 2.1 1.6 - 2.4 mg/dL   CBC Auto Differential   Result Value Ref Range    WBC 8.6 4.8 - 10.8 thou/mm3    RBC 5.08 4.70 - 6.10 mill/mm3    Hemoglobin 14.7 14.0 - 18.0 gm/dl    Hematocrit 43.8 42.0 - 52.0 %    MCV 86.3 80.0 - 94.0 fL    MCH 29.0 27.0 - 31.0 pg    MCHC 33.6 33.0 - 37.0 gm/dl    RDW 15.3 (H) 11.5 - 14.5 %    Platelets 436 624 - 735 thou/mm3    MPV 9.4 7.4 - 10.4 mcm    RBC Morphology NORMAL     Seg Neutrophils 71.6 %    Lymphocytes 14.1 %    Monocytes 12.0 %    Eosinophils 1.9 %    Basophils 0.4 %    nRBC 0 /100 wbc    Anisocytosis 1+     Segs Absolute 6.2 1.8 - 7.7 thou/mm3    Lymphocytes # 1.2 1.0 - 4.8 thou/mm3    Monocytes # 1.0 0.4 - 1.3 thou/mm3    Eosinophils # 0.2 0.0 - 0.4 thou/mm3    Basophils # 0.0 0.0 - 0.1 thou/mm3   Hepatic Function Panel   Result Value Ref Range    Total Bilirubin 1.2 0.3 - 1.2 mg/dL    Bilirubin, Direct 0.3 0.0 - 0.3 mg/dL    Alkaline Phosphatase 129 (H) 38 - 126 U/L    AST 11 5 - 40 U/L    ALT 8 (L) 11 - 66 U/L    Total Protein 6.3 6.1 - 8.0 g/dL   Anion Gap   Result Value Ref Range    Anion Gap 13.0 8.0 - 16.0 meq/L   Glomerular Filtration Rate, Estimated   Result Value Ref Range    Est, Glom Filt Rate >90 ml/min/1.73m2   Ammonia   Result Value Ref Range    Ammonia 27 11 - 60 umol/L   Vitamin D 25 hydroxy   Result Value Ref Range    Vit D, 25-Hydroxy 21 (L) 30 - 100 ng/ml   Urine with Reflexed Micro   Result Value Ref Range    Glucose, Ur NEGATIVE NEGATIVE mg/dl    Bilirubin Urine NEGATIVE NEGATIVE    Ketones, Urine NEGATIVE NEGATIVE    Specific Gravity, Urine 1.012 1.002 - 1.03    Blood, Urine LARGE (A) NEGATIVE    pH, UA 6.0 5.0 - 9.0    Protein, UA NEGATIVE NEGATIVE    Urobilinogen, Urine 0.2 0.0 - 1.0 eu/dl    Nitrite, Urine NEGATIVE NEGATIVE    Leukocyte Esterase, Urine NEGATIVE NEGATIVE    Color, UA YELLOW STRAW-YELL    Character, Urine CLEAR CLEAR-SL C    RBC, UA 15-25 0-2/hpf /hpf    WBC, UA 0-2 0-4/hpf /hpf    Epi Cells NONE SEEN 3-5/hpf /hpf    Bacteria, UA NONE FEW/NONE S /hpf    Casts UA NONE SEEN NONE SEEN /lpf    Crystals NONE SEEN NONE SEEN    Renal Epithelial, Urine NONE SEEN NONE SEEN    Yeast, UA NONE SEEN NONE SEEN    CASTS 2 NONE SEEN NONE SEEN /lpf    MISCELLANEOUS 2 NONE SEEN    Protime-INR   Result Value Ref Range    INR 1.95 (H) 0.85 - 1.13   Renal Function Panel   Result Value Ref Range    Glucose 127 (H) 70 - 108 mg/dL    BUN 20 7 - 22 mg/dL CREATININE 0.8 0.4 - 1.2 mg/dL    Sodium 140 135 - 145 meq/L    Potassium 3.6 3.5 - 5.2 meq/L    Chloride 98 98 - 111 meq/L    CO2 28 23 - 33 meq/L    Calcium 9.4 8.5 - 10.5 mg/dL    Phosphorus 3.6 2.4 - 4.7 mg/dL    Alb 3.7 3.5 - 5.1 g/dL   Magnesium   Result Value Ref Range    Magnesium 2.1 1.6 - 2.4 mg/dL   CBC Auto Differential   Result Value Ref Range    WBC 7.1 4.8 - 10.8 thou/mm3    RBC 4.84 4.70 - 6.10 mill/mm3    Hemoglobin 14.4 14.0 - 18.0 gm/dl    Hematocrit 41.6 (L) 42.0 - 52.0 %    MCV 85.9 80.0 - 94.0 fL    MCH 29.8 27.0 - 31.0 pg    MCHC 34.6 33.0 - 37.0 gm/dl    RDW 15.7 (H) 11.5 - 14.5 %    Platelets 765 003 - 262 thou/mm3    MPV 9.1 7.4 - 10.4 mcm    RBC Morphology NORMAL     Seg Neutrophils 74.5 %    Lymphocytes 12.7 %    Monocytes 10.9 %    Eosinophils 1.6 %    Basophils 0.3 %    nRBC 0 /100 wbc    Anisocytosis 1+     Segs Absolute 5.3 1.8 - 7.7 thou/mm3    Lymphocytes # 0.9 (L) 1.0 - 4.8 thou/mm3    Monocytes # 0.8 0.4 - 1.3 thou/mm3    Eosinophils # 0.1 0.0 - 0.4 thou/mm3    Basophils # 0.0 0.0 - 0.1 thou/mm3   Anion Gap   Result Value Ref Range    Anion Gap 14.0 8.0 - 16.0 meq/L   Glomerular Filtration Rate, Estimated   Result Value Ref Range    Est, Glom Filt Rate >90 ml/min/1.73m2   APTT   Result Value Ref Range    aPTT 32.2 22.0 - 38.0 seconds   APTT   Result Value Ref Range    aPTT 125.6 (HH) 22.0 - 38.0 seconds   APTT   Result Value Ref Range    aPTT 70.7 (H) 22.0 - 38.0 seconds   Protime-INR   Result Value Ref Range    INR 1.57 (H) 0.85 - 1.13   Renal Function Panel   Result Value Ref Range    Glucose 142 (H) 70 - 108 mg/dL    BUN 17 7 - 22 mg/dL    CREATININE 1.0 0.4 - 1.2 mg/dL    Sodium 141 135 - 145 meq/L    Potassium 3.9 3.5 - 5.2 meq/L    Chloride 95 (L) 98 - 111 meq/L    CO2 24 23 - 33 meq/L    Calcium 9.4 8.5 - 10.5 mg/dL    Phosphorus 3.2 2.4 - 4.7 mg/dL    Alb 4.3 3.5 - 5.1 g/dL   Magnesium   Result Value Ref Range    Magnesium 2.2 1.6 - 2.4 mg/dL   CBC Auto Differential Result Value Ref Range    WBC 11.2 (H) 4.8 - 10.8 thou/mm3    RBC 5.69 4.70 - 6.10 mill/mm3    Hemoglobin 16.6 14.0 - 18.0 gm/dl    Hematocrit 49.4 42.0 - 52.0 %    MCV 86.9 80.0 - 94.0 fL    MCH 29.2 27.0 - 31.0 pg    MCHC 33.6 33.0 - 37.0 gm/dl    RDW 15.2 (H) 11.5 - 14.5 %    Platelets 673 775 - 787 thou/mm3    MPV 9.2 7.4 - 10.4 mcm    RBC Morphology NORMAL     Seg Neutrophils 92.8 %    Lymphocytes 4.4 %    Monocytes 2.4 %    Eosinophils 0.1 %    Basophils 0.3 %    nRBC 0 /100 wbc    Anisocytosis 1+     Segs Absolute 10.4 (H) 1.8 - 7.7 thou/mm3    Lymphocytes # 0.5 (L) 1.0 - 4.8 thou/mm3    Monocytes # 0.3 (L) 0.4 - 1.3 thou/mm3    Eosinophils # 0.0 0.0 - 0.4 thou/mm3    Basophils # 0.0 0.0 - 0.1 thou/mm3   APTT   Result Value Ref Range    aPTT 32.8 22.0 - 38.0 seconds   APTT   Result Value Ref Range    aPTT 32.1 22.0 - 38.0 seconds   Anion Gap   Result Value Ref Range    Anion Gap 22.0 (H) 8.0 - 16.0 meq/L   Glomerular Filtration Rate, Estimated   Result Value Ref Range    Est, Glom Filt Rate 71 (A) ml/min/1.73m2   Venous O2 Saturation   Result Value Ref Range    POC O2 SAT 98 (H) 94 - 97 %    Source: AO     COLLECTED BY: [id number]    Venous O2 Saturation   Result Value Ref Range    POC O2 SAT 69 (L) 94 - 97 %    Source: SVC     COLLECTED BY: [id number]    Venous O2 Saturation   Result Value Ref Range    POC O2 SAT 70 (L) 94 - 97 %    Source: PA     COLLECTED BY: [id number]    Protime-INR   Result Value Ref Range    INR 1.77 (H) 0.85 - 1.13   Renal Function Panel   Result Value Ref Range    Glucose 120 (H) 70 - 108 mg/dL    BUN 37 (H) 7 - 22 mg/dL    CREATININE 1.3 (H) 0.4 - 1.2 mg/dL    Sodium 140 135 - 145 meq/L    Potassium 4.1 3.5 - 5.2 meq/L    Chloride 99 98 - 111 meq/L    CO2 24 23 - 33 meq/L    Calcium 9.3 8.5 - 10.5 mg/dL    Phosphorus 4.8 (H) 2.4 - 4.7 mg/dL    Alb 4.0 3.5 - 5.1 g/dL   Magnesium   Result Value Ref Range    Magnesium 2.4 1.6 - 2.4 mg/dL   CBC Auto Differential   Result Value Ref Range    WBC /100 wbc    Anisocytosis 1+     Segs Absolute 10.1 (H) 1.8 - 7.7 thou/mm3    Lymphocytes # 0.8 (L) 1.0 - 4.8 thou/mm3    Monocytes # 1.4 (H) 0.4 - 1.3 thou/mm3    Eosinophils # 0.0 0.0 - 0.4 thou/mm3    Basophils # 0.0 0.0 - 0.1 thou/mm3   Anion Gap   Result Value Ref Range    Anion Gap 17.0 (H) 8.0 - 16.0 meq/L   Glomerular Filtration Rate, Estimated   Result Value Ref Range    Est, Glom Filt Rate 44 (A) ml/min/1.73m2   Procalcitonin   Result Value Ref Range    Procalcitonin 0.08 0.01 - 0.09 ng/mL   Urine with Reflexed Micro   Result Value Ref Range    Glucose, Ur NEGATIVE NEGATIVE mg/dl    Bilirubin Urine MODERATE (A) NEGATIVE    Ketones, Urine TRACE (A) NEGATIVE    Specific Gravity, Urine 1.026 1.002 - 1.03    Blood, Urine LARGE (A) NEGATIVE    pH, UA 5.0 5.0 - 9.0    Protein, UA 30 (A) NEGATIVE    Urobilinogen, Urine 0.2 0.0 - 1.0 eu/dl    Nitrite, Urine POSITIVE (A) NEGATIVE    Leukocyte Esterase, Urine SMALL (A) NEGATIVE    Color, UA RED (A) STRAW-YELL    Character, Urine TURBID (A) CLEAR-SL C    RBC, UA > 200 0-2/hpf /hpf    WBC, UA 2-4 0-4/hpf /hpf    Epi Cells 0-2 3-5/hpf /hpf    Bacteria, UA NONE FEW/NONE S /hpf    Casts UA 4-8 HYALINE NONE SEEN /lpf    Crystals NONE SEEN NONE SEEN    Renal Epithelial, Urine NONE SEEN NONE SEEN    Yeast, UA NONE SEEN NONE SEEN    CASTS 2 NONE SEEN NONE SEEN /lpf    MISCELLANEOUS 2 NONE SEEN    Bile Acids, Total   Result Value Ref Range    Ictotest NEGATIVE NEGATIVE   Protime-INR   Result Value Ref Range    INR 3.80 (H) 0.85 - 1.13   Renal Function Panel   Result Value Ref Range    Glucose 118 (H) 70 - 108 mg/dL    BUN 42 (H) 7 - 22 mg/dL    CREATININE 1.2 0.4 - 1.2 mg/dL    Sodium 145 135 - 145 meq/L    Potassium 4.7 3.5 - 5.2 meq/L    Chloride 104 98 - 111 meq/L    CO2 27 23 - 33 meq/L    Calcium 8.7 8.5 - 10.5 mg/dL    Phosphorus 4.6 2.4 - 4.7 mg/dL    Alb 3.3 (L) 3.5 - 5.1 g/dL   Magnesium   Result Value Ref Range    Magnesium 2.5 (H) 1.6 - 2.4 mg/dL   CBC Auto Differential   Result Value Ref Range    WBC 10.7 4.8 - 10.8 thou/mm3    RBC 4.88 4.70 - 6.10 mill/mm3    Hemoglobin 14.2 14.0 - 18.0 gm/dl    Hematocrit 42.7 42.0 - 52.0 %    MCV 87.5 80.0 - 94.0 fL    MCH 29.0 27.0 - 31.0 pg    MCHC 33.1 33.0 - 37.0 gm/dl    RDW 16.0 (H) 11.5 - 14.5 %    Platelets 916 970 - 656 thou/mm3    MPV 9.9 7.4 - 10.4 mcm    RBC Morphology NORMAL     Seg Neutrophils 82.3 %    Lymphocytes 7.0 %    Monocytes 10.6 %    Eosinophils 0.0 %    Basophils 0.1 %    nRBC 0 /100 wbc    Anisocytosis 1+     Segs Absolute 8.8 (H) 1.8 - 7.7 thou/mm3    Lymphocytes # 0.7 (L) 1.0 - 4.8 thou/mm3    Monocytes # 1.1 0.4 - 1.3 thou/mm3    Eosinophils # 0.0 0.0 - 0.4 thou/mm3    Basophils # 0.0 0.0 - 0.1 thou/mm3   CK   Result Value Ref Range    Total CK 42 (L) 55 - 170 U/L   Myoglobin, urine   Result Value Ref Range    Myoglobin, Ur POSITIVE (A) NEGATIVE   Lipase   Result Value Ref Range    Lipase 18.5 5.6 - 51.3 U/L   Lactic acid, plasma   Result Value Ref Range    Lactic Acid 1.0 0.5 - 2.2 mmol/L   D-dimer, quantitative   Result Value Ref Range    D-Dimer, Quant 2101.00 (H) 0.00 - 500.0 ng/ml FEU   Microscopic Urinalysis   Result Value Ref Range    Glucose, Urine NEGATIVE NEGATIVE mg/dl    Bilirubin Urine NEGATIVE NEGATIVE    Ketones, Urine NEGATIVE NEGATIVE    Specific Gravity, UA 1.020 1.002 - 1.03    Blood, Urine LARGE (A) NEGATIVE    pH, UA 5.5 5.0 - 9.0    Protein, UA NEGATIVE NEGATIVE mg/dl    Urobilinogen, Urine 0.2 0.0 - 1.0 eu/dl    Nitrite, Urine NEGATIVE NEGATIVE    Leukocytes, UA NEGATIVE NEGATIVE    Color, UA YELLOW YELLOW-STR    Character, Urine SL CLOUDY [id number]    RBC, UA 15-25 0-2/hpf /hpf    WBC, UA NONE SEEN 0-4/hpf /hpf    Epi Cells NONE SEEN 3-5/hpf /hpf    Bacteria, UA NONE FEW/NONE S    Casts NONE SEEN NONE SEEN /lpf    Crystals NONE SEEN NONE SEEN    Renal Epithelial, Urine NONE SEEN NONE SEEN    Yeast, UA NONE SEEN NONE SEEN    Casts NONE SEEN /lpf    Miscellaneous Lab Test Result 27.0 - 31.0 pg    MCHC 33.3 33.0 - 37.0 gm/dl    RDW 15.6 (H) 11.5 - 14.5 %    Platelets 832 116 - 612 thou/mm3    MPV 9.3 7.4 - 10.4 mcm    RBC Morphology NORMAL     Seg Neutrophils 63.4 %    Lymphocytes 16.7 %    Monocytes 12.8 %    Eosinophils 6.6 %    Basophils 0.5 %    nRBC 0 /100 wbc    Anisocytosis 1+     Segs Absolute 6.3 1.8 - 7.7 thou/mm3    Lymphocytes # 1.7 1.0 - 4.8 thou/mm3    Monocytes # 1.3 0.4 - 1.3 thou/mm3    Eosinophils # 0.7 (H) 0.0 - 0.4 thou/mm3    Basophils # 0.1 0.0 - 0.1 thou/mm3   Anion Gap   Result Value Ref Range    Anion Gap 8.0 8.0 - 16.0 meq/L   Glomerular Filtration Rate, Estimated   Result Value Ref Range    Est, Glom Filt Rate 80 (A) ml/min/1.73m2   MRSA by PCR   Result Value Ref Range    MRSA SCREEN RT-PCR NEGATIVE    Activated clotting time   Result Value Ref Range    ACT-K 153 (H) 1 - 150 seconds   Activated clotting time   Result Value Ref Range    ACT-K 136 1 - 150 seconds   EKG Chest Pain   Result Value Ref Range    Ventricular Rate 87 BPM    Atrial Rate 81 BPM    QRS Duration 98 ms    Q-T Interval 374 ms    QTc Calculation (Bazett) 450 ms    R Axis 27 degrees    T Axis 123 degrees   EKG 12 Lead   Result Value Ref Range    Ventricular Rate 93 BPM    Atrial Rate 85 BPM    QRS Duration 106 ms    Q-T Interval 386 ms    QTc Calculation (Bazett) 479 ms    R Axis 44 degrees    T Axis 118 degrees   TYPE AND SCREEN   Result Value Ref Range    ABO B     Rh Factor POS     Antibody Screen NEG        Diet:  DIET DENTAL SOFT; Daily Fluid Restriction: 2000 ml  Dietary Nutrition Supplements: Frozen Oral Supplement    Activity:  Activity as tolerated (Patient may move about with assist as indicated or with supervision.)    Follow-up:  in the next few days with Misti Quezada MD    Disposition: SNF    Condition: Stable      Time Spent: 35 minutes    Electronically signed by Mima Rodriguez MD on 9/28/2017 at 12:06 PM    Discharging Hospitalist

## 2017-09-29 LAB
MRSA SCREEN: NORMAL
VRE CULTURE: NORMAL

## 2017-10-01 NOTE — PROCEDURES
Procedure: right Internal Carotid Artery Stent Placement with Distal Embolic Protection Device Deployment. Physician: Dr. Lubna Tavarez MD  Indication for Procedure: Posterior circulation insufficiency 2/2 bilateral V4 segment stenosis with CTA evidence of large R PCOM and R ICA cervical stenosis of at least 80%. Pre-treatment Stenosis= 76%  Post-treatment Stenosis= no significant residual stenosis  NIHSS pre-procedure= 1  MRS pre-procedure= 1  NIHSS post-procedure= 1  MRS post-procedure= 1  Fluoroscopy Time: 19 minutes  Contrast: 200 cc of Isovue 300  Medications: Fentanyl, Versed, Lidocaine, Atropine, Heparin  Complications: None  Tasks:  -Catheterization of the right common femoral artery.  -Aortic arch angiogram  -Selective catheterization of the left common carotid artery with    cervical carotid and cerebral angiography.  -Selective catheterization of the right common carotid artery with    cervical carotid and cerebral angiography.  -Stenting of the right internal carotid artery with distal embolic protection device.  -Angioplasty of the right internal carotid artery. Consent:  Material risks/benefits/indications reviewed with patient and/or family including but not limited to a 5% risk of stroke (mild, moderate, or fatal) which may be related to the actual procedure and/or equipment malfunction, a 0.5% risk of femoral artery injury (possibly requiring blood transfusion and/or surgery), dye-related nephropathy, and dye-related anaphylaxis. Patient and/or family understood these risks and wished to proceed. Moreover, diagnostic and treatment alternatives were discussed in depth with patient and/or family including conservative medical management. Decision was made to proceed with intervention. Witnessed written consent was obtained. Description of Procedure:  Diagnostic Portion:  The patient was brought to angiogram suite. The patient's groin was draped and prepared under sterile conditions.     10 cc of supplies contralateral SHELBI via ACOM. There is no other evidence of aneurysm or vascular abnormalities in any of the vessels visualized. The capillary and venous phases appear normal.     Intervention Portion:  Next, under fluoroscopic guidance, the guide catheter was introduced into the body through the sheath and advanced retrograde over a slip glide catheter to the ascending arch. The catheter was then advanced into the right common carotid artery. From this location, biplane cervical and cerebral angiography was performed. There was again notable stenosis of the proximal right internal carotid artery. At this point, decision was made to proceed with stenting. The guide catheter was advanced over the slip glide catheter into the common carotid artery. The glide catheter was subsequently removed from circulation. Under roadmap guidance, an 4-7 mm emboshield was advanced over a .014 microwire and deployed in the petrous portion of the ICA. Next, after pre-treatment with atropine, a stent was advanced over the microwire to the location of the stenosis. Once adequate positioned was ensured, the stent was deployed. A repeat angiogram demonstrated successful deployment with improved flow, however, there remained kinking/constriction of the stent. Thus, a balloon was inflated for approximately 10 seconds. The balloon was deflated and removed from circulation. At this point, repeat angiography demonstrated improved patency of the stent. There was significantly improved flow through the right ICA. The emboshield was subsequently removed from circulation. At this point, repeat cerebral angiography was performed which demonstrated improved flow intracranially to the MCA as well as a large R PCOM. The the guide catheter was subsequently removed from circulation. The femoral artery sheath was subsequently stitched in place.     Summary:  Successful PTAS of proximal R ICA demonstrating notable stenosis (>50% stenosis per NASCET criteria). This procedure conformed to the high risk criteria standards as set forth by CMS due to age >80 years and significant cardiac disease--pt has severe aortic stenosis.       Dorothea Snyder MD

## 2017-10-05 ENCOUNTER — PROCEDURE VISIT (OUTPATIENT)
Dept: UROLOGY | Age: 82
End: 2017-10-05
Payer: MEDICARE

## 2017-10-05 VITALS
WEIGHT: 144 LBS | HEIGHT: 66 IN | DIASTOLIC BLOOD PRESSURE: 68 MMHG | BODY MASS INDEX: 23.14 KG/M2 | SYSTOLIC BLOOD PRESSURE: 128 MMHG

## 2017-10-05 DIAGNOSIS — R33.8 BPH (BENIGN PROSTATIC HYPERTROPHY) WITH URINARY RETENTION: Primary | ICD-10-CM

## 2017-10-05 DIAGNOSIS — N40.1 BPH (BENIGN PROSTATIC HYPERTROPHY) WITH URINARY RETENTION: Primary | ICD-10-CM

## 2017-10-05 DIAGNOSIS — R31.29 MICROSCOPIC HEMATURIA: ICD-10-CM

## 2017-10-05 PROCEDURE — 52000 CYSTOURETHROSCOPY: CPT | Performed by: UROLOGY

## 2017-10-05 PROCEDURE — 99999 PR OFFICE/OUTPT VISIT,PROCEDURE ONLY: CPT | Performed by: UROLOGY

## 2017-10-05 PROCEDURE — 1036F TOBACCO NON-USER: CPT | Performed by: UROLOGY

## 2017-10-05 RX ORDER — GUAIFENESIN 100 MG/5ML
200 SYRUP ORAL 3 TIMES DAILY PRN
COMMUNITY
End: 2018-05-23

## 2017-10-05 RX ORDER — LEVOFLOXACIN 750 MG/1
750 TABLET ORAL DAILY
COMMUNITY
End: 2017-10-23 | Stop reason: ALTCHOICE

## 2017-10-05 NOTE — PROGRESS NOTES
Following Dr. Jorge A Friend of [facility]. Patient instructed on self cath with 14 Fr straight catheter with a residual of 300ml    Patient was supplied with a 14 Fr straight catheter containing a water soluble solution, and a urinary hat for voiding log. Patient was instructed to wash his hands and clean the head of the penis. While patient is sitting on the edge of the chair I explained to the patient I will place the catheter first so he knows what to expect. Once I had shown the patient I gave the catheter to him and ensured he was comfortable with placing the catheter. Patient was advised that he needs to void on his own prior to using the catheter, and then to keep track of urine amount from the catheter. Prescription for catheter were faxed to company, supplying a new catheter for each use. Patient is currently in a rehab facility.

## 2017-10-05 NOTE — MR AVS SNAPSHOT
[facility] [address] [facility] [address]  [facility] [phone]      You Were Seen for:         Comments    BPH (benign prostatic hypertrophy) with urinary retention   [[id number]]         Vital Signs     Blood Pressure Height Weight Body Mass Index Smoking Status       128/68 5' 5.5\" (1.664 m) 144 lb (65.3 kg) 23.6 kg/m2 Never Smoker          Today's Medication Changes          These changes are accurate as of: 10/5/17  2:21 PM.  If you have any questions, ask your nurse or doctor. CHANGE how you take these medications           warfarin 7.5 MG tablet   Commonly known as:  COUMADIN   Instructions:  Indications: Cerebrovascular Accident or Stroke Take as directed by [facility]. Quantity:  105 tablet   Refills:  3   What changed:    - additional instructions  - Another medication with the same name was removed. Continue taking this medication, and follow the directions you see here. Changed by:  Nikki Valerio RPH               Your Current Medications Are              ALBUTEROL SULFATE HFA IN Inhale into the lungs    guaiFENesin (ROBITUSSIN) 100 MG/5ML syrup Take 200 mg by mouth 3 times daily as needed for Cough    levofloxacin (LEVAQUIN) 750 MG tablet Take 750 mg by mouth daily    HYDROcodone-acetaminophen (NORCO) 5-325 MG per tablet Take 1 tablet by mouth every 6 hours as needed for Pain . Earliest Fill Date: 9/28/17    metoprolol succinate (TOPROL XL) 25 MG extended release tablet Take 1 tablet by mouth daily    clopidogrel (PLAVIX) 75 MG tablet Take 1 tablet by mouth daily    tamsulosin (FLOMAX) 0.4 MG capsule Take 1 capsule by mouth nightly    warfarin (COUMADIN) 7.5 MG tablet Indications: Cerebrovascular Accident or Stroke Take as directed by [facility].    bismuth subsalicylate (PEPTO BISMOL) 262 MG/15ML suspension Take 15 mLs by mouth every 6 hours as needed for Indigestion    atorvastatin (LIPITOR) 40 MG tablet Take 40 mg by mouth every 48 hours. Indications: Blood Cholesterol Abnormal    aspirin 325 MG tablet Take 325 mg by mouth daily. With food  Indications: Anticoagulant Therapy    acetaminophen (TYLENOL) 500 MG tablet Take 1,000 mg by mouth every 6 hours as needed. Don't take more than 3,000 mg per day. Indications: Pain    lansoprazole (PREVACID) 15 MG capsule Take 15 mg by mouth daily.  Indications: Gastroesophageal Reflux Disease      Allergies              Iodine Hives      We Ordered/Performed the following           KY CYSTOURETHROSCOPY          Additional Information        Basic Information     Date Of Birth Sex Race Ethnicity Preferred Language    9/18/1930 Male White Non-/Non  English      Problem List as of 10/5/2017  Date Reviewed: 8/28/2017                Carotid stenosis, right    Urinary retention    Neurogenic bladder    Microscopic hematuria    Dehydration    Stenosis of right internal carotid artery, POA    KIRSTIE (acute kidney injury) ([facility] [address]    Acute renal failure, not POA    At risk for aspiration    Severe malnutrition (HCC)    Congestive heart failure (HCC)    Protein-calorie malnutrition, severe (HCC)    Low vitamin D level    Slurred speech, POA    Elevated LFTs    Pleural effusion    Loculated pleural effusion on left, likely from CHF    Chest pain    Shortness of breath    Atrial fibrillation (HCC)    Acute on chronic diastolic heart failure (HCC)    Anticoagulated on Coumadin    History of TIA (transient ischemic attack)    History of myocardial infarction    Nonrheumatic aortic valve stenosis    History of thrombosis - PE, DVT    History of CVA (cerebrovascular accident)    Frailty    Coronary artery disease due to lipid rich plaque    Essential hypertension    PVC's (premature ventricular contractions)      Immunizations as of 10/5/2017     Name Date    Influenza Virus Vaccine 8/21/2017, 12/8/2016, 9/14/2015, 9/9/2014, 9/23/2013, 10/29/2012, 10/1/2011    Pneumococcal Conjugate 7-valent 2/5/2015

## 2017-10-09 ENCOUNTER — OFFICE VISIT (OUTPATIENT)
Dept: CARDIOLOGY CLINIC | Age: 82
End: 2017-10-09
Payer: MEDICARE

## 2017-10-09 ENCOUNTER — OFFICE VISIT (OUTPATIENT)
Dept: CARDIOTHORACIC SURGERY | Age: 82
End: 2017-10-09
Payer: MEDICARE

## 2017-10-09 VITALS
SYSTOLIC BLOOD PRESSURE: 134 MMHG | BODY MASS INDEX: 24.91 KG/M2 | DIASTOLIC BLOOD PRESSURE: 64 MMHG | HEIGHT: 66 IN | HEART RATE: 78 BPM | WEIGHT: 155 LBS

## 2017-10-09 DIAGNOSIS — R06.02 SHORTNESS OF BREATH: ICD-10-CM

## 2017-10-09 DIAGNOSIS — Z95.1 HX OF CABG: ICD-10-CM

## 2017-10-09 DIAGNOSIS — I35.0 SEVERE AORTIC STENOSIS: Primary | ICD-10-CM

## 2017-10-09 DIAGNOSIS — Z95.5 PRESENCE OF CORONARY ARTERY BYPASS GRAFT STENT: ICD-10-CM

## 2017-10-09 DIAGNOSIS — I35.0 NONRHEUMATIC AORTIC VALVE STENOSIS: ICD-10-CM

## 2017-10-09 DIAGNOSIS — Z95.1 PRESENCE OF CORONARY ARTERY BYPASS GRAFT STENT: ICD-10-CM

## 2017-10-09 DIAGNOSIS — I10 ESSENTIAL HYPERTENSION: ICD-10-CM

## 2017-10-09 DIAGNOSIS — I50.22 CHRONIC SYSTOLIC (CONGESTIVE) HEART FAILURE (HCC): ICD-10-CM

## 2017-10-09 PROCEDURE — 1123F ACP DISCUSS/DSCN MKR DOCD: CPT | Performed by: THORACIC SURGERY (CARDIOTHORACIC VASCULAR SURGERY)

## 2017-10-09 PROCEDURE — G8598 ASA/ANTIPLAT THER USED: HCPCS | Performed by: THORACIC SURGERY (CARDIOTHORACIC VASCULAR SURGERY)

## 2017-10-09 PROCEDURE — 99204 OFFICE O/P NEW MOD 45 MIN: CPT | Performed by: THORACIC SURGERY (CARDIOTHORACIC VASCULAR SURGERY)

## 2017-10-09 PROCEDURE — 99213 OFFICE O/P EST LOW 20 MIN: CPT | Performed by: PHYSICIAN ASSISTANT

## 2017-10-09 PROCEDURE — 1123F ACP DISCUSS/DSCN MKR DOCD: CPT | Performed by: PHYSICIAN ASSISTANT

## 2017-10-09 PROCEDURE — 1036F TOBACCO NON-USER: CPT | Performed by: PHYSICIAN ASSISTANT

## 2017-10-09 PROCEDURE — G8427 DOCREV CUR MEDS BY ELIG CLIN: HCPCS | Performed by: THORACIC SURGERY (CARDIOTHORACIC VASCULAR SURGERY)

## 2017-10-09 PROCEDURE — G8427 DOCREV CUR MEDS BY ELIG CLIN: HCPCS | Performed by: PHYSICIAN ASSISTANT

## 2017-10-09 PROCEDURE — G8482 FLU IMMUNIZE ORDER/ADMIN: HCPCS | Performed by: PHYSICIAN ASSISTANT

## 2017-10-09 PROCEDURE — G8417 CALC BMI ABV UP PARAM F/U: HCPCS | Performed by: THORACIC SURGERY (CARDIOTHORACIC VASCULAR SURGERY)

## 2017-10-09 PROCEDURE — 1036F TOBACCO NON-USER: CPT | Performed by: THORACIC SURGERY (CARDIOTHORACIC VASCULAR SURGERY)

## 2017-10-09 PROCEDURE — 4040F PNEUMOC VAC/ADMIN/RCVD: CPT | Performed by: THORACIC SURGERY (CARDIOTHORACIC VASCULAR SURGERY)

## 2017-10-09 PROCEDURE — 1111F DSCHRG MED/CURRENT MED MERGE: CPT | Performed by: THORACIC SURGERY (CARDIOTHORACIC VASCULAR SURGERY)

## 2017-10-09 PROCEDURE — 1111F DSCHRG MED/CURRENT MED MERGE: CPT | Performed by: PHYSICIAN ASSISTANT

## 2017-10-09 PROCEDURE — G8417 CALC BMI ABV UP PARAM F/U: HCPCS | Performed by: PHYSICIAN ASSISTANT

## 2017-10-09 PROCEDURE — 4040F PNEUMOC VAC/ADMIN/RCVD: CPT | Performed by: PHYSICIAN ASSISTANT

## 2017-10-09 PROCEDURE — G8482 FLU IMMUNIZE ORDER/ADMIN: HCPCS | Performed by: THORACIC SURGERY (CARDIOTHORACIC VASCULAR SURGERY)

## 2017-10-09 PROCEDURE — G8598 ASA/ANTIPLAT THER USED: HCPCS | Performed by: PHYSICIAN ASSISTANT

## 2017-10-09 RX ORDER — HYDROCODONE BITARTRATE AND ACETAMINOPHEN 5; 325 MG/1; MG/1
1 TABLET ORAL EVERY 6 HOURS PRN
COMMUNITY
End: 2017-10-23 | Stop reason: CLARIF

## 2017-10-09 ASSESSMENT — ENCOUNTER SYMPTOMS
EYES NEGATIVE: 1
SHORTNESS OF BREATH: 1
GASTROINTESTINAL NEGATIVE: 1

## 2017-10-09 NOTE — PROGRESS NOTES
Subjective:      Patient ID: Sabino Castro is a 80 y.o. male.     HPI    Review of Systems    Objective:   Physical Exam    Assessment:          Plan:

## 2017-10-09 NOTE — PROGRESS NOTES
LFTs    Pleural effusion    Slurred speech, POA    Low vitamin D level    Congestive heart failure (HCC)    Protein-calorie malnutrition, severe (HCC)    Acute renal failure, not POA    At risk for aspiration    Severe malnutrition ([address]    KIRSTIE (acute kidney injury) ([address]    Urinary retention    Neurogenic bladder    Microscopic hematuria    Dehydration    Stenosis of right internal carotid artery, POA    Carotid stenosis, right         Plan:  TUTU 0.6 sq. cm , low gradient, sternotomy x 2, CABG, Carotid stent, previous carotid stent stroke, pvd, chronic left leg lymphedema, right leg oedema mid tibia down. Definitely recommend TAVR over Surgical AVR due to co morbidities. Explained about risk of stroke, permanent pacemaker and bleeding. Mr. Carla Lennox appeared to understand, all of his questions were answered to his and his daughter's satisfaction and he is agreeable to proceed with TAVR as advised.     William Madrid MD

## 2017-10-09 NOTE — PROGRESS NOTES
Pt here for f/u [facility]    Pt denies chest pain, sob, heart palpitations, peripheral edema     Pt continues with dizziness, better last couple days,     . [facility]  [facility] [place]   [facility]   [address] [address]   Dept: [phone]   Dept Fax: [phone]: [phone]      Chief Complaint   Patient presents with    Follow-Up from Hospital     CAD     Follow-up recent hospitalization for congestive heart failure. Since his hospitalization patient states she's been feeling much better. He denies any issues with significant shortness of breath. He denies any chest pain or palpitations. He has some occasional dizziness. Cardiologist:  Dr. Sweetie Burks:   No fever, no chills, No fatigue or weight loss  Pulmonary:    No dyspnea, no wheezing  Cardiac:    Denies recent chest pain   GI:     No nausea or vomiting, no abdominal pain  Neuro:    occasional dizziness   Musculoskeletal:  No recent active issues  Extremities:   No edema, good peripheral pulses      Past Medical History:   Diagnosis Date    CAD (coronary artery disease)     GERD (gastroesophageal reflux disease)     History of CVA (cerebrovascular accident) - noted per MRI-brain on 9/18/2017 which demonstrates old strokes     Hypertension     Myocardial infarct ([address]     Pulmonary embolism ([address]     PVC's (premature ventricular contractions)     Retinal artery thrombosis     Thrombophlebitis     Weakness        Allergies   Allergen Reactions    Iodine Hives       Current Outpatient Prescriptions   Medication Sig Dispense Refill    HYDROcodone-acetaminophen (NORCO) 5-325 MG per tablet Take 1 tablet by mouth every 6 hours as needed for Pain .       ALBUTEROL SULFATE HFA IN Inhale into the lungs      guaiFENesin (ROBITUSSIN) 100 MG/5ML syrup Take 200 mg by mouth 3 times daily as needed for Cough      levofloxacin (LEVAQUIN) 750 MG tablet Take 750 mg by mouth daily  metoprolol succinate (TOPROL XL) 25 MG extended release tablet Take 1 tablet by mouth daily 30 tablet 3    clopidogrel (PLAVIX) 75 MG tablet Take 1 tablet by mouth daily 30 tablet 3    tamsulosin (FLOMAX) 0.4 MG capsule Take 1 capsule by mouth nightly 30 capsule 3    warfarin (COUMADIN) 7.5 MG tablet Indications: Cerebrovascular Accident or Stroke Take as directed by [facility]. (Patient taking differently: Indications: Cerebrovascular Accident or Stroke, Sunday- Tues- thurs- Sat 7.5 mg Take as directed by [facility].) 105 tablet 3    bismuth subsalicylate (PEPTO BISMOL) 262 MG/15ML suspension Take 15 mLs by mouth every 6 hours as needed for Indigestion      atorvastatin (LIPITOR) 40 MG tablet Take 40 mg by mouth every 48 hours. Indications: Blood Cholesterol Abnormal  2    aspirin 325 MG tablet Take 325 mg by mouth daily. With food  Indications: Anticoagulant Therapy      acetaminophen (TYLENOL) 500 MG tablet Take 1,000 mg by mouth every 6 hours as needed. Don't take more than 3,000 mg per day. Indications: Pain      lansoprazole (PREVACID) 15 MG capsule Take 15 mg by mouth daily. Indications: Gastroesophageal Reflux Disease       No current facility-administered medications for this visit. Social History     Social History    Marital status:      Spouse name: N/A    Number of children: N/A    Years of education: N/A     Social History Main Topics    Smoking status: Never Smoker    Smokeless tobacco: Never Used    Alcohol use No    Drug use: No    Sexual activity: Not Asked     Other Topics Concern    None     Social History Narrative    None       Family History   Problem Relation Age of Onset    Heart Disease Mother     Heart Disease Father     Heart Disease Sister     Heart Disease Sister        Blood pressure 134/64, pulse 78, height 5' 5.5\" (1.664 m), weight 155 lb (70.3 kg).     General:   Well developed, well nourished  Lungs:   Clear to

## 2017-10-11 DIAGNOSIS — I35.0 SEVERE AORTIC STENOSIS: Primary | ICD-10-CM

## 2017-10-11 NOTE — PROGRESS NOTES
care for at least 72 hours after you leave the hospital? (i.e. Meal preparation, assistance getting up out of chair)   Yes  No    [x] 3   [] 0    Total Score 10     Key: Destination at discharge from acute care predicted by score. Score < 6  SNF  Score 6-9 Additional intervention to discharge directly home (i.e. Home Care)  Score> 9 Directly home                [address] ([id number])  C[facility]ian Reveal  10/11/2017    The following questions refer to your heart failure and how it may affect your life. Please read and complete the following questions. There is no right or wrong answers. Please pilar the answer that best applies to you. 1. Heart failure affects different people in different ways. Some feel shortness of breath while others feel fatigue. Please indicate how much you are limited by heart failure (shortness of breath or fatigue) in your ability to do the following activities over the past 2 weeks. Extremely        Quite a bit   Moderately           Slightly     Not at all Other or did not     Limited         Limited      Limited              Limited      Limited   Perform activity  Activity__________________________________________________________________________________     a. Shower/bathing []               []           []      []          [x]                         []     b. Walk 1 block       []              []          []     []          []                         []  on level ground       c. Hurrying or         []              [x]          []     []          []                         []  jogging (as if to  catch a bus)       1               2                       3     4            5                         6  ____________________________________________________________________________________________    2. Over the past 2 weeks, how many times did you have swelling in your feet, ankles or legs when you woke up in the morning?             Every Morning   3 or more times             1-2 times per    Less than once    Never,  over        Per week/not QD     Week                per week    past 2weeks              [x]                  []     []                        []         1                    2       3              4   5               ________________________________________________________________    3. Over the past 2 weeks, on average, how many times has fatigue limited your ability to do what you wanted? All of the    Several times       At least           3 or more times         1-2 times       Less than    Never    Time             per day         once per day      per week but not       per week      once/week                  everyday                 []                 []                      [x]                       []                            []                   []                 []           1                  2                        3                         4                              5                     6                 7  ___________________________________________________________________       4. Over the past 2 weeks, on average, how many times has shortness of breath limited your ability to do what you    wanted? All of the      Several       At least             3 or more times          1-2 times        Less than         Never       time         times       Once a day          Per week, but not       per week       once a week                                                                       every day           []                 []                 []                       [x]                           []                   []                    []           1                  2                  3                         4                            5                     6                       7  ___________________________________________________________________    5.  Over the past 2 weeks, on average, how many times have you

## 2017-10-12 ENCOUNTER — TELEPHONE (OUTPATIENT)
Dept: UROLOGY | Age: 82
End: 2017-10-12

## 2017-10-12 LAB — INR BLD: 1.9

## 2017-10-13 RX ORDER — METHYLPREDNISOLONE 32 MG/1
TABLET ORAL
Qty: 2 TABLET | Refills: 0 | Status: SHIPPED | OUTPATIENT
Start: 2017-10-13 | End: 2017-10-23 | Stop reason: ALTCHOICE

## 2017-10-18 LAB — INR BLD: 2.8

## 2017-10-23 ENCOUNTER — OFFICE VISIT (OUTPATIENT)
Dept: NEUROLOGY | Age: 82
End: 2017-10-23

## 2017-10-23 ENCOUNTER — HOSPITAL ENCOUNTER (OUTPATIENT)
Dept: PHARMACY | Age: 82
Setting detail: THERAPIES SERIES
Discharge: HOME | End: 2017-10-23
Payer: MEDICARE

## 2017-10-23 VITALS
SYSTOLIC BLOOD PRESSURE: 122 MMHG | BODY MASS INDEX: 27.38 KG/M2 | HEART RATE: 72 BPM | DIASTOLIC BLOOD PRESSURE: 64 MMHG | HEIGHT: 65 IN | WEIGHT: 164.3 LBS

## 2017-10-23 VITALS
HEART RATE: 83 BPM | WEIGHT: 159.2 LBS | DIASTOLIC BLOOD PRESSURE: 73 MMHG | BODY MASS INDEX: 26.49 KG/M2 | SYSTOLIC BLOOD PRESSURE: 128 MMHG

## 2017-10-23 DIAGNOSIS — Z86.73 HISTORY OF CVA (CEREBROVASCULAR ACCIDENT): ICD-10-CM

## 2017-10-23 DIAGNOSIS — Z86.718 HISTORY OF THROMBOSIS: ICD-10-CM

## 2017-10-23 DIAGNOSIS — I65.21 STENOSIS OF RIGHT CAROTID ARTERY: Primary | ICD-10-CM

## 2017-10-23 LAB — POC INR: 4.8 (ref 0.8–1.2)

## 2017-10-23 PROCEDURE — G8598 ASA/ANTIPLAT THER USED: HCPCS | Performed by: PSYCHIATRY & NEUROLOGY

## 2017-10-23 PROCEDURE — 1123F ACP DISCUSS/DSCN MKR DOCD: CPT | Performed by: PSYCHIATRY & NEUROLOGY

## 2017-10-23 PROCEDURE — 99211 OFF/OP EST MAY X REQ PHY/QHP: CPT

## 2017-10-23 PROCEDURE — 1111F DSCHRG MED/CURRENT MED MERGE: CPT | Performed by: PSYCHIATRY & NEUROLOGY

## 2017-10-23 PROCEDURE — 1036F TOBACCO NON-USER: CPT | Performed by: PSYCHIATRY & NEUROLOGY

## 2017-10-23 PROCEDURE — 85610 PROTHROMBIN TIME: CPT

## 2017-10-23 PROCEDURE — 36416 COLLJ CAPILLARY BLOOD SPEC: CPT

## 2017-10-23 PROCEDURE — 99024 POSTOP FOLLOW-UP VISIT: CPT | Performed by: PSYCHIATRY & NEUROLOGY

## 2017-10-23 PROCEDURE — G8482 FLU IMMUNIZE ORDER/ADMIN: HCPCS | Performed by: PSYCHIATRY & NEUROLOGY

## 2017-10-23 PROCEDURE — G8427 DOCREV CUR MEDS BY ELIG CLIN: HCPCS | Performed by: PSYCHIATRY & NEUROLOGY

## 2017-10-23 PROCEDURE — 4040F PNEUMOC VAC/ADMIN/RCVD: CPT | Performed by: PSYCHIATRY & NEUROLOGY

## 2017-10-23 PROCEDURE — G8417 CALC BMI ABV UP PARAM F/U: HCPCS | Performed by: PSYCHIATRY & NEUROLOGY

## 2017-10-30 ENCOUNTER — HOSPITAL ENCOUNTER (OUTPATIENT)
Dept: PHARMACY | Age: 82
Setting detail: THERAPIES SERIES
Discharge: HOME | End: 2017-10-30
Payer: MEDICARE

## 2017-10-30 VITALS
DIASTOLIC BLOOD PRESSURE: 68 MMHG | SYSTOLIC BLOOD PRESSURE: 128 MMHG | WEIGHT: 154.2 LBS | HEART RATE: 102 BPM | BODY MASS INDEX: 25.66 KG/M2

## 2017-10-30 DIAGNOSIS — I48.91 ATRIAL FIBRILLATION, UNSPECIFIED TYPE (HCC): Primary | ICD-10-CM

## 2017-10-30 DIAGNOSIS — Z86.73 HISTORY OF CVA (CEREBROVASCULAR ACCIDENT): ICD-10-CM

## 2017-10-30 DIAGNOSIS — Z86.718 HISTORY OF THROMBOSIS: ICD-10-CM

## 2017-10-30 LAB
HCT VFR BLD CALC: 36.7 % (ref 42–52)
HEMOGLOBIN: 12 GM/DL (ref 14–18)
INR BLD: 5.5
INR BLD: 5.91 (ref 0.85–1.13)

## 2017-10-30 PROCEDURE — 36416 COLLJ CAPILLARY BLOOD SPEC: CPT

## 2017-10-30 PROCEDURE — 85610 PROTHROMBIN TIME: CPT

## 2017-10-30 PROCEDURE — 99212 OFFICE O/P EST SF 10 MIN: CPT

## 2017-10-30 PROCEDURE — 36415 COLL VENOUS BLD VENIPUNCTURE: CPT

## 2017-10-30 RX ORDER — FUROSEMIDE 40 MG/1
20 TABLET ORAL DAILY
Status: ON HOLD | COMMUNITY
End: 2019-01-01

## 2017-10-30 RX ORDER — TAMSULOSIN HYDROCHLORIDE 0.4 MG/1
0.4 CAPSULE ORAL DAILY
Status: ON HOLD | COMMUNITY
End: 2019-01-01

## 2017-10-30 NOTE — PROGRESS NOTES
10/23/2017    PROTIME 3.06 (H) 11/30/2011    PROTIME 6.37 (HH) 08/29/2011    PROTIME 4.16 (H) 08/06/2011     INR supratherapeutic   Recent Labs      10/30/17   0845   INR  5.91*         Plan:       Hold Coumadin today and tomorrow then return 11/1/17. Patient reminded to call the Anticoagulation Clinic with any signs or symptoms of bleeding or with any medication changes. Called patient's daughter to inform her of final INR result of 5.91. Patient given instructions utilizing the teach back method. Assessment and plan review with Ramon Lima PharmD    Discharged ambulatory in no apparent distress.     Emmanuel AGUILA RP  10/30/2017  8:45 AM

## 2017-11-01 ENCOUNTER — HOSPITAL ENCOUNTER (OUTPATIENT)
Dept: PHARMACY | Age: 82
Setting detail: THERAPIES SERIES
Discharge: HOME | End: 2017-11-01
Payer: MEDICARE

## 2017-11-01 VITALS
SYSTOLIC BLOOD PRESSURE: 127 MMHG | HEART RATE: 100 BPM | BODY MASS INDEX: 24.99 KG/M2 | DIASTOLIC BLOOD PRESSURE: 69 MMHG | WEIGHT: 150.2 LBS

## 2017-11-01 DIAGNOSIS — Z86.73 HISTORY OF CVA (CEREBROVASCULAR ACCIDENT): ICD-10-CM

## 2017-11-01 DIAGNOSIS — Z86.718 HISTORY OF THROMBOSIS: ICD-10-CM

## 2017-11-01 LAB — POC INR: 2.1 (ref 0.8–1.2)

## 2017-11-01 PROCEDURE — 99211 OFF/OP EST MAY X REQ PHY/QHP: CPT | Performed by: PHARMACIST

## 2017-11-01 PROCEDURE — 36416 COLLJ CAPILLARY BLOOD SPEC: CPT | Performed by: PHARMACIST

## 2017-11-01 PROCEDURE — 85610 PROTHROMBIN TIME: CPT | Performed by: PHARMACIST

## 2017-11-01 RX ORDER — WARFARIN SODIUM 5 MG/1
5 TABLET ORAL DAILY
COMMUNITY
End: 2017-12-11 | Stop reason: ALTCHOICE

## 2017-11-01 ASSESSMENT — ENCOUNTER SYMPTOMS
SHORTNESS OF BREATH: 1
CONSTIPATION: 0
BLOOD IN STOOL: 0
DIARRHEA: 0

## 2017-11-01 NOTE — PROGRESS NOTES
The [facility]  Anticoagulation Clinic  [phone] (phone)           [phone] (fax)    Visit Date: 11/1/2017     Subjective:       Patient ID: Tiny Chris, 80 y.o., male    HPI  Patient seen in clinic for anticoagulation management due to PE, DVT with a goal INR of 3-3.5. Patient last seen 2 days ago. INR ordered and reviewed today. Patient denies any new Rx medications. Patient denies any OTC medications or products. Patient denies any missed doses. Patient denies change in diet. Patient denies change in tobacco/alcohol use. Patient denies upcoming surgeries. Review of Systems   Constitutional: Negative for activity change, appetite change and fatigue. HENT: Negative for nosebleeds. Respiratory: Positive for shortness of breath (with activity). Cardiovascular: Negative for chest pain and leg swelling. Gastrointestinal: Negative for blood in stool, constipation and diarrhea. Genitourinary: Negative for hematuria. Neurological: Negative for weakness, light-headedness and headaches. Hematological: Bruises/bleeds easily (has healing bruise on chest).        Objective:   Physical Exam   Vitals:    11/01/17 1126   BP: 127/69   Pulse: 100       Physical Exam   Musculoskeletal: He exhibits edema (2+ edema in LLE, 1+ edema in RLE). Assessment:      Lab Results   Component Value Date    INR 2.10 (H) 11/01/2017    INR 5.91 (HH) 10/30/2017    INR 5.50 10/30/2017    PROTIME 3.06 (H) 11/30/2011    PROTIME 6.37 (HH) 08/29/2011    PROTIME 4.16 (H) 08/06/2011     INR subtherapeutic   Recent Labs      11/01/17   1125   INR  2.10*                               Plan:      7.5mg x1, 5mg x2 then decrease Coumadin 7.5mg MF, 5mg TWThSS. Recheck INR 1 week. Patient reminded to call the Anticoagulation Clinic with signs or symptoms of bleeding or with any medication changes. Patient given instructions utilizing the teach back method.     Discharged

## 2017-11-06 ENCOUNTER — HOSPITAL ENCOUNTER (OUTPATIENT)
Dept: CT IMAGING | Age: 82
Discharge: HOME | End: 2017-11-06
Payer: MEDICARE

## 2017-11-06 ENCOUNTER — HOSPITAL ENCOUNTER (OUTPATIENT)
Dept: PULMONOLOGY | Age: 82
Discharge: HOME | End: 2017-11-06
Payer: MEDICARE

## 2017-11-06 DIAGNOSIS — I35.0 SEVERE AORTIC STENOSIS: ICD-10-CM

## 2017-11-06 LAB — POC CREATININE WHOLE BLOOD: 0.8 MG/DL (ref 0.5–1.2)

## 2017-11-06 PROCEDURE — 94060 EVALUATION OF WHEEZING: CPT

## 2017-11-06 PROCEDURE — 94726 PLETHYSMOGRAPHY LUNG VOLUMES: CPT

## 2017-11-06 PROCEDURE — 6360000004 HC RX CONTRAST MEDICATION: Performed by: NURSE PRACTITIONER

## 2017-11-06 PROCEDURE — 94729 DIFFUSING CAPACITY: CPT

## 2017-11-06 PROCEDURE — 71275 CT ANGIOGRAPHY CHEST: CPT

## 2017-11-06 PROCEDURE — 74174 CTA ABD&PLVS W/CONTRAST: CPT

## 2017-11-06 PROCEDURE — 82565 ASSAY OF CREATININE: CPT

## 2017-11-06 RX ADMIN — IOPAMIDOL 100 ML: 755 INJECTION, SOLUTION INTRAVENOUS at 12:25

## 2017-11-08 ENCOUNTER — HOSPITAL ENCOUNTER (OUTPATIENT)
Dept: PHARMACY | Age: 82
Setting detail: THERAPIES SERIES
Discharge: HOME | End: 2017-11-08
Payer: MEDICARE

## 2017-11-08 VITALS
WEIGHT: 150.8 LBS | DIASTOLIC BLOOD PRESSURE: 68 MMHG | SYSTOLIC BLOOD PRESSURE: 117 MMHG | BODY MASS INDEX: 25.09 KG/M2 | HEART RATE: 90 BPM

## 2017-11-08 DIAGNOSIS — Z79.01 ANTICOAGULANT LONG-TERM USE: Primary | ICD-10-CM

## 2017-11-08 DIAGNOSIS — Z86.718 HISTORY OF THROMBOSIS: ICD-10-CM

## 2017-11-08 DIAGNOSIS — Z79.01 ANTICOAGULANT LONG-TERM USE: ICD-10-CM

## 2017-11-08 DIAGNOSIS — I82.4Y9 DEEP VEIN THROMBOSIS (DVT) OF PROXIMAL LOWER EXTREMITY, UNSPECIFIED CHRONICITY, UNSPECIFIED LATERALITY (HCC): ICD-10-CM

## 2017-11-08 DIAGNOSIS — Z86.73 HISTORY OF CVA (CEREBROVASCULAR ACCIDENT): ICD-10-CM

## 2017-11-08 DIAGNOSIS — Z79.01 ANTICOAGULATED ON COUMADIN: ICD-10-CM

## 2017-11-08 DIAGNOSIS — I63.9 CEREBROVASCULAR ACCIDENT (CVA), UNSPECIFIED MECHANISM (HCC): ICD-10-CM

## 2017-11-08 LAB
HCT VFR BLD CALC: 35 % (ref 42–52)
HEMOGLOBIN: 11.7 GM/DL (ref 14–18)
INR BLD: 6.79 (ref 0.85–1.13)

## 2017-11-08 PROCEDURE — 85610 PROTHROMBIN TIME: CPT

## 2017-11-08 PROCEDURE — 36415 COLL VENOUS BLD VENIPUNCTURE: CPT

## 2017-11-08 PROCEDURE — 99211 OFF/OP EST MAY X REQ PHY/QHP: CPT

## 2017-11-08 PROCEDURE — 36416 COLLJ CAPILLARY BLOOD SPEC: CPT

## 2017-11-08 ASSESSMENT — ENCOUNTER SYMPTOMS
DIARRHEA: 0
CONSTIPATION: 0
BLOOD IN STOOL: 0
SHORTNESS OF BREATH: 1

## 2017-11-08 NOTE — PROGRESS NOTES
The [facility]  Anticoagulation Clinic  [phone] (phone)           [phone] (fax)    Visit Date: 11/8/2017     Subjective:       Patient ID: Luis Thomas, 80 y.o., male    HPI  Patient seen in clinic for anticoagulation management due to h/o PE, DVT,  with a goal INR of 2.0-3.0. Patient last seen 5 days. INR ordered and reviewed today. Patient denies any new Rx medications. Patient denies any OTC medications or products. Patient denies any missed doses. Patient denies change in diet. Patient denies change in tobacco/alcohol use. Patient denies upcoming surgeries. Review of Systems   Constitutional: Negative for activity change, appetite change and fatigue. HENT: Negative for nosebleeds. Respiratory: Positive for shortness of breath (somewhat short of breath; has improved since starting furosemide). Cardiovascular: Negative for chest pain and leg swelling. Gastrointestinal: Negative for blood in stool, constipation and diarrhea. Genitourinary: Negative for hematuria. Neurological: Negative for weakness, light-headedness and headaches. Hematological: Does not bruise/bleed easily. Scheduled for heart valve replacement 12/14/17 in [facility]. Weight has dropped almost 15 pounds since 10/23/17 and starting furosemide. Breathing much improved, but still short of breath.      Objective:   Physical Exam   Vitals:    11/08/17 1456   BP: 117/68   Pulse: 90       Physical Exam    Assessment:      Lab Results   Component Value Date    INR 2.10 (H) 11/01/2017    INR 5.91 (HH) 10/30/2017    INR 5.50 10/30/2017    PROTIME 3.06 (H) 11/30/2011    PROTIME 6.37 (HH) 08/29/2011    PROTIME 4.16 (H) 08/06/2011     INR supratherapeutic No results for input(s): INR in the last 72 hours. Plan:   Hold x2, 2.5 mg x1, 5 mg x2. Recheck 11/13/17.  Patient reminded to call the Anticoagulation Clinic with any signs or symptoms of bleeding or with any medication changes. Patient given instructions utilizing the teach back method. Discharged ambulatory in no apparent distress.

## 2017-11-13 ENCOUNTER — HOSPITAL ENCOUNTER (OUTPATIENT)
Dept: PHARMACY | Age: 82
Setting detail: THERAPIES SERIES
Discharge: HOME | End: 2017-11-13
Payer: MEDICARE

## 2017-11-13 VITALS
HEART RATE: 95 BPM | SYSTOLIC BLOOD PRESSURE: 123 MMHG | WEIGHT: 155 LBS | BODY MASS INDEX: 25.79 KG/M2 | DIASTOLIC BLOOD PRESSURE: 66 MMHG

## 2017-11-13 DIAGNOSIS — Z86.73 HISTORY OF CVA (CEREBROVASCULAR ACCIDENT): ICD-10-CM

## 2017-11-13 DIAGNOSIS — Z86.718 HISTORY OF THROMBOSIS: ICD-10-CM

## 2017-11-13 LAB — POC INR: 1.6 (ref 0.8–1.2)

## 2017-11-13 PROCEDURE — 85610 PROTHROMBIN TIME: CPT | Performed by: PHARMACIST

## 2017-11-13 PROCEDURE — 99211 OFF/OP EST MAY X REQ PHY/QHP: CPT | Performed by: PHARMACIST

## 2017-11-13 PROCEDURE — 36416 COLLJ CAPILLARY BLOOD SPEC: CPT | Performed by: PHARMACIST

## 2017-11-13 NOTE — PROGRESS NOTES
The [address] [place] [address]  Anticoagulation Clinic  [phone] (phone)          [phone] (fax)  Patient presents for 5 days INR check. Patient in for anticoagulation management due to atrial fibrillation with a goal INR of 2.0-3.0. INR ordered and reviewed today. Patient reports the following:    Medication changes: none  Tablet strength per patient: 5 mg  Patient reported dosing regimen over last 1 week: held two doses last week, current schedule 5 mg daily except 7.5 mg MF  Missed doses in the last 1-2 weeks: none  Extra doses in the last 1-2 weeks: none  Any problems with bleeding/bruising? No  Any recent falls? No   Any signs or symptoms of DVT/PE or stroke? No  Alcohol use: none  Tobacco use: none  Diet changes as follows: No changes   Green leafy intake:    Grapefruit juice:   Upcoming surgeries or procedures: 12/14/2017  Appointment preference: 8:45 A  Vitals:    11/13/17 0900   BP: 123/66   Pulse: 95     Lab Results   Component Value Date    INR 1.60 (H) 11/13/2017    INR 6.79 (HH) 11/08/2017    INR 2.10 (H) 11/01/2017    PROTIME 3.06 (H) 11/30/2011    PROTIME 6.37 (HH) 08/29/2011    PROTIME 4.16 (H) 08/06/2011     INR subtherapeutic  Recent Labs      11/13/17   0857   INR  1.60*       Plan:     7.5mg Coumadin today and then decrease Coumadin 5mg daily. Recheck INR 1 weeks. Patient reminded to call the Anticoagulation Clinic with signs or symptoms of bleeding or with any medication changes. Patient given instructions utilizing the teach back method. Discharged ambulatory in no apparent distress.

## 2017-11-18 ENCOUNTER — HOSPITAL ENCOUNTER (INPATIENT)
Age: 82
LOS: 6 days | Discharge: HOME HEALTH | DRG: 291 | End: 2017-11-24
Attending: INTERNAL MEDICINE | Admitting: INTERNAL MEDICINE
Payer: MEDICARE

## 2017-11-18 ENCOUNTER — APPOINTMENT (OUTPATIENT)
Dept: GENERAL RADIOLOGY | Age: 82
DRG: 291 | End: 2017-11-18
Payer: MEDICARE

## 2017-11-18 DIAGNOSIS — I50.23 ACUTE ON CHRONIC SYSTOLIC CONGESTIVE HEART FAILURE (HCC): Primary | ICD-10-CM

## 2017-11-18 PROBLEM — I95.9 HYPOTENSION: Status: ACTIVE | Noted: 2017-11-18

## 2017-11-18 PROBLEM — I50.21 CHF (CONGESTIVE HEART FAILURE), NYHA CLASS IV, ACUTE, SYSTOLIC (HCC): Status: ACTIVE | Noted: 2017-11-18

## 2017-11-18 PROBLEM — D64.9 ANEMIA, NORMOCYTIC NORMOCHROMIC: Status: ACTIVE | Noted: 2017-11-18

## 2017-11-18 LAB
ALBUMIN SERPL-MCNC: 3.8 G/DL (ref 3.5–5.1)
ALLEN TEST: POSITIVE
ALP BLD-CCNC: 137 U/L (ref 38–126)
ALT SERPL-CCNC: 11 U/L (ref 11–66)
ANION GAP SERPL CALCULATED.3IONS-SCNC: 13 MEQ/L (ref 8–16)
ANISOCYTOSIS: ABNORMAL
AST SERPL-CCNC: 11 U/L (ref 5–40)
BASE EXCESS (CALCULATED): 5.5 MMOL/L (ref -2.5–2.5)
BASOPHILS # BLD: 0.5 %
BASOPHILS ABSOLUTE: 0 THOU/MM3 (ref 0–0.1)
BILIRUB SERPL-MCNC: 1 MG/DL (ref 0.3–1.2)
BILIRUBIN DIRECT: 0.3 MG/DL (ref 0–0.3)
BILIRUBIN URINE: NEGATIVE
BLOOD, URINE: NEGATIVE
BUN BLDV-MCNC: 30 MG/DL (ref 7–22)
CALCIUM SERPL-MCNC: 8.8 MG/DL (ref 8.5–10.5)
CHARACTER, URINE: CLEAR
CHLORIDE BLD-SCNC: 98 MEQ/L (ref 98–111)
CO2: 29 MEQ/L (ref 23–33)
COLLECTED BY:: ABNORMAL
COLOR: YELLOW
CREAT SERPL-MCNC: 0.9 MG/DL (ref 0.4–1.2)
D-DIMER QUANTITATIVE: 519 NG/ML FEU (ref 0–500)
DEVICE: ABNORMAL
EKG ATRIAL RATE: 90 BPM
EKG Q-T INTERVAL: 350 MS
EKG QRS DURATION: 102 MS
EKG QTC CALCULATION (BAZETT): 435 MS
EKG R AXIS: 59 DEGREES
EKG T AXIS: 127 DEGREES
EKG VENTRICULAR RATE: 93 BPM
EOSINOPHIL # BLD: 0.4 %
EOSINOPHILS ABSOLUTE: 0 THOU/MM3 (ref 0–0.4)
GFR SERPL CREATININE-BSD FRML MDRD: 80 ML/MIN/1.73M2
GLUCOSE BLD-MCNC: 111 MG/DL (ref 70–108)
GLUCOSE URINE: NEGATIVE MG/DL
HCO3: 30 MMOL/L (ref 23–28)
HCT VFR BLD CALC: 32.8 % (ref 42–52)
HEMOGLOBIN: 10.5 GM/DL (ref 14–18)
IFIO2: 3
INR BLD: 4 (ref 0.85–1.13)
KETONES, URINE: NEGATIVE
LACTIC ACID: 1.2 MMOL/L (ref 0.5–2.2)
LEUKOCYTE ESTERASE, URINE: NEGATIVE
LIPASE: 28.8 U/L (ref 5.6–51.3)
LYMPHOCYTES # BLD: 10.2 %
LYMPHOCYTES ABSOLUTE: 0.9 THOU/MM3 (ref 1–4.8)
MCH RBC QN AUTO: 27.3 PG (ref 27–31)
MCHC RBC AUTO-ENTMCNC: 32.1 GM/DL (ref 33–37)
MCV RBC AUTO: 85 FL (ref 80–94)
MONOCYTES # BLD: 9.5 %
MONOCYTES ABSOLUTE: 0.8 THOU/MM3 (ref 0.4–1.3)
MRSA SCREEN RT-PCR: NEGATIVE
NITRITE, URINE: NEGATIVE
NUCLEATED RED BLOOD CELLS: 0 /100 WBC
O2 SATURATION: 97 %
OSMOLALITY CALCULATION: 286.3 MOSMOL/KG (ref 275–300)
PCO2: 41 MMHG (ref 35–45)
PDW BLD-RTO: 15.3 % (ref 11.5–14.5)
PH BLOOD GAS: 7.47 (ref 7.35–7.45)
PH UA: 6
PLATELET # BLD: 199 THOU/MM3 (ref 130–400)
PMV BLD AUTO: 8.5 MCM (ref 7.4–10.4)
PO2: 81 MMHG (ref 71–104)
POTASSIUM SERPL-SCNC: 3.8 MEQ/L (ref 3.5–5.2)
PRO-BNP: 5768 PG/ML (ref 0–1800)
PROTEIN UA: NEGATIVE
RBC # BLD: 3.86 MILL/MM3 (ref 4.7–6.1)
SEG NEUTROPHILS: 79.4 %
SEGMENTED NEUTROPHILS ABSOLUTE COUNT: 7 THOU/MM3 (ref 1.8–7.7)
SODIUM BLD-SCNC: 140 MEQ/L (ref 135–145)
SOURCE, BLOOD GAS: ABNORMAL
SPECIFIC GRAVITY, URINE: 1.02 (ref 1–1.03)
TOTAL PROTEIN: 6.5 G/DL (ref 6.1–8)
TROPONIN T: 0.02 NG/ML
TROPONIN T: < 0.01 NG/ML
UROBILINOGEN, URINE: 0.2 EU/DL
WBC # BLD: 8.8 THOU/MM3 (ref 4.8–10.8)

## 2017-11-18 PROCEDURE — 82803 BLOOD GASES ANY COMBINATION: CPT

## 2017-11-18 PROCEDURE — 2580000003 HC RX 258: Performed by: FAMILY MEDICINE

## 2017-11-18 PROCEDURE — 99223 1ST HOSP IP/OBS HIGH 75: CPT | Performed by: FAMILY MEDICINE

## 2017-11-18 PROCEDURE — 83690 ASSAY OF LIPASE: CPT

## 2017-11-18 PROCEDURE — 71020 XR CHEST STANDARD TWO VW: CPT

## 2017-11-18 PROCEDURE — 94640 AIRWAY INHALATION TREATMENT: CPT

## 2017-11-18 PROCEDURE — 6370000000 HC RX 637 (ALT 250 FOR IP): Performed by: INTERNAL MEDICINE

## 2017-11-18 PROCEDURE — 82248 BILIRUBIN DIRECT: CPT

## 2017-11-18 PROCEDURE — 85379 FIBRIN DEGRADATION QUANT: CPT

## 2017-11-18 PROCEDURE — 84484 ASSAY OF TROPONIN QUANT: CPT

## 2017-11-18 PROCEDURE — 85610 PROTHROMBIN TIME: CPT

## 2017-11-18 PROCEDURE — 2060000000 HC ICU INTERMEDIATE R&B

## 2017-11-18 PROCEDURE — 36600 WITHDRAWAL OF ARTERIAL BLOOD: CPT

## 2017-11-18 PROCEDURE — 81003 URINALYSIS AUTO W/O SCOPE: CPT

## 2017-11-18 PROCEDURE — 36415 COLL VENOUS BLD VENIPUNCTURE: CPT

## 2017-11-18 PROCEDURE — 83880 ASSAY OF NATRIURETIC PEPTIDE: CPT

## 2017-11-18 PROCEDURE — 83605 ASSAY OF LACTIC ACID: CPT

## 2017-11-18 PROCEDURE — 85025 COMPLETE CBC W/AUTO DIFF WBC: CPT

## 2017-11-18 PROCEDURE — 87641 MR-STAPH DNA AMP PROBE: CPT

## 2017-11-18 PROCEDURE — 93005 ELECTROCARDIOGRAM TRACING: CPT

## 2017-11-18 PROCEDURE — 80053 COMPREHEN METABOLIC PANEL: CPT

## 2017-11-18 PROCEDURE — 87081 CULTURE SCREEN ONLY: CPT

## 2017-11-18 PROCEDURE — 99285 EMERGENCY DEPT VISIT HI MDM: CPT

## 2017-11-18 RX ORDER — BUMETANIDE 0.25 MG/ML
1 INJECTION, SOLUTION INTRAMUSCULAR; INTRAVENOUS ONCE
Status: COMPLETED | OUTPATIENT
Start: 2017-11-18 | End: 2017-11-19

## 2017-11-18 RX ORDER — ASPIRIN 325 MG
325 TABLET ORAL DAILY
Status: DISCONTINUED | OUTPATIENT
Start: 2017-11-18 | End: 2017-11-24 | Stop reason: HOSPADM

## 2017-11-18 RX ORDER — TAMSULOSIN HYDROCHLORIDE 0.4 MG/1
0.4 CAPSULE ORAL DAILY
Status: DISCONTINUED | OUTPATIENT
Start: 2017-11-18 | End: 2017-11-24 | Stop reason: HOSPADM

## 2017-11-18 RX ORDER — SODIUM CHLORIDE 0.9 % (FLUSH) 0.9 %
10 SYRINGE (ML) INJECTION PRN
Status: DISCONTINUED | OUTPATIENT
Start: 2017-11-18 | End: 2017-11-24 | Stop reason: HOSPADM

## 2017-11-18 RX ORDER — LANSOPRAZOLE 15 MG/1
15 CAPSULE, DELAYED RELEASE ORAL DAILY
Status: DISCONTINUED | OUTPATIENT
Start: 2017-11-18 | End: 2017-11-18 | Stop reason: CLARIF

## 2017-11-18 RX ORDER — FUROSEMIDE 40 MG/1
40 TABLET ORAL DAILY
Status: DISCONTINUED | OUTPATIENT
Start: 2017-11-18 | End: 2017-11-19

## 2017-11-18 RX ORDER — ONDANSETRON 2 MG/ML
4 INJECTION INTRAMUSCULAR; INTRAVENOUS EVERY 6 HOURS PRN
Status: DISCONTINUED | OUTPATIENT
Start: 2017-11-18 | End: 2017-11-24 | Stop reason: HOSPADM

## 2017-11-18 RX ORDER — PANTOPRAZOLE SODIUM 40 MG/1
40 TABLET, DELAYED RELEASE ORAL
Status: DISCONTINUED | OUTPATIENT
Start: 2017-11-19 | End: 2017-11-24 | Stop reason: HOSPADM

## 2017-11-18 RX ORDER — METOPROLOL SUCCINATE 25 MG/1
12.5 TABLET, EXTENDED RELEASE ORAL DAILY
Status: DISCONTINUED | OUTPATIENT
Start: 2017-11-19 | End: 2017-11-18

## 2017-11-18 RX ORDER — IPRATROPIUM BROMIDE AND ALBUTEROL SULFATE 2.5; .5 MG/3ML; MG/3ML
1 SOLUTION RESPIRATORY (INHALATION) ONCE
Status: COMPLETED | OUTPATIENT
Start: 2017-11-18 | End: 2017-11-18

## 2017-11-18 RX ORDER — ATORVASTATIN CALCIUM 40 MG/1
40 TABLET, FILM COATED ORAL
Status: DISCONTINUED | OUTPATIENT
Start: 2017-11-18 | End: 2017-11-24 | Stop reason: HOSPADM

## 2017-11-18 RX ORDER — WARFARIN SODIUM 5 MG/1
5 TABLET ORAL DAILY
Status: DISCONTINUED | OUTPATIENT
Start: 2017-11-19 | End: 2017-11-18 | Stop reason: DRUGHIGH

## 2017-11-18 RX ORDER — AMLODIPINE BESYLATE 10 MG/1
10 TABLET ORAL DAILY
Status: ON HOLD | COMMUNITY
End: 2017-11-24 | Stop reason: HOSPADM

## 2017-11-18 RX ORDER — ISOSORBIDE MONONITRATE 30 MG/1
30 TABLET, EXTENDED RELEASE ORAL DAILY
Status: DISCONTINUED | OUTPATIENT
Start: 2017-11-19 | End: 2017-11-24 | Stop reason: HOSPADM

## 2017-11-18 RX ORDER — AMLODIPINE BESYLATE 10 MG/1
10 TABLET ORAL DAILY
Status: DISCONTINUED | OUTPATIENT
Start: 2017-11-19 | End: 2017-11-20

## 2017-11-18 RX ORDER — ISOSORBIDE MONONITRATE 30 MG/1
30 TABLET, EXTENDED RELEASE ORAL DAILY
COMMUNITY
End: 2017-12-14 | Stop reason: ALTCHOICE

## 2017-11-18 RX ORDER — ACETAMINOPHEN 325 MG/1
650 TABLET ORAL EVERY 4 HOURS PRN
Status: DISCONTINUED | OUTPATIENT
Start: 2017-11-18 | End: 2017-11-24 | Stop reason: HOSPADM

## 2017-11-18 RX ORDER — SODIUM CHLORIDE 0.9 % (FLUSH) 0.9 %
10 SYRINGE (ML) INJECTION EVERY 12 HOURS SCHEDULED
Status: DISCONTINUED | OUTPATIENT
Start: 2017-11-18 | End: 2017-11-24 | Stop reason: HOSPADM

## 2017-11-18 RX ORDER — IPRATROPIUM BROMIDE AND ALBUTEROL SULFATE 2.5; .5 MG/3ML; MG/3ML
1 SOLUTION RESPIRATORY (INHALATION) EVERY 4 HOURS PRN
Status: DISCONTINUED | OUTPATIENT
Start: 2017-11-18 | End: 2017-11-24 | Stop reason: HOSPADM

## 2017-11-18 RX ADMIN — IPRATROPIUM BROMIDE AND ALBUTEROL SULFATE 1 AMPULE: .5; 3 SOLUTION RESPIRATORY (INHALATION) at 12:17

## 2017-11-18 RX ADMIN — Medication 10 ML: at 22:17

## 2017-11-18 ASSESSMENT — ENCOUNTER SYMPTOMS
WHEEZING: 0
NAUSEA: 0
EYE DISCHARGE: 0
SORE THROAT: 0
RHINORRHEA: 0
DIARRHEA: 0
VOMITING: 0
SHORTNESS OF BREATH: 1
ABDOMINAL PAIN: 0
COUGH: 1
BACK PAIN: 0
EYE REDNESS: 0

## 2017-11-18 NOTE — ED NOTES
Pt and family updated on plan of care and awaiting admission status. Will monitor.       Fito Tyler RN  11/18/17 1800

## 2017-11-18 NOTE — ED PROVIDER NOTES
Hematological: Negative for adenopathy. Psychiatric/Behavioral: Negative for agitation, confusion, dysphoric mood and suicidal ideas. The patient is not nervous/anxious. PAST MEDICAL HISTORY    has a past medical history of CAD (coronary artery disease); GERD (gastroesophageal reflux disease); History of CVA (cerebrovascular accident) - noted per MRI-brain on 9/18/2017 which demonstrates old strokes; Hypertension; Myocardial infarct; Pulmonary embolism ([address] PVC's (premature ventricular contractions); Retinal artery thrombosis; Thrombophlebitis; and Weakness. SURGICAL HISTORY      has a past surgical history that includes Coronary artery bypass graft; Appendectomy; hernia repair; transthoracic echocardiogram (01/10/2012); Cardiac catheterization (01/16/2012); and Cardiac catheterization. CURRENT MEDICATIONS       Previous Medications    ACETAMINOPHEN (TYLENOL) 500 MG TABLET    Take 1,000 mg by mouth every 6 hours as needed. Don't take more than 3,000 mg per day. Indications: Pain    ASPIRIN 325 MG TABLET    Take 325 mg by mouth daily. With food  Indications: Anticoagulant Therapy    ATORVASTATIN (LIPITOR) 40 MG TABLET    Take 40 mg by mouth every 48 hours. Indications: Blood Cholesterol Abnormal    BISMUTH SUBSALICYLATE (PEPTO BISMOL) 262 MG/15ML SUSPENSION    Take 15 mLs by mouth every 6 hours as needed for Indigestion    CLOPIDOGREL (PLAVIX) 75 MG TABLET    Take 1 tablet by mouth daily    FUROSEMIDE (LASIX) 40 MG TABLET    Take 40 mg by mouth daily    GUAIFENESIN (ROBITUSSIN) 100 MG/5ML SYRUP    Take 200 mg by mouth 3 times daily as needed for Cough    LANSOPRAZOLE (PREVACID) 15 MG CAPSULE    Take 15 mg by mouth daily.  Indications: Gastroesophageal Reflux Disease    METOPROLOL SUCCINATE (TOPROL XL) 25 MG EXTENDED RELEASE TABLET    Take 1 tablet by mouth daily    TAMSULOSIN (FLOMAX) 0.4 MG CAPSULE    Take 0.4 mg by mouth daily    WARFARIN (COUMADIN) 5 MG TABLET    Take 5 mg by mouth daily Take as CONSULTS:  Dr. Juan Carlos Aj:  None      FINAL IMPRESSION      1. Acute on chronic systolic congestive heart failure [facility])          DISPOSITION/PLAN   Admit    PATIENT REFERRED TO:  Summer Durand MD  [place]  [phone]            DISCHARGE MEDICATIONS:  New Prescriptions    No medications on file       (Please note that portions of this note were completed with a voice recognition program.  Efforts were made to edit the dictations but occasionally words are mis-transcribed.)    Scribe: Sheila Resendiz 11/18/17 11:45 AM Scribing for and in the presence of Sheila Resendiz MD.    Signed by: Tanja Claire, 11/18/17 6:27 PM    Provider:  I personally performed the services described in the documentation, reviewed and edited the documentation which was dictated to the scribe in my presence, and it accurately records my words and actions.     Shiela Resendiz MD 11/18/17 6:27 PM        Sheila Resendiz MD  11/18/17 4717

## 2017-11-18 NOTE — ED NOTES
Pt and family updated on admission status and bed placement. Pt assisted with urinal and repositioned on cart for comfort. Pt refused meal tray. Will monitor.       Sully Charlton RN  11/18/17 1892

## 2017-11-19 ENCOUNTER — APPOINTMENT (OUTPATIENT)
Dept: INTERVENTIONAL RADIOLOGY/VASCULAR | Age: 82
DRG: 291 | End: 2017-11-19
Payer: MEDICARE

## 2017-11-19 ENCOUNTER — APPOINTMENT (OUTPATIENT)
Dept: GENERAL RADIOLOGY | Age: 82
DRG: 291 | End: 2017-11-19
Payer: MEDICARE

## 2017-11-19 PROBLEM — R79.1 SUPRATHERAPEUTIC INR: Status: ACTIVE | Noted: 2017-11-19

## 2017-11-19 LAB
ALLEN TEST: POSITIVE
ANION GAP SERPL CALCULATED.3IONS-SCNC: 11 MEQ/L (ref 8–16)
ANISOCYTOSIS: ABNORMAL
BASE EXCESS (CALCULATED): 5.3 MMOL/L (ref -2.5–2.5)
BASOPHILS # BLD: 0.6 %
BASOPHILS ABSOLUTE: 0 THOU/MM3 (ref 0–0.1)
BUN BLDV-MCNC: 27 MG/DL (ref 7–22)
CALCIUM SERPL-MCNC: 8.6 MG/DL (ref 8.5–10.5)
CHLORIDE BLD-SCNC: 98 MEQ/L (ref 98–111)
CO2: 29 MEQ/L (ref 23–33)
COLLECTED BY:: ABNORMAL
CREAT SERPL-MCNC: 0.9 MG/DL (ref 0.4–1.2)
DEVICE: ABNORMAL
EOSINOPHIL # BLD: 1.4 %
EOSINOPHILS ABSOLUTE: 0.1 THOU/MM3 (ref 0–0.4)
GFR SERPL CREATININE-BSD FRML MDRD: 80 ML/MIN/1.73M2
GLUCOSE BLD-MCNC: 113 MG/DL (ref 70–108)
HCO3: 31 MMOL/L (ref 23–28)
HCT VFR BLD CALC: 33.4 % (ref 42–52)
HEMOGLOBIN: 10.8 GM/DL (ref 14–18)
IFIO2: 6
INR BLD: 3.3 (ref 0.85–1.13)
LYMPHOCYTES # BLD: 8.9 %
LYMPHOCYTES ABSOLUTE: 0.7 THOU/MM3 (ref 1–4.8)
MCH RBC QN AUTO: 27.8 PG (ref 27–31)
MCHC RBC AUTO-ENTMCNC: 32.3 GM/DL (ref 33–37)
MCV RBC AUTO: 86 FL (ref 80–94)
MONOCYTES # BLD: 8.5 %
MONOCYTES ABSOLUTE: 0.7 THOU/MM3 (ref 0.4–1.3)
NUCLEATED RED BLOOD CELLS: 0 /100 WBC
O2 SATURATION: 95 %
PCO2: 50 MMHG (ref 35–45)
PDW BLD-RTO: 15.2 % (ref 11.5–14.5)
PH BLOOD GAS: 7.4 (ref 7.35–7.45)
PLATELET # BLD: 170 THOU/MM3 (ref 130–400)
PMV BLD AUTO: 8.9 MCM (ref 7.4–10.4)
PO2: 75 MMHG (ref 71–104)
POTASSIUM SERPL-SCNC: 3.6 MEQ/L (ref 3.5–5.2)
PROCALCITONIN: 0.05 NG/ML (ref 0.01–0.09)
RBC # BLD: 3.89 MILL/MM3 (ref 4.7–6.1)
SEG NEUTROPHILS: 80.6 %
SEGMENTED NEUTROPHILS ABSOLUTE COUNT: 6.4 THOU/MM3 (ref 1.8–7.7)
SODIUM BLD-SCNC: 138 MEQ/L (ref 135–145)
SOURCE, BLOOD GAS: ABNORMAL
T4 FREE: 1.14 NG/DL (ref 0.93–1.76)
TROPONIN T: < 0.01 NG/ML
TROPONIN T: < 0.01 NG/ML
TSH SERPL DL<=0.05 MIU/L-ACNC: 5.79 UIU/ML (ref 0.4–4.2)
WBC # BLD: 7.9 THOU/MM3 (ref 4.8–10.8)

## 2017-11-19 PROCEDURE — 6370000000 HC RX 637 (ALT 250 FOR IP): Performed by: FAMILY MEDICINE

## 2017-11-19 PROCEDURE — 6360000002 HC RX W HCPCS: Performed by: INTERNAL MEDICINE

## 2017-11-19 PROCEDURE — 80048 BASIC METABOLIC PNL TOTAL CA: CPT

## 2017-11-19 PROCEDURE — 36415 COLL VENOUS BLD VENIPUNCTURE: CPT

## 2017-11-19 PROCEDURE — 85610 PROTHROMBIN TIME: CPT

## 2017-11-19 PROCEDURE — 82803 BLOOD GASES ANY COMBINATION: CPT

## 2017-11-19 PROCEDURE — 99232 SBSQ HOSP IP/OBS MODERATE 35: CPT | Performed by: INTERNAL MEDICINE

## 2017-11-19 PROCEDURE — 2500000003 HC RX 250 WO HCPCS: Performed by: FAMILY MEDICINE

## 2017-11-19 PROCEDURE — 2060000000 HC ICU INTERMEDIATE R&B

## 2017-11-19 PROCEDURE — 2580000003 HC RX 258: Performed by: FAMILY MEDICINE

## 2017-11-19 PROCEDURE — 84443 ASSAY THYROID STIM HORMONE: CPT

## 2017-11-19 PROCEDURE — 99222 1ST HOSP IP/OBS MODERATE 55: CPT | Performed by: INTERNAL MEDICINE

## 2017-11-19 PROCEDURE — A4421 OSTOMY SUPPLY MISC: HCPCS

## 2017-11-19 PROCEDURE — 93970 EXTREMITY STUDY: CPT

## 2017-11-19 PROCEDURE — 84145 PROCALCITONIN (PCT): CPT

## 2017-11-19 PROCEDURE — 71010 XR CHEST PORTABLE: CPT

## 2017-11-19 PROCEDURE — 84439 ASSAY OF FREE THYROXINE: CPT

## 2017-11-19 PROCEDURE — 2700000000 HC OXYGEN THERAPY PER DAY

## 2017-11-19 PROCEDURE — 36600 WITHDRAWAL OF ARTERIAL BLOOD: CPT

## 2017-11-19 PROCEDURE — 84484 ASSAY OF TROPONIN QUANT: CPT

## 2017-11-19 PROCEDURE — 85025 COMPLETE CBC W/AUTO DIFF WBC: CPT

## 2017-11-19 RX ORDER — FUROSEMIDE 10 MG/ML
40 INJECTION INTRAMUSCULAR; INTRAVENOUS 2 TIMES DAILY
Status: DISCONTINUED | OUTPATIENT
Start: 2017-11-19 | End: 2017-11-20

## 2017-11-19 RX ORDER — WARFARIN SODIUM 5 MG/1
5 TABLET ORAL ONCE
Status: COMPLETED | OUTPATIENT
Start: 2017-11-19 | End: 2017-11-19

## 2017-11-19 RX ADMIN — FUROSEMIDE 40 MG: 10 INJECTION, SOLUTION INTRAMUSCULAR; INTRAVENOUS at 09:39

## 2017-11-19 RX ADMIN — ASPIRIN 325 MG: 325 TABLET, COATED ORAL at 09:38

## 2017-11-19 RX ADMIN — TAMSULOSIN HYDROCHLORIDE 0.4 MG: 0.4 CAPSULE ORAL at 09:38

## 2017-11-19 RX ADMIN — Medication 10 ML: at 20:08

## 2017-11-19 RX ADMIN — Medication 10 ML: at 09:41

## 2017-11-19 RX ADMIN — BUMETANIDE 1 MG: 0.25 INJECTION INTRAMUSCULAR; INTRAVENOUS at 00:17

## 2017-11-19 RX ADMIN — WARFARIN SODIUM 5 MG: 5 TABLET ORAL at 17:58

## 2017-11-19 RX ADMIN — PANTOPRAZOLE SODIUM 40 MG: 40 TABLET, DELAYED RELEASE ORAL at 09:38

## 2017-11-19 RX ADMIN — AMLODIPINE BESYLATE 10 MG: 10 TABLET ORAL at 09:38

## 2017-11-19 RX ADMIN — ISOSORBIDE MONONITRATE 30 MG: 30 TABLET ORAL at 09:38

## 2017-11-19 NOTE — CONSULTS
[facility]        Cardiology Consultation/ History of present illness      Patient:  Naomi Templeton  YOB: 1930    MRN: [id number]     Acct: [de-identified]    PCP: Dinesh Chatterjee MD    Date of Admission: 11/18/2017      Chief Complaint:    Shortness of breath and lower extremity edema      History Of Present Illness:    80 y.o. male with past medical history of coronary artery disease, coronary artery bypass Surgery, stroke, carotid stent, myocardial infarction, pulmonary embolism hypertension, Severe aortic stenosis, dyslipidemia presented to the Emergency Department for the evaluation of bilateral leg swelling and shortness of breath for last 3 days. Patient  states that leg swelling has gone from ankle up to the thigh in last few days   Complains of worsening of dyspnea. Positive orthopnea    Awaiting percutaneous intervention for aortic valve replacement. Past Medical History:          Diagnosis Date    CAD (coronary artery disease)     CHF (congestive heart failure) (HCC)     GERD (gastroesophageal reflux disease)     History of CVA (cerebrovascular accident) - noted per MRI-brain on 9/18/2017 which demonstrates old strokes     Hypertension     Myocardial infarct     Pulmonary embolism ([address]     PVC's (premature ventricular contractions)     Retinal artery thrombosis     Thrombophlebitis     Weakness        Past Surgical History:          Procedure Laterality Date    APPENDECTOMY      CARDIAC CATHETERIZATION  01/16/2012    Status post successful PCI of SVG to OM2 branch with stent in the proximal area. Stent in the  mid area and balloon angioplasty of the distal anastomotic site.  CARDIAC CATHETERIZATION      CORONARY ARTERY BYPASS GRAFT      X2 1982 1984    HERNIA REPAIR      TRANSTHORACIC ECHOCARDIOGRAM  01/10/2012    LV was mildly dilated. Systolic function was normal. EF was estimated in  the range of 55-65%.  There were no regional wall History:            Problem Relation Age of Onset    Heart Disease Mother     Heart Disease Father     Heart Disease Sister     Heart Disease Sister          Review of Systems - General ROS:Fatigue  Psychological ROS: negative  Hematological and Lymphatic ROS: History of PE . Respiratory ROS: Complains of cough and shortness of breath  Cardiovascular ROS: no chest pain, c/o dyspnea on exertion  Gastrointestinal ROS: negative  Genito-Urinary ROS: no dysuria, trouble voiding, or hematuria  Musculoskeletal ROS: negative  Neurological ROS: no TIA or stroke symptoms  Dermatological ROS: negative   complains of leg swelling     BP (!) 145/85   Pulse 111   Temp 97.6 °F (36.4 °C) (Oral)   Resp 30   Ht 5' 5\" (1.651 m)   Wt 152 lb 4.8 oz (69.1 kg)   SpO2 96% Comment: weaned to 5 liters  BMI 25.34 kg/m²       Physical Examination: General appearance - alert, well appearing, and in mild respiratory distress   Mental status - alert, oriented to person, place, and time  Neck - supple, no significant adenopathy, no JVD  Chest -  reduced at entry bilaterally, few crackles   Heart -  tachycardia irregular rhythm, normal S1, S2, 3/6 systolic murmur at the base   Abdomen - soft, nontender, nondistended  Neurological - alert, oriented, normal speech  Musculoskeletal - no joint tenderness, deformity or swelling  Extremities - peripheral pulses faint,  2+ pitting pedal edema  Skin - normal coloration and turgor      EKG   Afib    Echo 09/17   Left ventricle size mildly dilated and systolic function treduced.   Ejection fraction was estimated at 30%.   Left atrial size was moderately dilated.   The aortic valve was calcified with reduced excursion. DOPPLER: TUTU 0.9-1.0 cm2   The mitral valve structure was calcified with adequate leaflet separation.   DOPPLER: The transmitral velocity was within the normal range with no  evidence for mitral stenosis. There was no evidence of moderate mitral  regurgitation.     Cath 09/17  RCA/LAD/LCx -   SVG to OM is patent, but does have moderate disease; SVG to second OM has mid graft 40-50% ISR, but is grossly patent. The previous anastomotic lesion of the OM2 that underwent PTCA has restenosis. There is competitive flow in the native OM; SVG to RCA has a patent proximal stent. The distal previously placed stent in the native RCA is . The native PDA is patent. Of note, there is a linear lucency at the anastomosis of the SVG-RCA into the aorta. However, this was present previously and does Not result in any impediment to flow. LIMA to LAD patent without any ostial, mid, or anastomotic lesions noted. Could not directly cannulate the LIMA due to tortuous subclavian artery and aorta.         Lab Results   Component Value Date    TROPONINT < 0.010 11/19/2017    TROPONINT < 0.010 11/19/2017    TROPONINT 0.024 11/18/2017       Recent Labs      11/18/17   1230  11/19/17   0214   WBC  8.8  7.9   HGB  10.5*  10.8*   HCT  32.8*  33.4*   MCV  85.0  86.0   PLT  199  170       Recent Labs      11/18/17   1230  11/19/17   0214   NA  140  138   K  3.8  3.6   CL  98  98   CO2  29  29   BUN  30*  27*   CREATININE  0.9  0.9       Lab Results   Component Value Date    ALKPHOS 137 11/18/2017    ALT 11 11/18/2017    AST 11 11/18/2017    PROT 6.5 11/18/2017    BILITOT 1.0 11/18/2017    BILIDIR 0.3 11/18/2017    LABALBU 3.8 11/18/2017    LABALBU 3.9 01/11/2012       Lab Results   Component Value Date    LABA1C 5.4 12/08/2016       Lab Results   Component Value Date    TRIG 53 09/17/2017    HDL 68 09/17/2017    LDLCALC 60 09/17/2017       Lab Results   Component Value Date    TSH 5.790 11/19/2017         Xr Chest Standard (2 Vw)    Result Date: 11/18/2017  PROCEDURE: XR CHEST STANDARD TWO VW CLINICAL INFORMATION: Congestive heart failure, COMPARISON: 9/25/2017 TECHNIQUE: AP upright and lateral views of the chest were obtained. 1. Suboptimal inflation of lungs. Mild thyromegaly.  Metallic

## 2017-11-19 NOTE — PROGRESS NOTES
WBC  8.8  7.9   HGB  10.5*  10.8*   HCT  32.8*  33.4*   PLT  199  170     Recent Labs      11/18/17   1230  11/19/17   0214   NA  140  138   K  3.8  3.6   CL  98  98   CO2  29  29   BUN  30*  27*   CREATININE  0.9  0.9   CALCIUM  8.8  8.6     Recent Labs      11/18/17   1230   AST  11   ALT  11   BILIDIR  0.3   BILITOT  1.0   ALKPHOS  137*     Recent Labs      11/18/17   1230  11/19/17   0214   INR  4.00*  3.30*     No results for input(s): Raya Labor in the last 72 hours. Urinalysis:    Lab Results   Component Value Date    NITRU NEGATIVE 11/18/2017    WBCUA NONE SEEN 09/24/2017    BACTERIA NONE 09/24/2017    RBCUA 15-25 09/24/2017    BLOODU NEGATIVE 11/18/2017    SPECGRAV 1.020 09/24/2017    GLUCOSEU NEGATIVE 11/18/2017       Radiology:  Xr Chest Standard (2 Vw)    Result Date: 11/18/2017  PROCEDURE: XR CHEST STANDARD TWO VW CLINICAL INFORMATION: Congestive heart failure, COMPARISON: 9/25/2017 TECHNIQUE: AP upright and lateral views of the chest were obtained. 1. Suboptimal inflation of lungs. Mild thyromegaly. Metallic sternotomy sutures. Moderately ectatic and tortuous thoracic aorta. 2. Moderate bibasilar atelectasis/pneumonia. Suggestion of a moderate-sized hiatus hernia. Small bilateral effusions. 3. Focal loculated fluid density along the lateral aspect left upper lobe, not present on prior study. This was present, but, a CT thorax dated 11/6/2017 and appears to represent a small loculated effusion. 4. Overall appearance of chest has worsened since prior study. **This report has been created using voice recognition software. It may contain minor errors which are inherent in voice recognition technology. ** Final report electronically signed by Dr. Jackie Hoang on 11/18/2017 12:13 PM    Cta Chest W Wo Contrast    Result Date: 11/8/2017  PROCEDURE: CTA ABD PELVIS W WO CONTRAST, CTA CHEST W WO CONTRAST CLINICAL INFORMATION: Severe aortic stenosis . Pre-op TAVR. Severe aortic stenosis. There is a moderate amount of calcified plaque of the aortic arch. Descending thoracic aorta: There is no significant tortuosity of the descending thoracic aorta. There is no aneurysmal dilation. There is moderate scattered calcified atherosclerosis. Abdominal aorta: There is moderate tortuosity and calcified atherosclerosis. There is no aneurysmal dilation. There is calcified atherosclerosis of the origins of both renal arteries. There is calcified atherosclerosis of the origins of the superior mesenteric artery and celiac artery. Iliofemoral access route: There is potentially significant tortuosity of the left common femoral artery just after the aortic bifurcation. Minimal diameter of the abdominal aorta: 13 mm. Minimal diameter of the right common iliac artery: 8 mm. Minimal diameter of the right external iliac artery: 7 mm. Minimal diameter of the right common femoral artery: 7 mm. Minimal diameter of the left common iliac artery: 8 mm. Minimal diameter of the left external iliac artery: 7 mm. Minimal diameter of the left common femoral artery: 5.5 mm. HEART: There is cardiomegaly. There is enlargement of all 4 chambers. PERICARDIUM:  Normal. CHEST: There is a moderate-sized right pleural effusion. There is a small left-sided pleural effusion which is partially loculated. There are some calcified pleural plaques in the left lung base. There is compressive atelectasis in both lung bases. There  are mild bilateral perihilar groundglass attenuation. This can are present mild pulmonary edema. The pulmonary artery is normal. No pulmonary emboli are noted. No mediastinal adenopathy is noted. There is a moderate-sized hiatal hernia. ABDOMEN:  The liver is normal. The spleen is normal. The adrenals and pancreas are normal. The gallbladder is normal.   There is no hydronephrosis or stones of either kidney. No renal masses are noted. No abnormalities of the small bowel loops are noted.   The IVC and aorta are of WORSENED APPEARANCE OF THE CHEST. **This report has been created using voice recognition software. It may contain minor errors which are inherent in voice recognition technology. ** Final report electronically signed by Dr. Phil Yanez on 11/19/2017 9:20 AM    Vl Dup Lower Extremity Venous Bilateral    Result Date: 11/19/2017  PROCEDURE: VL LOWER EXTREMITY BILATERAL VENOUS DUPLEX CLINICAL INFORMATION: bilateral lower extremity edema, elevated D Dimer, COMPARISON: No prior venous imaging for comparison. TECHNIQUE: Standard duplex sonographic imaging of the left and right lower extremity venous structures provided. Spectral analysis and augmentation maneuvers included. FINDINGS: All venous structures images demonstrate normal compressibility and normal color flow characteristics. Limited evaluation of the peroneal veins of the left lower extremity. No sonographic evidence of left or right lower extremity deep venous thrombosis demonstrated. Soft tissues are nonspecific. NO SONOGRAPHIC EVIDENCE OF LEFT OR RIGHT LOWER SYMMETRY DEEP VENOUS THROMBOSIS. SUBOPTIMAL EVALUATION OF THE PERONEAL VEINS OF THE LEFT LOWER EXTREMITY. **This report has been created using voice recognition software. It may contain minor errors which are inherent in voice recognition technology. ** Final report electronically signed by Dr. Phil Yanez on 11/19/2017 12:06 PM    Cta Abdomen Pelvis W Wo Contrast    Result Date: 11/8/2017  PROCEDURE: CTA ABD PELVIS W WO CONTRAST, CTA CHEST W WO CONTRAST CLINICAL INFORMATION: Severe aortic stenosis . Pre-op TAVR. Severe aortic stenosis. COMPARISON: No prior study. TECHNIQUE: Noncontrast 5 mm axial images were obtained through the chest, abdomen and pelvis. Intravenous Optiray contrast was given. ECG gated axial 0.6 mm axial images were attained of the entire heart. A CT of the entire chest, abdomen and pelvis was obtained at 0.6 mm increments. These are reconstructed into 3 x 3 axial images.  Those are sent to PACS. 3-D reconstructions through the chest, abdomen and pelvis include sagittal and coronal MIP images performed on the scanner. Centerline reconstructions  were obtained. Vascular and valve measurements are made on a dedicated 3-D workstation. Measurements were made in systole with the aortic valve open. The 30% phase was used. All CT scans at this facility use dose modulation, iterative reconstruction, and/or weight-based dosing when appropriate to reduce radiation dose to as low as reasonably achievable. FINDINGS: MEASUREMENTS: AORTIC VALVE: Calcium score: 3,257 Trileaflet valve. There is heavy calcification of the aortic valve leaflets. Valvular calcifications do  extend into the LVOT. These are mild. AORTIC DIMENSIONS: Aortic annulus: 29 x 21 mm. Aortic annulus area: 4.3 cm2. Aortic annulus perimeter: 77 mm. Distance of right coronary ostia to the annulus: 25 mm. Distance of the left coronary ostia to the annulus: 11 mm. Maximum diameter of the aortic sinus: 39 mm. Maximum diameter of the sinotubular junction: 29 mm. Maximum diameter of the mid ascending aorta: 37 mm. Aortic root orientation: 3 cusp view: Vietnamese 8, CAU 3;  Anterior view: FABIAN 0, CAU 12 Coronary anatomy: The patient has had a CABG. The native coronary arteries are heavily calcified and severely diseased. There are 2 patent vein grafts extending to the circumflex/obtuse marginal locations. There is a patent LIMA graft extending to LAD. There are 2 patent vein grafts to the right coronary artery. Ascending aorta and arch: There is mild calcified plaque of the a sending aorta. There is a moderate amount of calcified plaque of the aortic arch. Descending thoracic aorta: There is no significant tortuosity of the descending thoracic aorta. There is no aneurysmal dilation. There is moderate scattered calcified atherosclerosis. Abdominal aorta: There is moderate tortuosity and calcified atherosclerosis. There is no aneurysmal dilation. There is calcified atherosclerosis of the origins of both renal arteries. There is calcified atherosclerosis of the origins of the superior mesenteric artery and celiac artery. Iliofemoral access route: There is potentially significant tortuosity of the left common femoral artery just after the aortic bifurcation. Minimal diameter of the abdominal aorta: 13 mm. Minimal diameter of the right common iliac artery: 8 mm. Minimal diameter of the right external iliac artery: 7 mm. Minimal diameter of the right common femoral artery: 7 mm. Minimal diameter of the left common iliac artery: 8 mm. Minimal diameter of the left external iliac artery: 7 mm. Minimal diameter of the left common femoral artery: 5.5 mm. HEART: There is cardiomegaly. There is enlargement of all 4 chambers. PERICARDIUM:  Normal. CHEST: There is a moderate-sized right pleural effusion. There is a small left-sided pleural effusion which is partially loculated. There are some calcified pleural plaques in the left lung base. There is compressive atelectasis in both lung bases. There  are mild bilateral perihilar groundglass attenuation. This can are present mild pulmonary edema. The pulmonary artery is normal. No pulmonary emboli are noted. No mediastinal adenopathy is noted. There is a moderate-sized hiatal hernia. ABDOMEN:  The liver is normal. The spleen is normal. The adrenals and pancreas are normal. The gallbladder is normal.   There is no hydronephrosis or stones of either kidney. No renal masses are noted. No abnormalities of the small bowel loops are noted. The IVC and aorta are of normal caliber. There is no adenopathy. PELVIS:  There is diffuse thickening of the bladder wall. There is some gas within the urinary bladder. There is no pelvic free fluid. There is a moderate amount of stool throughout the colon. There is no adenopathy. BONES: There are degenerative changes in the spine.  There are chronic appearing compression fractures of

## 2017-11-19 NOTE — PROGRESS NOTES
7:30 AM Patient's oxygen saturation down to 79% on continuous monitor at nurses station. A good pleth was noted. In to check on patient and he states \"I cannot catch my breath. Something needs done about this. When is my procedure to drain my lung? \"  Patient was on 2 liters. Oxygen was increased to 6 liters. Patient is shaky. Lung sounds diminished. Dr. Al Bullock called at this time. No answer so message was left. 8:18 AM Notified respiratory therapist, Casandra Nixon, that STAT ABG's are ordered.

## 2017-11-19 NOTE — PROGRESS NOTES
Pt arrived in 4K 13 from ED and via cart/stretcher. Complaints: CHF. IV none infusing into the antecubital right, condition patent and no redness. Patient and family oriented to room.

## 2017-11-19 NOTE — PROGRESS NOTES
Dr. Mariama Reynoso to floor, updated on changed home medication list and increase of troponin from <0.010 to 0.024. Indicated to d/c lopressor, start Norvasc and IMDUR with parameters and that she will look at chart for interventions. No other orders at this time.

## 2017-11-19 NOTE — PLAN OF CARE
Problem: Falls - Risk of  Goal: Absence of falls  Outcome: Ongoing  Bed alarm on, call light in reach. No falls this shift. Problem: Risk for Impaired Skin Integrity  Goal: Tissue integrity - skin and mucous membranes  Structural intactness and normal physiological function of skin and  mucous membranes. Outcome: Ongoing  Turns in bed Q2 when prompted. No new skin breakdown this shift    Problem: Cardiovascular  Goal: No DVT, peripheral vascular complications  Outcome: Ongoing  No evidence of peripheral vascular complications this shift. Continuing to monitor for redness, swelling and pain. Goal: Hemodynamic stability  Outcome: Ongoing  Vitals:    11/18/17 1850 11/18/17 2015 11/19/17 0006 11/19/17 0012   BP: (!) 97/55 126/66 120/68    Pulse: 77 112 84    Resp: 20 22 18    Temp:  98.8 °F (37.1 °C) 98.2 °F (36.8 °C)    TempSrc:  Oral Oral    SpO2: 96% 91% (!) 89% 92%   Weight:  152 lb 4.8 oz (69.1 kg)     Height:  5' 5\" (1.651 m)     VSS  Goal: Anticoagulate/Hct stable  Outcome: Ongoing  Hct WNL  Goal: Agreement to quit smoking  Outcome: Ongoing  Does not smoke  Goal: Weight maintained or lost  Outcome: Ongoing  Daily weights  Goal: Understanding of dietary restrictions  Outcome: Ongoing  Pt understands low sodium diet restrictions    Problem: Respiratory  Goal: No pulmonary complications  Outcome: Ongoing  LS diminished bialterally. Goal: O2 Sat > 90%  Outcome: Ongoing  O2 sat 92% on 4L via NC  Goal: Supplemental O2 requirements decreased  Outcome: Ongoing  Weaning off O2  Goal: Agreement to quit smoking  Outcome: Completed Date Met: 11/19/17  Pt does not smoke    Comments: Care plan reviewed with patient. Patient verbalizes understanding of the plan of care and contributes to goal setting.

## 2017-11-19 NOTE — H&P
History & Physical    Patient:  Vicky Crow  YOB: 1930  Date of Service: 11/18/2017  MRN: [id number]   Acct:  [de-identified]   Primary Care Physician: Summer Durand MD    Chief Complaint: Shortness of breath, Cough    History of Present Illness:   History obtained from chart review and the patient. The patient is a 80 y.o. male with CHF, CAD with stents, s/p CABG,  GERD, h/o CVA (old) - noted per MRI-brain on 9/18/2017, HTN, MI, PE, Retinal artery thrombosis; Thrombophlebitis who presents to the Emergency Department for the evaluation of bilateral leg swelling and shortness of breath and 3 day history of non productive cough. Patient reports  leg swelling that has gone from ankle high to level of his knees in the past few days. Patient relates that he is taking his medications as prescribed. Patient notes worsening shortness of breath with laying down. He denies modifying factors. Patient denies chest pain, fever, chills, nausea, vomiting, diarrhea, abdominal pain, dysuria or falls. Patient denies smoking or alcohol use. Last echo was in September, 2017 showing Left ventricle size mildly dilated and systolic function is reduced. Ejection fraction was estimated at 30%. Left atrial size was moderately dilated. The aortic valve was calcified with reduced excursion. Patient is being admitted for further evaluation and treatment.                      Past Medical History:        Diagnosis Date    CAD (coronary artery disease)     GERD (gastroesophageal reflux disease)     History of CVA (cerebrovascular accident) - noted per MRI-brain on 9/18/2017 which demonstrates old strokes     Hypertension     Myocardial infarct     Pulmonary embolism ([address]     PVC's (premature ventricular contractions)     Retinal artery thrombosis     Thrombophlebitis     Weakness        Past Surgical History:        Procedure Laterality Date    APPENDECTOMY      CARDIAC CATHETERIZATION  01/16/2012    Status Allergies:  Iodine    Social History:    reports that he has never smoked. He has never used smokeless tobacco. He reports that he does not drink alcohol or use drugs. Family History:       Problem Relation Age of Onset    Heart Disease Mother     Heart Disease Father     Heart Disease Sister     Heart Disease Sister        Review of systems:  Constitutional: no fever, no night sweats, no fatigue  Head: no headache, no head injury, no migranes. Eye: no blurring of vision, no double vision. Ears: no hearing difficulty, no tinnitus  Mouth/throat: no ulceration, dental caries, dysphagia  Lungs: + cough, + shortness of breath, no wheeze  CVS: no palpitation, no chest pain, + shortness of breath  GI: no abdominal pain, no nausea , no vomiting, no constipation  MELISSA: no dysuria, frequency and urgency, no hematuria, no kidney stones  Musculoskeletal: + bilat lower leg swelling , stiffness  Endocrine: no polyuria, polydypsia, no cold or heat intolerence  Hematology: no anemia, no easy brusing or bleeding, no hx of clotting disorder  Dermatology: no skin rash, no eczema, no prurities,  Psychiatry: no depression, no anxiety,no panic attacks, no suicide ideation  Neurology: no syncope, no seizures, no numbness or tingling of hands, no numbness or tingling of feet, no paresis    10 point review of systems completed, all other than noted above are negative. Vitals:   Vitals:    11/18/17 1850   BP: (!) 97/55   Pulse: 77   Resp: 20   Temp:    SpO2: 96%      BMI: There is no height or weight on file to calculate BMI.                 Exam:  Physical Examination: General appearance - drowsy, ill appearing, and in no distress  Mental status - drowsy, oriented to person, place, and time  Neck - supple, no significant adenopathy, no JVD, or carotid bruits  Chest - clear to auscultation, no wheezes, rales or rhonchi, symmetric air entry  Heart - normal rate, regular rhythm, normal S1, S2, ++ murmurs, rubs, clicks or gallops  Abdomen - soft, nontender, nondistended, no masses or organomegaly  Neurological - alert, oriented, normal speech, no focal findings or movement disorder noted  Musculoskeletal - no joint tenderness, deformity or swelling  Extremities - peripheral pulses normal, +++ LE  Edema bilaterally to below knee region, no clubbing or cyanosis  Skin - normal coloration and turgor, no rashes, no suspicious skin lesions noted      Review of Labs and Diagnostic Testing:    Recent Results (from the past 24 hour(s))   EKG SOB    Collection Time: 11/18/17 11:41 AM   Result Value Ref Range    Ventricular Rate 93 BPM    Atrial Rate 90 BPM    QRS Duration 102 ms    Q-T Interval 350 ms    QTc Calculation (Bazett) 435 ms    R Axis 59 degrees    T Axis 127 degrees   CBC auto differential    Collection Time: 11/18/17 12:30 PM   Result Value Ref Range    WBC 8.8 4.8 - 10.8 thou/mm3    RBC 3.86 (L) 4.70 - 6.10 mill/mm3    Hemoglobin 10.5 (L) 14.0 - 18.0 gm/dl    Hematocrit 32.8 (L) 42.0 - 52.0 %    MCV 85.0 80.0 - 94.0 fL    MCH 27.3 27.0 - 31.0 pg    MCHC 32.1 (L) 33.0 - 37.0 gm/dl    RDW 15.3 (H) 11.5 - 14.5 %    Platelets 182 910 - 218 thou/mm3    MPV 8.5 7.4 - 10.4 mcm    Seg Neutrophils 79.4 %    Lymphocytes 10.2 %    Monocytes 9.5 %    Eosinophils 0.4 %    Basophils 0.5 %    nRBC 0 /100 wbc    Anisocytosis 1+ Absent    Segs Absolute 7.0 1.8 - 7.7 thou/mm3    Lymphocytes # 0.9 (L) 1.0 - 4.8 thou/mm3    Monocytes # 0.8 0.4 - 1.3 thou/mm3    Eosinophils # 0.0 0.0 - 0.4 thou/mm3    Basophils # 0.0 0.0 - 0.1 thou/mm3   Brain Natriuretic Peptide    Collection Time: 11/18/17 12:30 PM   Result Value Ref Range    Pro-BNP 5768.0 (H) 0.0 - 1800.0 pg/mL   Protime-INR    Collection Time: 11/18/17 12:30 PM   Result Value Ref Range    INR 4.00 (H) 0.85 - 1.13   Troponin    Collection Time: 11/18/17 12:30 PM   Result Value Ref Range    Troponin T < 0.010 ng/ml   Lipase    Collection Time: 11/18/17 12:30 PM   Result Value Ref Range    Lipase thoracic aorta. 2. Moderate bibasilar atelectasis/pneumonia. Suggestion of a moderate-sized hiatus hernia. Small bilateral effusions. 3. Focal loculated fluid density along the lateral aspect left upper lobe, not present on prior study. This was present, but, a CT thorax dated 11/6/2017 and appears to represent a small loculated effusion. 4. Overall appearance of chest has worsened since prior study. **This report has been created using voice recognition software. It may contain minor errors which are inherent in voice recognition technology. ** Final report electronically signed by Dr. Cha Vargas on 11/18/2017 12:13 PM        EKG: Atrial fibrillation with premature ventricular or aberrantly conducted complexes  Minimal voltage criteria for LVH, may be normal variant  Nonspecific ST and T wave abnormality      Assessment / Plan:    1. Acute on Chronic CHF- Bumex x1, Lasix, BNP, monitor I/O's, Cardiol consult  2. Coronary artery disease due to lipid rich plaque- ASA, Metoprolol, Lipitor,  3. Shortness of breath, poss d/t #1, r/o acs- trend troponin, EKG, 2 d echo, BNP, D Dimer, procalcotonin, lactic acid, lipase, UA, ABGs, CXR  4. Pleural effusion on left  5. Hypotension, poss d/t #1- monitor, TSH, Trend trop, EKG, 2 d echo, ABGs, DDimer  6. Anemia, normocytic normochromic- stool for occult blood, INR, monitor CBC  7. Supratherapeutic INR- Hold Coumadin for tonight, monitor INR, Pharmacy to dose coumadin       Dispo: Admit, Bumex, await pending labs, Cardiology consult.  Hospitalist service will follow        DVT prophylaxis: [] Lovenox                                 [] SCDs                                 [] SQ Heparin                                 [] Encourage ambulation, low risk for DVT, no chemical or mechanical prophylaxis necessary              [x] Already on Anticoagulation                Anticipated Disposition upon discharge: [] Home [x] Home with Home Health                                                                         [] [facility]                                                                         [] [address] ,[address]          Electronically signed by Blaine Vickers DO on 11/18/2017 at 8:12 PM

## 2017-11-20 ENCOUNTER — APPOINTMENT (OUTPATIENT)
Dept: PHARMACY | Age: 82
End: 2017-11-20
Payer: MEDICARE

## 2017-11-20 ENCOUNTER — APPOINTMENT (OUTPATIENT)
Dept: GENERAL RADIOLOGY | Age: 82
DRG: 291 | End: 2017-11-20
Payer: MEDICARE

## 2017-11-20 PROBLEM — I48.0 PAF (PAROXYSMAL ATRIAL FIBRILLATION) (HCC): Status: ACTIVE | Noted: 2017-09-16

## 2017-11-20 PROBLEM — I50.23 NYHA CLASS 3 ACUTE ON CHRONIC SYSTOLIC HEART FAILURE (HCC): Status: ACTIVE | Noted: 2017-11-18

## 2017-11-20 LAB
ANION GAP SERPL CALCULATED.3IONS-SCNC: 11 MEQ/L (ref 8–16)
BUN BLDV-MCNC: 26 MG/DL (ref 7–22)
CALCIUM SERPL-MCNC: 8.2 MG/DL (ref 8.5–10.5)
CHLORIDE BLD-SCNC: 97 MEQ/L (ref 98–111)
CO2: 32 MEQ/L (ref 23–33)
CREAT SERPL-MCNC: 1 MG/DL (ref 0.4–1.2)
GFR SERPL CREATININE-BSD FRML MDRD: 71 ML/MIN/1.73M2
GLUCOSE BLD-MCNC: 90 MG/DL (ref 70–108)
HCT VFR BLD CALC: 30.5 % (ref 42–52)
HEMOGLOBIN: 10 GM/DL (ref 14–18)
INR BLD: 4.02 (ref 0.85–1.13)
MCH RBC QN AUTO: 27.8 PG (ref 27–31)
MCHC RBC AUTO-ENTMCNC: 32.8 GM/DL (ref 33–37)
MCV RBC AUTO: 84.7 FL (ref 80–94)
MRSA SCREEN: NORMAL
PDW BLD-RTO: 14.7 % (ref 11.5–14.5)
PLATELET # BLD: 156 THOU/MM3 (ref 130–400)
PMV BLD AUTO: 9.1 MCM (ref 7.4–10.4)
POTASSIUM SERPL-SCNC: 3.7 MEQ/L (ref 3.5–5.2)
RBC # BLD: 3.61 MILL/MM3 (ref 4.7–6.1)
SODIUM BLD-SCNC: 140 MEQ/L (ref 135–145)
VRE CULTURE: NORMAL
WBC # BLD: 8.4 THOU/MM3 (ref 4.8–10.8)

## 2017-11-20 PROCEDURE — 99232 SBSQ HOSP IP/OBS MODERATE 35: CPT | Performed by: INTERNAL MEDICINE

## 2017-11-20 PROCEDURE — 85027 COMPLETE CBC AUTOMATED: CPT

## 2017-11-20 PROCEDURE — 80048 BASIC METABOLIC PNL TOTAL CA: CPT

## 2017-11-20 PROCEDURE — 6370000000 HC RX 637 (ALT 250 FOR IP): Performed by: INTERNAL MEDICINE

## 2017-11-20 PROCEDURE — 51798 US URINE CAPACITY MEASURE: CPT

## 2017-11-20 PROCEDURE — 6370000000 HC RX 637 (ALT 250 FOR IP): Performed by: NURSE PRACTITIONER

## 2017-11-20 PROCEDURE — 85610 PROTHROMBIN TIME: CPT

## 2017-11-20 PROCEDURE — 2060000000 HC ICU INTERMEDIATE R&B

## 2017-11-20 PROCEDURE — 99232 SBSQ HOSP IP/OBS MODERATE 35: CPT | Performed by: NURSE PRACTITIONER

## 2017-11-20 PROCEDURE — 2580000003 HC RX 258: Performed by: FAMILY MEDICINE

## 2017-11-20 PROCEDURE — 36415 COLL VENOUS BLD VENIPUNCTURE: CPT

## 2017-11-20 PROCEDURE — 6360000002 HC RX W HCPCS: Performed by: NURSE PRACTITIONER

## 2017-11-20 PROCEDURE — 93005 ELECTROCARDIOGRAM TRACING: CPT

## 2017-11-20 PROCEDURE — 51701 INSERT BLADDER CATHETER: CPT

## 2017-11-20 PROCEDURE — 71020 XR CHEST STANDARD TWO VW: CPT

## 2017-11-20 PROCEDURE — 6370000000 HC RX 637 (ALT 250 FOR IP): Performed by: FAMILY MEDICINE

## 2017-11-20 RX ORDER — DIGOXIN 125 MCG
125 TABLET ORAL DAILY
Status: DISCONTINUED | OUTPATIENT
Start: 2017-11-20 | End: 2017-11-24 | Stop reason: HOSPADM

## 2017-11-20 RX ORDER — FUROSEMIDE 10 MG/ML
20 INJECTION INTRAMUSCULAR; INTRAVENOUS 3 TIMES DAILY
Status: DISCONTINUED | OUTPATIENT
Start: 2017-11-20 | End: 2017-11-21

## 2017-11-20 RX ORDER — FUROSEMIDE 10 MG/ML
40 INJECTION INTRAMUSCULAR; INTRAVENOUS 3 TIMES DAILY
Status: DISCONTINUED | OUTPATIENT
Start: 2017-11-20 | End: 2017-11-20

## 2017-11-20 RX ORDER — LISINOPRIL 2.5 MG/1
2.5 TABLET ORAL DAILY
Status: DISCONTINUED | OUTPATIENT
Start: 2017-11-20 | End: 2017-11-24 | Stop reason: HOSPADM

## 2017-11-20 RX ADMIN — METOPROLOL TARTRATE 25 MG: 25 TABLET ORAL at 21:54

## 2017-11-20 RX ADMIN — FUROSEMIDE 20 MG: 10 INJECTION, SOLUTION INTRAMUSCULAR; INTRAVENOUS at 20:32

## 2017-11-20 RX ADMIN — METOPROLOL TARTRATE 25 MG: 25 TABLET ORAL at 01:38

## 2017-11-20 RX ADMIN — DIGOXIN 125 MCG: 0.12 TABLET ORAL at 15:22

## 2017-11-20 RX ADMIN — Medication 0.5 MG: at 17:46

## 2017-11-20 RX ADMIN — FUROSEMIDE 20 MG: 10 INJECTION, SOLUTION INTRAMUSCULAR; INTRAVENOUS at 14:59

## 2017-11-20 RX ADMIN — TAMSULOSIN HYDROCHLORIDE 0.4 MG: 0.4 CAPSULE ORAL at 08:38

## 2017-11-20 RX ADMIN — Medication 10 ML: at 08:38

## 2017-11-20 RX ADMIN — Medication 10 ML: at 20:37

## 2017-11-20 RX ADMIN — ATORVASTATIN CALCIUM 40 MG: 40 TABLET, FILM COATED ORAL at 21:54

## 2017-11-20 RX ADMIN — PANTOPRAZOLE SODIUM 40 MG: 40 TABLET, DELAYED RELEASE ORAL at 06:21

## 2017-11-20 RX ADMIN — ASPIRIN 325 MG: 325 TABLET, COATED ORAL at 08:38

## 2017-11-20 NOTE — PLAN OF CARE
Problem: DISCHARGE BARRIERS  Goal: Patient's continuum of care needs are met  Outcome: Ongoing  See SW note, current with [facility]

## 2017-11-20 NOTE — PROGRESS NOTES
WBC  8.8  7.9  8.4   HGB  10.5*  10.8*  10.0*   HCT  32.8*  33.4*  30.5*   PLT  199  170  156     Recent Labs      11/18/17   1230  11/19/17   0214  11/20/17   0421   NA  140  138  140   K  3.8  3.6  3.7   CL  98  98  97*   CO2  29  29  32   BUN  30*  27*  26*   CREATININE  0.9  0.9  1.0   CALCIUM  8.8  8.6  8.2*     Recent Labs      11/18/17   1230   AST  11   ALT  11   BILIDIR  0.3   BILITOT  1.0   ALKPHOS  137*     Recent Labs      11/18/17   1230  11/19/17   0214  11/20/17   0421   INR  4.00*  3.30*  4.02*     No results for input(s): Shelya Kirk in the last 72 hours. Urinalysis:      Lab Results   Component Value Date    NITRU NEGATIVE 11/18/2017    WBCUA NONE SEEN 09/24/2017    BACTERIA NONE 09/24/2017    RBCUA 15-25 09/24/2017    BLOODU NEGATIVE 11/18/2017    SPECGRAV 1.020 09/24/2017    GLUCOSEU NEGATIVE 11/18/2017       Radiology:  Xr Chest Standard (2 Vw)    Result Date: 11/18/2017  PROCEDURE: XR CHEST STANDARD TWO VW CLINICAL INFORMATION: Congestive heart failure, COMPARISON: 9/25/2017 TECHNIQUE: AP upright and lateral views of the chest were obtained. 1. Suboptimal inflation of lungs. Mild thyromegaly. Metallic sternotomy sutures. Moderately ectatic and tortuous thoracic aorta. 2. Moderate bibasilar atelectasis/pneumonia. Suggestion of a moderate-sized hiatus hernia. Small bilateral effusions. 3. Focal loculated fluid density along the lateral aspect left upper lobe, not present on prior study. This was present, but, a CT thorax dated 11/6/2017 and appears to represent a small loculated effusion. 4. Overall appearance of chest has worsened since prior study. **This report has been created using voice recognition software. It may contain minor errors which are inherent in voice recognition technology. ** Final report electronically signed by Dr. Reji Sigala on 11/18/2017 12:13 PM    Cta Chest W Wo Contrast    Result Date: 11/8/2017  PROCEDURE: CTA ABD PELVIS W WO CONTRAST, CTA CHEST W WO CONTRAST CLINICAL INFORMATION: Severe aortic stenosis . Pre-op TAVR. Severe aortic stenosis. COMPARISON: No prior study. TECHNIQUE: Noncontrast 5 mm axial images were obtained through the chest, abdomen and pelvis. Intravenous Optiray contrast was given. ECG gated axial 0.6 mm axial images were attained of the entire heart. A CT of the entire chest, abdomen and pelvis was obtained at 0.6 mm increments. These are reconstructed into 3 x 3 axial images. Those are sent to PACS. 3-D reconstructions through the chest, abdomen and pelvis include sagittal and coronal MIP images performed on the scanner. Centerline reconstructions  were obtained. Vascular and valve measurements are made on a dedicated 3-D workstation. Measurements were made in systole with the aortic valve open. The 30% phase was used. All CT scans at this facility use dose modulation, iterative reconstruction, and/or weight-based dosing when appropriate to reduce radiation dose to as low as reasonably achievable. FINDINGS: MEASUREMENTS: AORTIC VALVE: Calcium score: 3,257 Trileaflet valve. There is heavy calcification of the aortic valve leaflets. Valvular calcifications do  extend into the LVOT. These are mild. AORTIC DIMENSIONS: Aortic annulus: 29 x 21 mm. Aortic annulus area: 4.3 cm2. Aortic annulus perimeter: 77 mm. Distance of right coronary ostia to the annulus: 25 mm. Distance of the left coronary ostia to the annulus: 11 mm. Maximum diameter of the aortic sinus: 39 mm. Maximum diameter of the sinotubular junction: 29 mm. Maximum diameter of the mid ascending aorta: 37 mm. Aortic root orientation: 3 cusp view: [place] 8, CAU 3;  Anterior view: FABIAN 0, CAU 12 Coronary anatomy: The patient has had a CABG. The native coronary arteries are heavily calcified and severely diseased. There are 2 patent vein grafts extending to the circumflex/obtuse marginal locations. There is a patent LIMA graft extending to LAD.  There are 2 patent vein grafts to the right coronary are noted. No abnormalities of the small bowel loops are noted. The IVC and aorta are of normal caliber. There is no adenopathy. PELVIS:  There is diffuse thickening of the bladder wall. There is some gas within the urinary bladder. There is no pelvic free fluid. There is a moderate amount of stool throughout the colon. There is no adenopathy. BONES: There are degenerative changes in the spine. There are chronic appearing compression fractures of T12 and L2.     1. Preoperative TAVR measurements as listed above. 2. Cardiomegaly. 3. Moderate-sized right pleural effusion and a small partially loculated left pleural effusion. 4. Compressive atelectasis of both lung bases. There is also groundglass attenuation in the perihilar location suggestive of pulmonary edema. 5. Hiatal hernia 6. Diffuse bladder wall thickening. **This report has been created using voice recognition software. It may contain minor errors which are inherent in voice recognition technology. ** Final report electronically signed by Dr. Orestes Moreno on 11/8/2017 7:58 AM    Xr Chest Portable    Result Date: 11/19/2017  PROCEDURE: XR CHEST PORTABLE CLINICAL INFORMATION: dyspnea, COMPARISON: Chest radiograph, 18 November 2017. TECHNIQUE: Standard portable AP view of the chest provided, timed at 0900 hours. FINDINGS: There are bilateral perihilar alveolar abnormalities of the lungs, primarily on the right more than left. These have worsened in the interval. There are hazy characteristics of the lung bases with blunted costophrenic angles bilaterally, consistent with small bilateral pleural effusions. Interval worsening of possible loculated left pleural effusion over the lateral superior left pleural space. There are characteristics of cardiomegaly, stable. Stable appearance of the mediastinal contours, poorly defined. Stable osseous framework.      BILATERAL ALVEOLAR ABNORMALITIES TO INDICATE LIKELY PULMONARY EDEMA, LESS LIKELY BILATERAL PNEUMONIA, obtained at 0.6 mm increments. These are reconstructed into 3 x 3 axial images. Those are sent to PACS. 3-D reconstructions through the chest, abdomen and pelvis include sagittal and coronal MIP images performed on the scanner. Centerline reconstructions  were obtained. Vascular and valve measurements are made on a dedicated 3-D workstation. Measurements were made in systole with the aortic valve open. The 30% phase was used. All CT scans at this facility use dose modulation, iterative reconstruction, and/or weight-based dosing when appropriate to reduce radiation dose to as low as reasonably achievable. FINDINGS: MEASUREMENTS: AORTIC VALVE: Calcium score: 3,257 Trileaflet valve. There is heavy calcification of the aortic valve leaflets. Valvular calcifications do  extend into the LVOT. These are mild. AORTIC DIMENSIONS: Aortic annulus: 29 x 21 mm. Aortic annulus area: 4.3 cm2. Aortic annulus perimeter: 77 mm. Distance of right coronary ostia to the annulus: 25 mm. Distance of the left coronary ostia to the annulus: 11 mm. Maximum diameter of the aortic sinus: 39 mm. Maximum diameter of the sinotubular junction: 29 mm. Maximum diameter of the mid ascending aorta: 37 mm. Aortic root orientation: 3 cusp view: [place] 8, CAU 3;  Anterior view: FABIAN 0, CAU 12 Coronary anatomy: The patient has had a CABG. The native coronary arteries are heavily calcified and severely diseased. There are 2 patent vein grafts extending to the circumflex/obtuse marginal locations. There is a patent LIMA graft extending to LAD. There are 2 patent vein grafts to the right coronary artery. Ascending aorta and arch: There is mild calcified plaque of the a sending aorta. There is a moderate amount of calcified plaque of the aortic arch. Descending thoracic aorta: There is no significant tortuosity of the descending thoracic aorta. There is no aneurysmal dilation. There is moderate scattered calcified atherosclerosis. Abdominal aorta:   There is moderate tortuosity and calcified atherosclerosis. There is no aneurysmal dilation. There is calcified atherosclerosis of the origins of both renal arteries. There is calcified atherosclerosis of the origins of the superior mesenteric artery and celiac artery. Iliofemoral access route: There is potentially significant tortuosity of the left common femoral artery just after the aortic bifurcation. Minimal diameter of the abdominal aorta: 13 mm. Minimal diameter of the right common iliac artery: 8 mm. Minimal diameter of the right external iliac artery: 7 mm. Minimal diameter of the right common femoral artery: 7 mm. Minimal diameter of the left common iliac artery: 8 mm. Minimal diameter of the left external iliac artery: 7 mm. Minimal diameter of the left common femoral artery: 5.5 mm. HEART: There is cardiomegaly. There is enlargement of all 4 chambers. PERICARDIUM:  Normal. CHEST: There is a moderate-sized right pleural effusion. There is a small left-sided pleural effusion which is partially loculated. There are some calcified pleural plaques in the left lung base. There is compressive atelectasis in both lung bases. There  are mild bilateral perihilar groundglass attenuation. This can are present mild pulmonary edema. The pulmonary artery is normal. No pulmonary emboli are noted. No mediastinal adenopathy is noted. There is a moderate-sized hiatal hernia. ABDOMEN:  The liver is normal. The spleen is normal. The adrenals and pancreas are normal. The gallbladder is normal.   There is no hydronephrosis or stones of either kidney. No renal masses are noted. No abnormalities of the small bowel loops are noted. The IVC and aorta are of normal caliber. There is no adenopathy. PELVIS:  There is diffuse thickening of the bladder wall. There is some gas within the urinary bladder. There is no pelvic free fluid. There is a moderate amount of stool throughout the colon. There is no adenopathy.  BONES: There are degenerative changes in the spine. There are chronic appearing compression fractures of T12 and L2.     1. Preoperative TAVR measurements as listed above. 2. Cardiomegaly. 3. Moderate-sized right pleural effusion and a small partially loculated left pleural effusion. 4. Compressive atelectasis of both lung bases. There is also groundglass attenuation in the perihilar location suggestive of pulmonary edema. 5. Hiatal hernia 6. Diffuse bladder wall thickening. **This report has been created using voice recognition software. It may contain minor errors which are inherent in voice recognition technology. ** Final report electronically signed by Dr. Zev Pickard on 11/8/2017 7:58 AM      Diet: DIET LOW SODIUM 2 GM;     Disposition:    [x] Home       [] TCU       [] Rehab       [] Psych       [x] SNF       [] [place]       [] Other-    Code Status: Full Code    Assessment/Plan:    Active Hospital Problems    Diagnosis Date Noted    Coronary artery disease involving native coronary artery of native heart without angina pectoris [I25.10]     Supratherapeutic INR [R79.1] 11/19/2017    Hypotension [I95.9] 11/18/2017    Anemia, normocytic normochromic [D64.9] 11/18/2017    Pleural effusion on left [J90]     Shortness of breath [R06.02] 09/16/2017    Acute on chronic diastolic heart failure (HCC) [I50.33] 09/16/2017    Coronary artery disease due to lipid rich plaque [I25.10, I25.83]      1. Acute on Chronic CHF- IV diuresis, monitor I/O's, trend lytes and Cr.  ECHO. Cardiol consulted  2. Hypotension; possibly diuretic / BP med incued. Will hold meds until BP tolerates  3. Coronary artery disease - ASA, Metoprolol, Lipitor  4. Anemia, normocytic normochromic- stool for occult blood, INR, monitor CBC  5.    Supratherapeutic INR- Hold Coumadin, monitor INR, Pharmacy to dose coumadin       Electronically signed by Teresita Molina MD on 11/20/2017 at 12:43 PM

## 2017-11-20 NOTE — PROCEDURES
A Bladder scan was performed at 1340 . The patient was straight cath this morning . The residual amount was measured to be 160 ML. Report of results was given to Charlette Ramírez, [place].

## 2017-11-20 NOTE — PLAN OF CARE
Problem: Falls - Risk of  Goal: Absence of falls  Outcome: Ongoing  Pt had no falls this time this shift. Bed alarm is on, fall band on, fall sign posted. Hourly rounding on pt. Problem: Risk for Impaired Skin Integrity  Goal: Tissue integrity - skin and mucous membranes  Structural intactness and normal physiological function of skin and  mucous membranes. Outcome: Ongoing  Pt has no evidence of skin breakdown this shift. Pt turning q2h, pillow support given. Hourly rounding on pt. Problem: Cardiovascular  Goal: No DVT, peripheral vascular complications  Outcome: Ongoing  Pt has no evidence of DVT this shift. Pt is on coumadin. Goal: Hemodynamic stability  Outcome: Ongoing  Pt has been hemodynamically stable this shift. Vitals stable. Goal: Anticoagulate/Hct stable  Outcome: Ongoing  Pt INR is 3.3, Hct 33.4. Goal: Weight maintained or lost  Outcome: Ongoing  Obtaining daily weights on pt. Goal: Understanding of dietary restrictions  Outcome: Ongoing  Pt is understanding of low sodium diet. Problem: Respiratory  Goal: No pulmonary complications  Outcome: Ongoing  Pt has required to have O2 increased during the day. Pt O2 sats 98% on 6L N/C. Goal: O2 Sat > 90%  Outcome: Ongoing  Pt O2 sats 98% on 6L N/C. Goal: Supplemental O2 requirements decreased  Outcome: Not Met This Shift  Pt O2 sats 98% on 6L N/C. O2 required to be increased during the day. Will attempt to titrate O2. Goal: Pneumonia vaccine at discharge as needed  Outcome: Ongoing  Will administer upon discharge. Problem: Discharge Planning:  Goal: Discharged to appropriate level of care  Discharged to appropriate level of care   Outcome: Ongoing  Pt plans home with home health when medically stable. Comments: Care plan reviewed with patient. Patient verbalized understanding of the plan of care and contribute to goal setting.

## 2017-11-20 NOTE — PROCEDURES
A Bladder scan was performed at 1747 . The patient's last void was at 1745 . The residual amount was measured to be 63 ML. Report of results was given to [facility].

## 2017-11-20 NOTE — PROGRESS NOTES
9:19 AM Performed straight cath. Only got 150ml in return. Urine started out yellow and cloudy and urine became thick at the end. Notified Dr. Matt Olson and received orders for urine testing.

## 2017-11-20 NOTE — PROGRESS NOTES
findings or movement disorder noted    Medications:    lisinopril  2.5 mg Oral Daily    metoprolol tartrate  25 mg Oral BID    furosemide  40 mg Intravenous TID    warfarin  0.5 mg Oral Once    pneumococcal 13-valent conjugate  0.5 mL Intramuscular Once    aspirin  325 mg Oral Daily    atorvastatin  40 mg Oral Q48H    tamsulosin  0.4 mg Oral Daily    sodium chloride flush  10 mL Intravenous 2 times per day    pantoprazole  40 mg Oral QAM AC    warfarin (COUMADIN) daily dosing (placeholder)   Other RX Placeholder    isosorbide mononitrate  30 mg Oral Daily          ipratropium-albuterol 1 ampule Q4H PRN   sodium chloride flush 10 mL PRN   acetaminophen 650 mg Q4H PRN   magnesium hydroxide 30 mL Daily PRN   ondansetron 4 mg Q6H PRN       Diagnostics:  EK2017 11:41:04 [facility] ROUTINE RETRIEVAL  Atrial fibrillation with premature ventricular or aberrantly conducted complexes  Minimal voltage criteria for LVH, may be normal variant  Nonspecific ST and T wave abnormality  Abnormal ECG  When compared with ECG of 19-SEP-2017 21:31,  Nonspecific T wave abnormality has replaced inverted T waves in Lateral leads  Confirmed by Mason Woods ([id number]) on 2017 7:35:29 PM      Echo:   Summary   This was a low-dose DSE for the evaluation of aortic stenosis.   At this workload, there were no stress-induced ischemic ECG changes or   wall motion abnormalities. There was no LV dilation observed with stress.   There is severe aortic stenosis (low flow, low gradient).    LVEF and LV contractility improved at the peak dose of dobutamine for the   low-dose protocol.      Signature      ----------------------------------------------------------------   Electronically signed by Patsi Klinefelter MD (Interpreting   [id number]) on 2017     Left Heart Cath:  RIGHT HEART CATHETERIZATION:  Right atrial pressure 2, right  ventricular pressure 31/2, PA pressure 29/8 with a mean of 18, PA  saturation 69%, wedge pressure 10, cardiac output 3.1, cardiac index  1.8, SVC saturation 68%.     CORONARY ANGIOGRAPHY:  LEFT MAIN:  Distal .     LAD:  .     LEFT CIRCUMFLEX:  .     RCA:  Dominant, .     GRAFTS:  SVG to OM is patent, but does have moderate disease; SVG to  second OM has mid graft 40-50% ISR, but is grossly patent. The  previous anastomotic lesion of the OM2 that underwent PTCA has restenosis. There is competitive flow in the native OM; SVG to RCA has a patent proximal  stent. The distal previously placed stent in the native RCA is . The native PDA is  patent. Of note, there is a linear lucency at the anastomosis of the  SVG-RCA into the aorta. However, this was present previously and does  Not result in any impediment to flow. LIMA to LAD patent without any  ostial, mid, or anastomotic lesions noted. Could not directly  cannulate the LIMA due to tortuous subclavian artery and aorta.     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 Patent LIMA to LAD without any disease. Extremely  complex venous graft disease that was previously intervened upon, but  has had progression of disease despite intervention. At this point,  any further intervention is likely to result in graft loss. Given that all of the vein grafts while they may have disease are patent and  provide reasonable flow and the fact that the LIMA to LAD is open, we  would opt for medical therapy and further management of the aortic  stenosis.     PLAN:  1. Manual pull for the arterial sheath and 4 to 6 hours bed rest  post hemostasis. 2.  Continued inpatient management of medical issues including  delirium. 3.  Discussed with the family regarding TAVR and management of his  aortic stenosis. 4.  Continue optimal medical therapy for coronary artery disease.   5.  At this point, we will hold on any revascularization of his graft  disease given the previous interventions, the moderate disease, the  risk of graft loss with the complexity of the plaque and lesions. 6.  CT Surgery consultation for TAVR evaluation.     All the above was explained to the patient/the patient's family and  they were agreeable and amenable to the plan.      NAE GIMENEZ  D: 09/22/2017       Lab Data:    Cardiac Enzymes:  No results for input(s): CKTOTAL, CKMB, CKMBINDEX, TROPONINI in the last 72 hours.     CBC:   Lab Results   Component Value Date    WBC 8.4 11/20/2017    RBC 3.61 11/20/2017    RBC 3.74 01/17/2012    HGB 10.0 11/20/2017    HCT 30.5 11/20/2017     11/20/2017       CMP:  Lab Results   Component Value Date     11/20/2017    K 3.7 11/20/2017    CL 97 11/20/2017    CO2 32 11/20/2017    BUN 26 11/20/2017    CREATININE 1.0 11/20/2017    LABGLOM 71 11/20/2017    GLUCOSE 90 11/20/2017    GLUCOSE 111 01/17/2012    CALCIUM 8.2 11/20/2017       Hepatic Function Panel:  Lab Results   Component Value Date    ALKPHOS 137 11/18/2017    ALT 11 11/18/2017    AST 11 11/18/2017    PROT 6.5 11/18/2017    BILITOT 1.0 11/18/2017    BILIDIR 0.3 11/18/2017    LABALBU 3.8 11/18/2017    LABALBU 3.9 01/11/2012       Magnesium:    Lab Results   Component Value Date    MG 2.3 09/27/2017       PT/INR:    Lab Results   Component Value Date    PROTIME 3.06 11/30/2011    INR 4.02 11/20/2017       HgBA1c:    Lab Results   Component Value Date    LABA1C 5.4 12/08/2016       FLP:  Lab Results   Component Value Date    TRIG 53 09/17/2017    HDL 68 09/17/2017    LDLCALC 60 09/17/2017       TSH:    Lab Results   Component Value Date    TSH 5.790 11/19/2017         Assessment:    Acute on chronic systolic CHF [address] 3    ICDMP EF 30%    Severe AS   For TAVR 11-    CABG x2   recent cath with OMT    Chronic AFB with CVR   [id number]  3   On coumadin    Hx PE - chronic OAC  hypotensive  HLP    PVD- s\p carotid stenting        Plan:    Add digoxin   unable to get BB/ ACE with diuretics DT low BP    Change lasix to 20mg MIRLANDE CARREON    CXR am           Electronically signed by Allyn Castaneda CNP on 11/20/2017 at 12:58 PM

## 2017-11-21 LAB
ANION GAP SERPL CALCULATED.3IONS-SCNC: 9 MEQ/L (ref 8–16)
BACTERIA: ABNORMAL /HPF
BILIRUBIN URINE: NEGATIVE
BLOOD, URINE: ABNORMAL
BUN BLDV-MCNC: 30 MG/DL (ref 7–22)
CALCIUM SERPL-MCNC: 8.2 MG/DL (ref 8.5–10.5)
CASTS 2: ABNORMAL /LPF
CASTS UA: ABNORMAL /LPF
CHARACTER, URINE: ABNORMAL
CHLORIDE BLD-SCNC: 96 MEQ/L (ref 98–111)
CO2: 32 MEQ/L (ref 23–33)
COLOR: YELLOW
CREAT SERPL-MCNC: 1 MG/DL (ref 0.4–1.2)
CRYSTALS, UA: ABNORMAL
EPITHELIAL CELLS, UA: ABNORMAL /HPF
GFR SERPL CREATININE-BSD FRML MDRD: 71 ML/MIN/1.73M2
GLUCOSE BLD-MCNC: 101 MG/DL (ref 70–108)
GLUCOSE URINE: NEGATIVE MG/DL
HCT VFR BLD CALC: 31.2 % (ref 42–52)
HEMOGLOBIN: 10.1 GM/DL (ref 14–18)
INR BLD: 4.57 (ref 0.85–1.13)
KETONES, URINE: NEGATIVE
LEUKOCYTE ESTERASE, URINE: ABNORMAL
MCH RBC QN AUTO: 27.7 PG (ref 27–31)
MCHC RBC AUTO-ENTMCNC: 32.4 GM/DL (ref 33–37)
MCV RBC AUTO: 85.5 FL (ref 80–94)
MISCELLANEOUS 2: ABNORMAL
NITRITE, URINE: NEGATIVE
PDW BLD-RTO: 15.6 % (ref 11.5–14.5)
PH UA: 5
PLATELET # BLD: 155 THOU/MM3 (ref 130–400)
PMV BLD AUTO: 9.2 MCM (ref 7.4–10.4)
POTASSIUM SERPL-SCNC: 3.6 MEQ/L (ref 3.5–5.2)
PROTEIN UA: NEGATIVE
RBC # BLD: 3.65 MILL/MM3 (ref 4.7–6.1)
RBC URINE: ABNORMAL /HPF
RENAL EPITHELIAL, UA: ABNORMAL
SODIUM BLD-SCNC: 137 MEQ/L (ref 135–145)
SPECIFIC GRAVITY, URINE: 1.02 (ref 1–1.03)
UROBILINOGEN, URINE: 0.2 EU/DL
WBC # BLD: 9.6 THOU/MM3 (ref 4.8–10.8)
WBC UA: ABNORMAL /HPF
YEAST: ABNORMAL

## 2017-11-21 PROCEDURE — 2700000000 HC OXYGEN THERAPY PER DAY

## 2017-11-21 PROCEDURE — 87077 CULTURE AEROBIC IDENTIFY: CPT

## 2017-11-21 PROCEDURE — 87186 SC STD MICRODIL/AGAR DIL: CPT

## 2017-11-21 PROCEDURE — 6370000000 HC RX 637 (ALT 250 FOR IP): Performed by: NURSE PRACTITIONER

## 2017-11-21 PROCEDURE — 99231 SBSQ HOSP IP/OBS SF/LOW 25: CPT | Performed by: NURSE PRACTITIONER

## 2017-11-21 PROCEDURE — 85610 PROTHROMBIN TIME: CPT

## 2017-11-21 PROCEDURE — 81001 URINALYSIS AUTO W/SCOPE: CPT

## 2017-11-21 PROCEDURE — 6360000002 HC RX W HCPCS: Performed by: NURSE PRACTITIONER

## 2017-11-21 PROCEDURE — 6370000000 HC RX 637 (ALT 250 FOR IP): Performed by: FAMILY MEDICINE

## 2017-11-21 PROCEDURE — 36592 COLLECT BLOOD FROM PICC: CPT

## 2017-11-21 PROCEDURE — 80048 BASIC METABOLIC PNL TOTAL CA: CPT

## 2017-11-21 PROCEDURE — 2580000003 HC RX 258: Performed by: FAMILY MEDICINE

## 2017-11-21 PROCEDURE — 2060000000 HC ICU INTERMEDIATE R&B

## 2017-11-21 PROCEDURE — 87184 SC STD DISK METHOD PER PLATE: CPT

## 2017-11-21 PROCEDURE — 85027 COMPLETE CBC AUTOMATED: CPT

## 2017-11-21 PROCEDURE — 87086 URINE CULTURE/COLONY COUNT: CPT

## 2017-11-21 PROCEDURE — 99232 SBSQ HOSP IP/OBS MODERATE 35: CPT | Performed by: HOSPITALIST

## 2017-11-21 PROCEDURE — 36415 COLL VENOUS BLD VENIPUNCTURE: CPT

## 2017-11-21 RX ORDER — FUROSEMIDE 40 MG/1
40 TABLET ORAL DAILY
Status: DISCONTINUED | OUTPATIENT
Start: 2017-11-22 | End: 2017-11-24 | Stop reason: HOSPADM

## 2017-11-21 RX ADMIN — FUROSEMIDE 20 MG: 10 INJECTION, SOLUTION INTRAMUSCULAR; INTRAVENOUS at 13:38

## 2017-11-21 RX ADMIN — Medication 10 ML: at 21:02

## 2017-11-21 RX ADMIN — ASPIRIN 325 MG: 325 TABLET, COATED ORAL at 08:13

## 2017-11-21 RX ADMIN — PANTOPRAZOLE SODIUM 40 MG: 40 TABLET, DELAYED RELEASE ORAL at 06:17

## 2017-11-21 RX ADMIN — METOPROLOL TARTRATE 25 MG: 25 TABLET ORAL at 08:13

## 2017-11-21 RX ADMIN — ISOSORBIDE MONONITRATE 30 MG: 30 TABLET ORAL at 13:38

## 2017-11-21 RX ADMIN — TAMSULOSIN HYDROCHLORIDE 0.4 MG: 0.4 CAPSULE ORAL at 08:13

## 2017-11-21 RX ADMIN — DIGOXIN 125 MCG: 0.12 TABLET ORAL at 08:13

## 2017-11-21 RX ADMIN — Medication 10 ML: at 08:14

## 2017-11-21 RX ADMIN — METOPROLOL TARTRATE 25 MG: 25 TABLET ORAL at 21:02

## 2017-11-21 RX ADMIN — FUROSEMIDE 20 MG: 10 INJECTION, SOLUTION INTRAMUSCULAR; INTRAVENOUS at 08:13

## 2017-11-21 NOTE — PLAN OF CARE
Problem: Falls - Risk of  Goal: Absence of falls  Outcome: Ongoing  Pt had no falls this time this shift. Bed alarm is on, fall band on, fall sign posted. Hourly rounding on pt. Problem: Risk for Impaired Skin Integrity  Goal: Tissue integrity - skin and mucous membranes  Structural intactness and normal physiological function of skin and  mucous membranes. Outcome: Ongoing  Pt has no evidence of skin breakdown this shift. Pt turning q2h, pillow support given. Hourly rounding on pt. Problem: Cardiovascular  Goal: No DVT, peripheral vascular complications  Outcome: Ongoing  Pt has no evidence of DVT this shift. Pt is on coumadin. Goal: Hemodynamic stability  Outcome: Ongoing  Pt has been hemodynamically stable this shift. Vitals stable. Goal: Anticoagulate/Hct stable  Outcome: Ongoing  Pt INR is supratherapeutic at 4.1, Hct 30.5. Goal: Weight maintained or lost  Outcome: Ongoing  Obtaining daily weights on pt. Goal: Understanding of dietary restrictions  Outcome: Ongoing  Pt is understanding of low sodium diet. Problem: Respiratory  Goal: No pulmonary complications  Outcome: Ongoing  No pulmonary complications noted this shift. Pt O2 sats 99% on 5L N/C. Goal: O2 Sat > 90%  Outcome: Ongoing  Pt O2 sats 99% on 5L N/C. Goal: Supplemental O2 requirements decreased  Outcome: Ongoing  Pt O2 sats 99% on 5L N/C. Will attempt to titrate O2. Goal: Pneumonia vaccine at discharge as needed  Outcome: Ongoing  Will administer upon discharge. Problem: Discharge Planning:  Goal: Discharged to appropriate level of care  Discharged to appropriate level of care   Outcome: Ongoing  Pt plans home with home health when medically stable. Problem: DISCHARGE BARRIERS  Goal: Patient's continuum of care needs are met  Outcome: Ongoing  Pt continuum of care needs currently being met. Pt plans home with [facility] when medically stable. Comments: Care plan reviewed with patient and family.   Patient and family verbalize understanding of the plan of care and contribute to goal setting.

## 2017-11-21 NOTE — PLAN OF CARE
Problem: Falls - Risk of  Goal: Absence of falls  Outcome: Ongoing  Call light, overbed table, and belongings within reach. Bed/ chair alarm activated. Up with standby assistance and a walker. Patient included in hourly rounds. Problem: Risk for Impaired Skin Integrity  Goal: Tissue integrity - skin and mucous membranes  Structural intactness and normal physiological function of skin and  mucous membranes. Outcome: Ongoing  Blanchable redness noted to buttocks, bilateral heels, and behind bilateral ears. Problem: Cardiovascular  Goal: No DVT, peripheral vascular complications  Outcome: Ongoing  No signs/ symptoms of DVT. Goal: Hemodynamic stability  Outcome: Ongoing  Vitals:    11/21/17 0804   BP: (!) 98/55   Pulse: 69   Resp: 18   Temp: 97.6 °F (36.4 °C)   SpO2: 96%       Goal: Anticoagulate/Hct stable  Outcome: Ongoing  Hemoglobin 10.1  Goal: Weight maintained or lost  Outcome: Ongoing  Weight increased by 2lbs since yesterday. Goal: Understanding of dietary restrictions  Outcome: Ongoing  Verbalizes understanding of physician ordered diet. Problem: Respiratory  Goal: No pulmonary complications  Outcome: Ongoing  Lungs diminished in bases. Goal: O2 Sat > 90%  Outcome: Ongoing  O2 above 90% on 1L nasal cannula. Attempting to wean supplemental O2. Goal: Supplemental O2 requirements decreased  Outcome: Ongoing  Currently in 1L nasal cannula. Problem: Discharge Planning:  Goal: Discharged to appropriate level of care  Discharged to appropriate level of care   Outcome: Ongoing  Plan to return home with home health at discharge. Problem: DISCHARGE BARRIERS  Goal: Patient's continuum of care needs are met  Outcome: Ongoing  Plan to return home with home health at discharge. Comments: Care plan reviewed with patient. Patient verbalizes understanding of the plan of care and contributes to goal setting.

## 2017-11-21 NOTE — PROGRESS NOTES
Clinical Pharmacy Note    Warfarin consult follow-up    Recent Labs      11/21/17   0441   INR  4.57*     Recent Labs      11/19/17   0214  11/20/17   0421  11/21/17   0441   HGB  10.8*  10.0*  10.1*   HCT  33.4*  30.5*  31.2*   PLT  170  156  155       Significant Drug-Drug Interactions:  New warfarin drug-drug interactions: none  Discontinued drug-drug interactions: none  Current warfarin drug-drug interactions: ASA 325mg daily        Date INR Warfarin Dose   11/18/17 4.00 No Coumadin    11/19/2017 3.34  5 mg    11/20/2017  4.02  0.5 mg    11/21/2017 4.57   No Coumadin                                   Notes:                     Daily PT/INR until stable within therapeutic range.    Jaida Ludwig, PharmD  PGY-1 Resident  11/21/2017 5:07 PM

## 2017-11-21 NOTE — PROGRESS NOTES
Progress Note    Patient:  Mikey James    Unit/Bed:4K-13/013-A  YOB: 1930  MRN: [id number]   Acct: [de-identified]   Admit date: 11/18/2017      Principal Problem:    Acute on chronic diastolic heart failure (HCC)  Active Problems:    Coronary artery disease due to lipid rich plaque    Shortness of breath    Pleural effusion on left    Hypotension    Anemia, normocytic normochromic    Supratherapeutic INR    Coronary artery disease involving native coronary artery of native heart without angina pectoris    Acute on chronic systolic congestive heart failure (HCC)      Assessment and Plan:  1. Acute on chronic systolic heart failure:  Appreciate cardio input, hold diuresis for one day  2. Acute hypoxemic respiratory failure due to #1: wean O2, now down to [facility]. Encourage IS  3. supratherapeutic INR: continue to hold coumadin  4. History of PE: coumadin  5. Paroxysmal atrial fibrillation with Hypotension:  Continue rate controlling meds with holding parameters  6. Weakness: PT/OT eval  7. Severe AS:  Planned for TAVR next week  8. Dispo: dc planning 1-2 days        Patient Seen, Chart, Consults notes, Labs, Radiology studies reviewed. Subjective: Day 3 of stay with acute on chronic systolic heart failure  Patient seen and examined at bedside, states he is feeling better today. No new complaints or acute overnight events    All other ROS negative except noted in HPI    Past, Family, Social History unchanged from admission. Diet:  DIET LOW SODIUM 2 GM;     Medications:  Scheduled Meds:   [START ON 11/22/2017] furosemide  40 mg Oral Daily    lisinopril  2.5 mg Oral Daily    metoprolol tartrate  25 mg Oral BID    digoxin  125 mcg Oral Daily    pneumococcal 13-valent conjugate  0.5 mL Intramuscular Once    aspirin  325 mg Oral Daily    atorvastatin  40 mg Oral Q48H    tamsulosin  0.4 mg Oral Daily    sodium chloride flush  10 mL Intravenous 2 times per day    pantoprazole  40 mg Oral failure, COMPARISON: 9/25/2017 TECHNIQUE: AP upright and lateral views of the chest were obtained. 1. Suboptimal inflation of lungs. Mild thyromegaly. Metallic sternotomy sutures. Moderately ectatic and tortuous thoracic aorta. 2. Moderate bibasilar atelectasis/pneumonia. Suggestion of a moderate-sized hiatus hernia. Small bilateral effusions. 3. Focal loculated fluid density along the lateral aspect left upper lobe, not present on prior study. This was present, but, a CT thorax dated 11/6/2017 and appears to represent a small loculated effusion. 4. Overall appearance of chest has worsened since prior study. **This report has been created using voice recognition software. It may contain minor errors which are inherent in voice recognition technology. ** Final report electronically signed by Dr. Eric Mistry on 11/18/2017 12:13 PM    Cta Chest W Wo Contrast    Result Date: 11/8/2017  PROCEDURE: CTA ABD PELVIS W WO CONTRAST, CTA CHEST W WO CONTRAST CLINICAL INFORMATION: Severe aortic stenosis . Pre-op TAVR. Severe aortic stenosis. COMPARISON: No prior study. TECHNIQUE: Noncontrast 5 mm axial images were obtained through the chest, abdomen and pelvis. Intravenous Optiray contrast was given. ECG gated axial 0.6 mm axial images were attained of the entire heart. A CT of the entire chest, abdomen and pelvis was obtained at 0.6 mm increments. These are reconstructed into 3 x 3 axial images. Those are sent to PACS. 3-D reconstructions through the chest, abdomen and pelvis include sagittal and coronal MIP images performed on the scanner. Centerline reconstructions  were obtained. Vascular and valve measurements are made on a dedicated 3-D workstation. Measurements were made in systole with the aortic valve open. The 30% phase was used. All CT scans at this facility use dose modulation, iterative reconstruction, and/or weight-based dosing when appropriate to reduce radiation dose to as low as reasonably achievable. FINDINGS: MEASUREMENTS: AORTIC VALVE: Calcium score: 3,257 Trileaflet valve. There is heavy calcification of the aortic valve leaflets. Valvular calcifications do  extend into the LVOT. These are mild. AORTIC DIMENSIONS: Aortic annulus: 29 x 21 mm. Aortic annulus area: 4.3 cm2. Aortic annulus perimeter: 77 mm. Distance of right coronary ostia to the annulus: 25 mm. Distance of the left coronary ostia to the annulus: 11 mm. Maximum diameter of the aortic sinus: 39 mm. Maximum diameter of the sinotubular junction: 29 mm. Maximum diameter of the mid ascending aorta: 37 mm. Aortic root orientation: 3 cusp view: Armenian 8, CAU 3;  Anterior view: FABIAN 0, CAU 12 Coronary anatomy: The patient has had a CABG. The native coronary arteries are heavily calcified and severely diseased. There are 2 patent vein grafts extending to the circumflex/obtuse marginal locations. There is a patent LIMA graft extending to LAD. There are 2 patent vein grafts to the right coronary artery. Ascending aorta and arch: There is mild calcified plaque of the a sending aorta. There is a moderate amount of calcified plaque of the aortic arch. Descending thoracic aorta: There is no significant tortuosity of the descending thoracic aorta. There is no aneurysmal dilation. There is moderate scattered calcified atherosclerosis. Abdominal aorta: There is moderate tortuosity and calcified atherosclerosis. There is no aneurysmal dilation. There is calcified atherosclerosis of the origins of both renal arteries. There is calcified atherosclerosis of the origins of the superior mesenteric artery and celiac artery. Iliofemoral access route: There is potentially significant tortuosity of the left common femoral artery just after the aortic bifurcation. Minimal diameter of the abdominal aorta: 13 mm. Minimal diameter of the right common iliac artery: 8 mm. Minimal diameter of the right external iliac artery: 7 mm.  Minimal diameter of the right common femoral report electronically signed by Dr. Zev Pickard on 11/8/2017 7:58 AM    Xr Chest Portable    Result Date: 11/19/2017  PROCEDURE: XR CHEST PORTABLE CLINICAL INFORMATION: dyspnea, COMPARISON: Chest radiograph, 18 November 2017. TECHNIQUE: Standard portable AP view of the chest provided, timed at 0900 hours. FINDINGS: There are bilateral perihilar alveolar abnormalities of the lungs, primarily on the right more than left. These have worsened in the interval. There are hazy characteristics of the lung bases with blunted costophrenic angles bilaterally, consistent with small bilateral pleural effusions. Interval worsening of possible loculated left pleural effusion over the lateral superior left pleural space. There are characteristics of cardiomegaly, stable. Stable appearance of the mediastinal contours, poorly defined. Stable osseous framework. BILATERAL ALVEOLAR ABNORMALITIES TO INDICATE LIKELY PULMONARY EDEMA, LESS LIKELY BILATERAL PNEUMONIA, WITH WORSENING BILATERAL PLEURAL EFFUSIONS, LEFT MORE THAN RIGHT. OVERALL SIGNIFICANT WORSENED APPEARANCE OF THE CHEST. **This report has been created using voice recognition software. It may contain minor errors which are inherent in voice recognition technology. ** Final report electronically signed by Dr. Jo Victor on 11/19/2017 9:20 AM    Vl Dup Lower Extremity Venous Bilateral    Result Date: 11/19/2017  PROCEDURE: VL LOWER EXTREMITY BILATERAL VENOUS DUPLEX CLINICAL INFORMATION: bilateral lower extremity edema, elevated D Dimer, COMPARISON: No prior venous imaging for comparison. TECHNIQUE: Standard duplex sonographic imaging of the left and right lower extremity venous structures provided. Spectral analysis and augmentation maneuvers included. FINDINGS: All venous structures images demonstrate normal compressibility and normal color flow characteristics. Limited evaluation of the peroneal veins of the left lower extremity.  No sonographic evidence of left or right lower extremity deep venous thrombosis demonstrated. Soft tissues are nonspecific. NO SONOGRAPHIC EVIDENCE OF LEFT OR RIGHT LOWER SYMMETRY DEEP VENOUS THROMBOSIS. SUBOPTIMAL EVALUATION OF THE PERONEAL VEINS OF THE LEFT LOWER EXTREMITY. **This report has been created using voice recognition software. It may contain minor errors which are inherent in voice recognition technology. ** Final report electronically signed by Dr. Ni Chavez on 11/19/2017 12:06 PM    Cta Abdomen Pelvis W Wo Contrast    Result Date: 11/8/2017  PROCEDURE: CTA ABD PELVIS W WO CONTRAST, CTA CHEST W WO CONTRAST CLINICAL INFORMATION: Severe aortic stenosis . Pre-op TAVR. Severe aortic stenosis. COMPARISON: No prior study. TECHNIQUE: Noncontrast 5 mm axial images were obtained through the chest, abdomen and pelvis. Intravenous Optiray contrast was given. ECG gated axial 0.6 mm axial images were attained of the entire heart. A CT of the entire chest, abdomen and pelvis was obtained at 0.6 mm increments. These are reconstructed into 3 x 3 axial images. Those are sent to PACS. 3-D reconstructions through the chest, abdomen and pelvis include sagittal and coronal MIP images performed on the scanner. Centerline reconstructions  were obtained. Vascular and valve measurements are made on a dedicated 3-D workstation. Measurements were made in systole with the aortic valve open. The 30% phase was used. All CT scans at this facility use dose modulation, iterative reconstruction, and/or weight-based dosing when appropriate to reduce radiation dose to as low as reasonably achievable. FINDINGS: MEASUREMENTS: AORTIC VALVE: Calcium score: 3,257 Trileaflet valve. There is heavy calcification of the aortic valve leaflets. Valvular calcifications do  extend into the LVOT. These are mild. AORTIC DIMENSIONS: Aortic annulus: 29 x 21 mm. Aortic annulus area: 4.3 cm2. Aortic annulus perimeter: 77 mm. Distance of right coronary ostia to the annulus: 25 mm. Distance of the left coronary ostia to the annulus: 11 mm. Maximum diameter of the aortic sinus: 39 mm. Maximum diameter of the sinotubular junction: 29 mm. Maximum diameter of the mid ascending aorta: 37 mm. Aortic root orientation: 3 cusp view: [place] 8, CAU 3;  Anterior view: FABIAN 0, CAU 12 Coronary anatomy: The patient has had a CABG. The native coronary arteries are heavily calcified and severely diseased. There are 2 patent vein grafts extending to the circumflex/obtuse marginal locations. There is a patent LIMA graft extending to LAD. There are 2 patent vein grafts to the right coronary artery. Ascending aorta and arch: There is mild calcified plaque of the a sending aorta. There is a moderate amount of calcified plaque of the aortic arch. Descending thoracic aorta: There is no significant tortuosity of the descending thoracic aorta. There is no aneurysmal dilation. There is moderate scattered calcified atherosclerosis. Abdominal aorta: There is moderate tortuosity and calcified atherosclerosis. There is no aneurysmal dilation. There is calcified atherosclerosis of the origins of both renal arteries. There is calcified atherosclerosis of the origins of the superior mesenteric artery and celiac artery. Iliofemoral access route: There is potentially significant tortuosity of the left common femoral artery just after the aortic bifurcation. Minimal diameter of the abdominal aorta: 13 mm. Minimal diameter of the right common iliac artery: 8 mm. Minimal diameter of the right external iliac artery: 7 mm. Minimal diameter of the right common femoral artery: 7 mm. Minimal diameter of the left common iliac artery: 8 mm. Minimal diameter of the left external iliac artery: 7 mm. Minimal diameter of the left common femoral artery: 5.5 mm. HEART: There is cardiomegaly. There is enlargement of all 4 chambers. PERICARDIUM:  Normal. CHEST: There is a moderate-sized right pleural effusion.  There is a small left-sided pleural effusion which is partially loculated. There are some calcified pleural plaques in the left lung base. There is compressive atelectasis in both lung bases. There  are mild bilateral perihilar groundglass attenuation. This can are present mild pulmonary edema. The pulmonary artery is normal. No pulmonary emboli are noted. No mediastinal adenopathy is noted. There is a moderate-sized hiatal hernia. ABDOMEN:  The liver is normal. The spleen is normal. The adrenals and pancreas are normal. The gallbladder is normal.   There is no hydronephrosis or stones of either kidney. No renal masses are noted. No abnormalities of the small bowel loops are noted. The IVC and aorta are of normal caliber. There is no adenopathy. PELVIS:  There is diffuse thickening of the bladder wall. There is some gas within the urinary bladder. There is no pelvic free fluid. There is a moderate amount of stool throughout the colon. There is no adenopathy. BONES: There are degenerative changes in the spine. There are chronic appearing compression fractures of T12 and L2.     1. Preoperative TAVR measurements as listed above. 2. Cardiomegaly. 3. Moderate-sized right pleural effusion and a small partially loculated left pleural effusion. 4. Compressive atelectasis of both lung bases. There is also groundglass attenuation in the perihilar location suggestive of pulmonary edema. 5. Hiatal hernia 6. Diffuse bladder wall thickening. **This report has been created using voice recognition software. It may contain minor errors which are inherent in voice recognition technology. ** Final report electronically signed by Dr. Kartik Vega on 11/8/2017 7:58 AM        Physical Exam:  Vitals: BP (!) 92/54   Pulse 65   Temp 97.5 °F (36.4 °C) (Oral)   Resp 18   Ht 5' 5\" (1.651 m)   Wt 150 lb 6.4 oz (68.2 kg)   SpO2 91%   BMI 25.03 kg/m²   24 hour intake/output:  Intake/Output Summary (Last 24 hours) at 11/21/17 1431  Last data filed at 11/21/17 0459   Gross per 24 hour   Intake              100 ml   Output              775 ml   Net             -675 ml     Last 3 weights:   Wt Readings from Last 3 Encounters:   11/21/17 150 lb 6.4 oz (68.2 kg)   11/13/17 155 lb (70.3 kg)   11/08/17 150 lb 12.8 oz (68.4 kg)       General appearance - alert, awake appears to be in no acute distress  Chest - Bilateral air entry, no wheezes, crackles or rhonchi  Cardiovascular - S1S2 RRR, no murmurs or gallops  Abdomen - Soft non tender non distended, normoactive bowel sounds   Neurological - non focal, no neurological deficits noted  Extremities: No peripheral edema    DVT prophylaxis: [] Lovenox                                 [] SCDs                                 [] SQ Heparin                                 [] Encourage ambulation           [x] Already on Anticoagulation               Electronically signed by Mita Rivas MD on 11/21/2017 at 2:31 PM    [facility]  [phone]

## 2017-11-21 NOTE — PROGRESS NOTES
Cardiology Progress Note      Patient:  Efrain Charlton  YOB: 1930  MRN: [id number]   Acct: [de-identified]  Admit Date:  11/18/2017  Primary Cardiologist: Dr Kerline Mccain  Seen by Dr. Harshal Mendez    Chief Complaint:   Per CX note- 80 y.o. male with past medical history of coronary artery disease, coronary artery bypass Surgery, stroke, carotid stent, myocardial infarction, pulmonary embolism hypertension, Severe aortic stenosis, dyslipidemia presented to the Emergency Department for the evaluation of bilateral leg swelling and shortness of breath for last 3 days. Patient  states that leg swelling has gone from ankle up to the thigh in last few days   Complains of worsening of dyspnea.   Positive orthopnea     Awaiting percutaneous intervention for aortic valve replacement 11/30/2017        Subjective (Events in last 24 hours):     Sitting up in bed - eating lunch  States feeling improved  LE swelling improved    Chronic AFB noted with CVR  BP 90/'s  Not able to get cardiac meds      11/21/2017  No cardiac c/o  States breathing much improved  Actually a little dry    Did get lopressor bur no ACE    Objective:   BP (!) 92/54   Pulse 65   Temp 97.5 °F (36.4 °C) (Oral)   Resp 18   Ht 5' 5\" (1.651 m)   Wt 150 lb 6.4 oz (68.2 kg)   SpO2 91%   BMI 25.03 kg/m²        TELEMETRY: chronic AFB with CVR    Physical Exam:  General Appearance: alert and oriented to person, place and time, in no acute distress  Cardiovascular: irregular rhythm, normal S1 and S2,+++ murmurs, no rubs, clicks, or gallops, distal pulses intact, no carotid bruits, no JVD  Pulmonary/Chest: diminished to auscultation bilaterally- no wheezes, rales or rhonchi, normal air movement, no respiratory distress  Abdomen: soft, non-tender, non-distended, normal bowel sounds, no masses Extremities: no cyanosis, clubbing- Le 2+ pitting edema, pulses present   Skin: warm and dry, no rash or erythema  Head: normocephalic and atraumatic  Musculoskeletal: normal range of motion, no joint swelling, deformity or tenderness  Neurological: alert, oriented, normal speech, no focal findings or movement disorder noted    Medications:    lisinopril  2.5 mg Oral Daily    metoprolol tartrate  25 mg Oral BID    digoxin  125 mcg Oral Daily    furosemide  20 mg Intravenous TID    pneumococcal 13-valent conjugate  0.5 mL Intramuscular Once    aspirin  325 mg Oral Daily    atorvastatin  40 mg Oral Q48H    tamsulosin  0.4 mg Oral Daily    sodium chloride flush  10 mL Intravenous 2 times per day    pantoprazole  40 mg Oral QAM AC    warfarin (COUMADIN) daily dosing (placeholder)   Other RX Placeholder    isosorbide mononitrate  30 mg Oral Daily          ipratropium-albuterol 1 ampule Q4H PRN   sodium chloride flush 10 mL PRN   acetaminophen 650 mg Q4H PRN   magnesium hydroxide 30 mL Daily PRN   ondansetron 4 mg Q6H PRN       Diagnostics:  EK2017 11:41:04 [facility] ROUTINE RETRIEVAL  Atrial fibrillation with premature ventricular or aberrantly conducted complexes  Minimal voltage criteria for LVH, may be normal variant  Nonspecific ST and T wave abnormality  Abnormal ECG  When compared with ECG of 19-SEP-2017 21:31,  Nonspecific T wave abnormality has replaced inverted T waves in Lateral leads  Confirmed by Erica Sam ([id number]) on 2017 7:35:29 PM      Echo:   Summary   This was a low-dose DSE for the evaluation of aortic stenosis.   At this workload, there were no stress-induced ischemic ECG changes or   wall motion abnormalities. There was no LV dilation observed with stress.   There is severe aortic stenosis (low flow, low gradient).    LVEF and LV contractility improved at the peak dose of dobutamine for the   low-dose protocol.      Signature      ----------------------------------------------------------------   Electronically signed by Bibiana Tracey MD (Interpreting   [id number]) on 2017     Left Heart Cath:  RIGHT HEART CATHETERIZATION:  Right atrial pressure 2, right  ventricular pressure 31/2, PA pressure 29/8 with a mean of 18, PA  saturation 69%, wedge pressure 10, cardiac output 3.1, cardiac index  1.8, SVC saturation 68%.     CORONARY ANGIOGRAPHY:  LEFT MAIN:  Distal .     LAD:  .     LEFT CIRCUMFLEX:  .     RCA:  Dominant, .     GRAFTS:  SVG to OM is patent, but does have moderate disease; SVG to  second OM has mid graft 40-50% ISR, but is grossly patent. The  previous anastomotic lesion of the OM2 that underwent PTCA has restenosis. There is competitive flow in the native OM; SVG to RCA has a patent proximal  stent. The distal previously placed stent in the native RCA is . The native PDA is  patent. Of note, there is a linear lucency at the anastomosis of the  SVG-RCA into the aorta. However, this was present previously and does  Not result in any impediment to flow. LIMA to LAD patent without any  ostial, mid, or anastomotic lesions noted. Could not directly  cannulate the LIMA due to tortuous subclavian artery and aorta.     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 Patent LIMA to LAD without any disease. Extremely  complex venous graft disease that was previously intervened upon, but  has had progression of disease despite intervention. At this point,  any further intervention is likely to result in graft loss. Given that all of the vein grafts while they may have disease are patent and  provide reasonable flow and the fact that the LIMA to LAD is open, we  would opt for medical therapy and further management of the aortic  stenosis.     PLAN:  1. Manual pull for the arterial sheath and 4 to 6 hours bed rest  post hemostasis. 2.  Continued inpatient management of medical issues including  delirium. 3.  Discussed with the family regarding TAVR and management of his  aortic stenosis.   4.  Continue optimal medical therapy for coronary artery disease. 5.  At this point, we will hold on any revascularization of his graft  disease given the previous interventions, the moderate disease, the  risk of graft loss with the complexity of the plaque and lesions. 6.  CT Surgery consultation for TAVR evaluation.     All the above was explained to the patient/the patient's family and  they were agreeable and amenable to the plan.      NAE GIMENEZ  D: 09/22/2017       Lab Data:    Cardiac Enzymes:  No results for input(s): CKTOTAL, CKMB, CKMBINDEX, TROPONINI in the last 72 hours.     CBC:   Lab Results   Component Value Date    WBC 9.6 11/21/2017    RBC 3.65 11/21/2017    RBC 3.74 01/17/2012    HGB 10.1 11/21/2017    HCT 31.2 11/21/2017     11/21/2017       CMP:    Lab Results   Component Value Date     11/21/2017    K 3.6 11/21/2017    CL 96 11/21/2017    CO2 32 11/21/2017    BUN 30 11/21/2017    CREATININE 1.0 11/21/2017    LABGLOM 71 11/21/2017    GLUCOSE 101 11/21/2017    GLUCOSE 111 01/17/2012    CALCIUM 8.2 11/21/2017       Hepatic Function Panel:    Lab Results   Component Value Date    ALKPHOS 137 11/18/2017    ALT 11 11/18/2017    AST 11 11/18/2017    PROT 6.5 11/18/2017    BILITOT 1.0 11/18/2017    BILIDIR 0.3 11/18/2017    LABALBU 3.8 11/18/2017    LABALBU 3.9 01/11/2012       Magnesium:    Lab Results   Component Value Date    MG 2.3 09/27/2017       PT/INR:    Lab Results   Component Value Date    PROTIME 3.06 11/30/2011    INR 4.57 11/21/2017       HgBA1c:    Lab Results   Component Value Date    LABA1C 5.4 12/08/2016       FLP:    Lab Results   Component Value Date    TRIG 53 09/17/2017    HDL 68 09/17/2017    LDLCALC 60 09/17/2017       TSH:    Lab Results   Component Value Date    TSH 5.790 11/19/2017         Assessment:    Acute on chronic systolic CHF [address] 3 - resolved    CXR clear    ICDMP EF 30%    Severe AS   For TAVR 11-    CABG x2   recent cath with OMT    Chronic AFB with CVR   [id number]  3   On coumadin    Hx PE - chronic OAC  hypotensive  HLP    PVD- s\p carotid stenting        Plan:    Add digoxin   Did get BB - no ACE with lower BP      Stop diuretics - restart po in am      We will see prn- chf stable   He had TAVR next Thursday       Thanks           Electronically signed by Benjamin Lutz CNP on 11/21/2017 at 1:53 PM

## 2017-11-22 LAB
ANION GAP SERPL CALCULATED.3IONS-SCNC: 11 MEQ/L (ref 8–16)
BUN BLDV-MCNC: 32 MG/DL (ref 7–22)
CALCIUM SERPL-MCNC: 8.5 MG/DL (ref 8.5–10.5)
CHLORIDE BLD-SCNC: 94 MEQ/L (ref 98–111)
CO2: 32 MEQ/L (ref 23–33)
CREAT SERPL-MCNC: 1 MG/DL (ref 0.4–1.2)
GFR SERPL CREATININE-BSD FRML MDRD: 71 ML/MIN/1.73M2
GLUCOSE BLD-MCNC: 123 MG/DL (ref 70–108)
HCT VFR BLD CALC: 33.2 % (ref 42–52)
HEMOGLOBIN: 10.8 GM/DL (ref 14–18)
INR BLD: 2.7 (ref 0.85–1.13)
MCH RBC QN AUTO: 27.7 PG (ref 27–31)
MCHC RBC AUTO-ENTMCNC: 32.6 GM/DL (ref 33–37)
MCV RBC AUTO: 84.8 FL (ref 80–94)
PDW BLD-RTO: 15 % (ref 11.5–14.5)
PLATELET # BLD: 164 THOU/MM3 (ref 130–400)
PMV BLD AUTO: 9.3 MCM (ref 7.4–10.4)
POTASSIUM SERPL-SCNC: 3.6 MEQ/L (ref 3.5–5.2)
RBC # BLD: 3.91 MILL/MM3 (ref 4.7–6.1)
SODIUM BLD-SCNC: 137 MEQ/L (ref 135–145)
WBC # BLD: 8.5 THOU/MM3 (ref 4.8–10.8)

## 2017-11-22 PROCEDURE — 97110 THERAPEUTIC EXERCISES: CPT

## 2017-11-22 PROCEDURE — 2700000000 HC OXYGEN THERAPY PER DAY

## 2017-11-22 PROCEDURE — 97162 PT EVAL MOD COMPLEX 30 MIN: CPT

## 2017-11-22 PROCEDURE — G8979 MOBILITY GOAL STATUS: HCPCS

## 2017-11-22 PROCEDURE — 99232 SBSQ HOSP IP/OBS MODERATE 35: CPT | Performed by: HOSPITALIST

## 2017-11-22 PROCEDURE — G8978 MOBILITY CURRENT STATUS: HCPCS

## 2017-11-22 PROCEDURE — 2580000003 HC RX 258: Performed by: FAMILY MEDICINE

## 2017-11-22 PROCEDURE — G8988 SELF CARE GOAL STATUS: HCPCS

## 2017-11-22 PROCEDURE — 6370000000 HC RX 637 (ALT 250 FOR IP): Performed by: FAMILY MEDICINE

## 2017-11-22 PROCEDURE — 97530 THERAPEUTIC ACTIVITIES: CPT

## 2017-11-22 PROCEDURE — 2060000000 HC ICU INTERMEDIATE R&B

## 2017-11-22 PROCEDURE — 6370000000 HC RX 637 (ALT 250 FOR IP)

## 2017-11-22 PROCEDURE — G8987 SELF CARE CURRENT STATUS: HCPCS

## 2017-11-22 PROCEDURE — 80048 BASIC METABOLIC PNL TOTAL CA: CPT

## 2017-11-22 PROCEDURE — 6370000000 HC RX 637 (ALT 250 FOR IP): Performed by: NURSE PRACTITIONER

## 2017-11-22 PROCEDURE — 6360000002 HC RX W HCPCS: Performed by: HOSPITALIST

## 2017-11-22 PROCEDURE — 97165 OT EVAL LOW COMPLEX 30 MIN: CPT

## 2017-11-22 PROCEDURE — 36415 COLL VENOUS BLD VENIPUNCTURE: CPT

## 2017-11-22 PROCEDURE — 85610 PROTHROMBIN TIME: CPT

## 2017-11-22 PROCEDURE — 85027 COMPLETE CBC AUTOMATED: CPT

## 2017-11-22 RX ORDER — WARFARIN SODIUM 5 MG/1
5 TABLET ORAL ONCE
Status: COMPLETED | OUTPATIENT
Start: 2017-11-22 | End: 2017-11-22

## 2017-11-22 RX ORDER — VANCOMYCIN HYDROCHLORIDE 1 G/200ML
15 INJECTION, SOLUTION INTRAVENOUS EVERY 24 HOURS
Status: DISCONTINUED | OUTPATIENT
Start: 2017-11-22 | End: 2017-11-22

## 2017-11-22 RX ADMIN — PANTOPRAZOLE SODIUM 40 MG: 40 TABLET, DELAYED RELEASE ORAL at 06:13

## 2017-11-22 RX ADMIN — ASPIRIN 325 MG: 325 TABLET, COATED ORAL at 08:53

## 2017-11-22 RX ADMIN — Medication 10 ML: at 20:08

## 2017-11-22 RX ADMIN — DIGOXIN 125 MCG: 0.12 TABLET ORAL at 08:53

## 2017-11-22 RX ADMIN — METOPROLOL TARTRATE 25 MG: 25 TABLET ORAL at 08:53

## 2017-11-22 RX ADMIN — Medication 10 ML: at 08:53

## 2017-11-22 RX ADMIN — WARFARIN SODIUM 5 MG: 5 TABLET ORAL at 18:06

## 2017-11-22 RX ADMIN — TAMSULOSIN HYDROCHLORIDE 0.4 MG: 0.4 CAPSULE ORAL at 08:53

## 2017-11-22 RX ADMIN — VANCOMYCIN HYDROCHLORIDE 1000 MG: 1 INJECTION, SOLUTION INTRAVENOUS at 18:06

## 2017-11-22 RX ADMIN — ISOSORBIDE MONONITRATE 30 MG: 30 TABLET ORAL at 08:53

## 2017-11-22 RX ADMIN — ATORVASTATIN CALCIUM 40 MG: 40 TABLET, FILM COATED ORAL at 20:08

## 2017-11-22 RX ADMIN — LISINOPRIL 2.5 MG: 2.5 TABLET ORAL at 08:53

## 2017-11-22 NOTE — PROGRESS NOTES
Pharmacy Note  Vancomycin Consult    Annette Prescott is a 80 y.o. male started on Vancomycin for gram + cocci in urine; consult received from Dr. Branden Fried to manage therapy. Also receiving the following antibiotics: none.     Patient Active Problem List   Diagnosis    Frailty    Coronary artery disease due to lipid rich plaque    Essential hypertension    PVC's (premature ventricular contractions)    History of thrombosis - PE, DVT    History of CVA (cerebrovascular accident)    Hx of CABG    Presence of coronary artery bypass graft stent    Dyslipidemia    Severe aortic stenosis    Moderate mitral regurgitation    History of TIA (transient ischemic attack)    History of myocardial infarction    Nonrheumatic aortic valve stenosis    Anticoagulated on Coumadin    Loculated pleural effusion on left, likely from CHF    Chest pain    Shortness of breath    [facility]oxysmal atrial fibrillation) ([facility])    Acute on chronic diastolic heart failure ([facility])    Elevated LFTs    Pleural effusion on left    Slurred speech, POA    Low vitamin D level    Congestive heart failure ([facility])    Protein-calorie malnutrition, severe ([facility])    Acute renal failure, not POA    At risk for aspiration    Severe malnutrition ([address]    KIRSTIE (acute kidney injury) ([address]    Urinary retention    Neurogenic bladder    Microscopic hematuria    Dehydration    Stenosis of right internal carotid artery, POA    Carotid stenosis, right    NYHA class 3 acute on chronic systolic heart failure ([facility])    Hypotension    Anemia, normocytic normochromic    Supratherapeutic INR    Coronary artery disease involving native coronary artery of native heart without angina pectoris    Acute on chronic systolic congestive heart failure ([facility])       Allergies:  Iodine     Temp max: 97.8    Recent Labs      11/21/17   0441  11/22/17   0528   BUN  30*  32*       Recent Labs      11/21/17 0441 11/22/17   0528   CREATININE  1.0  1.0

## 2017-11-22 NOTE — PROGRESS NOTES
Stent in the  mid area and balloon angioplasty of the distal anastomotic site.  CARDIAC CATHETERIZATION      CORONARY ARTERY BYPASS GRAFT      X2 1982 1984    HERNIA REPAIR      TRANSTHORACIC ECHOCARDIOGRAM  01/10/2012    LV was mildly dilated. Systolic function was normal. EF was estimated in  the range of 55-65%. There were no regional wall motion abnormalities. Wall thickness was normal.     Restrictions/Precautions:  Restrictions/Precautions: General Precautions, Fall Risk    Subjective:  Chart Reviewed: Yes  Patient assessed for rehabilitation services?: Yes  Family / Caregiver Present: No  Subjective: RN approved session, pt is pleasant and agreeable to therapy. After PLOF obtained and ther ex performed, pt refuses transfers and amb, stating he is tired. Multiple attempts and encouragement, pt adamently refuses due to fatigue. RN notified, states pt has been up with staff amb to the bathroom. General:  Overall Orientation Status: Within Functional Limits  Follows Commands: Within Functional Limits  Vision: Within Functional Limits  Hearing: Within functional limits     Pain:  Denies.         Social/Functional History:    Lives With: Alone  Type of Home: House  Home Layout: One level  Home Access: Stairs to enter with rails  Entrance Stairs - Number of Steps: 2  Entrance Stairs - Rails: Left  Home Equipment: Rolling walker, Cane   Ambulation Assistance: Independent  Transfer Assistance: Independent    Active : Yes  Occupation: Retired  Additional Comments: Pt states he amb without AD, prior to 3 months ago, was very active and did a lot of driving going to car shows    Objective:  RLE PROM: WFL     LLE PROM: WFL    B LE's 3+/5     RLE Tone: Normotonic  LLE Tone: Normotonic     Transfers  Sit to Stand: Unable to assess (Pt refuses)    Exercises:  Comments: Pt performs seated B LE AROM while in recliner: ankle pumps, marches, LAQ and hip abd/add x 10 reps to increase strength for improved mobility. Pt requires consistent cues to slow down and for increased ROM. Activity Tolerance:  Activity Tolerance: Other (Pt refusing transfers/amb)    Treatment Initiated: See above exercises. Assessment: Body structures, Functions, Activity limitations: Decreased functional mobility , Decreased strength, Decreased endurance  Assessment: Pt admitted due to CHF. Pt tolerates seated ther ex well, then refuses transfers/amb due to being too tired. Pt ed on importance of mobility, pt states he was just up to the bathroom prior to session. PT to continue to progress strength and to assess mobility. Prognosis: Good    Clinical Presentation: Moderate - Evolving with Changing Characteristics: Pt admitted due to CHF. Pt tolerates seated ther ex well, then refuses transfers/amb due to being too tired. Pt ed on importance of mobility, pt states he was just up to the bathroom prior to session. PT to continue to progress strength and to assess mobility. Decision Making: High Complexitybased on patient assessment and decision making process of determining plan of care and establishing reasonable expectations for measurable functional outcomes    REQUIRES PT FOLLOW UP: Yes  Discharge Recommendations: Continue to assess pending progress    Patient Education:  Patient Education: POC, importance of participation in PT    Equipment Recommendations:  Equipment Needed: No    Safety:  Type of devices: All fall risk precautions in place, Call light within reach, Chair alarm in place, Left in chair  Restraints  Initially in place: No    Plan:  Times per week: 3-5 X GM  Times per day: Daily  Specific instructions for Next Treatment: B LE strengthening, HEP, PT to assess mobility. Current Treatment Recommendations: Strengthening, Home Exercise Program    Goals:  Patient goals : To go home alone tomorrow.   Short term goals  Time Frame for Short term goals: 2 weeks  Short term goal 1: Pt to participate in B LE ther ex to increase strength for improved functional mobility. Short term goal 2: PT to assess mobility. Long term goals  Time Frame for Long term goals : NA due to short length of stay. Evaluation Complexity: Based on the findings of patient history, examination, clinical presentation, and decision making during this evaluation, the evaluation of Efrain Charlton  is of medium complexity. PT G-Codes  Functional Limitation: Mobility: Walking and moving around  Mobility: Walking and Moving Around Current Status (): At least 40 percent but less than 60 percent impaired, limited or restricted  Mobility: Walking and Moving Around Goal Status ():  At least 1 percent but less than 20 percent impaired, limited or restricted    AM-PAC Inpatient Mobility without Stair Climbing Raw Score : 15  AM-PAC Inpatient without Stair Climbing T-Scale Score : 43.03  Mobility Inpatient CMS 0-100% Score: 47.43  Mobility Inpatient without Stair CMS G-Code Modifier : CK

## 2017-11-22 NOTE — PROGRESS NOTES
Larry Espino 60  INPATIENT OCCUPATIONAL THERAPY  [facility] ICU STEPDOWN TELEMETRY 4K  EVALUATION    Time:  Time In: 1030  Time Out: 1100  Timed Code Treatment Minutes: 15 Minutes  Minutes: 30          Date: 2017  Patient Name: Eric Louis,   Gender: male      MRN: [id number]  : 1930  (80 y.o.)  Referring Practitioner: Dr. Andre Roman MD  Diagnosis: CHF, NYHA Class IV, Acute Systolic  Additional Pertinent Hx: Pt presents to the Emergency Department for the evaluation of bilateral leg swelling and shortness of breath and 3 day history of non productive cough. Patient reports  leg swelling that has gone from ankle high to level of his knees in the past few days. Patient relates that he is taking his medications as prescribed. Patient notes worsening shortness of breath with laying down. He denies modifying factors. Last echo was in  showing Left ventricle size mildly dilated and systolic function is reduced. Ejection fraction was estimated at 30%. Left atrial size was moderately dilated. The aortic valve was calcified with reduced excursion. Restrictions/Precautions:  Restrictions/Precautions: General Precautions, Fall Risk                      Past Medical History:   Diagnosis Date    CAD (coronary artery disease)     CHF (congestive heart failure) (HCC)     GERD (gastroesophageal reflux disease)     History of CVA (cerebrovascular accident) - noted per MRI-brain on 2017 which demonstrates old strokes     Hypertension     Myocardial infarct     Pulmonary embolism ([address]     PVC's (premature ventricular contractions)     Retinal artery thrombosis     Thrombophlebitis     Weakness      Past Surgical History:   Procedure Laterality Date    APPENDECTOMY      CARDIAC CATHETERIZATION  2012    Status post successful PCI of SVG to OM2 branch with stent in the proximal area. Stent in the  mid area and balloon angioplasty of the distal anastomotic site.    Raisa Avelar CARDIAC CATHETERIZATION      CORONARY ARTERY BYPASS GRAFT      X2 1982 1984    HERNIA REPAIR      TRANSTHORACIC ECHOCARDIOGRAM  01/10/2012    LV was mildly dilated. Systolic function was normal. EF was estimated in  the range of 55-65%. There were no regional wall motion abnormalities. Wall thickness was normal.           Subjective  Chart Reviewed: Yes (Internal medicine note; order review)  Patient assessed for rehabilitation services?: Yes  Response to previous treatment: Patient with no complaints from previous session  Family / Caregiver Present: No    Subjective: Pleasant and cooperative  Comments: Pt asked to do his grooming at the sink after talking with his daughter. General:  Overall Orientation Status: Within Normal Limits    Vision: Impaired    Hearing: Within functional limits         Pain:  Pain Assessment  Patient Currently in Pain: Denies       Social/Functional:  Lives With: Daughter  Type of Home: House  Home Layout: One level  Home Access: Stairs to enter with rails  Entrance Stairs - Number of Steps: 2  Entrance Stairs - Rails: Left  Home Equipment: Cane, Rolling walker (used a button hook)     Bathroom Shower/Tub: Walk-in shower, Tub only  Bathroom Toilet: Standard  Bathroom Accessibility: Accessible  IADL Comments: Pt was doing self care and homemaking independently in past.  More recently, pt has been living with daughter who has helped with homemaking. He was independent with self care including taking baths. Receives Help From: Family  ADL Assistance: Independent  Homemaking Assistance: Needs assistance  Meal Prep: Moderate  Laundry: Stand by  Vacuuming: Maximal  Cleaning:  Moderate  Gardening: Maximal  Yard Work: Maximal  Driving: Stand by  Shopping: Stand by  [employer] Responsibilities: Yes  Meal Prep Responsibility: Secondary  Laundry Responsibility: Primary  Cleaning Responsibility: Secondary  Shopping Responsibility: Primary  Health Care Management: Primary    Ambulation Assistance: Independent  Transfer Assistance: Independent    Active : Yes  Occupation: Retired  Type of occupation:   Leisure & Hobbies: Reading  Additional Comments: Pt states he amb without AD, prior to 3 months ago, was very active and did a lot of driving going to car shows    Objective  Vision - Basic Assessment  Prior Vision: Other (add comment) (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 \"I knew it was going to do that. \" pt states. Sensation  Overall Sensation Status: Impaired (Occasional numbness and tingling in his fingers, pt states. )  Light Touch: Partial deficits in the RUE, Partial deficits in the LUE  Sharp/Dull: Partial deficits in the LUE, Partial deficits in the RUE    Edema: BLE mild edema noted in his ankles and feet    Observation/Palpation  Edema: BLE mild edema noted in his ankles and feet    Hand Dominance: Right    LUE PROM (degrees)  LUE PROM: WNL       LUE AROM (degrees)  LUE AROM : WFL  Left Hand PROM (degrees)  Left Hand General PROM: Shoulder flexion and abduction 0-100 degrees. Left Hand AROM (degrees)  Left Hand AROM: WNL    RUE PROM (degrees)  RUE PROM: WFL  RUE AROM (degrees)  RUE General AROM: Shoulder flexion and abduction 0-85 degrees secondary to rotator cuff injury.   Right Hand PROM (degrees)  Right Hand PROM: WNL  Right Hand AROM (degrees)  Right Hand AROM: WNL    LUE Strength  L Hand Grasp: 4/5  LUE Strength Comment: 3+/5 deltoid; 3+/5 pectoral; 4/5 biceps/triceps                RUE Strength  R Hand Grasp: 4/5  RUE Strength Comment: 3-/5 deltoid; 3+/5 pectoral; 4/5 biceps/triceps services to address above deficits. He presents with decreased endurance and some unsteadiness while walking secondary to having low blood pressure. He was not using any O2 at this time. He walked without any AD PTA. He did have episode of dizziness after he had first stood and taken steps toward the bathroom. Pt needed cues for safety. Pt was independent with his ADLs PTA. Performance deficits / Impairments: Decreased functional mobility , Decreased ADL status, Decreased endurance, Decreased safe awareness  Prognosis: Good  Discharge Recommendations: Home with Home health OT, Home with assist PRN, Patient would benefit from continued therapy after discharge, Continue to assess pending progress    Clinical Decision Making: Clinical Decision making was of Low Complexity as the result of analysis of data from a problem focused assessment, a consideration of a limited number of treatment options, no significant comorbidities affecting the plan of care and no modification or assistance required to complete the evaluation. Patient Education:  Patient Education: OT POC; pt's goal; pacing himself during activities; safety awareness while changing positions; strategy of waiting at least 30 seconds before taking steps after having stood to help with his BP stabilizing. Equipment Recommendations:  Equipment Needed: No    Safety:  Safety Devices in place: Yes  Type of devices: Gait belt, Call light within reach, Patient at risk for falls, Chair alarm in place, Left in chair    Plan:  Times per week: 3-5x  Current Treatment Recommendations: Safety Education & Training, Self-Care / ADL, Functional Mobility Training, Endurance Training  Plan Comment: Pt would be able to return home with help from his daughter and possible home health OT recommended.   Specific instructions for Next Treatment: Functional mobility; ADLs and pacing himself; safety awareness while doing standing tasks    Goals:  Patient goals : \"I want to get back into the swing of things as soon as I am able. \" pt states. Short term goals  Time Frame for Short term goals: 3 tx days  Short term goal 1: Pt will demonstrate functional mobility walking to/from the bathroom or in the hallway with SBA using any AD needed with cues for safety to prepare for doing self care and light homemaking. Short term goal 2: Pt will complete lower body ADLs while using any strategies needed with supervision to increase his safety and independence with self care. Short term goal 3: Pt will complete a spongebath or dressing in his own clothes with supervision and verbal cues to pace himself to increase his activity tolerance and safety while doing ADLs. Long term goals  Time Frame for Long term goals : None secondary to short estimated length of stay. Evaluation Complexity: Based on the findings of patient history, examination, clinical presentation, and decision making during this evaluation, this patient is of low complexity. OT G-codes  Functional Limitation: Self care  Self Care Current Status (): At least 20 percent but less than 40 percent impaired, limited or restricted  Self Care Goal Status ():  At least 1 percent but less than 20 percent impaired, limited or restricted    AM-PAC Inpatient Daily Activity Raw Score: 21  AM-PAC Inpatient ADL T-Scale Score : 44.27  ADL Inpatient CMS 0-100% Score: 32.79  ADL Inpatient CMS G-Code Modifier : BRENDON

## 2017-11-22 NOTE — PROGRESS NOTES
Radiology reports as per the Radiologist  Radiology: Xr Chest Standard (2 Vw)    Result Date: 11/20/2017  PROCEDURE: XR CHEST STANDARD TWO VW CLINICAL INFORMATION: Congestive heart failure. COMPARISON: 11/19/2017. TECHNIQUE: PA and lateral views of the chest performed. FINDINGS: POSTSURGICAL CHANGES: 1. There are stable median sternotomy wires and mediastinal surgical clips consistent with CABG. LINES/TUBES/MECHANICAL DEVICES: None. TRACHEA/HEART/MEDIASTINUM/HILUM: 1. There is tracheal and bronchial calcification. 2. There is stable mild enlargement of the cardiac silhouette. 3. There is atheromatous calcification of the thoracic and abdominal aorta. 4. There is a stable moderate-sized hiatal hernia containing an air-fluid level. LUNG FIELDS: 1. There are bilateral perihilar infiltrates, improved on the left and resolved on the right and stable small bilateral pleural effusions. There is also a stable pleural-based density along the lateral margin of the left upper chest consistent with loculated pleural fluid. There is no pulmonary vascular congestion. OTHER: None. PNEUMOTHORAX: None. OSSEOUS STRUCTURES: 1. There is generalized osteopenia. 2. There is mild dextroscoliosis of the thoracic spine and levoscoliosis of the lumbar spine. 3. Compression fractures of lower thoracic and upper lumbar vertebral bodies. 1. There are bilateral perihilar infiltrates, improved on the left and resolved on the right and stable small bilateral pleural effusions. There is also a stable pleural-based density along the lateral margin of the left upper chest consistent with loculated pleural fluid. There is no pulmonary vascular congestion. 2. Additional findings as described in the body the report. **This report has been created using voice recognition software. It may contain minor errors which are inherent in voice recognition technology. ** Final report electronically signed by Dr. Vianney Escobar on 11/20/2017 2:37 PM    Xr Chest Standard (2 Vw)    Result Date: 11/18/2017  PROCEDURE: XR CHEST STANDARD TWO VW CLINICAL INFORMATION: Congestive heart failure, COMPARISON: 9/25/2017 TECHNIQUE: AP upright and lateral views of the chest were obtained. 1. Suboptimal inflation of lungs. Mild thyromegaly. Metallic sternotomy sutures. Moderately ectatic and tortuous thoracic aorta. 2. Moderate bibasilar atelectasis/pneumonia. Suggestion of a moderate-sized hiatus hernia. Small bilateral effusions. 3. Focal loculated fluid density along the lateral aspect left upper lobe, not present on prior study. This was present, but, a CT thorax dated 11/6/2017 and appears to represent a small loculated effusion. 4. Overall appearance of chest has worsened since prior study. **This report has been created using voice recognition software. It may contain minor errors which are inherent in voice recognition technology. ** Final report electronically signed by Dr. Glee Canavan on 11/18/2017 12:13 PM    Cta Chest W Wo Contrast    Result Date: 11/8/2017  PROCEDURE: CTA ABD PELVIS W WO CONTRAST, CTA CHEST W WO CONTRAST CLINICAL INFORMATION: Severe aortic stenosis . Pre-op TAVR. Severe aortic stenosis. COMPARISON: No prior study. TECHNIQUE: Noncontrast 5 mm axial images were obtained through the chest, abdomen and pelvis. Intravenous Optiray contrast was given. ECG gated axial 0.6 mm axial images were attained of the entire heart. A CT of the entire chest, abdomen and pelvis was obtained at 0.6 mm increments. These are reconstructed into 3 x 3 axial images. Those are sent to PACS. 3-D reconstructions through the chest, abdomen and pelvis include sagittal and coronal MIP images performed on the scanner. Centerline reconstructions  were obtained. Vascular and valve measurements are made on a dedicated 3-D workstation. Measurements were made in systole with the aortic valve open. The 30% phase was used.  All CT scans at this facility use dose modulation, mm. Aortic annulus area: 4.3 cm2. Aortic annulus perimeter: 77 mm. Distance of right coronary ostia to the annulus: 25 mm. Distance of the left coronary ostia to the annulus: 11 mm. Maximum diameter of the aortic sinus: 39 mm. Maximum diameter of the sinotubular junction: 29 mm. Maximum diameter of the mid ascending aorta: 37 mm. Aortic root orientation: 3 cusp view: ANURAG 8, CAU 3;  Anterior view: FABIAN 0, CAU 12 Coronary anatomy: The patient has had a CABG. The native coronary arteries are heavily calcified and severely diseased. There are 2 patent vein grafts extending to the circumflex/obtuse marginal locations. There is a patent LIMA graft extending to LAD. There are 2 patent vein grafts to the right coronary artery. Ascending aorta and arch: There is mild calcified plaque of the a sending aorta. There is a moderate amount of calcified plaque of the aortic arch. Descending thoracic aorta: There is no significant tortuosity of the descending thoracic aorta. There is no aneurysmal dilation. There is moderate scattered calcified atherosclerosis. Abdominal aorta: There is moderate tortuosity and calcified atherosclerosis. There is no aneurysmal dilation. There is calcified atherosclerosis of the origins of both renal arteries. There is calcified atherosclerosis of the origins of the superior mesenteric artery and celiac artery. Iliofemoral access route: There is potentially significant tortuosity of the left common femoral artery just after the aortic bifurcation. Minimal diameter of the abdominal aorta: 13 mm. Minimal diameter of the right common iliac artery: 8 mm. Minimal diameter of the right external iliac artery: 7 mm. Minimal diameter of the right common femoral artery: 7 mm. Minimal diameter of the left common iliac artery: 8 mm. Minimal diameter of the left external iliac artery: 7 mm. Minimal diameter of the left common femoral artery: 5.5 mm. HEART: There is cardiomegaly.  There is enlargement of all 4 chambers. PERICARDIUM:  Normal. CHEST: There is a moderate-sized right pleural effusion. There is a small left-sided pleural effusion which is partially loculated. There are some calcified pleural plaques in the left lung base. There is compressive atelectasis in both lung bases. There  are mild bilateral perihilar groundglass attenuation. This can are present mild pulmonary edema. The pulmonary artery is normal. No pulmonary emboli are noted. No mediastinal adenopathy is noted. There is a moderate-sized hiatal hernia. ABDOMEN:  The liver is normal. The spleen is normal. The adrenals and pancreas are normal. The gallbladder is normal.   There is no hydronephrosis or stones of either kidney. No renal masses are noted. No abnormalities of the small bowel loops are noted. The IVC and aorta are of normal caliber. There is no adenopathy. PELVIS:  There is diffuse thickening of the bladder wall. There is some gas within the urinary bladder. There is no pelvic free fluid. There is a moderate amount of stool throughout the colon. There is no adenopathy. BONES: There are degenerative changes in the spine. There are chronic appearing compression fractures of T12 and L2.     1. Preoperative TAVR measurements as listed above. 2. Cardiomegaly. 3. Moderate-sized right pleural effusion and a small partially loculated left pleural effusion. 4. Compressive atelectasis of both lung bases. There is also groundglass attenuation in the perihilar location suggestive of pulmonary edema. 5. Hiatal hernia 6. Diffuse bladder wall thickening. **This report has been created using voice recognition software. It may contain minor errors which are inherent in voice recognition technology. ** Final report electronically signed by Dr. Evelin Washington on 11/8/2017 7:58 AM        Physical Exam:  Vitals: BP (!) 105/58   Pulse 70   Temp 97.8 °F (36.6 °C) (Oral)   Resp 20   Ht 5' 5\" (1.651 m)   Wt 149 lb 2 oz (67.6 kg)   SpO2 (!) 88%   BMI 24.82 kg/m²   24 hour intake/output:    Intake/Output Summary (Last 24 hours) at 11/22/17 1447  Last data filed at 11/22/17 1344   Gross per 24 hour   Intake             1120 ml   Output             1100 ml   Net               20 ml     Last 3 weights:   Wt Readings from Last 3 Encounters:   11/22/17 149 lb 2 oz (67.6 kg)   11/13/17 155 lb (70.3 kg)   11/08/17 150 lb 12.8 oz (68.4 kg)       General appearance - awake appears to be in no acute distress  Chest - Bilateral air entry, no wheezes, crackles or rhonchi  Cardiovascular - S1S2 RRR, no murmurs or gallops  Abdomen - Soft non tender non distended, normoactive bowel sounds   Neurological - non focal, no neurological deficits noted  Extremities: No peripheral edema    DVT prophylaxis: [] Lovenox                                 [] SCDs                                 [] SQ Heparin                                 [] Encourage ambulation           [x] Already on Anticoagulation               Electronically signed by Anjali Hardin MD on 11/22/2017 at 2:47 PM    [facility], [place] Hospitalist  [phone]

## 2017-11-22 NOTE — CARE COORDINATION
11/22/17, 2:43 PM  Anticipate discharge today or tomorrow. EFRAÍN informed [facility] of this. EFRAÍN also left a voice mail message with the clinical liaison, Rosamaria with the same company. EFRAÍN provided the fax number to the nurse to send the AVS--[phone]  Discharge plan discussed by  and . Discharge plan reviewed with patient/ family. Patient/ family verbalize understanding of discharge plan and are in agreement with plan. Understanding was demonstrated using the teach back method.      Services After Discharge  Services At/After Discharge: Nursing Services, Skilled Therapy   IMM Letter  IMM Letter given to Patient/Family/Significant other/Guardian/POA/by[de-identified]   IMM Letter date given[de-identified] 11/22/17  IMM Letter time given[de-identified] [phone]

## 2017-11-22 NOTE — FLOWSHEET NOTE
17 1905   Encounter Summary   Services provided to: Patient   Referral/Consult From: [facility] Children;Family members   Place of [address] at the corner of [place] and [facility] [employer] Completed   Continue Visiting Yes  (17 continue support)   Complexity of Encounter Low   Length of Encounter 30 minutes   Spiritual Assessment Completed Yes   Routine   Type Initial   Assessment Approachable; Hopeful   Intervention Active listening;Nurtured hope;Prayer;Discussed illness/injury and it's impact   Outcome Expressed gratitude   Spiritual/Scientology   Type Spiritual support     This staff visited patient during rounding on the  Unit. At the time of entry, patient was lying in his bed but awake. There were no other family members int he room. During conversation between staff and patient, he stated, \"My wife  several years ago. I have good support from my children and my [employer]ae[employer] family. \" At this time staff asked patient what was the name of his Mandaeism? Patient said he is a member of [employer] [facility] at the corner of [address] [place] [address] and [employer] in [place] [employer]. Patient also told this staff that he is being treated for congested heart failure/hypotentsion. Patient said his doctors are working on scheduling a heart surgery. Patient said he is receiving good care from his children and his Mandaeism. This staff offered words of hope and encouragement to patient and also provided emotional and spiritual support for patient including prayer. Will continue with a follow visit to offer encouragement and further spiritual support. It will also be helpful to patient and his children for spiritual care to offered post surgery resources that may be available for patient who have gone through cardiac surgery.

## 2017-11-23 LAB
ANION GAP SERPL CALCULATED.3IONS-SCNC: 8 MEQ/L (ref 8–16)
BUN BLDV-MCNC: 29 MG/DL (ref 7–22)
CALCIUM SERPL-MCNC: 8.1 MG/DL (ref 8.5–10.5)
CHLORIDE BLD-SCNC: 94 MEQ/L (ref 98–111)
CO2: 34 MEQ/L (ref 23–33)
CREAT SERPL-MCNC: 0.9 MG/DL (ref 0.4–1.2)
GFR SERPL CREATININE-BSD FRML MDRD: 80 ML/MIN/1.73M2
GLUCOSE BLD-MCNC: 97 MG/DL (ref 70–108)
HCT VFR BLD CALC: 32.3 % (ref 42–52)
HEMOGLOBIN: 10.5 GM/DL (ref 14–18)
INR BLD: 2.22 (ref 0.85–1.13)
MAGNESIUM: 2.3 MG/DL (ref 1.6–2.4)
MCH RBC QN AUTO: 27.6 PG (ref 27–31)
MCHC RBC AUTO-ENTMCNC: 32.6 GM/DL (ref 33–37)
MCV RBC AUTO: 84.7 FL (ref 80–94)
PDW BLD-RTO: 14.9 % (ref 11.5–14.5)
PLATELET # BLD: 156 THOU/MM3 (ref 130–400)
PMV BLD AUTO: 9.3 MCM (ref 7.4–10.4)
POTASSIUM SERPL-SCNC: 3.2 MEQ/L (ref 3.5–5.2)
RBC # BLD: 3.81 MILL/MM3 (ref 4.7–6.1)
SODIUM BLD-SCNC: 136 MEQ/L (ref 135–145)
WBC # BLD: 7.2 THOU/MM3 (ref 4.8–10.8)

## 2017-11-23 PROCEDURE — 6360000002 HC RX W HCPCS: Performed by: INTERNAL MEDICINE

## 2017-11-23 PROCEDURE — 2060000000 HC ICU INTERMEDIATE R&B

## 2017-11-23 PROCEDURE — 6370000000 HC RX 637 (ALT 250 FOR IP): Performed by: HOSPITALIST

## 2017-11-23 PROCEDURE — 6370000000 HC RX 637 (ALT 250 FOR IP): Performed by: INTERNAL MEDICINE

## 2017-11-23 PROCEDURE — 85027 COMPLETE CBC AUTOMATED: CPT

## 2017-11-23 PROCEDURE — 6370000000 HC RX 637 (ALT 250 FOR IP): Performed by: FAMILY MEDICINE

## 2017-11-23 PROCEDURE — 36415 COLL VENOUS BLD VENIPUNCTURE: CPT

## 2017-11-23 PROCEDURE — 6370000000 HC RX 637 (ALT 250 FOR IP): Performed by: NURSE PRACTITIONER

## 2017-11-23 PROCEDURE — 2580000003 HC RX 258: Performed by: FAMILY MEDICINE

## 2017-11-23 PROCEDURE — 6370000000 HC RX 637 (ALT 250 FOR IP)

## 2017-11-23 PROCEDURE — 99232 SBSQ HOSP IP/OBS MODERATE 35: CPT | Performed by: HOSPITALIST

## 2017-11-23 PROCEDURE — 80048 BASIC METABOLIC PNL TOTAL CA: CPT

## 2017-11-23 PROCEDURE — 83735 ASSAY OF MAGNESIUM: CPT

## 2017-11-23 PROCEDURE — 85610 PROTHROMBIN TIME: CPT

## 2017-11-23 PROCEDURE — 2580000003 HC RX 258: Performed by: INTERNAL MEDICINE

## 2017-11-23 RX ORDER — 0.9 % SODIUM CHLORIDE 0.9 %
250 INTRAVENOUS SOLUTION INTRAVENOUS ONCE
Status: COMPLETED | OUTPATIENT
Start: 2017-11-23 | End: 2017-11-23

## 2017-11-23 RX ORDER — POTASSIUM CHLORIDE 20 MEQ/1
40 TABLET, EXTENDED RELEASE ORAL ONCE
Status: COMPLETED | OUTPATIENT
Start: 2017-11-23 | End: 2017-11-23

## 2017-11-23 RX ORDER — NITROFURANTOIN 25; 75 MG/1; MG/1
100 CAPSULE ORAL EVERY 12 HOURS SCHEDULED
Status: DISCONTINUED | OUTPATIENT
Start: 2017-11-23 | End: 2017-11-23

## 2017-11-23 RX ORDER — AMOXICILLIN 500 MG/1
500 CAPSULE ORAL EVERY 12 HOURS SCHEDULED
Status: DISCONTINUED | OUTPATIENT
Start: 2017-11-23 | End: 2017-11-24 | Stop reason: HOSPADM

## 2017-11-23 RX ORDER — WARFARIN SODIUM 5 MG/1
5 TABLET ORAL ONCE
Status: COMPLETED | OUTPATIENT
Start: 2017-11-23 | End: 2017-11-23

## 2017-11-23 RX ADMIN — LISINOPRIL 2.5 MG: 2.5 TABLET ORAL at 09:43

## 2017-11-23 RX ADMIN — SODIUM CHLORIDE 1.5 G: 900 INJECTION INTRAVENOUS at 12:54

## 2017-11-23 RX ADMIN — TAMSULOSIN HYDROCHLORIDE 0.4 MG: 0.4 CAPSULE ORAL at 09:43

## 2017-11-23 RX ADMIN — Medication 10 ML: at 21:04

## 2017-11-23 RX ADMIN — SODIUM CHLORIDE 1.5 G: 900 INJECTION INTRAVENOUS at 01:34

## 2017-11-23 RX ADMIN — AMOXICILLIN 500 MG: 500 CAPSULE ORAL at 21:03

## 2017-11-23 RX ADMIN — Medication 10 ML: at 09:45

## 2017-11-23 RX ADMIN — POTASSIUM CHLORIDE 40 MEQ: 1500 TABLET, EXTENDED RELEASE ORAL at 09:43

## 2017-11-23 RX ADMIN — SODIUM CHLORIDE 1.5 G: 900 INJECTION INTRAVENOUS at 06:26

## 2017-11-23 RX ADMIN — WARFARIN SODIUM 5 MG: 5 TABLET ORAL at 18:46

## 2017-11-23 RX ADMIN — DIGOXIN 125 MCG: 0.12 TABLET ORAL at 09:44

## 2017-11-23 RX ADMIN — ASPIRIN 325 MG: 325 TABLET, COATED ORAL at 09:44

## 2017-11-23 RX ADMIN — Medication 10 ML: at 21:05

## 2017-11-23 RX ADMIN — PANTOPRAZOLE SODIUM 40 MG: 40 TABLET, DELAYED RELEASE ORAL at 06:25

## 2017-11-23 RX ADMIN — ISOSORBIDE MONONITRATE 30 MG: 30 TABLET ORAL at 09:43

## 2017-11-23 RX ADMIN — SODIUM CHLORIDE 250 ML: 9 INJECTION, SOLUTION INTRAVENOUS at 03:08

## 2017-11-23 ASSESSMENT — PAIN SCALES - GENERAL
PAINLEVEL_OUTOF10: 0
PAINLEVEL_OUTOF10: 0

## 2017-11-23 NOTE — FLOWSHEET NOTE
11/23/17 0250   Provider Notification   Reason for Communication Review case  (Pt low output of 50ml)   Provider Name Dr. Candy Davila   Provider Notification Physician   Method of Communication Call   Response See orders   Notification Time Josephine Davila regarding pt having low urine output of only 50ml since 3pm.  Discussed case, explained that pt has been having consistently low BP and lasix has been on hold for the past two days due to this. Notified that pt has been bradycardic throughout the night as well, with HR going as low as 39, then returning to 50s, and due to this not receiving hs metoprolol dose due to not meeting parameters. Also notified of PVC's and 9 beat run of VTach. Explained that NICOM was done and per NICOM pt had stroke vol change of 29.7% which makes them fluid responsive. Order received to bolus 250ml NS, obtain stat BMP, Mg, and to discontinue metoprolol.

## 2017-11-23 NOTE — PROGRESS NOTES
Clinical Pharmacy Note    Warfarin consult follow-up    Recent Labs      11/23/17   0341   INR  2.22*     Recent Labs      11/21/17   0441  11/22/17   0528  11/23/17   0341   HGB  10.1*  10.8*  10.5*   HCT  31.2*  33.2*  32.3*   PLT  155  164  156       Significant Drug-Drug Interactions:  New warfarin drug-drug interactions: none  Discontinued drug-drug interactions: none  Current warfarin drug-drug interactions: aspirin     Date INR Warfarin Dose   11/18/2017 4 No Coumadin   11/19/2017 3.34 5 mg   11/20/2017 4.02 0.5 mg   11/21/2017 4.57  No Coumadin   11/22/2017 2.7 5 mg   11/23/2017 2.22 5 mg               Notes:                     Daily PT/INR until stable within therapeutic range.

## 2017-11-23 NOTE — FLOWSHEET NOTE
11/23/17 0430   Provider Notification   Reason for Communication Review case  (K+ 3.2)   Provider Name Dr. Anthony Chaudhari   Provider Notification Physician   Method of Communication Secure Message   Response See orders   Notification Time 0430     Notified of K+ 3.2 on stat BMP. Order received to initiate potassium replacement protocol.

## 2017-11-23 NOTE — PROGRESS NOTES
Progress Note    Patient:  Lewis Diver    Unit/Bed:4K-13/013-A  YOB: 1930  MRN: [id number]   Acct: [de-identified]   Admit date: 11/18/2017      Principal Problem:    Acute on chronic diastolic heart failure (HCC)  Active Problems:    Coronary artery disease due to lipid rich plaque    Shortness of breath    Pleural effusion on left    Hypotension    Anemia, normocytic normochromic    Supratherapeutic INR    Coronary artery disease involving native coronary artery of native heart without angina pectoris    Acute on chronic systolic congestive heart failure (HCC)      Assessment and Plan:  1. Acute on chronic systolic heart failure:  Lasix still not given due to lower bp.  2. Acute hypoxemic respiratory failure due to #1: wean O2, now down to [facility]. Encourage IS  3. History of PE: coumadin  4. Paroxysmal atrial fibrillation with Hypotension:  Unable to tolerate BB, continue with digoxin per cardio recommendations. 5. Weakness: PT/OT eval  6. Severe AS:  Planned for TAVR next week  7. Dispo: dc planning 11/24, discussed with daughter        Patient Seen, Chart, Consults notes, Labs, Radiology studies reviewed. Subjective: Day 5 of stay with acute on chronic systolic heart failure  Patient seen and examined at bedside, states he is feeling better today. No new complaints or acute overnight events    All other ROS negative except noted in HPI    Past, Family, Social History unchanged from admission. Diet:  DIET LOW SODIUM 2 GM;     Medications:  Scheduled Meds:   potassium (CARDIAC) replacement protocol   Other RX Placeholder    warfarin  5 mg Oral Once    amoxicillin  500 mg Oral 2 times per day    furosemide  40 mg Oral Daily    lisinopril  2.5 mg Oral Daily    digoxin  125 mcg Oral Daily    pneumococcal 13-valent conjugate  0.5 mL Intramuscular Once    aspirin  325 mg Oral Daily    atorvastatin  40 mg Oral Q48H    tamsulosin  0.4 mg Oral Daily    sodium chloride flush  10 mL Intravenous 2 times per day    pantoprazole  40 mg Oral QAM AC    warfarin (COUMADIN) daily dosing (placeholder)   Other RX Placeholder    isosorbide mononitrate  30 mg Oral Daily     Continuous Infusions:   PRN Meds:ipratropium-albuterol, sodium chloride flush, acetaminophen, magnesium hydroxide, ondansetron    Objective:  CBC:   Recent Labs      11/21/17 0441 11/22/17 0528 11/23/17 0341   WBC  9.6  8.5  7.2   HGB  10.1*  10.8*  10.5*   PLT  155  164  156     BMP:    Recent Labs      11/21/17 0441 11/22/17 0528 11/23/17 0341   NA  137  137  136   K  3.6  3.6  3.2*   CL  96*  94*  94*   CO2  32  32  34*   BUN  30*  32*  29*   CREATININE  1.0  1.0  0.9   GLUCOSE  101  123*  97     Calcium:  Recent Labs      11/23/17 0341   CALCIUM  8.1*     Ionized Calcium:No results for input(s): IONCA in the last 72 hours. Magnesium:  Recent Labs      11/23/17 0341   MG  2.3     Phosphorus:No results for input(s): PHOS in the last 72 hours. BNP:No results for input(s): BNP in the last 72 hours. Glucose:No results for input(s): POCGLU in the last 72 hours. HgbA1C: No results for input(s): LABA1C in the last 72 hours. INR:   Recent Labs      11/23/17 0341   INR  2.22*     Hepatic: No results for input(s): ALKPHOS, ALT, AST, PROT, BILITOT, BILIDIR, LABALBU in the last 72 hours. Amylase and Lipase:  No results for input(s): LACTA, AMYLASE in the last 72 hours. Lactic Acid:   No results for input(s): LACTA in the last 72 hours. Troponin: No results for input(s): CKTOTAL, CKMB, TROPONINT in the last 72 hours. BNP: No results for input(s): BNP in the last 72 hours. Lipids: No results for input(s): CHOL, TRIG, HDL, LDLCALC in the last 72 hours.     Invalid input(s): LDL  ABGs:   Lab Results   Component Value Date    PH 7.40 11/19/2017    PCO2 50 11/19/2017    PO2 75 11/19/2017    HCO3 31 11/19/2017    O2SAT 95 11/19/2017           Radiology reports as per the Radiologist  Radiology: Xr Chest Standard (2 TWO VW CLINICAL INFORMATION: Congestive heart failure, COMPARISON: 9/25/2017 TECHNIQUE: AP upright and lateral views of the chest were obtained. 1. Suboptimal inflation of lungs. Mild thyromegaly. Metallic sternotomy sutures. Moderately ectatic and tortuous thoracic aorta. 2. Moderate bibasilar atelectasis/pneumonia. Suggestion of a moderate-sized hiatus hernia. Small bilateral effusions. 3. Focal loculated fluid density along the lateral aspect left upper lobe, not present on prior study. This was present, but, a CT thorax dated 11/6/2017 and appears to represent a small loculated effusion. 4. Overall appearance of chest has worsened since prior study. **This report has been created using voice recognition software. It may contain minor errors which are inherent in voice recognition technology. ** Final report electronically signed by Dr. Chrissy Schwab on 11/18/2017 12:13 PM    Cta Chest W Wo Contrast    Result Date: 11/8/2017  PROCEDURE: CTA ABD PELVIS W WO CONTRAST, CTA CHEST W WO CONTRAST CLINICAL INFORMATION: Severe aortic stenosis . Pre-op TAVR. Severe aortic stenosis. COMPARISON: No prior study. TECHNIQUE: Noncontrast 5 mm axial images were obtained through the chest, abdomen and pelvis. Intravenous Optiray contrast was given. ECG gated axial 0.6 mm axial images were attained of the entire heart. A CT of the entire chest, abdomen and pelvis was obtained at 0.6 mm increments. These are reconstructed into 3 x 3 axial images. Those are sent to PACS. 3-D reconstructions through the chest, abdomen and pelvis include sagittal and coronal MIP images performed on the scanner. Centerline reconstructions  were obtained. Vascular and valve measurements are made on a dedicated 3-D workstation. Measurements were made in systole with the aortic valve open. The 30% phase was used.  All CT scans at this facility use dose modulation, iterative reconstruction, and/or weight-based dosing when appropriate to reduce radiation diameter of the right common femoral artery: 7 mm. Minimal diameter of the left common iliac artery: 8 mm. Minimal diameter of the left external iliac artery: 7 mm. Minimal diameter of the left common femoral artery: 5.5 mm. HEART: There is cardiomegaly. There is enlargement of all 4 chambers. PERICARDIUM:  Normal. CHEST: There is a moderate-sized right pleural effusion. There is a small left-sided pleural effusion which is partially loculated. There are some calcified pleural plaques in the left lung base. There is compressive atelectasis in both lung bases. There  are mild bilateral perihilar groundglass attenuation. This can are present mild pulmonary edema. The pulmonary artery is normal. No pulmonary emboli are noted. No mediastinal adenopathy is noted. There is a moderate-sized hiatal hernia. ABDOMEN:  The liver is normal. The spleen is normal. The adrenals and pancreas are normal. The gallbladder is normal.   There is no hydronephrosis or stones of either kidney. No renal masses are noted. No abnormalities of the small bowel loops are noted. The IVC and aorta are of normal caliber. There is no adenopathy. PELVIS:  There is diffuse thickening of the bladder wall. There is some gas within the urinary bladder. There is no pelvic free fluid. There is a moderate amount of stool throughout the colon. There is no adenopathy. BONES: There are degenerative changes in the spine. There are chronic appearing compression fractures of T12 and L2.     1. Preoperative TAVR measurements as listed above. 2. Cardiomegaly. 3. Moderate-sized right pleural effusion and a small partially loculated left pleural effusion. 4. Compressive atelectasis of both lung bases. There is also groundglass attenuation in the perihilar location suggestive of pulmonary edema. 5. Hiatal hernia 6. Diffuse bladder wall thickening. **This report has been created using voice recognition software.  It may contain minor errors which are inherent in voice recognition technology. ** Final report electronically signed by Dr. Ellen Fields on 11/8/2017 7:58 AM    Xr Chest Portable    Result Date: 11/19/2017  PROCEDURE: XR CHEST PORTABLE CLINICAL INFORMATION: dyspnea, COMPARISON: Chest radiograph, 18 November 2017. TECHNIQUE: Standard portable AP view of the chest provided, timed at 0900 hours. FINDINGS: There are bilateral perihilar alveolar abnormalities of the lungs, primarily on the right more than left. These have worsened in the interval. There are hazy characteristics of the lung bases with blunted costophrenic angles bilaterally, consistent with small bilateral pleural effusions. Interval worsening of possible loculated left pleural effusion over the lateral superior left pleural space. There are characteristics of cardiomegaly, stable. Stable appearance of the mediastinal contours, poorly defined. Stable osseous framework. BILATERAL ALVEOLAR ABNORMALITIES TO INDICATE LIKELY PULMONARY EDEMA, LESS LIKELY BILATERAL PNEUMONIA, WITH WORSENING BILATERAL PLEURAL EFFUSIONS, LEFT MORE THAN RIGHT. OVERALL SIGNIFICANT WORSENED APPEARANCE OF THE CHEST. **This report has been created using voice recognition software. It may contain minor errors which are inherent in voice recognition technology. ** Final report electronically signed by Dr. Reymundo Arthur on 11/19/2017 9:20 AM    Vl Dup Lower Extremity Venous Bilateral    Result Date: 11/19/2017  PROCEDURE: VL LOWER EXTREMITY BILATERAL VENOUS DUPLEX CLINICAL INFORMATION: bilateral lower extremity edema, elevated D Dimer, COMPARISON: No prior venous imaging for comparison. TECHNIQUE: Standard duplex sonographic imaging of the left and right lower extremity venous structures provided. Spectral analysis and augmentation maneuvers included. FINDINGS: All venous structures images demonstrate normal compressibility and normal color flow characteristics. Limited evaluation of the peroneal veins of the left lower extremity. coronary ostia to the annulus: 25 mm. Distance of the left coronary ostia to the annulus: 11 mm. Maximum diameter of the aortic sinus: 39 mm. Maximum diameter of the sinotubular junction: 29 mm. Maximum diameter of the mid ascending aorta: 37 mm. Aortic root orientation: 3 cusp view: Frisian 8, CAU 3;  Anterior view: FABIAN 0, CAU 12 Coronary anatomy: The patient has had a CABG. The native coronary arteries are heavily calcified and severely diseased. There are 2 patent vein grafts extending to the circumflex/obtuse marginal locations. There is a patent LIMA graft extending to LAD. There are 2 patent vein grafts to the right coronary artery. Ascending aorta and arch: There is mild calcified plaque of the a sending aorta. There is a moderate amount of calcified plaque of the aortic arch. Descending thoracic aorta: There is no significant tortuosity of the descending thoracic aorta. There is no aneurysmal dilation. There is moderate scattered calcified atherosclerosis. Abdominal aorta: There is moderate tortuosity and calcified atherosclerosis. There is no aneurysmal dilation. There is calcified atherosclerosis of the origins of both renal arteries. There is calcified atherosclerosis of the origins of the superior mesenteric artery and celiac artery. Iliofemoral access route: There is potentially significant tortuosity of the left common femoral artery just after the aortic bifurcation. Minimal diameter of the abdominal aorta: 13 mm. Minimal diameter of the right common iliac artery: 8 mm. Minimal diameter of the right external iliac artery: 7 mm. Minimal diameter of the right common femoral artery: 7 mm. Minimal diameter of the left common iliac artery: 8 mm. Minimal diameter of the left external iliac artery: 7 mm. Minimal diameter of the left common femoral artery: 5.5 mm. HEART: There is cardiomegaly. There is enlargement of all 4 chambers.  PERICARDIUM:  Normal. CHEST: There is a moderate-sized right pleural 11/23/17 1529  Last data filed at 11/23/17 1430   Gross per 24 hour   Intake           994.74 ml   Output              525 ml   Net           469.74 ml     Last 3 weights:   Wt Readings from Last 3 Encounters:   11/23/17 150 lb 14.4 oz (68.4 kg)   11/13/17 155 lb (70.3 kg)   11/08/17 150 lb 12.8 oz (68.4 kg)       General appearance - alert, awake appears to be in no acute distress  Chest - Bilateral air entry, no wheezes, crackles or rhonchi  Cardiovascular - S1S2 RRR, no murmurs or gallops  Abdomen - Soft non tender non distended, normoactive bowel sounds   Neurological - non focal, no neurological deficits noted  Extremities: No peripheral edema    DVT prophylaxis: [] Lovenox                                 [] SCDs                                 [] SQ Heparin                                 [] Encourage ambulation           [x] Already on Anticoagulation               Electronically signed by Saniya Gamez MD on 11/23/2017 at 3:29 PM    [facility]  [phone]

## 2017-11-24 VITALS
DIASTOLIC BLOOD PRESSURE: 58 MMHG | WEIGHT: 151.38 LBS | RESPIRATION RATE: 16 BRPM | HEART RATE: 80 BPM | BODY MASS INDEX: 25.22 KG/M2 | SYSTOLIC BLOOD PRESSURE: 113 MMHG | TEMPERATURE: 97.5 F | HEIGHT: 65 IN | OXYGEN SATURATION: 93 %

## 2017-11-24 LAB
ANION GAP SERPL CALCULATED.3IONS-SCNC: 8 MEQ/L (ref 8–16)
BUN BLDV-MCNC: 22 MG/DL (ref 7–22)
CALCIUM SERPL-MCNC: 8.3 MG/DL (ref 8.5–10.5)
CHLORIDE BLD-SCNC: 95 MEQ/L (ref 98–111)
CO2: 33 MEQ/L (ref 23–33)
CREAT SERPL-MCNC: 0.8 MG/DL (ref 0.4–1.2)
GFR SERPL CREATININE-BSD FRML MDRD: > 90 ML/MIN/1.73M2
GLUCOSE BLD-MCNC: 91 MG/DL (ref 70–108)
HCT VFR BLD CALC: 34.8 % (ref 42–52)
HEMOGLOBIN: 11.2 GM/DL (ref 14–18)
INR BLD: 2.86 (ref 0.85–1.13)
MCH RBC QN AUTO: 27.2 PG (ref 27–31)
MCHC RBC AUTO-ENTMCNC: 32.3 GM/DL (ref 33–37)
MCV RBC AUTO: 84.3 FL (ref 80–94)
PDW BLD-RTO: 14.6 % (ref 11.5–14.5)
PLATELET # BLD: 167 THOU/MM3 (ref 130–400)
PMV BLD AUTO: 9.2 MCM (ref 7.4–10.4)
POTASSIUM SERPL-SCNC: 3.6 MEQ/L (ref 3.5–5.2)
RBC # BLD: 4.13 MILL/MM3 (ref 4.7–6.1)
SODIUM BLD-SCNC: 136 MEQ/L (ref 135–145)
WBC # BLD: 6.2 THOU/MM3 (ref 4.8–10.8)

## 2017-11-24 PROCEDURE — 99239 HOSP IP/OBS DSCHRG MGMT >30: CPT | Performed by: HOSPITALIST

## 2017-11-24 PROCEDURE — 97116 GAIT TRAINING THERAPY: CPT

## 2017-11-24 PROCEDURE — 85027 COMPLETE CBC AUTOMATED: CPT

## 2017-11-24 PROCEDURE — 6370000000 HC RX 637 (ALT 250 FOR IP): Performed by: FAMILY MEDICINE

## 2017-11-24 PROCEDURE — 2580000003 HC RX 258: Performed by: FAMILY MEDICINE

## 2017-11-24 PROCEDURE — 6370000000 HC RX 637 (ALT 250 FOR IP): Performed by: NURSE PRACTITIONER

## 2017-11-24 PROCEDURE — 80048 BASIC METABOLIC PNL TOTAL CA: CPT

## 2017-11-24 PROCEDURE — 36415 COLL VENOUS BLD VENIPUNCTURE: CPT

## 2017-11-24 PROCEDURE — 85610 PROTHROMBIN TIME: CPT

## 2017-11-24 PROCEDURE — 6370000000 HC RX 637 (ALT 250 FOR IP): Performed by: HOSPITALIST

## 2017-11-24 PROCEDURE — 97110 THERAPEUTIC EXERCISES: CPT

## 2017-11-24 RX ORDER — AMOXICILLIN 500 MG/1
500 CAPSULE ORAL EVERY 12 HOURS SCHEDULED
Qty: 10 CAPSULE | Refills: 0 | Status: SHIPPED | OUTPATIENT
Start: 2017-11-24 | End: 2017-11-29

## 2017-11-24 RX ORDER — LISINOPRIL 2.5 MG/1
2.5 TABLET ORAL DAILY
Qty: 30 TABLET | Refills: 3 | Status: SHIPPED | OUTPATIENT
Start: 2017-11-25 | End: 2017-12-14 | Stop reason: ALTCHOICE

## 2017-11-24 RX ORDER — WARFARIN SODIUM 4 MG/1
4 TABLET ORAL
Status: DISCONTINUED | OUTPATIENT
Start: 2017-11-24 | End: 2017-11-24 | Stop reason: HOSPADM

## 2017-11-24 RX ORDER — DIGOXIN 125 MCG
125 TABLET ORAL DAILY
Qty: 30 TABLET | Refills: 3 | Status: SHIPPED | OUTPATIENT
Start: 2017-11-25 | End: 2017-12-14 | Stop reason: ALTCHOICE

## 2017-11-24 RX ORDER — BLOOD PRESSURE TEST KIT
1 KIT MISCELLANEOUS DAILY
Qty: 1 KIT | Refills: 0 | Status: SHIPPED | OUTPATIENT
Start: 2017-11-24

## 2017-11-24 RX ADMIN — ISOSORBIDE MONONITRATE 30 MG: 30 TABLET ORAL at 09:09

## 2017-11-24 RX ADMIN — ASPIRIN 325 MG: 325 TABLET, COATED ORAL at 09:09

## 2017-11-24 RX ADMIN — LISINOPRIL 2.5 MG: 2.5 TABLET ORAL at 09:09

## 2017-11-24 RX ADMIN — AMOXICILLIN 500 MG: 500 CAPSULE ORAL at 09:09

## 2017-11-24 RX ADMIN — FUROSEMIDE 40 MG: 40 TABLET ORAL at 09:09

## 2017-11-24 RX ADMIN — DIGOXIN 125 MCG: 0.12 TABLET ORAL at 09:09

## 2017-11-24 RX ADMIN — Medication 10 ML: at 09:10

## 2017-11-24 RX ADMIN — PANTOPRAZOLE SODIUM 40 MG: 40 TABLET, DELAYED RELEASE ORAL at 09:09

## 2017-11-24 RX ADMIN — TAMSULOSIN HYDROCHLORIDE 0.4 MG: 0.4 CAPSULE ORAL at 09:09

## 2017-11-24 ASSESSMENT — PAIN SCALES - GENERAL
PAINLEVEL_OUTOF10: 0
PAINLEVEL_OUTOF10: 0

## 2017-11-24 NOTE — PROGRESS NOTES
Stent in the  mid area and balloon angioplasty of the distal anastomotic site.  CARDIAC CATHETERIZATION      CORONARY ARTERY BYPASS GRAFT      X2 1982 1984    HERNIA REPAIR      TRANSTHORACIC ECHOCARDIOGRAM  01/10/2012    LV was mildly dilated. Systolic function was normal. EF was estimated in  the range of 55-65%. There were no regional wall motion abnormalities. Wall thickness was normal.       Restrictions/Precautions:  Restrictions/Precautions: General Precautions, Fall Risk    Subjective:  Subjective: RN approved session. Per notes, pt with low BP yesterday. Pt is agreeable and BP assessed throughout. Pain:  Denies. Social/Functional:  Lives With: Daughter  Type of Home: House  Home Layout: One level  Home Access: Stairs to enter with rails  Entrance Stairs - Number of Steps: 2  Entrance Stairs - Rails: Left  Home Equipment: Cane, Rolling walker (used a button hook)     Objective:  Supine to Sit: Stand by assistance (HOB elevated, use of bed rail)  Scooting: Stand by assistance (to edge of bed)    Transfers  Sit to Stand: Contact guard assistance  Stand to sit: Contact guard assistance     Ambulation 1  Surface: level tile  Device: Rolling Walker  Assistance: Contact guard assistance  Quality of Gait: Pt amb with increased trunk flexion and decreased raymundo and stride length, impaired heel strike and push off, slightly unsteady with directional changes, no LOB. Distance: 75 feet     Balance  Sitting - Static: Good  Sitting - Dynamic: Good  Standing - Static: Fair;+  Standing - Dynamic: Fair     BP while supine in bed before treatment: 110/52  BP EOB after there ex: 121/65  BP seated after gait: 142/73    Nurse notified of all BP readings. Exercises:  Exercises  Comments: Pt performs supine B LE AROM: ankle pumps, heel slides, hip abd/add and seated edge of bed B LAQ AROM x 10 reps to increase strength for improved gait and balance.      Activity Tolerance:  Activity Tolerance: Patient short length of stay.

## 2017-11-24 NOTE — DISCHARGE INSTR - MEDS
Clinical Pharmacy Note                                               Warfarin Discharge Recommendations      Pt discharged from [facility] today after admission for CHF exacerbation    INR today:  Recent Labs      11/24/17   0433   INR  2.86*       Coumadin 5 mg tabs    Interacting medications at discharge: none    INR goal during admission:3-3.5    Recommendations for discharge:   Date Warfarin Dose   11/24/17 4mg   11/25/17 5mg   11/26/17 5mg   11/27/17 5mg and call clinic for INR check       Provider dosing warfarin: [facility]    Recheck INR:  11/28/17 with results to [facility] [address] [place], [place] [id number], [facility]  11/24/2017     1:23 PM

## 2017-11-24 NOTE — DISCHARGE SUMMARY
Discharge Summary    Patient:  Aman Mauriico  YOB: 1930    MRN: [id number]   Acct: [de-identified]    Primary Care Physician: Taryn Wilks MD    Admit date:  11/18/2017    Discharge date:   11/24/2017       Discharge Diagnoses:   Acute on chronic diastolic heart failure (HCC)  Principal Problem:    Acute on chronic diastolic heart failure (HCC)  Active Problems:    Acute on chronic systolic congestive heart failure (HCC)    Coronary artery disease due to lipid rich plaque    Shortness of breath    Pleural effusion on left    Hypotension    Anemia, normocytic normochromic    Supratherapeutic INR    Coronary artery disease involving native coronary artery of native heart without angina pectoris        Admitted for: (HPI)  The patient is a 80 y.o. 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 Patient reports  leg swelling that has gone from ankle high to level of his knees in the past few days. Patient relates that he is taking his medications as prescribed. Patient notes worsening shortness of breath with laying down. He denies modifying factors. Patient denies chest pain, fever, chills, nausea, vomiting, diarrhea, abdominal pain, dysuria or falls. Patient denies smoking or alcohol use. Last echo was in September, 2017 showing Left ventricle size mildly dilated and systolic function is reduced. Ejection fraction was estimated at 30%. Left atrial size was moderately dilated. The aortic valve was calcified with reduced excursion. Patient is being admitted for further evaluation and treatment. Hospital Course:  80year old with severe AS, CAD and systolic heart failure was admitted for CHF exacerbation.   He was diuresed and his symptoms on prior study. This was present, but, a CT thorax dated 11/6/2017 and appears to represent a small loculated effusion. 4. Overall appearance of chest has worsened since prior study. **This report has been created using voice recognition software. It may contain minor errors which are inherent in voice recognition technology. ** Final report electronically signed by Dr. Juan C Anaya on 11/18/2017 12:13 PM    Cta Chest W Wo Contrast    Result Date: 11/8/2017  PROCEDURE: CTA ABD PELVIS W WO CONTRAST, CTA CHEST W WO CONTRAST CLINICAL INFORMATION: Severe aortic stenosis . Pre-op TAVR. Severe aortic stenosis. COMPARISON: No prior study. TECHNIQUE: Noncontrast 5 mm axial images were obtained through the chest, abdomen and pelvis. Intravenous Optiray contrast was given. ECG gated axial 0.6 mm axial images were attained of the entire heart. A CT of the entire chest, abdomen and pelvis was obtained at 0.6 mm increments. These are reconstructed into 3 x 3 axial images. Those are sent to PACS. 3-D reconstructions through the chest, abdomen and pelvis include sagittal and coronal MIP images performed on the scanner. Centerline reconstructions  were obtained. Vascular and valve measurements are made on a dedicated 3-D workstation. Measurements were made in systole with the aortic valve open. The 30% phase was used. All CT scans at this facility use dose modulation, iterative reconstruction, and/or weight-based dosing when appropriate to reduce radiation dose to as low as reasonably achievable. FINDINGS: MEASUREMENTS: AORTIC VALVE: Calcium score: 3,257 Trileaflet valve. There is heavy calcification of the aortic valve leaflets. Valvular calcifications do  extend into the LVOT. These are mild. AORTIC DIMENSIONS: Aortic annulus: 29 x 21 mm. Aortic annulus area: 4.3 cm2. Aortic annulus perimeter: 77 mm. Distance of right coronary ostia to the annulus: 25 mm. Distance of the left coronary ostia to the annulus: 11 mm.  Maximum diameter plaques in the left lung base. There is compressive atelectasis in both lung bases. There  are mild bilateral perihilar groundglass attenuation. This can are present mild pulmonary edema. The pulmonary artery is normal. No pulmonary emboli are noted. No mediastinal adenopathy is noted. There is a moderate-sized hiatal hernia. ABDOMEN:  The liver is normal. The spleen is normal. The adrenals and pancreas are normal. The gallbladder is normal.   There is no hydronephrosis or stones of either kidney. No renal masses are noted. No abnormalities of the small bowel loops are noted. The IVC and aorta are of normal caliber. There is no adenopathy. PELVIS:  There is diffuse thickening of the bladder wall. There is some gas within the urinary bladder. There is no pelvic free fluid. There is a moderate amount of stool throughout the colon. There is no adenopathy. BONES: There are degenerative changes in the spine. There are chronic appearing compression fractures of T12 and L2.     1. Preoperative TAVR measurements as listed above. 2. Cardiomegaly. 3. Moderate-sized right pleural effusion and a small partially loculated left pleural effusion. 4. Compressive atelectasis of both lung bases. There is also groundglass attenuation in the perihilar location suggestive of pulmonary edema. 5. Hiatal hernia 6. Diffuse bladder wall thickening. **This report has been created using voice recognition software. It may contain minor errors which are inherent in voice recognition technology. ** Final report electronically signed by Dr. Cayetano Lawson on 11/8/2017 7:58 AM    Xr Chest Portable    Result Date: 11/19/2017  PROCEDURE: XR CHEST PORTABLE CLINICAL INFORMATION: dyspnea, COMPARISON: Chest radiograph, 18 November 2017. TECHNIQUE: Standard portable AP view of the chest provided, timed at 0900 hours. FINDINGS: There are bilateral perihilar alveolar abnormalities of the lungs, primarily on the right more than left.  These have worsened in the interval. There are hazy characteristics of the lung bases with blunted costophrenic angles bilaterally, consistent with small bilateral pleural effusions. Interval worsening of possible loculated left pleural effusion over the lateral superior left pleural space. There are characteristics of cardiomegaly, stable. Stable appearance of the mediastinal contours, poorly defined. Stable osseous framework. BILATERAL ALVEOLAR ABNORMALITIES TO INDICATE LIKELY PULMONARY EDEMA, LESS LIKELY BILATERAL PNEUMONIA, WITH WORSENING BILATERAL PLEURAL EFFUSIONS, LEFT MORE THAN RIGHT. OVERALL SIGNIFICANT WORSENED APPEARANCE OF THE CHEST. **This report has been created using voice recognition software. It may contain minor errors which are inherent in voice recognition technology. ** Final report electronically signed by Dr. Chris Rutherford on 11/19/2017 9:20 AM    Vl Dup Lower Extremity Venous Bilateral    Result Date: 11/19/2017  PROCEDURE: VL LOWER EXTREMITY BILATERAL VENOUS DUPLEX CLINICAL INFORMATION: bilateral lower extremity edema, elevated D Dimer, COMPARISON: No prior venous imaging for comparison. TECHNIQUE: Standard duplex sonographic imaging of the left and right lower extremity venous structures provided. Spectral analysis and augmentation maneuvers included. FINDINGS: All venous structures images demonstrate normal compressibility and normal color flow characteristics. Limited evaluation of the peroneal veins of the left lower extremity. No sonographic evidence of left or right lower extremity deep venous thrombosis demonstrated. Soft tissues are nonspecific. NO SONOGRAPHIC EVIDENCE OF LEFT OR RIGHT LOWER SYMMETRY DEEP VENOUS THROMBOSIS. SUBOPTIMAL EVALUATION OF THE PERONEAL VEINS OF THE LEFT LOWER EXTREMITY. **This report has been created using voice recognition software. It may contain minor errors which are inherent in voice recognition technology. ** Final report electronically signed by Dr. Chris Rutherford normal. The adrenals and pancreas are normal. The gallbladder is normal.   There is no hydronephrosis or stones of either kidney. No renal masses are noted. No abnormalities of the small bowel loops are noted. The IVC and aorta are of normal caliber. There is no adenopathy. PELVIS:  There is diffuse thickening of the bladder wall. There is some gas within the urinary bladder. There is no pelvic free fluid. There is a moderate amount of stool throughout the colon. There is no adenopathy. BONES: There are degenerative changes in the spine. There are chronic appearing compression fractures of T12 and L2.     1. Preoperative TAVR measurements as listed above. 2. Cardiomegaly. 3. Moderate-sized right pleural effusion and a small partially loculated left pleural effusion. 4. Compressive atelectasis of both lung bases. There is also groundglass attenuation in the perihilar location suggestive of pulmonary edema. 5. Hiatal hernia 6. Diffuse bladder wall thickening. **This report has been created using voice recognition software. It may contain minor errors which are inherent in voice recognition technology. ** Final report electronically signed by Dr. Humera Simon on 11/8/2017 7:58 AM      Results for orders placed or performed during the hospital encounter of 11/18/17   MRSA Screening Culture Only   Result Value Ref Range    MRSA SCREEN No MRSA isolated    VRE culture   Result Value Ref Range    VRE Culture Culture screen is negative for VRE colonization. Urine Culture, Reflexed   Result Value Ref Range    Urine Culture Reflex (A)      Current antibiotic therapy ineffective in vitro for at  least one of culture isolates.       Organism Enterococcus faecalis - (Group D) (A)     Urine Culture Reflex [place] count: >100,000 CFU/mL     Organism gram negative bacilli (A)     Urine Culture Reflex [place] count: 10,000-50,000 CFU/mL        Susceptibility    Enterococcus  faecalis - BACTERIAL SUSCEPTIBILITY PANEL BY WAYNE ampicillin <=2 Sensitive mcg/mL     Penicillin G =8 Sensitive mcg/mL     ciprofloxacin >=8 Resistant mcg/mL     nitrofurantoin <=16 Sensitive mcg/mL     vancomycin =1 Sensitive mcg/mL   CBC auto differential   Result Value Ref Range    WBC 8.8 4.8 - 10.8 thou/mm3    RBC 3.86 (L) 4.70 - 6.10 mill/mm3    Hemoglobin 10.5 (L) 14.0 - 18.0 gm/dl    Hematocrit 32.8 (L) 42.0 - 52.0 %    MCV 85.0 80.0 - 94.0 fL    MCH 27.3 27.0 - 31.0 pg    MCHC 32.1 (L) 33.0 - 37.0 gm/dl    RDW 15.3 (H) 11.5 - 14.5 %    Platelets 776 506 - 917 thou/mm3    MPV 8.5 7.4 - 10.4 mcm    Seg Neutrophils 79.4 %    Lymphocytes 10.2 %    Monocytes 9.5 %    Eosinophils 0.4 %    Basophils 0.5 %    nRBC 0 /100 wbc    Anisocytosis 1+ Absent    Segs Absolute 7.0 1.8 - 7.7 thou/mm3    Lymphocytes # 0.9 (L) 1.0 - 4.8 thou/mm3    Monocytes # 0.8 0.4 - 1.3 thou/mm3    Eosinophils # 0.0 0.0 - 0.4 thou/mm3    Basophils # 0.0 0.0 - 0.1 thou/mm3   Brain Natriuretic Peptide   Result Value Ref Range    Pro-BNP 5768.0 (H) 0.0 - 1800.0 pg/mL   Protime-INR   Result Value Ref Range    INR 4.00 (H) 0.85 - 1.13   Troponin   Result Value Ref Range    Troponin T < 0.010 ng/ml   Lipase   Result Value Ref Range    Lipase 28.8 5.6 - 51.3 U/L   Hepatic function panel   Result Value Ref Range    Alb 3.8 3.5 - 5.1 g/dL    Total Bilirubin 1.0 0.3 - 1.2 mg/dL    Bilirubin, Direct 0.3 0.0 - 0.3 mg/dL    Alkaline Phosphatase 137 (H) 38 - 126 U/L    AST 11 5 - 40 U/L    ALT 11 11 - 66 U/L    Total Protein 6.5 6.1 - 8.0 g/dL   Basic Metabolic Panel   Result Value Ref Range    Sodium 140 135 - 145 meq/L    Potassium 3.8 3.5 - 5.2 meq/L    Chloride 98 98 - 111 meq/L    CO2 29 23 - 33 meq/L    Glucose 111 (H) 70 - 108 mg/dL    BUN 30 (H) 7 - 22 mg/dL    CREATININE 0.9 0.4 - 1.2 mg/dL    Calcium 8.8 8.5 - 10.5 mg/dL   Urine reflex to culture   Result Value Ref Range    Glucose, Ur NEGATIVE NEGATIVE mg/dl    Bilirubin Urine NEGATIVE NEGATIVE    Ketones, Urine NEGATIVE NEGATIVE    Specific # 0.7 (L) 1.0 - 4.8 thou/mm3    Monocytes # 0.7 0.4 - 1.3 thou/mm3    Eosinophils # 0.1 0.0 - 0.4 thou/mm3    Basophils # 0.0 0.0 - 0.1 thou/mm3   Basic Metabolic Panel   Result Value Ref Range    Sodium 138 135 - 145 meq/L    Potassium 3.6 3.5 - 5.2 meq/L    Chloride 98 98 - 111 meq/L    CO2 29 23 - 33 meq/L    Glucose 113 (H) 70 - 108 mg/dL    BUN 27 (H) 7 - 22 mg/dL    CREATININE 0.9 0.4 - 1.2 mg/dL    Calcium 8.6 8.5 - 10.5 mg/dL   Protime-INR   Result Value Ref Range    INR 3.30 (H) 0.85 - 1.13   TSH with Reflex   Result Value Ref Range    TSH 5.790 (H) 0.400 - 4.20 uIU/mL   Procalcitonin   Result Value Ref Range    Procalcitonin 0.05 0.01 - 0.09 ng/mL   Anion Gap   Result Value Ref Range    Anion Gap 11.0 8.0 - 16.0 meq/L   Glomerular Filtration Rate, Estimated   Result Value Ref Range    Est, Glom Filt Rate 80 (A) ml/min/1.73m2   T4, Free   Result Value Ref Range    T4 Free 1.14 0.93 - 1.76 ng/dL   Blood Gas, Arterial   Result Value Ref Range    pH, Blood Gas 7.40 7.35 - 7.45    PCO2 50 (H) 35 - 45 mmhg    PO2 75 71 - 104 mmhg    HCO3 31 (H) 23 - 28 mmol/l    Base Excess (Calculated) 5.3 (H) -2.5 - 2.5 mmol/l    O2 Sat 95 %    IFIO2 6     DEVICE Cannula     Alistair Test Positive     Source: R Radial     COLLECTED BY: [id number]    Protime-INR   Result Value Ref Range    INR 4.02 (H) 0.85 - 4.90   Basic Metabolic Panel   Result Value Ref Range    Sodium 140 135 - 145 meq/L    Potassium 3.7 3.5 - 5.2 meq/L    Chloride 97 (L) 98 - 111 meq/L    CO2 32 23 - 33 meq/L    Glucose 90 70 - 108 mg/dL    BUN 26 (H) 7 - 22 mg/dL    CREATININE 1.0 0.4 - 1.2 mg/dL    Calcium 8.2 (L) 8.5 - 10.5 mg/dL   CBC   Result Value Ref Range    WBC 8.4 4.8 - 10.8 thou/mm3    RBC 3.61 (L) 4.70 - 6.10 mill/mm3    Hemoglobin 10.0 (L) 14.0 - 18.0 gm/dl    Hematocrit 30.5 (L) 42.0 - 52.0 %    MCV 84.7 80.0 - 94.0 fL    MCH 27.8 27.0 - 31.0 pg    MCHC 32.8 (L) 33.0 - 37.0 gm/dl    RDW 14.7 (H) 11.5 - 14.5 %    Platelets 582 937 - 896 thou/mm3 0.85 - 7.32   Basic Metabolic Panel   Result Value Ref Range    Sodium 136 135 - 145 meq/L    Potassium 3.6 3.5 - 5.2 meq/L    Chloride 95 (L) 98 - 111 meq/L    CO2 33 23 - 33 meq/L    Glucose 91 70 - 108 mg/dL    BUN 22 7 - 22 mg/dL    CREATININE 0.8 0.4 - 1.2 mg/dL    Calcium 8.3 (L) 8.5 - 10.5 mg/dL   CBC   Result Value Ref Range    WBC 6.2 4.8 - 10.8 thou/mm3    RBC 4.13 (L) 4.70 - 6.10 mill/mm3    Hemoglobin 11.2 (L) 14.0 - 18.0 gm/dl    Hematocrit 34.8 (L) 42.0 - 52.0 %    MCV 84.3 80.0 - 94.0 fL    MCH 27.2 27.0 - 31.0 pg    MCHC 32.3 (L) 33.0 - 37.0 gm/dl    RDW 14.6 (H) 11.5 - 14.5 %    Platelets 934 906 - 020 thou/mm3    MPV 9.2 7.4 - 10.4 mcm   Anion Gap   Result Value Ref Range    Anion Gap 8.0 8.0 - 16.0 meq/L   Glomerular Filtration Rate, Estimated   Result Value Ref Range    Est, Glom Filt Rate >90 ml/min/1.73m2   EKG SOB   Result Value Ref Range    Ventricular Rate 93 BPM    Atrial Rate 90 BPM    QRS Duration 102 ms    Q-T Interval 350 ms    QTc Calculation (Bazett) 435 ms    R Axis 59 degrees    T Axis 127 degrees       Diet:  DIET LOW SODIUM 2 GM;     Activity:  Activity as tolerated (Patient may move about with assist as indicated or with supervision.)    Follow-up:  in the next few days with Cailin Jimenez MD    Disposition: home    Condition: Stable      Time Spent: 35 minutes    Electronically signed by Kelsea Romano MD on 11/24/2017 at 11:24 AM    Discharging Hospitalist

## 2017-11-24 NOTE — PROGRESS NOTES
Clinical Pharmacy Note                                               Warfarin Discharge Recommendations      Pt discharged from [facility] today after admission for CHF    INR today:  Recent Labs      11/24/17   0433   INR  2.86*       Coumadin 5 mg tabs    Interacting medications at discharge: none    INR goal during admission:3-3.5    Recommendations for discharge:   Date Warfarin Dose   11/24/17 4mg   11/25/17 5mg   11/26/17 5mg   11/27/17 5mg and call clinic for INR check       Provider dosing warfarin: [facility]    Recheck INR:  11/28/17 with results to [facility] [address] [place], [place] [address], [facility]  11/24/2017     1:23 PM

## 2017-11-25 LAB
ORGANISM: ABNORMAL
ORGANISM: ABNORMAL
URINE CULTURE REFLEX: ABNORMAL

## 2017-11-29 DIAGNOSIS — N39.0 URINARY TRACT INFECTION WITHOUT HEMATURIA, SITE UNSPECIFIED: Primary | ICD-10-CM

## 2017-11-30 ENCOUNTER — HOSPITAL ENCOUNTER (OUTPATIENT)
Dept: PHARMACY | Age: 82
Setting detail: THERAPIES SERIES
Discharge: HOME | End: 2017-11-30
Payer: MEDICARE

## 2017-11-30 DIAGNOSIS — Z86.73 HISTORY OF CVA (CEREBROVASCULAR ACCIDENT): ICD-10-CM

## 2017-11-30 DIAGNOSIS — Z86.718 HISTORY OF THROMBOSIS: ICD-10-CM

## 2017-11-30 LAB — POC INR: 2.1 (ref 0.8–1.2)

## 2017-11-30 PROCEDURE — 36415 COLL VENOUS BLD VENIPUNCTURE: CPT

## 2017-11-30 PROCEDURE — 99211 OFF/OP EST MAY X REQ PHY/QHP: CPT

## 2017-11-30 PROCEDURE — 85610 PROTHROMBIN TIME: CPT

## 2017-11-30 ASSESSMENT — ENCOUNTER SYMPTOMS
CONSTIPATION: 0
SHORTNESS OF BREATH: 0
BLOOD IN STOOL: 0
DIARRHEA: 0

## 2017-12-04 ENCOUNTER — HOSPITAL ENCOUNTER (OUTPATIENT)
Age: 82
Discharge: HOME | End: 2017-12-04
Payer: MEDICARE

## 2017-12-04 DIAGNOSIS — N39.0 URINARY TRACT INFECTION WITHOUT HEMATURIA, SITE UNSPECIFIED: ICD-10-CM

## 2017-12-04 LAB
BACTERIA: ABNORMAL
BILIRUBIN URINE: NEGATIVE
BLOOD, URINE: NEGATIVE
CASTS: ABNORMAL /LPF
CASTS: ABNORMAL /LPF
CHARACTER, URINE: CLEAR
COLOR: YELLOW
CRYSTALS: ABNORMAL
EPITHELIAL CELLS, UA: ABNORMAL /HPF
GLUCOSE, URINE: NEGATIVE MG/DL
KETONES, URINE: NEGATIVE
LEUKOCYTE ESTERASE, URINE: ABNORMAL
MISCELLANEOUS LAB TEST RESULT: ABNORMAL
NITRITE, URINE: NEGATIVE
PH UA: 5
PROTEIN UA: NEGATIVE MG/DL
RBC URINE: ABNORMAL /HPF
RENAL EPITHELIAL, UA: ABNORMAL
SPECIFIC GRAVITY UA: 1.02 (ref 1–1.03)
UROBILINOGEN, URINE: 0.2 EU/DL
WBC UA: ABNORMAL /HPF
YEAST: ABNORMAL

## 2017-12-04 PROCEDURE — 81001 URINALYSIS AUTO W/SCOPE: CPT

## 2017-12-11 RX ORDER — WARFARIN SODIUM 5 MG/1
5 TABLET ORAL DAILY
Qty: 30 TABLET | Refills: 3 | Status: ON HOLD
Start: 2017-12-11 | End: 2019-01-01

## 2017-12-13 ENCOUNTER — ANESTHESIA EVENT (OUTPATIENT)
Dept: CARDIAC CATH/INVASIVE PROCEDURES | Age: 82
DRG: 267 | End: 2017-12-13
Payer: MEDICARE

## 2017-12-14 ENCOUNTER — ANESTHESIA (OUTPATIENT)
Dept: CARDIAC CATH/INVASIVE PROCEDURES | Age: 82
DRG: 267 | End: 2017-12-14
Payer: MEDICARE

## 2017-12-14 ENCOUNTER — HOSPITAL ENCOUNTER (INPATIENT)
Dept: CARDIAC CATH/INVASIVE PROCEDURES | Age: 82
LOS: 2 days | Discharge: HOME HEALTH | DRG: 267 | End: 2017-12-16
Attending: INTERNAL MEDICINE | Admitting: INTERNAL MEDICINE
Payer: MEDICARE

## 2017-12-14 VITALS — SYSTOLIC BLOOD PRESSURE: 167 MMHG | DIASTOLIC BLOOD PRESSURE: 80 MMHG | OXYGEN SATURATION: 95 %

## 2017-12-14 DIAGNOSIS — Z95.2 S/P TAVR (TRANSCATHETER AORTIC VALVE REPLACEMENT): Primary | ICD-10-CM

## 2017-12-14 DIAGNOSIS — I35.0 SEVERE AORTIC STENOSIS: ICD-10-CM

## 2017-12-14 LAB
% CKMB: 3.8 % (ref 0–3.5)
ACTIVATED CLOTTING TIME: 274 SEC (ref 79–149)
ALBUMIN SERPL-MCNC: 4.3 G/DL (ref 3.5–5.2)
ANION GAP SERPL CALCULATED.3IONS-SCNC: 18 MMOL/L (ref 9–17)
BILIRUB SERPL-MCNC: 0.74 MG/DL (ref 0.3–1.2)
BNP INTERPRETATION: ABNORMAL
BUN BLDV-MCNC: 34 MG/DL (ref 8–23)
BUN/CREAT BLD: ABNORMAL (ref 9–20)
CALCIUM SERPL-MCNC: 9 MG/DL (ref 8.6–10.4)
CHLORIDE BLD-SCNC: 98 MMOL/L (ref 98–107)
CK MB: 2 NG/ML
CKMB INTERPRETATION: ABNORMAL
CO2: 26 MMOL/L (ref 20–31)
CREAT SERPL-MCNC: 0.96 MG/DL (ref 0.7–1.2)
EKG ATRIAL RATE: 76 BPM
EKG Q-T INTERVAL: 368 MS
EKG QRS DURATION: 98 MS
EKG QTC CALCULATION (BAZETT): 452 MS
EKG R AXIS: 30 DEGREES
EKG T AXIS: 178 DEGREES
EKG VENTRICULAR RATE: 91 BPM
GFR AFRICAN AMERICAN: >60 ML/MIN
GFR NON-AFRICAN AMERICAN: >60 ML/MIN
GFR SERPL CREATININE-BSD FRML MDRD: ABNORMAL ML/MIN/{1.73_M2}
GFR SERPL CREATININE-BSD FRML MDRD: ABNORMAL ML/MIN/{1.73_M2}
GLUCOSE BLD-MCNC: 147 MG/DL (ref 70–99)
HCT VFR BLD CALC: 39.5 % (ref 40.7–50.3)
HEMOGLOBIN: 12 G/DL (ref 13–17)
INR BLD: 1.1
MCH RBC QN AUTO: 26.5 PG (ref 25.2–33.5)
MCHC RBC AUTO-ENTMCNC: 30.4 G/DL (ref 28.4–34.8)
MCV RBC AUTO: 87.4 FL (ref 82.6–102.9)
PARTIAL THROMBOPLASTIN TIME: 23.3 SEC (ref 21.3–31.3)
PDW BLD-RTO: 14 % (ref 11.8–14.4)
PLATELET # BLD: 178 K/UL (ref 138–453)
PMV BLD AUTO: 11.4 FL (ref 8.1–13.5)
POTASSIUM SERPL-SCNC: 4.8 MMOL/L (ref 3.7–5.3)
PRO-BNP: 4469 PG/ML
PROTHROMBIN TIME: 12.3 SEC (ref 9.4–12.6)
RBC # BLD: 4.52 M/UL (ref 4.21–5.77)
SODIUM BLD-SCNC: 142 MMOL/L (ref 135–144)
TOTAL CK: 53 U/L (ref 39–308)
WBC # BLD: 3.5 K/UL (ref 3.5–11.3)

## 2017-12-14 PROCEDURE — 85347 COAGULATION TIME ACTIVATED: CPT

## 2017-12-14 PROCEDURE — C1725 CATH, TRANSLUMIN NON-LASER: HCPCS

## 2017-12-14 PROCEDURE — C1769 GUIDE WIRE: HCPCS

## 2017-12-14 PROCEDURE — C1887 CATHETER, GUIDING: HCPCS

## 2017-12-14 PROCEDURE — 7100000011 HC PHASE II RECOVERY - ADDTL 15 MIN

## 2017-12-14 PROCEDURE — C1894 INTRO/SHEATH, NON-LASER: HCPCS

## 2017-12-14 PROCEDURE — 83880 ASSAY OF NATRIURETIC PEPTIDE: CPT

## 2017-12-14 PROCEDURE — 2000000000 HC ICU R&B

## 2017-12-14 PROCEDURE — 86900 BLOOD TYPING SEROLOGIC ABO: CPT

## 2017-12-14 PROCEDURE — 6360000002 HC RX W HCPCS

## 2017-12-14 PROCEDURE — 6360000002 HC RX W HCPCS: Performed by: INTERNAL MEDICINE

## 2017-12-14 PROCEDURE — 6370000000 HC RX 637 (ALT 250 FOR IP): Performed by: INTERNAL MEDICINE

## 2017-12-14 PROCEDURE — 82040 ASSAY OF SERUM ALBUMIN: CPT

## 2017-12-14 PROCEDURE — 7100000000 HC PACU RECOVERY - FIRST 15 MIN

## 2017-12-14 PROCEDURE — 99223 1ST HOSP IP/OBS HIGH 75: CPT | Performed by: INTERNAL MEDICINE

## 2017-12-14 PROCEDURE — 2780000006 HC MISC HEART VALVE

## 2017-12-14 PROCEDURE — 80048 BASIC METABOLIC PNL TOTAL CA: CPT

## 2017-12-14 PROCEDURE — 82550 ASSAY OF CK (CPK): CPT

## 2017-12-14 PROCEDURE — C1760 CLOSURE DEV, VASC: HCPCS

## 2017-12-14 PROCEDURE — 6360000002 HC RX W HCPCS: Performed by: ANESTHESIOLOGY

## 2017-12-14 PROCEDURE — 3700000001 HC ADD 15 MINUTES (ANESTHESIA)

## 2017-12-14 PROCEDURE — 93005 ELECTROCARDIOGRAM TRACING: CPT

## 2017-12-14 PROCEDURE — 33361 REPLACE AORTIC VALVE PERQ: CPT | Performed by: INTERNAL MEDICINE

## 2017-12-14 PROCEDURE — 02RF38Z REPLACEMENT OF AORTIC VALVE WITH ZOOPLASTIC TISSUE, PERCUTANEOUS APPROACH: ICD-10-PCS | Performed by: INTERNAL MEDICINE

## 2017-12-14 PROCEDURE — 85730 THROMBOPLASTIN TIME PARTIAL: CPT

## 2017-12-14 PROCEDURE — 6360000002 HC RX W HCPCS: Performed by: NURSE ANESTHETIST, CERTIFIED REGISTERED

## 2017-12-14 PROCEDURE — 86920 COMPATIBILITY TEST SPIN: CPT

## 2017-12-14 PROCEDURE — 82553 CREATINE MB FRACTION: CPT

## 2017-12-14 PROCEDURE — 6370000000 HC RX 637 (ALT 250 FOR IP)

## 2017-12-14 PROCEDURE — 2580000003 HC RX 258: Performed by: NURSE ANESTHETIST, CERTIFIED REGISTERED

## 2017-12-14 PROCEDURE — C1756 CATH, PACING, TRANSESOPH: HCPCS

## 2017-12-14 PROCEDURE — 85027 COMPLETE CBC AUTOMATED: CPT

## 2017-12-14 PROCEDURE — 3700000000 HC ANESTHESIA ATTENDED CARE

## 2017-12-14 PROCEDURE — 82247 BILIRUBIN TOTAL: CPT

## 2017-12-14 PROCEDURE — 2580000003 HC RX 258: Performed by: INTERNAL MEDICINE

## 2017-12-14 PROCEDURE — 86901 BLOOD TYPING SEROLOGIC RH(D): CPT

## 2017-12-14 PROCEDURE — 86850 RBC ANTIBODY SCREEN: CPT

## 2017-12-14 PROCEDURE — 85610 PROTHROMBIN TIME: CPT

## 2017-12-14 RX ORDER — SODIUM CHLORIDE 9 MG/ML
INJECTION, SOLUTION INTRAVENOUS CONTINUOUS PRN
Status: DISCONTINUED | OUTPATIENT
Start: 2017-12-14 | End: 2017-12-14 | Stop reason: SDUPTHER

## 2017-12-14 RX ORDER — SODIUM CHLORIDE 9 MG/ML
INJECTION, SOLUTION INTRAVENOUS ONCE
Status: COMPLETED | OUTPATIENT
Start: 2017-12-14 | End: 2017-12-14

## 2017-12-14 RX ORDER — ASPIRIN 81 MG/1
81 TABLET ORAL DAILY
Status: DISCONTINUED | OUTPATIENT
Start: 2017-12-14 | End: 2017-12-16 | Stop reason: HOSPADM

## 2017-12-14 RX ORDER — TAMSULOSIN HYDROCHLORIDE 0.4 MG/1
0.4 CAPSULE ORAL DAILY
Status: DISCONTINUED | OUTPATIENT
Start: 2017-12-14 | End: 2017-12-16 | Stop reason: HOSPADM

## 2017-12-14 RX ORDER — PROTAMINE SULFATE 10 MG/ML
INJECTION, SOLUTION INTRAVENOUS PRN
Status: DISCONTINUED | OUTPATIENT
Start: 2017-12-14 | End: 2017-12-14 | Stop reason: SDUPTHER

## 2017-12-14 RX ORDER — PREDNISONE 50 MG/1
50 TABLET ORAL ONCE
Status: COMPLETED | OUTPATIENT
Start: 2017-12-14 | End: 2017-12-14

## 2017-12-14 RX ORDER — FENTANYL CITRATE 50 UG/ML
INJECTION, SOLUTION INTRAMUSCULAR; INTRAVENOUS PRN
Status: DISCONTINUED | OUTPATIENT
Start: 2017-12-14 | End: 2017-12-14 | Stop reason: SDUPTHER

## 2017-12-14 RX ORDER — MEPERIDINE HYDROCHLORIDE 50 MG/ML
12.5 INJECTION INTRAMUSCULAR; INTRAVENOUS; SUBCUTANEOUS EVERY 5 MIN PRN
Status: DISCONTINUED | OUTPATIENT
Start: 2017-12-14 | End: 2017-12-16 | Stop reason: HOSPADM

## 2017-12-14 RX ORDER — METOPROLOL SUCCINATE 25 MG/1
25 TABLET, EXTENDED RELEASE ORAL DAILY
COMMUNITY

## 2017-12-14 RX ORDER — HEPARIN SODIUM 1000 [USP'U]/ML
INJECTION, SOLUTION INTRAVENOUS; SUBCUTANEOUS PRN
Status: DISCONTINUED | OUTPATIENT
Start: 2017-12-14 | End: 2017-12-14 | Stop reason: SDUPTHER

## 2017-12-14 RX ORDER — VANCOMYCIN HYDROCHLORIDE 1 G/200ML
1000 INJECTION, SOLUTION INTRAVENOUS EVERY 12 HOURS
Status: COMPLETED | OUTPATIENT
Start: 2017-12-15 | End: 2017-12-15

## 2017-12-14 RX ORDER — SODIUM CHLORIDE 0.9 % (FLUSH) 0.9 %
10 SYRINGE (ML) INJECTION PRN
Status: DISCONTINUED | OUTPATIENT
Start: 2017-12-14 | End: 2017-12-16 | Stop reason: HOSPADM

## 2017-12-14 RX ORDER — FENTANYL CITRATE 50 UG/ML
25 INJECTION, SOLUTION INTRAMUSCULAR; INTRAVENOUS EVERY 5 MIN PRN
Status: DISCONTINUED | OUTPATIENT
Start: 2017-12-14 | End: 2017-12-16 | Stop reason: HOSPADM

## 2017-12-14 RX ORDER — FUROSEMIDE 40 MG/1
40 TABLET ORAL DAILY
Status: DISCONTINUED | OUTPATIENT
Start: 2017-12-14 | End: 2017-12-16 | Stop reason: HOSPADM

## 2017-12-14 RX ORDER — PANTOPRAZOLE SODIUM 40 MG/1
40 TABLET, DELAYED RELEASE ORAL
Status: DISCONTINUED | OUTPATIENT
Start: 2017-12-15 | End: 2017-12-16 | Stop reason: HOSPADM

## 2017-12-14 RX ORDER — ONDANSETRON 2 MG/ML
4 INJECTION INTRAMUSCULAR; INTRAVENOUS
Status: ACTIVE | OUTPATIENT
Start: 2017-12-14 | End: 2017-12-14

## 2017-12-14 RX ORDER — METOPROLOL SUCCINATE 25 MG/1
25 TABLET, EXTENDED RELEASE ORAL DAILY
Status: DISCONTINUED | OUTPATIENT
Start: 2017-12-14 | End: 2017-12-16 | Stop reason: HOSPADM

## 2017-12-14 RX ORDER — WARFARIN SODIUM 5 MG/1
5 TABLET ORAL DAILY
Status: DISCONTINUED | OUTPATIENT
Start: 2017-12-15 | End: 2017-12-16 | Stop reason: HOSPADM

## 2017-12-14 RX ORDER — VANCOMYCIN HYDROCHLORIDE 1 G/200ML
1000 INJECTION, SOLUTION INTRAVENOUS EVERY 12 HOURS
Status: DISCONTINUED | OUTPATIENT
Start: 2017-12-14 | End: 2017-12-14

## 2017-12-14 RX ORDER — MIDAZOLAM HYDROCHLORIDE 1 MG/ML
INJECTION INTRAMUSCULAR; INTRAVENOUS PRN
Status: DISCONTINUED | OUTPATIENT
Start: 2017-12-14 | End: 2017-12-14 | Stop reason: SDUPTHER

## 2017-12-14 RX ORDER — SODIUM CHLORIDE 0.9 % (FLUSH) 0.9 %
10 SYRINGE (ML) INJECTION EVERY 12 HOURS SCHEDULED
Status: DISCONTINUED | OUTPATIENT
Start: 2017-12-14 | End: 2017-12-16 | Stop reason: HOSPADM

## 2017-12-14 RX ORDER — DIPHENHYDRAMINE HYDROCHLORIDE 50 MG/ML
50 INJECTION INTRAMUSCULAR; INTRAVENOUS ONCE
Status: COMPLETED | OUTPATIENT
Start: 2017-12-14 | End: 2017-12-14

## 2017-12-14 RX ORDER — SODIUM CHLORIDE 9 MG/ML
INJECTION, SOLUTION INTRAVENOUS CONTINUOUS
Status: DISCONTINUED | OUTPATIENT
Start: 2017-12-14 | End: 2017-12-16 | Stop reason: HOSPADM

## 2017-12-14 RX ORDER — HYDRALAZINE HYDROCHLORIDE 20 MG/ML
10 INJECTION INTRAMUSCULAR; INTRAVENOUS EVERY 10 MIN PRN
Status: DISCONTINUED | OUTPATIENT
Start: 2017-12-14 | End: 2017-12-16 | Stop reason: HOSPADM

## 2017-12-14 RX ORDER — FENTANYL CITRATE 50 UG/ML
50 INJECTION, SOLUTION INTRAMUSCULAR; INTRAVENOUS EVERY 5 MIN PRN
Status: DISCONTINUED | OUTPATIENT
Start: 2017-12-14 | End: 2017-12-16 | Stop reason: HOSPADM

## 2017-12-14 RX ORDER — ONDANSETRON 2 MG/ML
4 INJECTION INTRAMUSCULAR; INTRAVENOUS EVERY 6 HOURS PRN
Status: DISCONTINUED | OUTPATIENT
Start: 2017-12-14 | End: 2017-12-16 | Stop reason: HOSPADM

## 2017-12-14 RX ORDER — ATROPINE SULFATE 0.4 MG/ML
0.5 AMPUL (ML) INJECTION
Status: DISPENSED | OUTPATIENT
Start: 2017-12-14 | End: 2017-12-14

## 2017-12-14 RX ORDER — CLOPIDOGREL BISULFATE 75 MG/1
75 TABLET ORAL DAILY
Status: DISCONTINUED | OUTPATIENT
Start: 2017-12-14 | End: 2017-12-16 | Stop reason: HOSPADM

## 2017-12-14 RX ORDER — ATORVASTATIN CALCIUM 40 MG/1
40 TABLET, FILM COATED ORAL
Status: DISCONTINUED | OUTPATIENT
Start: 2017-12-14 | End: 2017-12-16 | Stop reason: HOSPADM

## 2017-12-14 RX ORDER — ACETAMINOPHEN 325 MG/1
650 TABLET ORAL EVERY 4 HOURS PRN
Status: DISCONTINUED | OUTPATIENT
Start: 2017-12-14 | End: 2017-12-16 | Stop reason: HOSPADM

## 2017-12-14 RX ADMIN — DIPHENHYDRAMINE HYDROCHLORIDE 50 MG: 50 INJECTION, SOLUTION INTRAMUSCULAR; INTRAVENOUS at 10:24

## 2017-12-14 RX ADMIN — PREDNISONE 50 MG: 50 TABLET ORAL at 10:22

## 2017-12-14 RX ADMIN — TAMSULOSIN HYDROCHLORIDE 0.4 MG: 0.4 CAPSULE ORAL at 15:23

## 2017-12-14 RX ADMIN — SODIUM CHLORIDE: 9 INJECTION, SOLUTION INTRAVENOUS at 14:40

## 2017-12-14 RX ADMIN — VANCOMYCIN HYDROCHLORIDE 1 G: 1 INJECTION, SOLUTION INTRAVENOUS at 12:13

## 2017-12-14 RX ADMIN — SODIUM CHLORIDE: 900 INJECTION INTRAVENOUS at 11:24

## 2017-12-14 RX ADMIN — FENTANYL CITRATE 25 MCG: 50 INJECTION INTRAMUSCULAR; INTRAVENOUS at 13:58

## 2017-12-14 RX ADMIN — SODIUM CHLORIDE: 9 INJECTION, SOLUTION INTRAVENOUS at 09:30

## 2017-12-14 RX ADMIN — HYDRALAZINE HYDROCHLORIDE 10 MG: 20 INJECTION INTRAMUSCULAR; INTRAVENOUS at 14:16

## 2017-12-14 RX ADMIN — ATORVASTATIN CALCIUM 40 MG: 40 TABLET, FILM COATED ORAL at 20:34

## 2017-12-14 RX ADMIN — MIDAZOLAM HYDROCHLORIDE 1 MG: 1 INJECTION, SOLUTION INTRAMUSCULAR; INTRAVENOUS at 11:27

## 2017-12-14 RX ADMIN — FENTANYL CITRATE 25 MCG: 50 INJECTION INTRAMUSCULAR; INTRAVENOUS at 11:28

## 2017-12-14 RX ADMIN — Medication 10 ML: at 20:35

## 2017-12-14 RX ADMIN — HEPARIN SODIUM 17000 UNITS: 1000 INJECTION INTRAVENOUS; SUBCUTANEOUS at 12:41

## 2017-12-14 RX ADMIN — FUROSEMIDE 40 MG: 40 TABLET ORAL at 15:23

## 2017-12-14 RX ADMIN — METOPROLOL SUCCINATE 25 MG: 25 TABLET, FILM COATED, EXTENDED RELEASE ORAL at 15:23

## 2017-12-14 RX ADMIN — PROTAMINE SULFATE 25 MG: 10 INJECTION, SOLUTION INTRAVENOUS at 13:20

## 2017-12-14 RX ADMIN — FENTANYL CITRATE 50 MCG: 50 INJECTION, SOLUTION INTRAMUSCULAR; INTRAVENOUS at 14:22

## 2017-12-14 RX ADMIN — FENTANYL CITRATE 50 MCG: 50 INJECTION, SOLUTION INTRAMUSCULAR; INTRAVENOUS at 14:57

## 2017-12-14 ASSESSMENT — PAIN SCALES - GENERAL
PAINLEVEL_OUTOF10: 0
PAINLEVEL_OUTOF10: 0
PAINLEVEL_OUTOF10: 6
PAINLEVEL_OUTOF10: 6

## 2017-12-14 ASSESSMENT — ENCOUNTER SYMPTOMS: SHORTNESS OF BREATH: 1

## 2017-12-14 NOTE — ANESTHESIA PRE PROCEDURE
capsule Take 0.4 mg by mouth daily      furosemide (LASIX) 40 MG tablet Take 40 mg by mouth daily      guaiFENesin (ROBITUSSIN) 100 MG/5ML syrup Take 200 mg by mouth 3 times daily as needed for Cough      acetaminophen (TYLENOL) 500 MG tablet Take 1,000 mg by mouth every 6 hours as needed. Don't take more than 3,000 mg per day. Indications: Pain      lansoprazole (PREVACID) 15 MG capsule Take 15 mg by mouth daily. Indications: Gastroesophageal Reflux Disease      warfarin (COUMADIN) 5 MG tablet Take 1 tablet by mouth daily 30 tablet 3    Blood Pressure KIT 1 Units by Does not apply route daily 1 kit 0    bismuth subsalicylate (PEPTO BISMOL) 262 MG/15ML suspension Take 15 mLs by mouth every 6 hours as needed for Indigestion      atorvastatin (LIPITOR) 40 MG tablet Take 40 mg by mouth every 48 hours. Indications: Blood Cholesterol Abnormal  2    aspirin 325 MG tablet Take 325 mg by mouth daily. With food  Indications: Anticoagulant Therapy       Current Facility-Administered Medications   Medication Dose Route Frequency Provider Last Rate Last Dose    vancomycin (VANCOCIN) 1000 mg in dextrose 5% 200 mL IVPB  1,000 mg Intravenous Q12H Farida Avendaño MD        0.9 % sodium chloride infusion   Intravenous Once Farida Avendaño MD        predniSONE (DELTASONE) tablet 50 mg  50 mg Oral Once Farida Avendaño MD        diphenhydrAMINE (BENADRYL) injection 50 mg  50 mg Intravenous Once Farida Avendaño MD        phenylephrine (JULIO C-SYNEPHRINE) 50 mg in dextrose 5 % 250 mL infusion  100 mcg/min Intravenous Once Nila García MD           Allergies:     Allergies   Allergen Reactions    Iodine Hives       Problem List:    Patient Active Problem List   Diagnosis Code    Frailty R54    Coronary artery disease due to lipid rich plaque I25.10, I25.83    Essential hypertension I10    PVC's (premature ventricular contractions) I49.3    History of thrombosis - PE, DVT Z86.718    History of CVA (cerebrovascular Pending 12/14/2017    LABGLOM Pending 12/14/2017    LABGLOM >90 11/24/2017    GLUCOSE Pending 12/14/2017    GLUCOSE 111 01/17/2012    PROT 6.5 11/18/2017    CALCIUM Pending 12/14/2017    BILITOT 1.0 11/18/2017    ALKPHOS 137 11/18/2017    AST 11 11/18/2017    ALT 11 11/18/2017       POC Tests: No results for input(s): POCGLU, POCNA, POCK, POCCL, POCBUN, POCHEMO, POCHCT in the last 72 hours. Coags:   Lab Results   Component Value Date    PROTIME 12.3 12/14/2017    PROTIME 3.06 11/30/2011    INR 1.1 12/14/2017    APTT 23.3 12/14/2017    APTT 85.8 09/22/2017       HCG (If Applicable): No results found for: PREGTESTUR, PREGSERUM, HCG, HCGQUANT     ABGs: No results found for: PHART, PO2ART, EJP0ORO, NTV5ENC, BEART, F8IIUCDS     Type & Screen (If Applicable):  Lab Results   Component Value Date    LABRH POS 09/20/2017     TTE 9/2017  Left ventricle size mildly dilated and systolic function treduced.   Ejection fraction was estimated at 30%.   Left atrial size was moderately dilated.   The aortic valve was calcified with reduced excursion. DOPPLER: TUTU   0.9-1.0 cm2   The mitral valve structure was calcified with adequate leaflet separation.   DOPPLER: The transmitral velocity was within the normal range with no   evidence for mitral stenosis. There was no evidence of moderate mitral   regurgitation.     EKG 12/14/2017  Atrial fibrillation with premature ventricular or aberrantly conducted complexes  Nonspecific ST and T wave abnormality  Abnormal ECG  No previous ECGs available       Anesthesia Evaluation  Patient summary reviewed and Nursing notes reviewed  Airway: Mallampati: III  TM distance: >3 FB   Neck ROM: full  Mouth opening: > = 3 FB Dental:    (+) edentulous      Pulmonary:normal exam    (+) shortness of breath:                             Cardiovascular:    (+) hypertension:, past MI:, CAD:, CABG/stent:, dysrhythmias: atrial fibrillation, CHF:,                   Neuro/Psych:   (+) CVA:,              ROS comment: History of tremor of right upper extremity GI/Hepatic/Renal:   (+) GERD:,           Endo/Other: Negative Endo/Other ROS                    Abdominal:           Vascular: negative vascular ROS.  + PVD, aortic or cerebral, . Anesthesia Plan      MAC     ASA 4     (ASA 4 for cardiomyopathy. Anesthesia consent obtained from patient  )  Induction: intravenous. arterial line  MIPS: Postoperative opioids intended. Anesthetic plan and risks discussed with patient and child/children. Plan discussed with CRNA.                 Merlin Bidding, MD   12/14/2017

## 2017-12-14 NOTE — PROGRESS NOTES
Pt. Bob Armstrong and prepped. Patient admitted, consent signed, all questions answered. Pt ready for procedure. Call light to reach with side rails up 2 of 2.

## 2017-12-14 NOTE — PROCEDURES
Post-TAVR Procedure Note    Performed at [facility].    Primary: Kerlien Mccain    Secondary: Delano Dobson    Procedure:  After informed consent was obtained, the patient was brought to the cardiac catheterization lab. Timeout completed. Please see Anesthesia Note for further information regarding sedation. Primary Access: RFA 16 Fr - 2 Perclose  Secondary Access: LFA 7Fr x 45 cm - Angioseal  Venous: R IJV 7Fr - Sutured in place    Heparin IV given, ACT > 250 s. Aortography in the RCC was performed. AL1-0.035 straight wire to cross the AoV. Exchanged for 6Fr angled-pigtail. Small Sarfari wire in the the LV. Valve loading checked and appropriate on fluoroscopy. P2P gradient: 20-25 mmHg (low-flow, low-gradient). LVEDP 10-12. After confirming hemodynamic stability, 16Fr sheath removed under fluoroscopy. Evolut R 34 advanced sheathless to the aortic arch. Cross arch without issue. Deployed valve, however, appeared to be shallow in the [place], thus did a partial recapture, and deployed again. Aortography confirmed satisfactory valve position. Slow release and stable deployment. Mean gradient: 0 mmHg. LVEDP 12    TTE confirmed no significant PVL, no effusion, improvement in the LVEF, and well-seated prosthesis. Groins closed as noted above. Protamine 25 mg IV given. Plavix 600 mg PO and  mg PO given after extubation. Plan:  1) Admit CICU  (Will be followed by Kellie Dinh)  2) TTE in 48 hours  3) Bedrest per protocol  4) IVF  5) ABx per protocol  6) PT/OT  7) Restart home medications as tolerated  8) Follow-up as outpatient in 1 week with Dr. Kerline Mccain    Findings discussed with patient/patient's family. All agreeable and amenable to the plan.

## 2017-12-14 NOTE — H&P
[facility]'s  History and Physical    Patient's Name/Date of Birth: Marcin Cash / 9/18/1930 (75 y.o.)    Date: December 14, 2017     Cardiologist: Elsy Oliver    Chief Complaint:  Severe AS      HPI: This is a pleasant 80 y.o. male presents with hx as below with severe AS. Very frail. Recent admissions for CHF. Currently well compensated. He is very fatigued and exhausted. His INR is 1.1 and his Cr 0.96. He has no current complaints. He has stopped his Coumadin appropriately. All labs, EKG's, diagnostic testing and images as well as cardiac cath, stress testing were reviewed during this encounter    Past Medical History:   Diagnosis Date    CAD (coronary artery disease)     CHF (congestive heart failure) (HCC)     GERD (gastroesophageal reflux disease)     History of CVA (cerebrovascular accident) - noted per MRI-brain on 9/18/2017 which demonstrates old strokes     Hypertension     Myocardial infarct     Occasional tremors     Pulmonary embolism ([address]     PVC's (premature ventricular contractions)     Retinal artery thrombosis     Thrombophlebitis     Weakness      Past Surgical History:   Procedure Laterality Date    APPENDECTOMY      CARDIAC CATHETERIZATION  01/16/2012    Status post successful PCI of SVG to OM2 branch with stent in the proximal area. Stent in the  mid area and balloon angioplasty of the distal anastomotic site.  CARDIAC CATHETERIZATION      CORONARY ARTERY BYPASS GRAFT      X2 1982 1984    HERNIA REPAIR      TRANSTHORACIC ECHOCARDIOGRAM  01/10/2012    LV was mildly dilated. Systolic function was normal. EF was estimated in  the range of 55-65%. There were no regional wall motion abnormalities.  Wall thickness was normal.     Current Outpatient Prescriptions   Medication Sig Dispense Refill    metoprolol succinate (TOPROL XL) 25 MG extended release tablet Take 25 mg by mouth daily      tamsulosin (FLOMAX) 0.4 MG capsule Take 0.4 mg by mouth daily      furosemide (LASIX) 40 MG tablet Take 40 mg by mouth daily      guaiFENesin (ROBITUSSIN) 100 MG/5ML syrup Take 200 mg by mouth 3 times daily as needed for Cough      acetaminophen (TYLENOL) 500 MG tablet Take 1,000 mg by mouth every 6 hours as needed. Don't take more than 3,000 mg per day. Indications: Pain      lansoprazole (PREVACID) 15 MG capsule Take 15 mg by mouth daily. Indications: Gastroesophageal Reflux Disease      warfarin (COUMADIN) 5 MG tablet Take 1 tablet by mouth daily 30 tablet 3    Blood Pressure KIT 1 Units by Does not apply route daily 1 kit 0    bismuth subsalicylate (PEPTO BISMOL) 262 MG/15ML suspension Take 15 mLs by mouth every 6 hours as needed for Indigestion      atorvastatin (LIPITOR) 40 MG tablet Take 40 mg by mouth every 48 hours. Indications: Blood Cholesterol Abnormal  2    aspirin 325 MG tablet Take 325 mg by mouth daily. With food  Indications: Anticoagulant Therapy       Current Facility-Administered Medications   Medication Dose Route Frequency Provider Last Rate Last Dose    vancomycin (VANCOCIN) 1000 mg in dextrose 5% 200 mL IVPB  1,000 mg Intravenous Q12H Deborah Workman MD        phenylephrine (JULIO C-SYNEPHRINE) 50 mg in dextrose 5 % 250 mL infusion  100 mcg/min Intravenous Once Gladis Blount MD         Prior to Admission medications    Medication Sig Start Date End Date Taking? Authorizing Provider   metoprolol succinate (TOPROL XL) 25 MG extended release tablet Take 25 mg by mouth daily   Yes Historical Provider, MD   tamsulosin (FLOMAX) 0.4 MG capsule Take 0.4 mg by mouth daily   Yes Historical Provider, MD   furosemide (LASIX) 40 MG tablet Take 40 mg by mouth daily   Yes Historical Provider, MD   guaiFENesin (ROBITUSSIN) 100 MG/5ML syrup Take 200 mg by mouth 3 times daily as needed for Cough   Yes Historical Provider, MD   acetaminophen (TYLENOL) 500 MG tablet Take 1,000 mg by mouth every 6 hours as needed. Don't take more than 3,000 mg per day. Indications: Pain   Yes Historical Provider, MD   lansoprazole (PREVACID) 15 MG capsule Take 15 mg by mouth daily. Indications: Gastroesophageal Reflux Disease   Yes Historical Provider, MD   warfarin (COUMADIN) 5 MG tablet Take 1 tablet by mouth daily 12/11/17   Elizabet Suarez NP   Blood Pressure KIT 1 Units by Does not apply route daily 11/24/17   Ashanti Goodson MD   bismuth subsalicylate (PEPTO BISMOL) 262 MG/15ML suspension Take 15 mLs by mouth every 6 hours as needed for Indigestion    Historical Provider, MD   atorvastatin (LIPITOR) 40 MG tablet Take 40 mg by mouth every 48 hours. Indications: Blood Cholesterol Abnormal 10/16/14   Historical Provider, MD   aspirin 325 MG tablet Take 325 mg by mouth daily. With food  Indications: Anticoagulant Therapy    Historical Provider, MD   Scheduled Meds:   vancomycin  1,000 mg Intravenous Q12H     Continuous Infusions:   phenylephrine (JULIO C-SYNEPHRINE) 50mg/250mL infusion       PRN Meds:. Allergies   Allergen Reactions    Iodine Hives     Family History   Problem Relation Age of Onset    Heart Disease Mother     Heart Disease Father     Heart Disease Sister     Heart Disease Sister      Social History     Social History    Marital status:      Spouse name: N/A    Number of children: N/A    Years of education: N/A     Occupational History    Not on file. Social History Main Topics    Smoking status: Never Smoker    Smokeless tobacco: Never Used    Alcohol use No    Drug use: No    Sexual activity: Not on file     Other Topics Concern    Not on file     Social History Narrative    No narrative on file       ROS:   Constitutional: Denies any recent wt change, fever or fatigue  Eyes:  Denies any blurring or double vision, no glaucoma  Ears/Nose/Mouth/Throat:  Denies any chronic sinus/rhinitis, nosebleeds or bleeding gums  Cardiovascular:  As described above.   Denies any orthopnea, paroxysmal nocturnal dyspnea or claudication  Respiratory: Denies any frequent cough, wheezing or coughing up blood  Genitourinary:  Denies difficulty with urination and kidney stones  Gastrointestinal:  Denies any chronic problems with abdominal pain, nausea, vomiting or diarrhea  Musculoskeletal:  Denies any joint pain, back pain, or difficulty walking  Integumentary:  Denies any rash  Neurological:  +TIA/CVA  Endocrine:  Denies any history of Diabetes or thyroid disease  Hematologic/Lymphatic:  Denies any history of bleeding or bruising tendencies, DVT or PE       Labs:  CBC:   Recent Labs      12/14/17 0916   WBC  3.5   HGB  12.0*   HCT  39.5*   MCV  87.4   PLT  178     BMP:   Recent Labs      12/14/17 0916   NA  142   K  4.8   CL  98   CO2  26   BUN  34*   CREATININE  0.96     Accucheck Glucoses: No results for input(s): POCGLU in the last 72 hours.   Cardiac Enzymes:   Recent Labs      12/14/17 0916   CKTOTAL  53   CKMB  2.0     PT/INR:   Recent Labs      12/14/17 0916   PROTIME  12.3   INR  1.1     APTT:   Recent Labs      12/14/17 0916   APTT  23.3     Liver Profile:  Lab Results   Component Value Date    AST 11 11/18/2017    ALT 11 11/18/2017    BILIDIR 0.3 11/18/2017    BILITOT 0.74 12/14/2017    ALKPHOS 137 11/18/2017     Lab Results   Component Value Date    CHOL 139 09/17/2017    HDL 68 09/17/2017    TRIG 53 09/17/2017     TSH:   Lab Results   Component Value Date    TSH 5.790 11/19/2017     UA:   Lab Results   Component Value Date    COLORU YELLOW 12/04/2017    PHUR 5.0 12/04/2017    LABCAST NONE SEEN 12/04/2017    LABCAST NONE SEEN 12/04/2017    WBCUA 0-2 12/04/2017    RBCUA 0-2 12/04/2017    YEAST NONE SEEN 12/04/2017    BACTERIA NONE 12/04/2017    SPECGRAV 1.017 12/04/2017    LEUKOCYTESUR TRACE 12/04/2017    UROBILINOGEN 0.2 12/04/2017    BILIRUBINUR NEGATIVE 12/04/2017    BLOODU NEGATIVE 12/04/2017    GLUCOSEU NEGATIVE 11/21/2017       Physical Exam:  Vitals:    12/14/17 0904   BP: 138/82   Pulse: 64   Resp: 18   Temp: 98.1 °F (36.7 °C)   SpO2:

## 2017-12-15 LAB
ABO/RH: NORMAL
ALBUMIN SERPL-MCNC: 3.3 G/DL (ref 3.5–5.2)
ALBUMIN/GLOBULIN RATIO: 1.2 (ref 1–2.5)
ALP BLD-CCNC: 112 U/L (ref 40–129)
ALT SERPL-CCNC: 6 U/L (ref 5–41)
ANION GAP SERPL CALCULATED.3IONS-SCNC: 13 MMOL/L (ref 9–17)
ANTIBODY SCREEN: NEGATIVE
ARM BAND NUMBER: NORMAL
AST SERPL-CCNC: 13 U/L
BILIRUB SERPL-MCNC: 1.06 MG/DL (ref 0.3–1.2)
BLD PROD TYP BPU: NORMAL
BUN BLDV-MCNC: 30 MG/DL (ref 8–23)
BUN/CREAT BLD: ABNORMAL (ref 9–20)
CALCIUM SERPL-MCNC: 8.5 MG/DL (ref 8.6–10.4)
CHLORIDE BLD-SCNC: 100 MMOL/L (ref 98–107)
CO2: 26 MMOL/L (ref 20–31)
CREAT SERPL-MCNC: 0.9 MG/DL (ref 0.7–1.2)
CROSSMATCH RESULT: NORMAL
DISPENSE STATUS BLOOD BANK: NORMAL
EXPIRATION DATE: NORMAL
GFR AFRICAN AMERICAN: >60 ML/MIN
GFR NON-AFRICAN AMERICAN: >60 ML/MIN
GFR SERPL CREATININE-BSD FRML MDRD: ABNORMAL ML/MIN/{1.73_M2}
GFR SERPL CREATININE-BSD FRML MDRD: ABNORMAL ML/MIN/{1.73_M2}
GLUCOSE BLD-MCNC: 119 MG/DL (ref 70–99)
HCT VFR BLD CALC: 35.9 % (ref 40.7–50.3)
HEMOGLOBIN: 10.8 G/DL (ref 13–17)
INR BLD: 1.1
MCH RBC QN AUTO: 26.9 PG (ref 25.2–33.5)
MCHC RBC AUTO-ENTMCNC: 30.1 G/DL (ref 28.4–34.8)
MCV RBC AUTO: 89.5 FL (ref 82.6–102.9)
PDW BLD-RTO: 14.3 % (ref 11.8–14.4)
PLATELET # BLD: 165 K/UL (ref 138–453)
PMV BLD AUTO: 11.2 FL (ref 8.1–13.5)
POTASSIUM SERPL-SCNC: 4.2 MMOL/L (ref 3.7–5.3)
PROTHROMBIN TIME: 12.3 SEC (ref 9.4–12.6)
RBC # BLD: 4.01 M/UL (ref 4.21–5.77)
SODIUM BLD-SCNC: 139 MMOL/L (ref 135–144)
TOTAL PROTEIN: 6.1 G/DL (ref 6.4–8.3)
TRANSFUSION STATUS: NORMAL
UNIT DIVISION: 0
UNIT NUMBER: NORMAL
WBC # BLD: 15.5 K/UL (ref 3.5–11.3)

## 2017-12-15 PROCEDURE — 2060000000 HC ICU INTERMEDIATE R&B

## 2017-12-15 PROCEDURE — 2580000003 HC RX 258: Performed by: INTERNAL MEDICINE

## 2017-12-15 PROCEDURE — 85610 PROTHROMBIN TIME: CPT

## 2017-12-15 PROCEDURE — 94762 N-INVAS EAR/PLS OXIMTRY CONT: CPT

## 2017-12-15 PROCEDURE — 36415 COLL VENOUS BLD VENIPUNCTURE: CPT

## 2017-12-15 PROCEDURE — 85027 COMPLETE CBC AUTOMATED: CPT

## 2017-12-15 PROCEDURE — 80053 COMPREHEN METABOLIC PANEL: CPT

## 2017-12-15 PROCEDURE — 99233 SBSQ HOSP IP/OBS HIGH 50: CPT | Performed by: INTERNAL MEDICINE

## 2017-12-15 PROCEDURE — 6370000000 HC RX 637 (ALT 250 FOR IP): Performed by: INTERNAL MEDICINE

## 2017-12-15 PROCEDURE — 6360000002 HC RX W HCPCS: Performed by: INTERNAL MEDICINE

## 2017-12-15 RX ADMIN — WARFARIN SODIUM 5 MG: 5 TABLET ORAL at 19:00

## 2017-12-15 RX ADMIN — SODIUM CHLORIDE: 9 INJECTION, SOLUTION INTRAVENOUS at 05:59

## 2017-12-15 RX ADMIN — TAMSULOSIN HYDROCHLORIDE 0.4 MG: 0.4 CAPSULE ORAL at 09:33

## 2017-12-15 RX ADMIN — CLOPIDOGREL 75 MG: 75 TABLET, FILM COATED ORAL at 09:36

## 2017-12-15 RX ADMIN — PANTOPRAZOLE SODIUM 40 MG: 40 TABLET, DELAYED RELEASE ORAL at 09:35

## 2017-12-15 RX ADMIN — ASPIRIN 81 MG: 81 TABLET, COATED ORAL at 09:34

## 2017-12-15 RX ADMIN — METOPROLOL SUCCINATE 25 MG: 25 TABLET, FILM COATED, EXTENDED RELEASE ORAL at 09:33

## 2017-12-15 RX ADMIN — VANCOMYCIN HYDROCHLORIDE 1000 MG: 1 INJECTION, SOLUTION INTRAVENOUS at 01:07

## 2017-12-15 RX ADMIN — FUROSEMIDE 40 MG: 40 TABLET ORAL at 09:33

## 2017-12-15 RX ADMIN — Medication 10 ML: at 09:33

## 2017-12-15 ASSESSMENT — PAIN SCALES - GENERAL
PAINLEVEL_OUTOF10: 0

## 2017-12-15 NOTE — ANESTHESIA POSTPROCEDURE EVALUATION
Department of Anesthesiology  Postprocedure Note    Patient: Adella Runner  MRN: [id number]  [place]: 9/18/1930  Date of evaluation: 12/14/2017  Time:  11:36 PM     Procedure Summary     Date:  12/14/17 Room / Location:  [facility] Cath Lab    Anesthesia Start:  7780 Anesthesia Stop:  1401    Procedure:  TAVR W/ ANESTHESIA Diagnosis:      Scheduled Providers:  Amanda Fowler MD Responsible Provider:  Amanda Fowler MD    Anesthesia Type:  MAC ASA Status:  4          Anesthesia Type: MAC    [place] Phase I:      [place] Phase II:      Last vitals: Reviewed and per EMR flowsheets.    POST-OP ANESTHESIA NOTE       BP (!) 124/50   Pulse 62   Temp 98.2 °F (36.8 °C) (Oral)   Resp 18   Ht 5' 5\" (1.651 m)   Wt 138 lb (62.6 kg)   SpO2 96%   BMI 22.96 kg/m²    Pain Assessment: 0-10  Pain Level: 6         Anesthesia Post Evaluation    Patient location during evaluation: ICU  Patient participation: complete - patient participated  Level of consciousness: awake  Pain score: 6  Airway patency: patent  Nausea & Vomiting: no nausea and no vomiting  Complications: no  Cardiovascular status: hemodynamically stable  Respiratory status: acceptable  Hydration status: stable

## 2017-12-16 VITALS
HEART RATE: 67 BPM | WEIGHT: 137.13 LBS | RESPIRATION RATE: 18 BRPM | DIASTOLIC BLOOD PRESSURE: 56 MMHG | BODY MASS INDEX: 22.85 KG/M2 | TEMPERATURE: 97.3 F | SYSTOLIC BLOOD PRESSURE: 120 MMHG | HEIGHT: 65 IN | OXYGEN SATURATION: 95 %

## 2017-12-16 PROBLEM — Z95.2 S/P TAVR (TRANSCATHETER AORTIC VALVE REPLACEMENT): Status: ACTIVE | Noted: 2017-12-16

## 2017-12-16 LAB
INR BLD: 1.2
LV EF: 35 %
LVEF MODALITY: NORMAL
PROTHROMBIN TIME: 13.4 SEC (ref 9.4–12.6)

## 2017-12-16 PROCEDURE — 36415 COLL VENOUS BLD VENIPUNCTURE: CPT

## 2017-12-16 PROCEDURE — 85610 PROTHROMBIN TIME: CPT

## 2017-12-16 PROCEDURE — 94762 N-INVAS EAR/PLS OXIMTRY CONT: CPT

## 2017-12-16 PROCEDURE — 6370000000 HC RX 637 (ALT 250 FOR IP): Performed by: INTERNAL MEDICINE

## 2017-12-16 PROCEDURE — 93306 TTE W/DOPPLER COMPLETE: CPT

## 2017-12-16 RX ORDER — ASPIRIN 81 MG/1
81 TABLET ORAL DAILY
Qty: 30 TABLET | Refills: 0 | Status: SHIPPED | OUTPATIENT
Start: 2017-12-17 | End: 2018-01-11 | Stop reason: ALTCHOICE

## 2017-12-16 RX ORDER — CLOPIDOGREL BISULFATE 75 MG/1
75 TABLET ORAL DAILY
Qty: 30 TABLET | Refills: 3 | Status: SHIPPED | OUTPATIENT
Start: 2017-12-17 | End: 2017-12-27 | Stop reason: ALTCHOICE

## 2017-12-16 RX ADMIN — ASPIRIN 81 MG: 81 TABLET, COATED ORAL at 09:35

## 2017-12-16 RX ADMIN — FUROSEMIDE 40 MG: 40 TABLET ORAL at 09:35

## 2017-12-16 RX ADMIN — METOPROLOL SUCCINATE 25 MG: 25 TABLET, FILM COATED, EXTENDED RELEASE ORAL at 09:35

## 2017-12-16 RX ADMIN — CLOPIDOGREL 75 MG: 75 TABLET, FILM COATED ORAL at 09:35

## 2017-12-16 RX ADMIN — PANTOPRAZOLE SODIUM 40 MG: 40 TABLET, DELAYED RELEASE ORAL at 09:35

## 2017-12-16 ASSESSMENT — PAIN SCALES - GENERAL: PAINLEVEL_OUTOF10: 0

## 2017-12-16 NOTE — FLOWSHEET NOTE
Stopped to see pt while rounding on CAR 1. Pt was sitting in a chair near his bed when  entered room. Pt said that he was doing very well and that the TAVR procedure had gone well. He expects to be d/colleen tomorrow. Pt identified himself as an [employer] was pleased to learn that  knew a little about that denomination. Pt said that he still keeps busy w/ work. 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 Pt mentioned that his [facility] would [facility] He accepted 's offer of prayer. Chaplains will remain available to offer spiritual and emotional support as needed. Rev. Walt Leone, [address] [place] [address] Road     12/15/17 4941   Encounter Summary   Services provided to: Patient   Referral/Consult From: [address] [place] [address] Children;Family members; Alevism/nery community   Place of [address] [place] [address] [facility]   Continue Visiting (12/15)   Complexity of Encounter Low   Length of Encounter 15 minutes   Spiritual Assessment Completed Yes   Routine   Type Initial   Assessment Calm; Approachable;Coping; Hopeful   Intervention Sustaining presence/ Ministry of presence; Active listening;Explored feelings, thoughts, concerns; Discussed illness/injury and it's impact; Discussed belief system/Presybeterian practices/nery;Discussed relationship with God;Nurtured hope;Prayer  (left Spiritual Care info)   Outcome Receptive; Acceptance; Coping; Hopeful;Engaged in conversation; Shared life review;Encouraged;Expressed gratitude   Spiritual/Mu-ism   Type Spiritual support

## 2017-12-16 NOTE — CARE COORDINATION
Discharge [address] [facility]  Clinical Case Management Department  Written by: Soledad Magallanes RN    Patient Name: Abisai Bernal  Attending Provider: No att. providers found  Admit Date: 2017  2:06 PM  MRN: [id number]  Account: [de-identified]                     : 1930  Discharge Date: 2017      Disposition: home with [address] home care   [address], home care order faxed.      Soledad Magallanes RN
it        Electronically signed by Andre Washington RN on 12/14/17 at 3:58 PM

## 2017-12-16 NOTE — PROGRESS NOTES
Cardiology Progress Note      Patient:  Mango Burns  YOB: 1930  MRN: [id number]   Acct: [de-identified]  [facility] Date:  12/14/2017  Primary Cardiologist: Kelby Marino    Chief Complaint: S/p TAVR    Subjective (Events in last 24 hours):   Doing very well this AM.  No neurologic concerns. He is not short of breath and has no chest pain. Groins without bleeding. He made good UOP overnight. Heart rate is chronically bradycardic.       Objective:   /85   Pulse 67   Temp 98.1 °F (36.7 °C) (Oral)   Resp 23   Ht 5' 5\" (1.651 m)   Wt 138 lb (62.6 kg)   SpO2 97%   BMI 22.96 kg/m²        TELEMETRY: No major pauses, blocks, or need for pacing    Physical Exam:  General Appearance: alert and oriented to person, place and time, in no acute distress  Cardiovascular: normal rate, kleber, irreg-irreg, normal S1 and S2, soft HSM 2/6 at the apex, rubs, clicks, or gallops, distal pulses intact, no carotid bruits, no JVD  Pulmonary/Chest: clear to auscultation bilaterally- no wheezes, rales or rhonchi, normal air movement, no respiratory distress  Abdomen: soft, non-tender, non-distended, normal bowel sounds, no masses   Extremities: no cyanosis, clubbing or edema, pulses 2+ bilaterally  Skin: warm and dry, no rash or erythema  Head: normocephalic and atraumatic  Eyes: pupils equal, round, and reactive to light  Neck: supple and non-tender without mass, no thyromegaly   Musculoskeletal: normal range of motion, no joint swelling, deformity or tenderness  Neurological: alert, oriented, normal speech, no focal findings or movement disorder noted    Medications:    sodium chloride flush  10 mL Intravenous 2 times per day    clopidogrel  75 mg Oral Daily    aspirin  81 mg Oral Daily    atorvastatin  40 mg Oral Q48H    furosemide  40 mg Oral Daily    pantoprazole  40 mg Oral QAM AC    metoprolol succinate  25 mg Oral Daily    tamsulosin  0.4 mg Oral Daily    warfarin  5 mg Oral Daily    warfarin (COUMADIN) daily dosing (placeholder)   Other RX Placeholder      phenylephrine (JULIO C-SYNEPHRINE) 50mg/250mL infusion      sodium chloride 75 mL/hr at 12/15/17 0559       meperidine 12.5 mg Q5 Min PRN   fentanNYL 25 mcg Q5 Min PRN   fentanNYL 50 mcg Q5 Min PRN   sodium chloride flush 10 mL PRN   acetaminophen 650 mg Q4H PRN   magnesium hydroxide 30 mL Daily PRN   ondansetron 4 mg Q6H PRN   hydrALAZINE 10 mg Q10 Min PRN   bismuth subsalicylate 15 mL D7F PRN       Lab Data:    Cardiac Enzymes:  Recent Labs      12/14/17   0916   CKTOTAL  53   CKMB  2.0       CBC:   Lab Results   Component Value Date    WBC 15.5 12/15/2017    RBC 4.01 12/15/2017    RBC 3.74 01/17/2012    HGB 10.8 12/15/2017    HCT 35.9 12/15/2017     12/15/2017       CMP:  Lab Results   Component Value Date     12/15/2017    K 4.2 12/15/2017     12/15/2017    CO2 26 12/15/2017    BUN 30 12/15/2017    CREATININE 0.90 12/15/2017    GFRAA >60 12/15/2017    LABGLOM >60 12/15/2017    LABGLOM >90 11/24/2017    GLUCOSE 119 12/15/2017    GLUCOSE 111 01/17/2012    CALCIUM 8.5 12/15/2017       Hepatic Function Panel:  Lab Results   Component Value Date    ALKPHOS 112 12/15/2017    ALT 6 12/15/2017    AST 13 12/15/2017    PROT 6.1 12/15/2017    BILITOT 1.06 12/15/2017    BILIDIR 0.3 11/18/2017    LABALBU 3.3 12/15/2017    LABALBU 3.9 01/11/2012       Magnesium:    Lab Results   Component Value Date    MG 2.3 11/23/2017       PT/INR:    Lab Results   Component Value Date    PROTIME 12.3 12/15/2017    PROTIME 3.06 11/30/2011    INR 1.1 12/15/2017       HgBA1c:    Lab Results   Component Value Date    LABA1C 5.4 12/08/2016       FLP:  Lab Results   Component Value Date    TRIG 53 09/17/2017    HDL 68 09/17/2017    LDLCALC 60 09/17/2017       TSH:    Lab Results   Component Value Date    TSH 5.790 11/19/2017         Assessment:  S/p TAVR with Evolut R 34 - no concerns overnight. Telemetry satisfactory.   Soft mitral regurgitation is slightly more prominent and was seen post procedure as a result of improved ejection fraction. He is not volume overloaded and does not have any pulmonary congestion on exam. DAPT on board. Labs demonstrate leukocytosis, this is likely related to Prednisone prior to procedure due to Iodine allergy. TTE tomorrow AM.  Ambulate today. Restart Coumadin tonight. Continue triple therapy, till INR > 2, then d/c ASA. If no issues with TTE and ambulating without issues, d/c tomorrow with follow-up 1-2 weeks. Greater than 60 minutes of time was spent managing critical issues, discussing with the family, and coordinating care.          Electronically signed by Lizandro Solis MD on 12/15/2017 at 10:53 PM

## 2017-12-27 ENCOUNTER — OFFICE VISIT (OUTPATIENT)
Dept: CARDIOLOGY CLINIC | Age: 82
End: 2017-12-27
Payer: MEDICARE

## 2017-12-27 VITALS
SYSTOLIC BLOOD PRESSURE: 120 MMHG | HEIGHT: 66 IN | DIASTOLIC BLOOD PRESSURE: 56 MMHG | WEIGHT: 138.13 LBS | BODY MASS INDEX: 22.2 KG/M2 | HEART RATE: 72 BPM

## 2017-12-27 DIAGNOSIS — I50.23 ACUTE ON CHRONIC SYSTOLIC CONGESTIVE HEART FAILURE (HCC): ICD-10-CM

## 2017-12-27 DIAGNOSIS — Z95.2 S/P TAVR (TRANSCATHETER AORTIC VALVE REPLACEMENT): Primary | ICD-10-CM

## 2017-12-27 DIAGNOSIS — I50.33 ACUTE ON CHRONIC DIASTOLIC HEART FAILURE (HCC): ICD-10-CM

## 2017-12-27 PROCEDURE — 1111F DSCHRG MED/CURRENT MED MERGE: CPT | Performed by: INTERNAL MEDICINE

## 2017-12-27 PROCEDURE — G8598 ASA/ANTIPLAT THER USED: HCPCS | Performed by: INTERNAL MEDICINE

## 2017-12-27 PROCEDURE — 4040F PNEUMOC VAC/ADMIN/RCVD: CPT | Performed by: INTERNAL MEDICINE

## 2017-12-27 PROCEDURE — 1036F TOBACCO NON-USER: CPT | Performed by: INTERNAL MEDICINE

## 2017-12-27 PROCEDURE — 1123F ACP DISCUSS/DSCN MKR DOCD: CPT | Performed by: INTERNAL MEDICINE

## 2017-12-27 PROCEDURE — G8420 CALC BMI NORM PARAMETERS: HCPCS | Performed by: INTERNAL MEDICINE

## 2017-12-27 PROCEDURE — G8427 DOCREV CUR MEDS BY ELIG CLIN: HCPCS | Performed by: INTERNAL MEDICINE

## 2017-12-27 PROCEDURE — 99213 OFFICE O/P EST LOW 20 MIN: CPT | Performed by: INTERNAL MEDICINE

## 2017-12-27 PROCEDURE — G8482 FLU IMMUNIZE ORDER/ADMIN: HCPCS | Performed by: INTERNAL MEDICINE

## 2017-12-27 RX ORDER — POTASSIUM CHLORIDE 1.5 G/1.77G
10 POWDER, FOR SOLUTION ORAL DAILY
Status: ON HOLD | COMMUNITY
End: 2019-01-01

## 2018-01-01 ENCOUNTER — APPOINTMENT (OUTPATIENT)
Dept: CT IMAGING | Age: 83
DRG: 604 | End: 2018-01-01
Payer: MEDICARE

## 2018-01-01 ENCOUNTER — HOSPITAL ENCOUNTER (INPATIENT)
Age: 83
LOS: 2 days | Discharge: HOME | DRG: 604 | End: 2018-11-21
Attending: FAMILY MEDICINE | Admitting: SURGERY
Payer: MEDICARE

## 2018-01-01 ENCOUNTER — APPOINTMENT (OUTPATIENT)
Dept: GENERAL RADIOLOGY | Age: 83
DRG: 604 | End: 2018-01-01
Payer: MEDICARE

## 2018-01-01 ENCOUNTER — TELEPHONE (OUTPATIENT)
Dept: CARDIOLOGY CLINIC | Age: 83
End: 2018-01-01

## 2018-01-01 ENCOUNTER — HOSPITAL ENCOUNTER (OUTPATIENT)
Dept: NON INVASIVE DIAGNOSTICS | Age: 83
Discharge: HOME | End: 2018-08-03
Payer: MEDICARE

## 2018-01-01 VITALS
RESPIRATION RATE: 16 BRPM | WEIGHT: 118.6 LBS | TEMPERATURE: 97.8 F | HEIGHT: 68 IN | OXYGEN SATURATION: 97 % | BODY MASS INDEX: 17.98 KG/M2 | HEART RATE: 90 BPM | DIASTOLIC BLOOD PRESSURE: 68 MMHG | SYSTOLIC BLOOD PRESSURE: 128 MMHG

## 2018-01-01 DIAGNOSIS — S06.9X9A CLOSED TRAUMATIC BRAIN INJURY WITH LOSS OF CONSCIOUSNESS, INITIAL ENCOUNTER (HCC): ICD-10-CM

## 2018-01-01 DIAGNOSIS — S00.03XA TRAUMATIC HEMATOMA OF SCALP, INITIAL ENCOUNTER: ICD-10-CM

## 2018-01-01 DIAGNOSIS — S22.020A TRAUMATIC COMPRESSION FRACTURE OF T2 THORACIC VERTEBRA, CLOSED, INITIAL ENCOUNTER (HCC): ICD-10-CM
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
## 2018-01-01 DIAGNOSIS — Z95.2 S/P TAVR (TRANSCATHETER AORTIC VALVE REPLACEMENT): Primary | ICD-10-CM

## 2018-01-01 DIAGNOSIS — S50.311A ABRASION OF RIGHT ELBOW, INITIAL ENCOUNTER: ICD-10-CM

## 2018-01-01 DIAGNOSIS — R64 CACHEXIA (HCC): ICD-10-CM
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
## 2018-01-01 DIAGNOSIS — R47.81 SLURRED SPEECH: Primary | ICD-10-CM

## 2018-01-01 DIAGNOSIS — S09.90XA CLOSED HEAD INJURY, INITIAL ENCOUNTER: ICD-10-CM

## 2018-01-01 DIAGNOSIS — R53.1 WEAKNESS: ICD-10-CM

## 2018-01-01 DIAGNOSIS — S09.90XA CLOSED HEAD INJURY, INITIAL ENCOUNTER: Primary | ICD-10-CM

## 2018-01-01 LAB
ALBUMIN SERPL-MCNC: 4.2 G/DL (ref 3.5–5.1)
ALP BLD-CCNC: 103 U/L (ref 38–126)
ALT SERPL-CCNC: < 5 U/L (ref 11–66)
AMPHETAMINE+METHAMPHETAMINE URINE SCREEN: NEGATIVE
ANION GAP SERPL CALCULATED.3IONS-SCNC: 10 MEQ/L (ref 8–16)
ANION GAP SERPL CALCULATED.3IONS-SCNC: 14 MEQ/L (ref 8–16)
AST SERPL-CCNC: 11 U/L (ref 5–40)
BACTERIA: ABNORMAL
BARBITURATE QUANTITATIVE URINE: NEGATIVE
BASOPHILS # BLD: 0.3 %
BASOPHILS ABSOLUTE: 0 THOU/MM3 (ref 0–0.1)
BENZODIAZEPINE QUANTITATIVE URINE: NEGATIVE
BILIRUB SERPL-MCNC: 0.5 MG/DL (ref 0.3–1.2)
BILIRUBIN URINE: NEGATIVE
BLOOD, URINE: NEGATIVE
BUN BLDV-MCNC: 32 MG/DL (ref 7–22)
BUN BLDV-MCNC: 38 MG/DL (ref 7–22)
CALCIUM SERPL-MCNC: 9.4 MG/DL (ref 8.5–10.5)
CALCIUM SERPL-MCNC: 9.8 MG/DL (ref 8.5–10.5)
CANNABINOID QUANTITATIVE URINE: NEGATIVE
CASTS: ABNORMAL /LPF
CASTS: ABNORMAL /LPF
CHARACTER, URINE: CLEAR
CHLORIDE BLD-SCNC: 102 MEQ/L (ref 98–111)
CHLORIDE BLD-SCNC: 99 MEQ/L (ref 98–111)
CO2: 25 MEQ/L (ref 23–33)
CO2: 26 MEQ/L (ref 23–33)
COCAINE METABOLITE QUANTITATIVE URINE: NEGATIVE
COLOR: YELLOW
CREAT SERPL-MCNC: 0.7 MG/DL (ref 0.4–1.2)
CREAT SERPL-MCNC: 0.9 MG/DL (ref 0.4–1.2)
CRYSTALS: ABNORMAL
EOSINOPHIL # BLD: 1 %
EOSINOPHILS ABSOLUTE: 0.1 THOU/MM3 (ref 0–0.4)
EPITHELIAL CELLS, UA: ABNORMAL /HPF
ERYTHROCYTE [DISTWIDTH] IN BLOOD BY AUTOMATED COUNT: 13.3 % (ref 11.5–14.5)
ERYTHROCYTE [DISTWIDTH] IN BLOOD BY AUTOMATED COUNT: 13.6 % (ref 11.5–14.5)
ERYTHROCYTE [DISTWIDTH] IN BLOOD BY AUTOMATED COUNT: 42.3 FL (ref 35–45)
ERYTHROCYTE [DISTWIDTH] IN BLOOD BY AUTOMATED COUNT: 46.5 FL (ref 35–45)
ETHYL ALCOHOL, SERUM: < 0.01 %
GFR SERPL CREATININE-BSD FRML MDRD: 80 ML/MIN/1.73M2
GFR SERPL CREATININE-BSD FRML MDRD: > 90 ML/MIN/1.73M2
GLUCOSE BLD-MCNC: 104 MG/DL (ref 70–108)
GLUCOSE BLD-MCNC: 87 MG/DL (ref 70–108)
GLUCOSE, URINE: NEGATIVE MG/DL
HCT VFR BLD CALC: 38.1 % (ref 42–52)
HCT VFR BLD CALC: 42.6 % (ref 42–52)
HEMOGLOBIN: 13.3 GM/DL (ref 14–18)
HEMOGLOBIN: 13.7 GM/DL (ref 14–18)
IMMATURE GRANS (ABS): 0.03 THOU/MM3 (ref 0–0.07)
IMMATURE GRANULOCYTES: 0.3 %
INR BLD: 2.48 (ref 0.85–1.13)
INR BLD: 2.73 (ref 0.85–1.13)
INR BLD: 3.13 (ref 0.85–1.13)
KETONES, URINE: 15
LACTIC ACID: 1.9 MMOL/L (ref 0.5–2.2)
LEUKOCYTE EST, POC: NEGATIVE
LV EF: 43 %
LVEF MODALITY: NORMAL
LYMPHOCYTES # BLD: 15.8 %
LYMPHOCYTES ABSOLUTE: 1.4 THOU/MM3 (ref 1–4.8)
MCH RBC QN AUTO: 30 PG (ref 26–33)
MCH RBC QN AUTO: 30.2 PG (ref 26–33)
MCHC RBC AUTO-ENTMCNC: 32.2 GM/DL (ref 32.2–35.5)
MCHC RBC AUTO-ENTMCNC: 34.9 GM/DL (ref 32.2–35.5)
MCV RBC AUTO: 86.6 FL (ref 80–94)
MCV RBC AUTO: 93.2 FL (ref 80–94)
MISCELLANEOUS LAB TEST RESULT: ABNORMAL
MONOCYTES # BLD: 7.9 %
MONOCYTES ABSOLUTE: 0.7 THOU/MM3 (ref 0.4–1.3)
NITRITE, URINE: NEGATIVE
NUCLEATED RED BLOOD CELLS: 0 /100 WBC
OPIATES, URINE: NEGATIVE
OSMOLALITY CALCULATION: 285 MOSMOL/KG (ref 275–300)
OXYCODONE: NEGATIVE
PH UA: 5.5
PHENCYCLIDINE QUANTITATIVE URINE: NEGATIVE
PLATELET # BLD: 141 THOU/MM3 (ref 130–400)
PLATELET # BLD: 186 THOU/MM3 (ref 130–400)
PMV BLD AUTO: 11.1 FL (ref 9.4–12.4)
PMV BLD AUTO: 11.6 FL (ref 9.4–12.4)
POTASSIUM REFLEX MAGNESIUM: 4.8 MEQ/L (ref 3.5–5.2)
POTASSIUM SERPL-SCNC: 4 MEQ/L (ref 3.5–5.2)
PROTEIN UA: ABNORMAL MG/DL
RBC # BLD: 4.4 MILL/MM3 (ref 4.7–6.1)
RBC # BLD: 4.57 MILL/MM3 (ref 4.7–6.1)
RBC URINE: ABNORMAL /HPF
RENAL EPITHELIAL, UA: ABNORMAL
SEG NEUTROPHILS: 74.7 %
SEGMENTED NEUTROPHILS ABSOLUTE COUNT: 6.5 THOU/MM3 (ref 1.8–7.7)
SODIUM BLD-SCNC: 138 MEQ/L (ref 135–145)
SODIUM BLD-SCNC: 138 MEQ/L (ref 135–145)
SPECIFIC GRAVITY UA: 1.02 (ref 1–1.03)
TOTAL PROTEIN: 6.8 G/DL (ref 6.1–8)
TROPONIN T: < 0.01 NG/ML
UROBILINOGEN, URINE: 0.2 EU/DL
WBC # BLD: 8.7 THOU/MM3 (ref 4.8–10.8)
WBC # BLD: 9.4 THOU/MM3 (ref 4.8–10.8)
WBC UA: ABNORMAL /HPF
YEAST: ABNORMAL

## 2018-01-01 PROCEDURE — 85610 PROTHROMBIN TIME: CPT

## 2018-01-01 PROCEDURE — 71250 CT THORAX DX C-: CPT

## 2018-01-01 PROCEDURE — 72125 CT NECK SPINE W/O DYE: CPT

## 2018-01-01 PROCEDURE — G8987 SELF CARE CURRENT STATUS: HCPCS

## 2018-01-01 PROCEDURE — 2580000003 HC RX 258: Performed by: PHYSICIAN ASSISTANT

## 2018-01-01 PROCEDURE — 97535 SELF CARE MNGMENT TRAINING: CPT

## 2018-01-01 PROCEDURE — 99232 SBSQ HOSP IP/OBS MODERATE 35: CPT | Performed by: SURGERY

## 2018-01-01 PROCEDURE — 80307 DRUG TEST PRSMV CHEM ANLYZR: CPT

## 2018-01-01 PROCEDURE — 85025 COMPLETE CBC W/AUTO DIFF WBC: CPT

## 2018-01-01 PROCEDURE — 97161 PT EVAL LOW COMPLEX 20 MIN: CPT

## 2018-01-01 PROCEDURE — 6370000000 HC RX 637 (ALT 250 FOR IP): Performed by: NURSE PRACTITIONER

## 2018-01-01 PROCEDURE — 93306 TTE W/DOPPLER COMPLETE: CPT

## 2018-01-01 PROCEDURE — APPSS60 APP SPLIT SHARED TIME 46-60 MINUTES: Performed by: NURSE PRACTITIONER

## 2018-01-01 PROCEDURE — APPSS60 APP SPLIT SHARED TIME 46-60 MINUTES: Performed by: PHYSICIAN ASSISTANT

## 2018-01-01 PROCEDURE — 97110 THERAPEUTIC EXERCISES: CPT

## 2018-01-01 PROCEDURE — 81001 URINALYSIS AUTO W/SCOPE: CPT

## 2018-01-01 PROCEDURE — G8979 MOBILITY GOAL STATUS: HCPCS

## 2018-01-01 PROCEDURE — G0480 DRUG TEST DEF 1-7 CLASSES: HCPCS

## 2018-01-01 PROCEDURE — 6370000000 HC RX 637 (ALT 250 FOR IP): Performed by: PHYSICIAN ASSISTANT

## 2018-01-01 PROCEDURE — 83605 ASSAY OF LACTIC ACID: CPT

## 2018-01-01 PROCEDURE — 80053 COMPREHEN METABOLIC PANEL: CPT

## 2018-01-01 PROCEDURE — G8988 SELF CARE GOAL STATUS: HCPCS

## 2018-01-01 PROCEDURE — 99285 EMERGENCY DEPT VISIT HI MDM: CPT

## 2018-01-01 PROCEDURE — 73080 X-RAY EXAM OF ELBOW: CPT

## 2018-01-01 PROCEDURE — 73552 X-RAY EXAM OF FEMUR 2/>: CPT

## 2018-01-01 PROCEDURE — 92523 SPEECH SOUND LANG COMPREHEN: CPT

## 2018-01-01 PROCEDURE — 2709999900 HC NON-CHARGEABLE SUPPLY

## 2018-01-01 PROCEDURE — 6370000000 HC RX 637 (ALT 250 FOR IP): Performed by: SURGERY

## 2018-01-01 PROCEDURE — 72170 X-RAY EXAM OF PELVIS: CPT

## 2018-01-01 PROCEDURE — 99222 1ST HOSP IP/OBS MODERATE 55: CPT | Performed by: SURGERY

## 2018-01-01 PROCEDURE — APPSS180 APP SPLIT SHARED TIME > 60 MINUTES: Performed by: PHYSICIAN ASSISTANT

## 2018-01-01 PROCEDURE — 36415 COLL VENOUS BLD VENIPUNCTURE: CPT

## 2018-01-01 PROCEDURE — 97116 GAIT TRAINING THERAPY: CPT

## 2018-01-01 PROCEDURE — 85027 COMPLETE CBC AUTOMATED: CPT

## 2018-01-01 PROCEDURE — APPNB60 APP NON BILLABLE TIME 46-60 MINS: Performed by: PHYSICIAN ASSISTANT

## 2018-01-01 PROCEDURE — 70450 CT HEAD/BRAIN W/O DYE: CPT

## 2018-01-01 PROCEDURE — 1200000000 HC SEMI PRIVATE

## 2018-01-01 PROCEDURE — 97165 OT EVAL LOW COMPLEX 30 MIN: CPT

## 2018-01-01 PROCEDURE — 74176 CT ABD & PELVIS W/O CONTRAST: CPT

## 2018-01-01 PROCEDURE — 97530 THERAPEUTIC ACTIVITIES: CPT

## 2018-01-01 PROCEDURE — G8978 MOBILITY CURRENT STATUS: HCPCS

## 2018-01-01 PROCEDURE — 80048 BASIC METABOLIC PNL TOTAL CA: CPT

## 2018-01-01 PROCEDURE — 84484 ASSAY OF TROPONIN QUANT: CPT

## 2018-01-01 RX ORDER — ATORVASTATIN CALCIUM 40 MG/1
40 TABLET, FILM COATED ORAL
Status: DISCONTINUED | OUTPATIENT
Start: 2018-01-01 | End: 2018-01-01 | Stop reason: HOSPADM

## 2018-01-01 RX ORDER — CLOPIDOGREL BISULFATE 75 MG/1
75 TABLET ORAL DAILY
Qty: 30 TABLET | Refills: 3 | Status: SHIPPED | OUTPATIENT
Start: 2018-01-01 | End: 2019-01-01 | Stop reason: ALTCHOICE

## 2018-01-01 RX ORDER — WARFARIN SODIUM 7.5 MG/1
7.5 TABLET ORAL ONCE
Status: DISCONTINUED | OUTPATIENT
Start: 2018-01-01 | End: 2018-01-01 | Stop reason: HOSPADM

## 2018-01-01 RX ORDER — METOPROLOL SUCCINATE 25 MG/1
25 TABLET, EXTENDED RELEASE ORAL DAILY
Status: DISCONTINUED | OUTPATIENT
Start: 2018-01-01 | End: 2018-01-01 | Stop reason: HOSPADM

## 2018-01-01 RX ORDER — SODIUM CHLORIDE 0.9 % (FLUSH) 0.9 %
10 SYRINGE (ML) INJECTION EVERY 12 HOURS SCHEDULED
Status: DISCONTINUED | OUTPATIENT
Start: 2018-01-01 | End: 2018-01-01 | Stop reason: HOSPADM

## 2018-01-01 RX ORDER — POTASSIUM CHLORIDE 750 MG/1
10 TABLET, FILM COATED, EXTENDED RELEASE ORAL DAILY
Status: DISCONTINUED | OUTPATIENT
Start: 2018-01-01 | End: 2018-01-01 | Stop reason: HOSPADM

## 2018-01-01 RX ORDER — ACETAMINOPHEN 325 MG/1
650 TABLET ORAL EVERY 4 HOURS PRN
Status: DISCONTINUED | OUTPATIENT
Start: 2018-01-01 | End: 2018-01-01 | Stop reason: HOSPADM

## 2018-01-01 RX ORDER — DOCUSATE SODIUM 100 MG/1
100 CAPSULE, LIQUID FILLED ORAL 2 TIMES DAILY
Status: DISCONTINUED | OUTPATIENT
Start: 2018-01-01 | End: 2018-01-01 | Stop reason: HOSPADM

## 2018-01-01 RX ORDER — GINSENG 100 MG
CAPSULE ORAL 3 TIMES DAILY
Status: DISCONTINUED | OUTPATIENT
Start: 2018-01-01 | End: 2018-01-01 | Stop reason: HOSPADM

## 2018-01-01 RX ORDER — WARFARIN SODIUM 3 MG/1
6 TABLET ORAL ONCE
Status: COMPLETED | OUTPATIENT
Start: 2018-01-01 | End: 2018-01-01

## 2018-01-01 RX ORDER — GINSENG 100 MG
CAPSULE ORAL
Refills: 3 | COMMUNITY
Start: 2018-01-01 | End: 2018-01-01

## 2018-01-01 RX ORDER — ONDANSETRON 2 MG/ML
4 INJECTION INTRAMUSCULAR; INTRAVENOUS EVERY 6 HOURS PRN
Status: DISCONTINUED | OUTPATIENT
Start: 2018-01-01 | End: 2018-01-01 | Stop reason: HOSPADM

## 2018-01-01 RX ORDER — SODIUM CHLORIDE 0.9 % (FLUSH) 0.9 %
10 SYRINGE (ML) INJECTION PRN
Status: DISCONTINUED | OUTPATIENT
Start: 2018-01-01 | End: 2018-01-01 | Stop reason: HOSPADM

## 2018-01-01 RX ORDER — TAMSULOSIN HYDROCHLORIDE 0.4 MG/1
0.4 CAPSULE ORAL DAILY
Status: DISCONTINUED | OUTPATIENT
Start: 2018-01-01 | End: 2018-01-01 | Stop reason: HOSPADM

## 2018-01-01 RX ORDER — CLOPIDOGREL BISULFATE 75 MG/1
75 TABLET ORAL DAILY
Status: DISCONTINUED | OUTPATIENT
Start: 2018-01-01 | End: 2018-01-01 | Stop reason: HOSPADM

## 2018-01-01 RX ORDER — FAMOTIDINE 20 MG/1
20 TABLET, FILM COATED ORAL DAILY
Status: DISCONTINUED | OUTPATIENT
Start: 2018-01-01 | End: 2018-01-01 | Stop reason: HOSPADM

## 2018-01-01 RX ORDER — FUROSEMIDE 40 MG/1
40 TABLET ORAL DAILY
Status: DISCONTINUED | OUTPATIENT
Start: 2018-01-01 | End: 2018-01-01 | Stop reason: HOSPADM

## 2018-01-01 RX ADMIN — METOPROLOL SUCCINATE 25 MG: 25 TABLET, FILM COATED, EXTENDED RELEASE ORAL at 10:01

## 2018-01-01 RX ADMIN — BACITRACIN: 500 OINTMENT TOPICAL at 09:27

## 2018-01-01 RX ADMIN — Medication 10 ML: at 01:38

## 2018-01-01 RX ADMIN — Medication 10 ML: at 09:27

## 2018-01-01 RX ADMIN — TAMSULOSIN HYDROCHLORIDE 0.4 MG: 0.4 CAPSULE ORAL at 09:57

## 2018-01-01 RX ADMIN — FAMOTIDINE 20 MG: 20 TABLET ORAL at 09:27

## 2018-01-01 RX ADMIN — POTASSIUM CHLORIDE 10 MEQ: 750 TABLET, FILM COATED, EXTENDED RELEASE ORAL at 10:00

## 2018-01-01 RX ADMIN — CARBIDOPA AND LEVODOPA 1 TABLET: 25; 100 TABLET ORAL at 21:17

## 2018-01-01 RX ADMIN — ATORVASTATIN CALCIUM 40 MG: 40 TABLET, FILM COATED ORAL at 21:17

## 2018-01-01 RX ADMIN — TAMSULOSIN HYDROCHLORIDE 0.4 MG: 0.4 CAPSULE ORAL at 17:21

## 2018-01-01 RX ADMIN — DOCUSATE SODIUM 100 MG: 100 CAPSULE, LIQUID FILLED ORAL at 09:27

## 2018-01-01 RX ADMIN — WARFARIN SODIUM 6 MG: 3 TABLET ORAL at 17:20

## 2018-01-01 RX ADMIN — METOPROLOL SUCCINATE 25 MG: 25 TABLET, FILM COATED, EXTENDED RELEASE ORAL at 17:21

## 2018-01-01 RX ADMIN — CLOPIDOGREL BISULFATE 75 MG: 75 TABLET ORAL at 10:00

## 2018-01-01 RX ADMIN — Medication 10 ML: at 21:17

## 2018-01-01 RX ADMIN — CARBIDOPA AND LEVODOPA 1 TABLET: 25; 100 TABLET ORAL at 17:21

## 2018-01-01 RX ADMIN — BACITRACIN: 500 OINTMENT TOPICAL at 01:37

## 2018-01-01 RX ADMIN — CLOPIDOGREL BISULFATE 75 MG: 75 TABLET ORAL at 17:21

## 2018-01-01 RX ADMIN — FUROSEMIDE 40 MG: 40 TABLET ORAL at 09:57

## 2018-01-01 RX ADMIN — POTASSIUM CHLORIDE 10 MEQ: 750 TABLET, FILM COATED, EXTENDED RELEASE ORAL at 17:21

## 2018-01-01 RX ADMIN — CARBIDOPA AND LEVODOPA 1 TABLET: 25; 100 TABLET ORAL at 09:57

## 2018-01-01 RX ADMIN — BACITRACIN: 500 OINTMENT TOPICAL at 21:17

## 2018-01-01 RX ADMIN — BACITRACIN: 500 OINTMENT TOPICAL at 09:57

## 2018-01-01 RX ADMIN — BACITRACIN: 500 OINTMENT TOPICAL at 17:21

## 2018-01-01 RX ADMIN — FAMOTIDINE 20 MG: 20 TABLET ORAL at 10:00

## 2018-01-01 RX ADMIN — FUROSEMIDE 40 MG: 40 TABLET ORAL at 17:21

## 2018-01-01 ASSESSMENT — ENCOUNTER SYMPTOMS
CHEST TIGHTNESS: 0
COUGH: 0
CONSTIPATION: 0
DIARRHEA: 0
ABDOMINAL PAIN: 0
SORE THROAT: 0
BACK PAIN: 0
BLOOD IN STOOL: 0
SINUS PRESSURE: 0
NAUSEA: 0
VOMITING: 0
EYE REDNESS: 0
COUGH: 0
SHORTNESS OF BREATH: 0
APNEA: 0
NAUSEA: 0
VOICE CHANGE: 0
EYE DISCHARGE: 0
GASTROINTESTINAL NEGATIVE: 1
ABDOMINAL PAIN: 0
EYE PAIN: 0
ABDOMINAL DISTENTION: 0
SHORTNESS OF BREATH: 0
TROUBLE SWALLOWING: 0
EYE ITCHING: 0
BACK PAIN: 0

## 2018-01-01 ASSESSMENT — PAIN DESCRIPTION - FREQUENCY
FREQUENCY: INTERMITTENT
FREQUENCY: CONTINUOUS

## 2018-01-01 ASSESSMENT — PAIN DESCRIPTION - DESCRIPTORS
DESCRIPTORS: ACHING
DESCRIPTORS: DULL
DESCRIPTORS: SORE
DESCRIPTORS: DULL;ACHING

## 2018-01-01 ASSESSMENT — PAIN DESCRIPTION - ORIENTATION
ORIENTATION: RIGHT
ORIENTATION: POSTERIOR
ORIENTATION: POSTERIOR

## 2018-01-01 ASSESSMENT — PAIN DESCRIPTION - LOCATION
LOCATION: HEAD
LOCATION: NECK
LOCATION: ARM
LOCATION: NECK

## 2018-01-01 ASSESSMENT — PAIN SCALES - GENERAL
PAINLEVEL_OUTOF10: 4
PAINLEVEL_OUTOF10: 0
PAINLEVEL_OUTOF10: 0
PAINLEVEL_OUTOF10: 4
PAINLEVEL_OUTOF10: 2
PAINLEVEL_OUTOF10: 4
PAINLEVEL_OUTOF10: 4

## 2018-01-01 ASSESSMENT — PAIN DESCRIPTION - PAIN TYPE
TYPE: CHRONIC PAIN
TYPE: ACUTE PAIN

## 2018-01-01 NOTE — PROGRESS NOTES
[facility] PROFESSIONAL SERVS  [facility]  [address]  [address]  [address] [address]  Dept: [phone]  Dept Fax: [phone]  Loc: [phone]    Visit Date: 12/27/2017    Mr. Hakeem Maki is a 80 y.o. male  who presented for:  Chief Complaint   Patient presents with    Check-Up     severe aortic stenosis       HPI:   HPI   81 yo s/p TAVR. 1 week follow-up. D/c'ed within 48 hours post procedure. No complications. TTE acceptable post-procedure. No groin issues. Feels well today. Tolerating DAPT. He is compliant with medications. He is actually able to get around his house since the procedure much easier than prior. His family is confused about his DAPT and warfarin regimen. He has no chest pain or complaints otherwise. Dyspnea much better than prior. Current Outpatient Prescriptions:     potassium chloride (KLOR-CON) 20 MEQ packet, Take 10 mEq by mouth daily, Disp: , Rfl:     aspirin 81 MG EC tablet, Take 1 tablet by mouth daily, Disp: 30 tablet, Rfl: 0    metoprolol succinate (TOPROL XL) 25 MG extended release tablet, Take 25 mg by mouth daily, Disp: , Rfl:     warfarin (COUMADIN) 5 MG tablet, Take 1 tablet by mouth daily, Disp: 30 tablet, Rfl: 3    Blood Pressure KIT, 1 Units by Does not apply route daily, Disp: 1 kit, Rfl: 0    tamsulosin (FLOMAX) 0.4 MG capsule, Take 0.4 mg by mouth daily, Disp: , Rfl:     furosemide (LASIX) 40 MG tablet, Take 40 mg by mouth daily, Disp: , Rfl:     guaiFENesin (ROBITUSSIN) 100 MG/5ML syrup, Take 200 mg by mouth 3 times daily as needed for Cough, Disp: , Rfl:     bismuth subsalicylate (PEPTO BISMOL) 262 MG/15ML suspension, Take 15 mLs by mouth every 6 hours as needed for Indigestion, Disp: , Rfl:     atorvastatin (LIPITOR) 40 MG tablet, Take 40 mg by mouth every 48 hours. Indications: Blood Cholesterol Abnormal, Disp: , Rfl: 2    acetaminophen (TYLENOL) 500 MG tablet, Take 1,000 mg by mouth every 6 hours as needed.  Don't take more for pain, discharge, redness and itching. Respiratory: Negative for apnea, cough, chest tightness and shortness of breath. Gastrointestinal: Negative for abdominal distention, abdominal pain, blood in stool, constipation, diarrhea and nausea. Endocrine: Negative for cold intolerance, heat intolerance, polydipsia and polyphagia. Genitourinary: Negative for dysuria, enuresis, flank pain and hematuria. Musculoskeletal: Negative for arthralgias, back pain, joint swelling and neck pain. Neurological: Negative for dizziness, syncope, numbness and headaches. Psychiatric/Behavioral: Negative for agitation, confusion, decreased concentration and dysphoric mood. Objective:     BP (!) 120/56   Pulse 72 Comment: slightly irregular  Ht 5' 5.5\" (1.664 m)   Wt 138 lb 2 oz (62.7 kg)   BMI 22.64 kg/m²     Wt Readings from Last 3 Encounters:   12/27/17 138 lb 2 oz (62.7 kg)   12/16/17 137 lb 2 oz (62.2 kg)   11/24/17 151 lb 6 oz (68.7 kg)     BP Readings from Last 3 Encounters:   12/27/17 (!) 120/56   12/16/17 (!) 120/56   12/14/17 (!) 167/80       Physical Exam   Constitutional: He is oriented to person, place, and time. He appears well-developed and well-nourished. HENT:   Head: Normocephalic and atraumatic. Eyes: EOM are normal. Pupils are equal, round, and reactive to light. Neck: Normal range of motion. Neck supple. No JVD present. Cardiovascular: Normal rate, regular rhythm, normal heart sounds and intact distal pulses. No murmur heard. Pulmonary/Chest: Effort normal and breath sounds normal. No respiratory distress. He has no wheezes. He has no rales. Abdominal: Soft. Bowel sounds are normal. He exhibits no distension. There is no tenderness. Musculoskeletal: Normal range of motion. He exhibits no edema. Neurological: He is alert and oriented to person, place, and time. No cranial nerve deficit. Coordination normal.   Skin: Skin is warm and dry.    Psychiatric: He has a normal mood and affect. Nursing note and vitals reviewed.       Lab Results   Component Value Date    CKTOTAL 53 12/14/2017    CKTOTAL 42 09/24/2017    CKTOTAL 106 12/05/2016    CKMB 2.0 12/14/2017    CKMB 1.2 01/09/2012       Lab Results   Component Value Date    WBC 15.5 12/15/2017    RBC 4.01 12/15/2017    RBC 3.74 01/17/2012    HGB 10.8 12/15/2017    HCT 35.9 12/15/2017    MCV 89.5 12/15/2017    MCH 26.9 12/15/2017    MCHC 30.1 12/15/2017    RDW 14.3 12/15/2017     12/15/2017    MPV 11.2 12/15/2017       Lab Results   Component Value Date     12/15/2017    K 4.2 12/15/2017     12/15/2017    CO2 26 12/15/2017    BUN 30 12/15/2017    LABALBU 3.3 12/15/2017    LABALBU 3.9 01/11/2012    CREATININE 0.90 12/15/2017    CALCIUM 8.5 12/15/2017    GFRAA >60 12/15/2017    LABGLOM >60 12/15/2017    LABGLOM >90 11/24/2017    GLUCOSE 119 12/15/2017    GLUCOSE 111 01/17/2012       Lab Results   Component Value Date    ALKPHOS 112 12/15/2017    ALT 6 12/15/2017    AST 13 12/15/2017    PROT 6.1 12/15/2017    BILITOT 1.06 12/15/2017    BILIDIR 0.3 11/18/2017    LABALBU 3.3 12/15/2017    LABALBU 3.9 01/11/2012       Lab Results   Component Value Date    MG 2.3 11/23/2017       Lab Results   Component Value Date    INR 1.2 12/16/2017    INR 1.1 12/15/2017    INR 1.1 12/14/2017    PROTIME 13.4 (H) 12/16/2017    PROTIME 12.3 12/15/2017    PROTIME 12.3 12/14/2017         Lab Results   Component Value Date    LABA1C 5.4 12/08/2016       Lab Results   Component Value Date    TRIG 53 09/17/2017    HDL 68 09/17/2017    LDLCALC 60 09/17/2017       Lab Results   Component Value Date    TSH 5.790 11/19/2017         Testing Reviewed:      I have individually reviewed the below cardiac tests    ECHO:   Results for orders placed during the hospital encounter of 12/14/17   ECHO Complete 2D W Doppler W Color    Narrative Transthoracic Echocardiography Report (TTE)     Patient Name Ryan Mares  Date of Study           12/16/2017

## 2018-01-11 ENCOUNTER — OFFICE VISIT (OUTPATIENT)
Dept: CARDIOLOGY CLINIC | Age: 83
End: 2018-01-11
Payer: MEDICARE

## 2018-01-11 VITALS
DIASTOLIC BLOOD PRESSURE: 63 MMHG | SYSTOLIC BLOOD PRESSURE: 113 MMHG | WEIGHT: 144 LBS | HEIGHT: 66 IN | HEART RATE: 82 BPM | BODY MASS INDEX: 23.14 KG/M2

## 2018-01-11 DIAGNOSIS — I50.42 CHF (CONGESTIVE HEART FAILURE), NYHA CLASS II, CHRONIC, COMBINED (HCC): Primary | ICD-10-CM

## 2018-01-11 PROCEDURE — G8482 FLU IMMUNIZE ORDER/ADMIN: HCPCS | Performed by: NURSE PRACTITIONER

## 2018-01-11 PROCEDURE — 4040F PNEUMOC VAC/ADMIN/RCVD: CPT | Performed by: NURSE PRACTITIONER

## 2018-01-11 PROCEDURE — G8427 DOCREV CUR MEDS BY ELIG CLIN: HCPCS | Performed by: NURSE PRACTITIONER

## 2018-01-11 PROCEDURE — 1111F DSCHRG MED/CURRENT MED MERGE: CPT | Performed by: NURSE PRACTITIONER

## 2018-01-11 PROCEDURE — 1036F TOBACCO NON-USER: CPT | Performed by: NURSE PRACTITIONER

## 2018-01-11 PROCEDURE — G8598 ASA/ANTIPLAT THER USED: HCPCS | Performed by: NURSE PRACTITIONER

## 2018-01-11 PROCEDURE — G8420 CALC BMI NORM PARAMETERS: HCPCS | Performed by: NURSE PRACTITIONER

## 2018-01-11 PROCEDURE — 99213 OFFICE O/P EST LOW 20 MIN: CPT | Performed by: NURSE PRACTITIONER

## 2018-01-11 PROCEDURE — 1123F ACP DISCUSS/DSCN MKR DOCD: CPT | Performed by: NURSE PRACTITIONER

## 2018-01-11 ASSESSMENT — ENCOUNTER SYMPTOMS
NAUSEA: 0
WHEEZING: 0
CHEST TIGHTNESS: 0
COLOR CHANGE: 0
ABDOMINAL PAIN: 0
COUGH: 0
APNEA: 0
ABDOMINAL DISTENTION: 0
SHORTNESS OF BREATH: 0

## 2018-01-11 NOTE — PROGRESS NOTES
Heart Failure Clinic       Visit Date: 1/11/2018  Cardiologist:  Dr. Markos Washington   Primary Care Physician: Dr. Aisha Rojo MD    Analisa Haque is a 80 y.o. male who presents today for:  Chief Complaint   Patient presents with    Congestive Heart Failure     possible Cardio Mems       HPI:   Analisa Haque is a 80 y.o. male who presents to the office for a new patient visit in the heart failure clinic. He underwent a recent TAVR with Dr Markos Washington on 12/14/17. He has been feeling well post procedure. He does not feel as SOB as he did prior. He lives alone, his daughters bring him meals. They watch his sodium. He does not drink more then 2 liters of fluid a day. He is able to lay in bed and uses 1 pillow to sleep. He has a right arm/hand tremor that does bother him. This limits how much he is able to do at home such as taking longer to get dressed in the morning. He can walk at least 50 feet without feeling too SOB. He can perform basic ADLs without feeling SOB.      Patient has:  Last hospital admission related to Heart Failure:  11/18/17-11/24/17  Chest Pain: no  Worsening SOB/orthopnea/PND: no  Edema: yes but not worse  Any extra diuretic use since last visit: no  Weight gain: no  Compliant checking home weight: yes  Fatigue: no  Abdominal bloating: no  Appetite: good  Difficulty sleeping: no  Cough: no  Compliant checking blood pressure: no  Any refills on CHF medications needed at this time: no    Past Medical History:   Diagnosis Date    CAD (coronary artery disease)     CHF (congestive heart failure) (HCC)     GERD (gastroesophageal reflux disease)     History of CVA (cerebrovascular accident) - noted per MRI-brain on 9/18/2017 which demonstrates old strokes     Hypertension     Myocardial infarct     Occasional tremors     Pulmonary embolism (HCC)     PVC's (premature ventricular contractions)     Retinal artery thrombosis     S/P TAVR (transcatheter aortic valve replacement) Dr. Markos Washington 12/14/17 Magnesium:    Lab Results   Component Value Date    MG 2.3 11/23/2017     PT/INR:    Lab Results   Component Value Date    PROTIME 13.4 12/16/2017    PROTIME 3.06 11/30/2011    INR 1.2 12/16/2017     Lipids:    Lab Results   Component Value Date    TRIG 53 09/17/2017    HDL 68 09/17/2017    LDLCALC 60 09/17/2017       ASSESSMENT AND PLAN:   The patient's condition/symptoms are Stable: No clinical evidence of fluid overload today. Continue current medical regimen without changes at present time.     1. CHF (congestive heart failure), NYHA class II, chronic, combined (HCC)         Continue:  · Continue current medications - Toprol, Lasix -- not on ACE/ARB d/t [previous elevated Creatinine. Last Cr 0.9. Consider reintroducing low dose ACE. · Daily weights  · Fluid restriction of 2 Liters per day  · Limit sodium in diet to around 1500 mg/day  · Monitor BP  · Activity as tolerated     Patient was instructed to call the [address] for changes in the following symptoms:   Weight gain of 2-3 pounds in 1 day or 5 pounds in 1 week  Increased shortness of breath  Shortness of breath while laying down  Cough  Chest pain  Swelling in feet, ankles or legs  Tenderness or bloating in the abdomen  Fatigue   Decreased appetite or nausea   Confusion      Return in about 2 days (around 1/13/2018). or sooner if needed     Patient given educational materials - see patient instructions. We discussed the importance of weighing oneself and recording daily. We also discussed the importance of a low sodium diet, higher sodium foods to avoid and better low sodium food options. CardioMEMS purpose and procedure were discussed with Yodit Ruiz and his daughter. Patient brochure given. At this time he would like to wait on implanting. He feels like he has had a lot done in the past few months and wants to [employer]. \"     We discussed the importance of compression hose.  However, due to his tremor and difficulty of getting them on, he can not use them. Patient verbalizes understanding of plan of care using teach back method, and is agreeable to the treatment plan.        Electronically signed by Alycia Deleon CNP on 1/11/2018 at 4:32 PM

## 2018-01-18 ENCOUNTER — HOSPITAL ENCOUNTER (OUTPATIENT)
Dept: CARDIAC REHAB | Age: 83
Setting detail: THERAPIES SERIES
Discharge: HOME | End: 2018-01-18
Payer: MEDICARE

## 2018-01-18 VITALS — HEART RATE: 84 BPM | HEIGHT: 66 IN | OXYGEN SATURATION: 95 % | WEIGHT: 144 LBS | BODY MASS INDEX: 23.14 KG/M2

## 2018-01-18 PROCEDURE — G0423 INTENS CARDIAC REHAB NO EXER: HCPCS

## 2018-01-18 PROCEDURE — G0422 INTENS CARDIAC REHAB W/EXERC: HCPCS

## 2018-01-18 PROCEDURE — GZ HC ICR CARDIAC REHAB W/EXERC

## 2018-01-18 NOTE — PLAN OF CARE
[address] [place] [address] Facility-Based Program  Individualized Cardiac Treatment Plan    Patient Name:  Alyssa Mehta  :  1930  Age:  80 y.o. MRN:  [id number]  Diagnosis: TAVR  Date of Event: 17   Physician:  Markos Doss Office Visit:  18  Date Entered Program: 18  Risk Stratifications: [] Low [] Intermediate [x] High  Allergies: Allergies   Allergen Reactions    Iodine Hives       Individual Cardiac Treatment Plan EXERCISE  INITIAL 27 DAY 60 DAY 90 DAY FINAL DAY   EXERCISE  ASSESSMENT/PLAN EXERCISE  REASSESSMENT EXERCISE   REASSESSMENT EXERCISE   REASSESSMENT EXERCISE  DISCHARGE/FOLLOW-UP   Date: 18 Date: Date: Date: Date:   Session #1 Session # ** Session # ** Session # ** Session # **  Last session completed on **   Stages of Change Stages of Change Stages of Change Stages of Change Stages of Change   [x] Pre Contemplation  [] Contemplation  [] Preparation  [] Action  [] Maintenance  [] Relapse [] Pre Contemplation  [] Contemplation  [] Preparation  [] Action  [] Maintenance  [] Relapse [] Pre Contemplation  [] Contemplation  [] Preparation  [] Action  [] Maintenance  [] Relapse [] Pre Contemplation  [] Contemplation  [] Preparation  [] Action  [] Maintenance  [] Relapse [] Pre Contemplation  [] Contemplation  [] Preparation  [] Action  [] Maintenance  [] Relapse   EXERCISE ASSESSMENT EXERCISE ASSESSMENT EXERCISE ASSESSMENT EXERCISE ASSESSMENT EXERCISE ASSESSMENT   6 Min Walk Test  Distance walked:   0.14 miles  739.2 ft.  2.1 METs  Max HR:124 BPM      RPE:  10    THR:  103-113  Rhythm:  At- fib with occ PVC's    6 Min Walk Test  Distance walked:   ** miles  ** ft  ** METs  Max HR:** BPM      RPE:  **  %Change ft= **    Rhythm:  **   DASI: 4.4 METS DASI: ** METS DASI: ** METS DASI: ** METS DASI: ** METS   Return to Work  [employer] on returning to work?    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 Return to work: Has Ray Bath returned to work? [] Yes    [] No    Return to work date set? [] Yes, **    [] No    Rayfield Bath is doing ** at work. Return to work: Has Rayfield Bath returned to work? [] Yes    [] No    Return to work date set? [] Yes, **    [] No    Rayfield Bath is doing ** at work. Return to work: Has Rayfield Bath returned to work? [] Yes    [] No    Return to work date set? [] Yes, **    [] No    Rayfield Bath is doing ** at work. Return to work: Has Rayfield Bath returned to work? [] Yes    [] No    Return to work? [] Yes, **    [] No    *Required MET Level achieved for job duties? [] Yes    [] No   Orthopedic Limitations/  [x] Yes    [] No       Orthopedic Limitations  *If patient has orthopedic issue:   Actions/  accomodations needed to make Ray Bath successful : Orthopedic Limitations   Orthopedic Limitations   Orthopedic Limitations     Fall Risk  Fall risk assessed? [x] Yes      [] No    Balance Issues? [x] Yes      [] No     [] Walker [] Cane    [x] Safety issues reviewed      Fall Risk  *If patient is a fall risk, action needed to accommodate:  Fall Risk Fall Risk Fall Risk   Home Exercise  [] Yes    [x] No   Home Exercise  [] Yes    [] No  Type: **  Frequency:**  Duration: ** Home Exercise  [] Yes    [] No  Type: **  Frequency: **  Duration: ** Home Exercise  [] Yes    [] No  Type: **  Frequency: **  Duration: ** Home Exercise  [] Yes    [] No  Type: **  Frequency: **  Duration: **   Angina with Activity? [] Yes    [x] No  Angina Management: na Angina with Activity? [] Yes    [] No  Angina Management: ** Angina with Activity? [] Yes    [] No  Angina Management: ** Angina with Activity? [] Yes    [] No  Angina Management: ** Angina with Activity?   [] Yes    [] No  Angina Management: **   EXERCISE PLAN EXERCISE PLAN EXERCISE PLAN EXERCISE PLAN EXERCISE PLAN   *Interventions* *Interventions* *Interventions* *Interventions* *Interventions*   Exercise Prescription  (per physician & CR staff) Exercise Prescription  (per physician & CR staff) Exercise Prescription  (per physician & CR staff) Exercise Prescription  (per physician & CR staff) Exercise Prescription  (per physician & CR staff)   Cardiovascular Cardiovascular Cardiovascular Cardiovascular Cardiovascular   Mode:    [x] Treadmill (TM)  [x] Schwinn Airdyne (AD)  [x] Arms Ergometer (AE)  [x] NuStep  [] Elliptical (E) MODE:    [] Treadmill (TM)  [] Schwinn Airdyne (AD)  [] Arms Ergometer (AE)  [] NuStep  [] Elliptical (E) MODE:    [] Treadmill (TM)  [] Schwinn Airdyne (AD)  [] Arms Ergometer (AE)  [] NuStep  [] Elliptical (E) MODE:    [] Treadmill (TM)  [] Schwinn Airdyne (AD)  [] Arms Ergometer (AE)  [] NuStep  [] Elliptical (E) MODE:    [] Treadmill (TM)  [] Schwinn Airdyne (AD)  [] Arms Ergometer (AE)  [] NuStep  [] Elliptical (E)   Initial Workloads  TM: [email or web address] 2.2 METs  AD: 0.5 level = 2.1 METs  NS: 36  Santiago= 2.2 METs  AE: 0.3 level = 1.7 METs Current Workloads  TM:  @ %=  METs  AD:  level =  METs  NS:   Santiago=  METs  AE:  level =  METs Current Workloads  TM:  @ %=  METs  AD:  level =  METs  NS:   Santiago=  METs  AE:  level =  METs Current Workloads  TM:  @ %=  METs  AD:  level =  METs  NS:   Santiago=  METs  AE:  level =  METs Current Workloads  TM:  @ %=  METs  AD:  level =  METs  NS:   Santiago=  METs  AE:  level =  METs     Frequency:    ICR: 3x/week  Home: 2-3x/wk Frequency:   ICR: 3x/week  Home: 3x/wk Frequency:  ICR: 3x/week  Home: 3-4x/wk Frequency:  ICR: 3x/week  Home: 3-4x/wk Frequency:  Reubin Shed will continue exercise at  5-7 days/week   Duration:   Total aerobic exercise = 16-24 min    8 min/mode Duration:  Total aerobic exercises = ** min     **min/mode Duration:  Total aerobic exercises = ** min     **min/mode Duration:  Total aerobic exercises = ** min     **min/mode Duration:  Total erobic exercise =  60-90 min   Intensity:   MET Level Stress Management Skills *Please check if needed  [] Psych Consult  [] Physician Referral  [] Stress Management Skills *Please check if needed  [] Psych Consult  [] Physician Referral  [] Stress Management Skills *Please check if needed  [] Psych Consult  [] Physician Referral  [] Stress Management Skills   Is patient currently taking anti-depressant or anti-anxiety medications? [] Yes      [x] No   Change in anti-depressant or anti-anxiety medications? [] Yes      [] No  If yes, please list medications:  ** Change in anti-depressant or anti-anxiety medications? [] Yes      [] No  If yes, please list medications:  ** Change in anti-depressant or anti-anxiety medications? [] Yes      [] No  If yes, please list medications:  ** Change in anti-depressant or anti-anxiety medications? [] Yes      [] No  If yes, please list medications:  **   *Education* *Education* *Education* *Education* *Education*   Healthy Mind-Set Workshops Recommended  [x] Stress & Health  [x] Taking Charge of Stress  [x] New Thoughts, New Behaviors  [x] Managing Moods & Relationships Healthy Mind-Set Workshops Attended/Date Healthy Mind-Set Workshops Attended/Date Healthy Mind-Set Workshops Attended/Date Healthy Mind-Set Workshops  Completed  [] Yes      [] No   *Goals* *Goals* *Goals* *Goals* *Goals*   Rasta's psychosocial goals are as follows:  Complete HADS & Dayanara & Dinh, Quality of Life Index, Cardiac Version IV Rasta's psychosocial goals are as follows:  [] Attend Healthy Mind-Set Workshops  [] Reduce depression symptom severity by 1 level  [] ** Rasta's psychosocial goals are as follows:  [] Attend Healthy Mind-Set Workshops  [] Reduce depression symptom severity by 1 level  [] ** Rasta's psychosocial goals are as follows:  [] Attend Healthy Mind-Set Workshops  [] Reduce depression symptom severity by 1 level  [] ** Rasta achieved psychosocial goals?   [] Yes    [] No  If no, why?  **  [] Use methods learned to continue to Recent BS:  BS have been in range  [] Yes      [] No  Medication Changes  [] Yes      [] No     Diabetes  Most Recent BS:  BS have been in range  [] Yes      [] No  Medication Changes  [] Yes      [] No     Diabetes  Most Recent BS:  BS have been in range  [] Yes      [] No  Medication Changes  [] Yes      [] No       Tobacco Use  [] Current  [] Former  [x] Never      Smokeless Tobacco use:   [] Yes      [x] No   Tobacco Use  Change in smoking status   [] Yes      [] No    Quit date: **   Tobacco Use  Change in smoking status   [] Yes      [] No    Quit date: **   Tobacco Use  Change in smoking status   [] Yes      [] No    Quit date: ** Tobacco Use  Change in smoking status   [] Yes      [] No    Quit date: **            Learning Barriers  Please select one:  [] Speech  [] Literacy  [x] Hearing  [] Cognitive  [x] Vision  [x] Ready to Learn Learning Barriers Addressed:   [] Yes      [] No   Learning Barriers Addressed:   [] Yes      [] No   Learning Barriers Addressed:  [] Yes      [] No Learning Barriers Addressed:  [] Yes      [] No     RISK FACTOR/EDUCATION PLAN RISK FACTOR/EDUCATION PLAN RISK FACTOR/EDUCATION PLAN RISK FACTOR/EDUCATION PLAN RISK FACTOR/EDUCATION PLAN   *Interventions* *Interventions* *Interventions* *Interventions* *Interventions*   Recommended Educational Videos    [x] Intake & The Pritikin Solution Program Overview  [x] Overview of The Pritikin Eating Plan  [x] Becoming A Pritikin   [x] Diseases of Our Time-Part 1  [x] Calorie Density  [x] Label Reading-Part 1  [x] Move It!   [x] Healthy Minds, Bodies, Hearts  [x] Dining Out-Part 1  [x] Heart Disease Risk Reduction  [x] Metabolic Syndrome & Belly Fat  [x] Facts on Fat  [x] Diseases of Our Times-Part 2  [x] Biology of Weight Control  [x] Biomechanical Limitations  [x] Nurtition Action Plan   Completed Videos      1/18/2018  Intake & The Pritikin Solution Program Overview Completed Videos Completed Videos Recommended Educational Videos cardiac activity  [x] Initiate continuous telemerty monitoring and notify me with any concerns  [] Other    Electronically signed by Bradford Arroyo on 1/18/2018 at 3:11 PM   Physician Response    [x] Cardiac rehab is reasonably and medically necessary for continuous cardiac monitoring surveillance  of patient's cardiac activity  [x] Continue continuous telemerty monitoring and notify me with any concerns  [] Other     Physician Response      [x] Cardiac rehab is reasonably and medically necessary for continuous cardiac monitoring surveillance  of patient's cardiac activity  [x] Continue continuous telemerty monitoring and notify me with any concerns   [] Other     Physician Response    [x] Cardiac rehab is reasonably and medically necessary for continuous cardiac monitoring surveillance  of patient's cardiac activity  [x] Continue continuous telemerty monitoring and notify me with any concerns   [] Other

## 2018-01-22 ENCOUNTER — APPOINTMENT (OUTPATIENT)
Dept: CARDIAC REHAB | Age: 83
End: 2018-01-22
Payer: MEDICARE

## 2018-01-22 ENCOUNTER — HOSPITAL ENCOUNTER (OUTPATIENT)
Dept: CARDIAC REHAB | Age: 83
Setting detail: THERAPIES SERIES
Discharge: HOME | End: 2018-01-22
Payer: MEDICARE

## 2018-01-22 NOTE — PROGRESS NOTES
Hospital Facility-Based Program  Phase 2 Cardiac Rehab Weekly Progress Report    Initiate ICR and progress as prescribed.

## 2018-01-24 ENCOUNTER — APPOINTMENT (OUTPATIENT)
Dept: CARDIAC REHAB | Age: 83
End: 2018-01-24
Payer: MEDICARE

## 2018-01-24 ENCOUNTER — HOSPITAL ENCOUNTER (OUTPATIENT)
Dept: CARDIAC REHAB | Age: 83
Setting detail: THERAPIES SERIES
Discharge: HOME | End: 2018-01-24
Payer: MEDICARE

## 2018-01-25 ENCOUNTER — HOSPITAL ENCOUNTER (OUTPATIENT)
Dept: NON INVASIVE DIAGNOSTICS | Age: 83
Discharge: HOME | End: 2018-01-25
Payer: MEDICARE

## 2018-01-26 ENCOUNTER — HOSPITAL ENCOUNTER (OUTPATIENT)
Dept: CARDIAC REHAB | Age: 83
Setting detail: THERAPIES SERIES
Discharge: HOME | End: 2018-01-26
Payer: MEDICARE

## 2018-01-26 PROCEDURE — G0422 INTENS CARDIAC REHAB W/EXERC: HCPCS

## 2018-01-26 PROCEDURE — G0423 INTENS CARDIAC REHAB NO EXER: HCPCS

## 2018-01-26 PROCEDURE — GZ HC ICR CARDIAC REHAB W/EXERC

## 2018-01-26 NOTE — PROGRESS NOTES
Oliva GASPAR.:  1930    Acct Number: [de-identified]   MRN:  [id number]                             [facility] [employer]ING [employer]OL WORKSHOP             Date: 2018    Session # ________    Jessi Stallings class covered:      () Adding Flavor     () Fast Breakfasts     () Salads and Dressings     () Soups and Sauces     () Simple Sides     (x) Appetizers and Snacks     () Delicious Desserts     () Plant Based Proteins     () Fast Evening Meals     () Weekend Breakfasts     () Efficiency Cooking        Patients were shown how to choose, prep, and cook; substitutions and other options were given. Samples were offered. Recipes were given and questions answered. The patient above was in the [employer] for 55 minutes.       Electronically signed by Zoë Louis on 2018 at 3:21 PM

## 2018-01-29 ENCOUNTER — APPOINTMENT (OUTPATIENT)
Dept: CARDIAC REHAB | Age: 83
End: 2018-01-29
Payer: MEDICARE

## 2018-01-29 ENCOUNTER — HOSPITAL ENCOUNTER (OUTPATIENT)
Dept: CARDIAC REHAB | Age: 83
Setting detail: THERAPIES SERIES
Discharge: HOME | End: 2018-01-29
Payer: MEDICARE

## 2018-01-29 NOTE — PROGRESS NOTES
Hospital Facility-Based Program  Phase 2 Cardiac Rehab Weekly Progress Report      Patient prescribed exercise:  1:00 class. 3 times per week in rehab, 1-4 times per week at home for the amount of sessions/weeks specified by insurance. Current Levels: NuStep:  36 Santiago for 15 minutes, UBE: 24 Santiago for 5 minutes. Progression Discussion: Maintain/Increase Aerobic exercise 25 minutes to work on endurance. Attempt to increase intensity by 5-20% for each modality this week. Try to increase intensities until Kacey Lilly rates the exercises a 13-17 on Brian RPE.

## 2018-01-31 ENCOUNTER — HOSPITAL ENCOUNTER (OUTPATIENT)
Dept: CARDIAC REHAB | Age: 83
Setting detail: THERAPIES SERIES
Discharge: HOME | End: 2018-01-31
Payer: MEDICARE

## 2018-01-31 ENCOUNTER — APPOINTMENT (OUTPATIENT)
Dept: CARDIAC REHAB | Age: 83
End: 2018-01-31
Payer: MEDICARE

## 2018-02-01 ENCOUNTER — HOSPITAL ENCOUNTER (OUTPATIENT)
Dept: NON INVASIVE DIAGNOSTICS | Age: 83
Discharge: HOME | End: 2018-02-01
Payer: MEDICARE

## 2018-02-01 DIAGNOSIS — Z95.2 S/P TAVR (TRANSCATHETER AORTIC VALVE REPLACEMENT): ICD-10-CM

## 2018-02-01 LAB
LV EF: 23 %
LVEF MODALITY: NORMAL

## 2018-02-01 PROCEDURE — 93306 TTE W/DOPPLER COMPLETE: CPT

## 2018-02-02 ENCOUNTER — APPOINTMENT (OUTPATIENT)
Dept: CT IMAGING | Age: 83
End: 2018-02-02
Payer: MEDICARE

## 2018-02-02 ENCOUNTER — OFFICE VISIT (OUTPATIENT)
Dept: CARDIOLOGY CLINIC | Age: 83
End: 2018-02-02
Payer: MEDICARE

## 2018-02-02 ENCOUNTER — TELEPHONE (OUTPATIENT)
Dept: CARDIOLOGY CLINIC | Age: 83
End: 2018-02-02

## 2018-02-02 ENCOUNTER — HOSPITAL ENCOUNTER (EMERGENCY)
Age: 83
Discharge: HOME | End: 2018-02-02
Attending: INTERNAL MEDICINE
Payer: MEDICARE

## 2018-02-02 VITALS
BODY MASS INDEX: 23.11 KG/M2 | RESPIRATION RATE: 18 BRPM | OXYGEN SATURATION: 98 % | TEMPERATURE: 97.6 F | HEART RATE: 83 BPM | WEIGHT: 141 LBS | SYSTOLIC BLOOD PRESSURE: 129 MMHG | DIASTOLIC BLOOD PRESSURE: 67 MMHG

## 2018-02-02 VITALS
WEIGHT: 144 LBS | SYSTOLIC BLOOD PRESSURE: 134 MMHG | BODY MASS INDEX: 23.14 KG/M2 | HEART RATE: 89 BPM | HEIGHT: 66 IN | DIASTOLIC BLOOD PRESSURE: 74 MMHG

## 2018-02-02 DIAGNOSIS — I50.23 NYHA CLASS 3 ACUTE ON CHRONIC SYSTOLIC HEART FAILURE (HCC): ICD-10-CM

## 2018-02-02 DIAGNOSIS — I25.10 CORONARY ARTERY DISEASE INVOLVING NATIVE CORONARY ARTERY OF NATIVE HEART WITHOUT ANGINA PECTORIS: Primary | ICD-10-CM

## 2018-02-02 DIAGNOSIS — R10.9 ABDOMINAL PAIN, UNSPECIFIED ABDOMINAL LOCATION: Primary | ICD-10-CM

## 2018-02-02 DIAGNOSIS — I65.21 STENOSIS OF RIGHT INTERNAL CAROTID ARTERY: ICD-10-CM

## 2018-02-02 DIAGNOSIS — G89.29 ACUTE EXACERBATION OF CHRONIC LOW BACK PAIN: ICD-10-CM

## 2018-02-02 DIAGNOSIS — I50.22 CHRONIC SYSTOLIC CHF (CONGESTIVE HEART FAILURE) (HCC): ICD-10-CM

## 2018-02-02 DIAGNOSIS — Z95.1 HX OF CABG: ICD-10-CM

## 2018-02-02 DIAGNOSIS — M54.50 ACUTE EXACERBATION OF CHRONIC LOW BACK PAIN: ICD-10-CM

## 2018-02-02 DIAGNOSIS — R54 FRAILTY: ICD-10-CM

## 2018-02-02 DIAGNOSIS — S09.90XA CLOSED HEAD INJURY, INITIAL ENCOUNTER: ICD-10-CM

## 2018-02-02 DIAGNOSIS — Z95.2 S/P TAVR (TRANSCATHETER AORTIC VALVE REPLACEMENT): Primary | ICD-10-CM

## 2018-02-02 LAB
ANION GAP SERPL CALCULATED.3IONS-SCNC: 14 MEQ/L (ref 8–16)
ANISOCYTOSIS: ABNORMAL
BASOPHILS # BLD: 0.1 %
BASOPHILS ABSOLUTE: 0 THOU/MM3 (ref 0–0.1)
BUN BLDV-MCNC: 27 MG/DL (ref 7–22)
CALCIUM SERPL-MCNC: 9.6 MG/DL (ref 8.5–10.5)
CHLORIDE BLD-SCNC: 100 MEQ/L (ref 98–111)
CO2: 27 MEQ/L (ref 23–33)
CREAT SERPL-MCNC: 0.7 MG/DL (ref 0.4–1.2)
EKG ATRIAL RATE: 63 BPM
EKG Q-T INTERVAL: 376 MS
EKG QRS DURATION: 96 MS
EKG QTC CALCULATION (BAZETT): 452 MS
EKG R AXIS: 3 DEGREES
EKG T AXIS: 122 DEGREES
EKG VENTRICULAR RATE: 87 BPM
EOSINOPHIL # BLD: 0.3 %
EOSINOPHILS ABSOLUTE: 0 THOU/MM3 (ref 0–0.4)
GFR SERPL CREATININE-BSD FRML MDRD: > 90 ML/MIN/1.73M2
GLUCOSE BLD-MCNC: 145 MG/DL (ref 70–108)
HCT VFR BLD CALC: 33.1 % (ref 42–52)
HEMOGLOBIN: 10.6 GM/DL (ref 14–18)
HYPOCHROMIA: ABNORMAL
INR BLD: 4.66 (ref 0.85–1.13)
LYMPHOCYTES # BLD: 7.3 %
LYMPHOCYTES ABSOLUTE: 0.6 THOU/MM3 (ref 1–4.8)
MCH RBC QN AUTO: 25 PG (ref 27–31)
MCHC RBC AUTO-ENTMCNC: 32 GM/DL (ref 33–37)
MCV RBC AUTO: 78.3 FL (ref 80–94)
MICROCYTES: ABNORMAL
MONOCYTES # BLD: 7.6 %
MONOCYTES ABSOLUTE: 0.6 THOU/MM3 (ref 0.4–1.3)
NUCLEATED RED BLOOD CELLS: 0 /100 WBC
OSMOLALITY CALCULATION: 289 MOSMOL/KG (ref 275–300)
PDW BLD-RTO: 17 % (ref 11.5–14.5)
PLATELET # BLD: 173 THOU/MM3 (ref 130–400)
PMV BLD AUTO: 9.5 FL (ref 7.4–10.4)
POTASSIUM SERPL-SCNC: 4.3 MEQ/L (ref 3.5–5.2)
PRO-BNP: 6405 PG/ML (ref 0–1800)
RBC # BLD: 4.23 MILL/MM3 (ref 4.7–6.1)
SEG NEUTROPHILS: 84.7 %
SEGMENTED NEUTROPHILS ABSOLUTE COUNT: 7 THOU/MM3 (ref 1.8–7.7)
SODIUM BLD-SCNC: 141 MEQ/L (ref 135–145)
WBC # BLD: 8.3 THOU/MM3 (ref 4.8–10.8)

## 2018-02-02 PROCEDURE — 70450 CT HEAD/BRAIN W/O DYE: CPT

## 2018-02-02 PROCEDURE — G8482 FLU IMMUNIZE ORDER/ADMIN: HCPCS | Performed by: INTERNAL MEDICINE

## 2018-02-02 PROCEDURE — 85025 COMPLETE CBC W/AUTO DIFF WBC: CPT

## 2018-02-02 PROCEDURE — 4040F PNEUMOC VAC/ADMIN/RCVD: CPT | Performed by: INTERNAL MEDICINE

## 2018-02-02 PROCEDURE — G8598 ASA/ANTIPLAT THER USED: HCPCS | Performed by: INTERNAL MEDICINE

## 2018-02-02 PROCEDURE — 83880 ASSAY OF NATRIURETIC PEPTIDE: CPT

## 2018-02-02 PROCEDURE — G8420 CALC BMI NORM PARAMETERS: HCPCS | Performed by: INTERNAL MEDICINE

## 2018-02-02 PROCEDURE — 80048 BASIC METABOLIC PNL TOTAL CA: CPT

## 2018-02-02 PROCEDURE — 1036F TOBACCO NON-USER: CPT | Performed by: INTERNAL MEDICINE

## 2018-02-02 PROCEDURE — 76376 3D RENDER W/INTRP POSTPROCES: CPT

## 2018-02-02 PROCEDURE — 93005 ELECTROCARDIOGRAM TRACING: CPT

## 2018-02-02 PROCEDURE — 74176 CT ABD & PELVIS W/O CONTRAST: CPT

## 2018-02-02 PROCEDURE — G8427 DOCREV CUR MEDS BY ELIG CLIN: HCPCS | Performed by: INTERNAL MEDICINE

## 2018-02-02 PROCEDURE — 1123F ACP DISCUSS/DSCN MKR DOCD: CPT | Performed by: INTERNAL MEDICINE

## 2018-02-02 PROCEDURE — 85610 PROTHROMBIN TIME: CPT

## 2018-02-02 PROCEDURE — 99214 OFFICE O/P EST MOD 30 MIN: CPT | Performed by: INTERNAL MEDICINE

## 2018-02-02 PROCEDURE — 99284 EMERGENCY DEPT VISIT MOD MDM: CPT

## 2018-02-02 PROCEDURE — 36415 COLL VENOUS BLD VENIPUNCTURE: CPT

## 2018-02-02 PROCEDURE — 72125 CT NECK SPINE W/O DYE: CPT

## 2018-02-02 ASSESSMENT — PAIN SCALES - GENERAL
PAINLEVEL_OUTOF10: 8
PAINLEVEL_OUTOF10: 8

## 2018-02-02 ASSESSMENT — PAIN DESCRIPTION - FREQUENCY
FREQUENCY: CONTINUOUS
FREQUENCY: CONTINUOUS

## 2018-02-02 ASSESSMENT — ENCOUNTER SYMPTOMS
NAUSEA: 1
VOMITING: 0
DIARRHEA: 0
CONSTIPATION: 0
RHINORRHEA: 0
BLOOD IN STOOL: 0
WHEEZING: 0
SHORTNESS OF BREATH: 0
COUGH: 0
ABDOMINAL PAIN: 1
SORE THROAT: 0
CHEST TIGHTNESS: 0
BACK PAIN: 1

## 2018-02-02 ASSESSMENT — PAIN DESCRIPTION - ORIENTATION: ORIENTATION: MID

## 2018-02-02 ASSESSMENT — PAIN DESCRIPTION - LOCATION: LOCATION: ABDOMEN

## 2018-02-02 ASSESSMENT — PAIN DESCRIPTION - PAIN TYPE: TYPE: ACUTE PAIN

## 2018-02-02 NOTE — ED PROVIDER NOTES
Negative for joint swelling, neck pain and neck stiffness. Skin: Negative for pallor and rash. Neurological: Negative for dizziness, syncope, weakness, light-headedness, numbness and headaches. Hematological: Negative for adenopathy. Psychiatric/Behavioral: Negative for confusion and suicidal ideas. The patient is not nervous/anxious. PAST MEDICAL HISTORY    has a past medical history of CAD (coronary artery disease); CHF (congestive heart failure) ([address] GERD (gastroesophageal reflux disease); History of CVA (cerebrovascular accident) - noted per MRI-brain on 9/18/2017 which demonstrates old strokes; Hypertension; Myocardial infarct; Occasional tremors; Pulmonary embolism ([address] PVC's (premature ventricular contractions); Retinal artery thrombosis; S/P TAVR (transcatheter aortic valve replacement) Dr. Jelani Rivero 12/14/17; Thrombophlebitis; and Weakness. SURGICAL HISTORY      has a past surgical history that includes Coronary artery bypass graft; Appendectomy; hernia repair; transthoracic echocardiogram (01/10/2012); Cardiac catheterization (01/16/2012); Cardiac catheterization; Diagnostic Cardiac Cath Lab Procedure; and Aortic valve surgery (12/14/2017). CURRENT MEDICATIONS       Previous Medications    ACETAMINOPHEN (TYLENOL) 500 MG TABLET    Take 1,000 mg by mouth every 6 hours as needed. Don't take more than 3,000 mg per day. Indications: Pain    ATORVASTATIN (LIPITOR) 40 MG TABLET    Take 40 mg by mouth every 48 hours.  Indications: Blood Cholesterol Abnormal    BISMUTH SUBSALICYLATE (PEPTO BISMOL) 262 MG/15ML SUSPENSION    Take 15 mLs by mouth every 6 hours as needed for Indigestion    BLOOD PRESSURE KIT    1 Units by Does not apply route daily    CLOPIDOGREL (PLAVIX) 75 MG TABLET    Take 1 tablet by mouth daily    FUROSEMIDE (LASIX) 40 MG TABLET    Take 40 mg by mouth daily    GUAIFENESIN (ROBITUSSIN) 100 MG/5ML SYRUP    Take 200 mg by mouth 3 times daily as needed for Cough    LANSOPRAZOLE (PREVACID) 15 MG CAPSULE    Take 15 mg by mouth daily. Indications: Gastroesophageal Reflux Disease    METOPROLOL SUCCINATE (TOPROL XL) 25 MG EXTENDED RELEASE TABLET    Take 25 mg by mouth daily    POTASSIUM CHLORIDE (KLOR-CON) 20 MEQ PACKET    Take 10 mEq by mouth daily    TAMSULOSIN (FLOMAX) 0.4 MG CAPSULE    Take 0.4 mg by mouth daily    WARFARIN (COUMADIN) 5 MG TABLET    Take 1 tablet by mouth daily       ALLERGIES     is allergic to iodine. FAMILY HISTORY     indicated that his mother is . He indicated that his father is . He indicated that both of his sisters are . He indicated that his maternal grandmother is . He indicated that his maternal grandfather is . He indicated that his paternal grandmother is . He indicated that his paternal grandfather is . family history includes Heart Disease in his father, mother, sister, and sister. SOCIAL HISTORY      reports that he has never smoked. He has never used smokeless tobacco. He reports that he does not drink alcohol or use drugs. PHYSICAL EXAM     INITIAL VITALS:  weight is 141 lb (64 kg). His oral temperature is 97.6 °F (36.4 °C). His blood pressure is 129/67 and his pulse is 83. His respiration is 18 and oxygen saturation is 98%. Physical Exam   Constitutional: He is oriented to person, place, and time. He appears well-developed and well-nourished. He is active and cooperative. Non-toxic appearance. HENT:   Head: Normocephalic and atraumatic. Head is without contusion. Right Ear: Hearing, tympanic membrane, external ear and ear canal normal. No drainage. Tympanic membrane is not injected, not erythematous and not bulging. No middle ear effusion. No hemotympanum. Left Ear: Hearing, tympanic membrane, external ear and ear canal normal. No drainage. Tympanic membrane is not injected, not erythematous and not bulging. No middle ear effusion. No hemotympanum.    Mouth/Throat: Oropharynx is tenderness. He exhibits normal range of motion, no swelling and no deformity. Right lower leg: He exhibits edema (trace). He exhibits no tenderness. Left lower leg: He exhibits edema (trace). He exhibits no tenderness. Neurological: He is alert and oriented to person, place, and time. He has normal strength. He is not disoriented. He displays no atrophy and no tremor. No sensory deficit. GCS eye subscore is 4. GCS verbal subscore is 5. GCS motor subscore is 6. Patient is neurologically intact. Skin: Skin is warm and dry. No abrasion, no bruising, no ecchymosis and no rash noted. He is not diaphoretic. No erythema. Psychiatric: He has a normal mood and affect. His speech is normal and behavior is normal.   Nursing note and vitals reviewed. DIAGNOSTIC RESULTS     EKG: All EKG's are interpreted by the Emergency Department Physician who either signs or Co-signs this chart in the absence of a cardiologist.  EKG interpreted by Codey Patel MD:    Vent. Rate: 87 bpm  WI interval: * ms  QRS duration: 96 ms  QTc: 452 ms  P-R-T axes: *, 3, 122  Atrial fibrillation with premature ventricular or aberrantly conducted complexes  No STEMI      RADIOLOGY: non-plain film images(s) such as CT, Ultrasound and MRI are read by the radiologist.    CT Lumbar Reconstruction WO Post Process   Final Result   1. No acute fracture or spondylolisthesis of the lumbar spine. 2. Stable chronic fracture deformities of T12 and L2.   3. Extensive multilevel degenerative disc disease and posterior facet arthropathy as detailed above. Final report electronically signed by Dr. Lolis James on 2/2/2018 8:21 PM      CT ABDOMEN PELVIS WO CONTRAST Additional Contrast? None   Final Result   1. No acute intra-abdominal or intrapelvic findings. 2. Uncomplicated sigmoid colon diverticulosis. 3. Chronic stable fractures of T12 and L2.       Final report electronically signed by Dr. Lolis James on 2/2/2018 8:13 PM      CT

## 2018-02-04 ASSESSMENT — ENCOUNTER SYMPTOMS
EYE PAIN: 0
CONSTIPATION: 0
APNEA: 0
SORE THROAT: 0
NAUSEA: 0
EYE ITCHING: 0
BLOOD IN STOOL: 0
BACK PAIN: 0
EYE REDNESS: 0
COUGH: 0
CHEST TIGHTNESS: 0
SINUS PRESSURE: 0
ABDOMINAL DISTENTION: 0
SHORTNESS OF BREATH: 0
DIARRHEA: 0
EYE DISCHARGE: 0
ABDOMINAL PAIN: 0

## 2018-02-04 NOTE — PROGRESS NOTES
Component Value Date    TSH 5.790 11/19/2017         Testing Reviewed:      I have individually reviewed the below cardiac tests    ECHO:   Results for orders placed during the hospital encounter of 02/01/18   ECHO Complete 2D W Doppler W Color    Narrative Transthoracic Echocardiography Report (TTE)     Demographics      Patient Name   Manju Barry  Gender              Male                  E      MR #           [id number]       Race                                                     Ethnicity      Account #      [de-identified]       Room Number      Accession      [id number]       Date of Study       02/01/2018   Number      Date of Birth  09/18/1930      Referring Physician Ardeth Bryant MD Ulysses Lapping MD      Age            80 year(s)      Arvin Mclain MD                                  Physician     Procedure    Type of Study      TTE procedure:ECHOCARDIOGRAM COMPLETE 2D W DOPPLER W COLOR. Procedure Date  Date: 02/01/2018 Start: 03:49 PM    Study Location: Echo Lab  Technical Quality: Adequate visualization    Indications:Post TAVR. Additional Medical History:Congestive heart failure, coronary artery  disease, hypertension, PVC's, hyperlipidemia    Patient Status: Routine    Height: 65 inches Weight: 144 pounds BSA: 1.72 m^2 BMI: 23.96 kg/m^2    BP: 113/63 mmHg    Allergies    - Iodine.      - See Epic. Conclusions      Summary   Severely reduced systolic function. Ejection fraction was estimated at 20-25%. There was severe global hypokinesis and wall thickness was within normal   limits. Features were consistent with a pseudonormal left ventricular filling   pattern, with concomitant abnormal relaxation and increased filling   pressure (grade 2 diastolic dysfunction).    Left atrial size was severely dilated. The right ventricular size was normal with severely reduced systolic   function and wall thickness. S/p transcatheter aortic valve prosthesis implantation. Transaortic velocity was within the normal range with no evidence of   aortic stenosis (mean gradient = 5 mmHg). There was no evidence of central aortic regurgitation. There is mild perivalvular aortic regurgitation. The mitral valve structure demonstrated thickened leaflets, with normal   leaflet separation but reduced coaptation. There was moderate to severe mitral regurgitation, appears to be central   and related to mal-coaptation. There was mild tricuspid regurgitation. Assuming an RAP = 10 mmHg, the estimated RVSP = 49 mmHg. Mild-moderate pulmonic regurgitation. Signature      ----------------------------------------------------------------   Electronically signed by Tim English MD (Interpreting   physician) on 02/02/2018 at 01:09 PM   ----------------------------------------------------------------      Findings      Mitral Valve   The mitral valve structure demonstrated thickened leaflets, with normal   leaflet separation but reduced coaptation. DOPPLER: The transmitral   velocity was within the normal range with no evidence for mitral stenosis. There was moderate to severe mitral regurgitation, appears to be central   and related to mal-coaptation. Aortic Valve   S/p transcatheter aortic valve prosthesis implantation. DOPPLER:   Transaortic velocity was within the normal range with no evidence of   aortic stenosis (mean gradient = 5 mmHg). There was no evidence of central   aortic regurgitation. There is mild perivalvular regurgitation. Tricuspid Valve   The tricuspid valve structure was normal with normal leaflet separation. DOPPLER: There was no evidence of tricuspid stenosis. There was mild   tricuspid regurgitation. Assuming an RAP = 10 mmHg, the estimated RVSP =   49 mmHg.

## 2018-02-05 ENCOUNTER — HOSPITAL ENCOUNTER (OUTPATIENT)
Dept: CARDIAC REHAB | Age: 83
Setting detail: THERAPIES SERIES
Discharge: HOME | End: 2018-02-05
Payer: MEDICARE

## 2018-02-07 ENCOUNTER — HOSPITAL ENCOUNTER (OUTPATIENT)
Dept: CARDIAC REHAB | Age: 83
Setting detail: THERAPIES SERIES
Discharge: HOME | End: 2018-02-07
Payer: MEDICARE

## 2018-02-07 ENCOUNTER — HOSPITAL ENCOUNTER (OUTPATIENT)
Dept: CT IMAGING | Age: 83
Discharge: HOME | End: 2018-02-07
Payer: MEDICARE

## 2018-02-07 DIAGNOSIS — M54.6 PAIN IN THORACIC SPINE: ICD-10-CM

## 2018-02-07 PROCEDURE — 72128 CT CHEST SPINE W/O DYE: CPT

## 2018-02-08 NOTE — TELEPHONE ENCOUNTER
Called and spoke to [place] to see if she has talked to her father and decided. She states that he had to get an x-ray and that he will find out the results tomorrow afternoon and is wanting to wait and see if his back is broken or not.

## 2018-02-09 ENCOUNTER — HOSPITAL ENCOUNTER (OUTPATIENT)
Dept: CARDIAC REHAB | Age: 83
Setting detail: THERAPIES SERIES
Discharge: HOME | End: 2018-02-09
Payer: MEDICARE

## 2018-02-12 ENCOUNTER — HOSPITAL ENCOUNTER (OUTPATIENT)
Dept: CARDIAC REHAB | Age: 83
Setting detail: THERAPIES SERIES
Discharge: HOME | End: 2018-02-12
Payer: MEDICARE

## 2018-02-12 NOTE — PLAN OF CARE
[address] [place] [address] Facility-Based Program  Individualized Cardiac Treatment Plan    Patient Name:  Simone Libman  :  1930  Age:  80 y.o. MRN:  [id number]  Diagnosis: TAVR  Date of Event: 17   Physician:  Lyndsay Doss Office Visit:  18  Date Entered Program: 18  Risk Stratifications: [] Low [] Intermediate [x] High  Allergies: Allergies   Allergen Reactions    Iodine Hives     18 - Pt d/c Rehab due to back surgery. Individual Cardiac Treatment Plan -EXERCISE  INITIAL 30 DAY 61 DAY 90 DAY FINAL DAY   EXERCISE  ASSESSMENT/PLAN EXERCISE  REASSESSMENT EXERCISE   REASSESSMENT EXERCISE   REASSESSMENT EXERCISE  DISCHARGE/FOLLOW-UP   Date: 18 Date: 18 Date: Date: Date:   Session #1 Session # 4 Session # ** Session # ** Session # **  Last session completed on **   Stages of Change Stages of Change Stages of Change Stages of Change Stages of Change   [x] Pre Contemplation  [] Contemplation  [] Preparation  [] Action  [] Maintenance  [] Relapse [] Pre Contemplation  [] Contemplation  [] Preparation  [] Action  [] Maintenance  [] Relapse [] Pre Contemplation  [] Contemplation  [] Preparation  [] Action  [] Maintenance  [] Relapse [] Pre Contemplation  [] Contemplation  [] Preparation  [] Action  [] Maintenance  [] Relapse [] Pre Contemplation  [] Contemplation  [] Preparation  [] Action  [] Maintenance  [] Relapse   EXERCISE ASSESSMENT EXERCISE ASSESSMENT EXERCISE ASSESSMENT EXERCISE ASSESSMENT EXERCISE ASSESSMENT   6 Min Walk Test  Distance walked:   0.14 miles  739.2 ft.  2.1 METs  Max HR:124 BPM      RPE:  10    THR:  103-113  Rhythm:  At- fib with occ PVC's    6 Min Walk Test  Distance walked:   ** miles  ** ft  ** METs  Max HR:** BPM      RPE:  **  %Change ft= **    Rhythm:  **   DASI: 4.4 METS DASI: ** METS DASI: ** METS DASI: ** METS DASI: ** METS   Return to Work  [employer] on returning to work?    [x] Yes endurance so he/she is able to return to ** Return to ADL or Hobbies:  Beena Leal would like to improve strength and endurance so he/she is able to return to **    *MET level required for above goal:  5-6 METs MET level Achieved:  **METS MET level Achieved:  **METS MET level Achieved:  **METS MET level Achieved:  **METS     Individual Cardiac Treatment Plan - Nutrition  NUTRITION  ASSESSMENT/PLAN NUTRITION  REASSESSMENT NUTRITION   REASSESSMENT NUTRITION   REASSESSMENT NUTRITION  DISCHARGE/FOLLOW-UP   Stages of Change Stages of Change Stages of Change Stages of Change Stages of Change   [] Pre Contemplation  [x] Contemplation  [] Preparation  [] Action  [] Maintenance  [] Relapse [] Pre Contemplation  [] Contemplation  [] Preparation  [] Action  [] Maintenance  [] Relapse [] Pre Contemplation  [] Contemplation  [] Preparation  [] Action  [] Maintenance  [] Relapse [] Pre Contemplation  [] Contemplation  [] Preparation  [] Action  [] Maintenance  [] Relapse [] Pre Contemplation  [] Contemplation  [] Preparation  [] Action  [] Maintenance  [] Relapse   NUTRITION ASSESSMENT NUTRITION ASSESSMENT NUTRITION ASSESSMENT NUTRITION ASSESSMENT NUTRITION ASSESSMENT   Weight Management  Weight: 142.4        Height: 66   BMI: 23  Weight Management  Weight: **                  Weight Management  Weight: **                  Weight Management  Weight: ** Weight Management  Weight: **                    BMI: **   Eating Plan  Current eating habits: low fat Eating Plan  Changes: Eating Plan  Changes: Eating Plan  Changes: Eating Plan Improvements:   Alcohol Use  [x] none          [] daily  [] weekly      [] special          Diet Assessment Tool:  RATE YOUR PLATE  *Given to patient to complete and return.  Diet Assessment Tool:    Score: **/69       Diet Assessment Tool: RATE YOUR PLATE  Score: **/02   NUTRITION PLAN NUTRITION PLAN NUTRITION PLAN NUTRITION PLAN NUTRITION PLAN   *Interventions* *Interventions* *Interventions* necessary for continuous cardiac monitoring surveillance  of patient's cardiac activity  [x] Initiate continuous telemerty monitoring and notify me with any concerns  [] Other    Electronically signed by Bradford Arroyo on 1/18/2018 at 3:11 PM    Physician Response    [x] Cardiac rehab is reasonably and medically necessary for continuous cardiac monitoring surveillance  of patient's cardiac activity  [x] Continue continuous telemerty monitoring and notify me with any concerns  [] Other    Electronically signed by Leslie Vasquez on 2/12/2018 at 1:18 PM   Physician Response      [x] Cardiac rehab is reasonably and medically necessary for continuous cardiac monitoring surveillance  of patient's cardiac activity  [x] Continue continuous telemerty monitoring and notify me with any concerns   [] Other     Physician Response    [x] Cardiac rehab is reasonably and medically necessary for continuous cardiac monitoring surveillance  of patient's cardiac activity  [x] Continue continuous telemerty monitoring and notify me with any concerns   [] Other        Cosigned by: Janeth Bynum DO at 1/22/2018  5:58 PM   Revision History      Date/Time User Provider Type Action   1/22/2018  5:58 PM Janeth Bynum DO Physician Cosign   1/18/2018  3:11 PM Bradford Arroyo Exercise Physiologist Sign

## 2018-02-14 NOTE — TELEPHONE ENCOUNTER
Spoke with [facility] states pt does not want to wear the life vest until he gets his back fixed  Wondering if Dr. Stanley Thacker will clear pt for vertebral augmentation and to hold hold his plavix and coumadin for 5 days   Please advise

## 2018-05-23 ENCOUNTER — OFFICE VISIT (OUTPATIENT)
Dept: CARDIOLOGY CLINIC | Age: 83
End: 2018-05-23
Payer: MEDICARE

## 2018-05-23 VITALS
WEIGHT: 130.4 LBS | HEART RATE: 59 BPM | HEIGHT: 71 IN | DIASTOLIC BLOOD PRESSURE: 81 MMHG | BODY MASS INDEX: 18.26 KG/M2 | SYSTOLIC BLOOD PRESSURE: 149 MMHG

## 2018-05-23 DIAGNOSIS — I50.22 CHRONIC SYSTOLIC CHF (CONGESTIVE HEART FAILURE) (HCC): ICD-10-CM

## 2018-05-23 DIAGNOSIS — Z95.2 S/P TAVR (TRANSCATHETER AORTIC VALVE REPLACEMENT): Primary | ICD-10-CM

## 2018-05-23 DIAGNOSIS — Z95.1 HX OF CABG: ICD-10-CM

## 2018-05-23 PROCEDURE — 1123F ACP DISCUSS/DSCN MKR DOCD: CPT | Performed by: INTERNAL MEDICINE

## 2018-05-23 PROCEDURE — G8427 DOCREV CUR MEDS BY ELIG CLIN: HCPCS | Performed by: INTERNAL MEDICINE

## 2018-05-23 PROCEDURE — 4040F PNEUMOC VAC/ADMIN/RCVD: CPT | Performed by: INTERNAL MEDICINE

## 2018-05-23 PROCEDURE — 1036F TOBACCO NON-USER: CPT | Performed by: INTERNAL MEDICINE

## 2018-05-23 PROCEDURE — 99213 OFFICE O/P EST LOW 20 MIN: CPT | Performed by: INTERNAL MEDICINE

## 2018-05-23 PROCEDURE — G8418 CALC BMI BLW LOW PARAM F/U: HCPCS | Performed by: INTERNAL MEDICINE

## 2018-05-23 PROCEDURE — G8598 ASA/ANTIPLAT THER USED: HCPCS | Performed by: INTERNAL MEDICINE

## 2018-05-23 ASSESSMENT — ENCOUNTER SYMPTOMS
APNEA: 0
NAUSEA: 0
CONSTIPATION: 0
DIARRHEA: 0
EYE ITCHING: 0
BACK PAIN: 0
SINUS PRESSURE: 0
EYE DISCHARGE: 0
SORE THROAT: 0
BLOOD IN STOOL: 0
CHEST TIGHTNESS: 0
EYE PAIN: 0
SHORTNESS OF BREATH: 0
ABDOMINAL PAIN: 0
ABDOMINAL DISTENTION: 0
COUGH: 0
EYE REDNESS: 0

## 2018-05-24 ENCOUNTER — TELEPHONE (OUTPATIENT)
Dept: CARDIOLOGY CLINIC | Age: 83
End: 2018-05-24

## 2018-11-19 PROBLEM — S09.90XA CHI (CLOSED HEAD INJURY), INITIAL ENCOUNTER: Status: ACTIVE | Noted: 2018-01-01

## 2018-11-19 PROBLEM — S50.311A ABRASION OF RIGHT ELBOW: Status: ACTIVE | Noted: 2018-01-01
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
## 2018-11-19 PROBLEM — S00.03XA TRAUMATIC HEMATOMA OF SCALP: Status: ACTIVE | Noted: 2018-01-01
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
## 2018-11-19 NOTE — ED PROVIDER NOTES
metoprolol succinate (TOPROL XL) 25 MG extended release tablet Take 25 mg by mouth daily      warfarin (COUMADIN) 5 MG tablet Take 1 tablet by mouth daily  Qty: 30 tablet, Refills: 3      tamsulosin (FLOMAX) 0.4 MG capsule Take 0.4 mg by mouth daily      furosemide (LASIX) 40 MG tablet Take 40 mg by mouth daily      bismuth subsalicylate (PEPTO BISMOL) 262 MG/15ML suspension Take 15 mLs by mouth every 6 hours as needed for Indigestion      atorvastatin (LIPITOR) 40 MG tablet Take 40 mg by mouth every 48 hours. Indications: Blood Cholesterol Abnormal  Refills: 2      acetaminophen (TYLENOL) 500 MG tablet Take 1,000 mg by mouth every 6 hours as needed. Don't take more than 3,000 mg per day. Indications: Pain      lansoprazole (PREVACID) 15 MG capsule Take 15 mg by mouth daily. Indications: Gastroesophageal Reflux Disease      Blood Pressure KIT 1 Units by Does not apply route daily  Qty: 1 kit, Refills: 0             ALLERGIES     is allergic to iodine. FAMILY HISTORY     indicated that his mother is . He indicated that his father is . He indicated that both of his sisters are . He indicated that his maternal grandmother is . He indicated that his maternal grandfather is . He indicated that his paternal grandmother is . He indicated that his paternal grandfather is . family history includes Heart Disease in his father, mother, sister, and sister. SOCIAL HISTORY      reports that he has never smoked. He has never used smokeless tobacco. He reports that he does not drink alcohol or use drugs. PHYSICAL EXAM     INITIAL VITALS:  height is 5' 8\" (1.727 m) and weight is 118 lb 9.6 oz (53.8 kg). His oral temperature is 97.2 °F (36.2 °C). His blood pressure is 120/78 and his pulse is 91. His respiration is 18 and oxygen saturation is 98%. Physical Exam   Constitutional: He is oriented to person, place, and time.  He appears well-developed and

## 2018-11-20 PROBLEM — E43 SEVERE MALNUTRITION (HCC): Chronic | Status: ACTIVE | Noted: 2018-01-01

## 2018-11-20 NOTE — PROGRESS NOTES
[facility]  Daily Progress Note      Pt Name: Clayton Herndon  Medical Record Number: [id number]  Date of Birth 9/18/1930   Today's Date: 11/20/2018    HD: # 1    CC:  \"Still a little weak\"    ASSESSMENT  1. Active Hospital Problems    Diagnosis Date Noted    Severe malnutrition ([facility] [address] [E43] 11/20/2018    Fall at home, initial encounter [W87. Claudio Garcia, R25.677] 11/19/2018    CHI (closed head injury), initial encounter [S09.90XA] 11/19/2018    Abrasion of right elbow [S50.311A] 11/19/2018    Traumatic hematoma of scalp [S00.03XA] 11/19/2018         PLAN  Patient admitted under Trauma Services for observation      CHI              -Limited stimulation per brain injury guidelines               -Routine NV checks              -Pain control               -SLP cog eval pending     Hematoma              -Apply ice     Abrasions and skin tears              -Routine wound care              -Mepilex border to skin tears     Pain Management              -Tylenol, add Norco and Morphine if needed      Prophylaxis: SCD's, Incentive Spirometry, Colace, Pepcid, Zofran     General Diet  Regular Neurovascular Checks  Repeat Labs Tomorrow AM  PT/OT/SLP Eval and Treat  Activity as tolerated, pt up with assistance  Resume home meds  Consult pharmacy to dose Coumadin     Planned Discharge pending clinical course   - PM&R consulted for possible IPR vs TCU   - Family interested in home with home health          SUBJECTIVE  Pt doing well. Adequate analgesia. he is tolerating a general diet. Pt tolerating increased activity with therapies however does note that he is still slightly weak. Pt is passing flatus and has not had a bowel movement. Had diarrhea prior to arrival to hospital, will refrain from adding any more laxatives at this time. Pt denies any nausea of vomiting.   Discussed with daughter at bedside plan for discharge and patient as well as family are leaning towards home with home health as they have had this in the past.  Anticipate discharge home in next 24 hours. Wt Readings from Last 3 Encounters:   11/19/18 118 lb 9.6 oz (53.8 kg)   05/23/18 130 lb 6.4 oz (59.1 kg)   02/02/18 141 lb (64 kg)     Temp Readings from Last 3 Encounters:   11/20/18 98.6 °F (37 °C) (Oral)   02/02/18 97.6 °F (36.4 °C) (Oral)   12/16/17 97.3 °F (36.3 °C) (Oral)     BP Readings from Last 3 Encounters:   11/20/18 127/78   05/23/18 (!) 149/81   02/02/18 129/67     Pulse Readings from Last 3 Encounters:   11/20/18 79   05/23/18 59   02/02/18 83       24 HR INTAKE/OUTPUT : No intake or output data in the 24 hours ending 11/20/18 1345  DIET GENERAL;  Dietary Nutrition Supplements: Standard High Calorie Oral Supplement  BM = 0      OBJECTIVE  CURRENT VITALS /78   Pulse 79   Temp 98.6 °F (37 °C) (Oral)   Resp 16   Ht 5' 8\" (1.727 m)   Wt 118 lb 9.6 oz (53.8 kg)   SpO2 98%   BMI 18.03 kg/m²        GENERAL: alert, cooperative, no distress  NEURO: Awake, alert and oriented. PERRL. Conversing fluently and appropriately  LUNGS: clear to auscultation bilaterally- no wheezes, rales or rhonchi, normal air movement, no respiratory distress  HEART: normal rate, normal S1 and S2, no gallops, intact distal pulses and no carotid bruits  ABDOMEN: soft, non-tender, non-distended, normal bowel sounds, no masses or organomegaly  WOUNDS: Skin tear to right elbow  And right hand with Mepilex border in place. EXTREMITY: no cyanosis, no clubbing and mild BLE edema.        LABS  CBC :   Recent Labs      11/19/18 1728  11/20/18   0628   WBC  8.7  9.4   HGB  13.7*  13.3*   HCT  42.6  38.1*   MCV  93.2  86.6   PLT  186  141     BMP:   Recent Labs      11/19/18 1728  11/20/18   0628   NA  138  138   K  4.0  4.8   CL  99  102   CO2  25  26   BUN  38*  32*   CREATININE  0.9  0.7     COAGS:   Recent Labs      11/19/18   1728  11/20/18   1249   PROT  6.8   --    INR  3.13*  2.73*     Pancreas/HFP:  No results for

## 2018-11-20 NOTE — ED NOTES
Patient resting in bed, patient and family updated on POC. Patinet alert and oriented and denies any complaints at this time. Vitals obtained. Will continue to monitor.       Nicolás Figueroa RN  11/19/18 5958

## 2018-11-20 NOTE — PLAN OF CARE
Problem: Nutrition  Goal: Optimal nutrition therapy  Outcome: Ongoing  . Nutrition Problem: Severe malnutrition  Intervention: Food and/or Nutrient Delivery: Continue current diet, Start ONS  Nutritional Goals: Pt. will consume 75% or more of meals during LOS.

## 2018-11-20 NOTE — PROGRESS NOTES
[facility]  INPATIENT PHYSICAL THERAPY  EVALUATION  [facility] MED SURG 8A - 8A-18/018-A    Time In: 8707  Time Out: 2306  Timed Code Treatment Minutes: 8 Minutes  Minutes: 23          Date: 2018  Patient Name: Candace Robles,  Gender:  male        MRN: [id number]  : 1930  (80 y.o.)      Referring Practitioner: Smitha Cali PA-C  Diagnosis: Fall at home, initial encounter  Additional Pertinent Hx: Pt admitted with above. Patient was getting into his house on the front step and he stumbled on himself and fell backward. He hit his head and he has a small contusion and scalp hematoma on the right parietal area. He also bruised and has skin tears on his right elbow. He was having diarrhea the day prior to having fallen. Pt has lost 60 lbs over the past 14 months, per pt in the ER note. Past Medical History:   Diagnosis Date    CAD (coronary artery disease)     CHF (congestive heart failure) (HCC)     GERD (gastroesophageal reflux disease)     History of CVA (cerebrovascular accident) - noted per MRI-brain on 2017 which demonstrates old strokes     Hypertension     Myocardial infarct ([address]     Occasional tremors     Pulmonary embolism ([address]     PVC's (premature ventricular contractions)     Retinal artery thrombosis     S/P TAVR (transcatheter aortic valve replacement) Dr. Concepción Yoder 17    Thrombophlebitis     Weakness      Past Surgical History:   Procedure Laterality Date    AORTIC VALVE SURGERY  2017    APPENDECTOMY      CARDIAC CATHETERIZATION  2012    Status post successful PCI of SVG to OM2 branch with stent in the proximal area. Stent in the  mid area and balloon angioplasty of the distal anastomotic site.  CARDIAC CATHETERIZATION      CORONARY ARTERY BYPASS GRAFT      X2 1982    DIAGNOSTIC CARDIAC CATH LAB PROCEDURE      HERNIA REPAIR      TRANSTHORACIC ECHOCARDIOGRAM  01/10/2012    LV was mildly dilated.  Systolic impaired, limited or restricted  Mobility: Walking and Moving Around Goal Status ():  At least 40 percent but less than 60 percent impaired, limited or restricted       AM-PAC Inpatient Mobility without Stair Climbing Raw Score : 16  AM-PAC Inpatient without Stair Climbing T-Scale Score : 45.54  Mobility Inpatient CMS 0-100% Score: 40.64  Mobility Inpatient without Stair CMS G-Code Modifier : CK

## 2018-11-20 NOTE — PROGRESS NOTES
Pharmacy Renal Adjustment    Jeromy Warner is a 80 y.o. male. Pharmacy renally adjust the following medications per P&T approved policy: Pepcid    Recent Labs      11/19/18   1728   BUN  38*       Recent Labs      11/19/18   1728   CREATININE  0.9       Estimated Creatinine Clearance: 43 mL/min (based on SCr of 0.9 mg/dL).     Height:   Ht Readings from Last 1 Encounters:   11/19/18 5' 8\" (1.727 m)     Weight:  Wt Readings from Last 1 Encounters:   11/19/18 118 lb 9.6 oz (53.8 kg)       Plan: Adjustments based on renal function:          Decrease Pepcid to 20 mg daily                    Thank you,  Shree Soriano, GaetanoD

## 2018-11-20 NOTE — PROGRESS NOTES
[facility]  SPEECH THERAPY  [facility] MED SURG 8A  Speech - Language - Cognitive Evaluation    SLP Individual Minutes  Time In: 3720  Time Out: 5826  Minutes: 29  Timed Code Treatment Minutes: 0 Minutes       Date: 2018  Patient Name: Jeromy Warner      MRN: [id number]    : 1930  (80 y.o.)  Gender: male   Referring Physician:  Ned Ospina PA-C  Diagnosis: Fall at home, initial encounter   Secondary Diagnosis:  Cognitive deficits   History of Present Illness/Injury: Patient admitted to [facility] with above dx. See physician H&P for full report. ST consulted to complete cognitive evaluation and determine goals and discharge recommendations as appropriate. Past Medical History:   Diagnosis Date    CAD (coronary artery disease)     CHF (congestive heart failure) (HCC)     GERD (gastroesophageal reflux disease)     History of CVA (cerebrovascular accident) - noted per MRI-brain on 2017 which demonstrates old strokes     Hypertension     Myocardial infarct ([address]     Occasional tremors     Pulmonary embolism ([address]     PVC's (premature ventricular contractions)     Retinal artery thrombosis     S/P TAVR (transcatheter aortic valve replacement) Dr. Rain Huff 17    Thrombophlebitis     Weakness        Precautions: Fall risk   Pain:  0/10    Subjective:  Patient seen sitting upright in chair; alert and very cooperative and motivated. Daughter and son present outside of patient's room; both supportive. SOCIAL HISTORY: Patient reports, \"I live alone and am independent. \" Patient reports, \"I completed all finances, medications, laundry and light cooking. \"  Patient reports, \"My son lives one mile down the road and my daughter brings meals to me that last me 3-4 days after with leftovers. \"  Patient also reports, \"I do drive but may plan to slow down. \"       ORAL MOTOR:  Labial / Facial:  WFL  Lingual: WFL  Soft Palate: WFL  Sensation: WFL  Dentition: poor driving and stated \"we will defiantly take care of that. \"  ST discussed OP vs HH with that patient. Patient receptive to continued therapy and would prefer OP. ST also informed patient and patient's family of various locations to complete OP therapy, including [facility]. All family very receptive and supportive. *RECOMMEND OP or HH Speech Therapy evaluation and treatment to follow discharge     REHAB POTENTIAL: [x] Excellent [] Good [] Fair  [] Poor    EDUCATION:   Learner: [x]Patient [] Significant other [x] Son and Daughter [] Parent     [] Other:   Education: [x] Reviewed results and recommendations of this evaluation     [] Reviewed diet and strategies     [] Reviewed signs, symptoms and risk of aspiration     [] Demonstrated how to thick liquid appropriately. [x] Reviewed goals and Plan of Care     [] OTHER:   Method: [x] Discussion [x] Demonstration [] Hand-out     [] OTHER:   Evaluation of Education:     [x] Verbalizes understanding [x] Demonstrates with assistance     [] Demonstrates without assistance []Needs further instruction     [] No evidence of learning  [] Family not present    PLAN / TREATMENT RECOMMENDATIONS:  [x] Skilled SLP intervention on acute care 3-5 x per week or until goals met and/or pt plateaus in function.   Specific interventions for next session may include: cognitive treatment     *RECOMMEND OP or HH Speech Therapy evaluation and treatment to follow discharge       SHORT TERM GOALS:  Short-term Goals  Timeframe for Short-term Goals: 2 weeks  Goal 1: Patient will complete recall, short term memory and working memory tasks with 70% accuracy provided mod cues to improve overall recall  complex medical information and improve retention of new learning   Goal 2: Patient will complete high level problem solving/reasoning tasks with 70% accuracy provided mod cues to improve successfull and indepnedent return to completion of IADL's (finances, medications)  Goal 3: Patient will

## 2018-11-20 NOTE — H&P
Gastrointestinal: Positive for diarrhea. Negative for abdominal distention, abdominal pain, blood in stool, nausea and vomiting. Genitourinary: Negative for difficulty urinating and dysuria. Musculoskeletal: Negative for arthralgias, back pain, myalgias, neck pain and neck stiffness. Skin: Negative for color change and rash. Neurological: Negative for dizziness, light-headedness, numbness and headaches. Psychiatric/Behavioral: Negative for agitation, behavioral problems and confusion. Review of Systems    Iodine  Past Surgical History:   Procedure Laterality Date    AORTIC VALVE SURGERY  12/14/2017    APPENDECTOMY      CARDIAC CATHETERIZATION  01/16/2012    Status post successful PCI of SVG to OM2 branch with stent in the proximal area. Stent in the  mid area and balloon angioplasty of the distal anastomotic site.  CARDIAC CATHETERIZATION      CORONARY ARTERY BYPASS GRAFT      X2 1982 1984    DIAGNOSTIC CARDIAC CATH LAB PROCEDURE      HERNIA REPAIR      TRANSTHORACIC ECHOCARDIOGRAM  01/10/2012    LV was mildly dilated. Systolic function was normal. EF was estimated in  the range of 55-65%. There were no regional wall motion abnormalities.  Wall thickness was normal.     Past Medical History:   Diagnosis Date    CAD (coronary artery disease)     CHF (congestive heart failure) (HCC)     GERD (gastroesophageal reflux disease)     History of CVA (cerebrovascular accident) - noted per MRI-brain on 9/18/2017 which demonstrates old strokes     Hypertension     Myocardial infarct ([address]     Occasional tremors     Pulmonary embolism ([address]     PVC's (premature ventricular contractions)     Retinal artery thrombosis     S/P TAVR (transcatheter aortic valve replacement) Dr. Rosaline Gonzalez 12/14/17 12/16/2017    Thrombophlebitis     Weakness      Past Surgical History:   Procedure Laterality Date    AORTIC VALVE SURGERY  12/14/2017    APPENDECTOMY      CARDIAC CATHETERIZATION  01/16/2012    Status

## 2018-11-21 NOTE — CONSULTS
Patient seen and examined for fall with close head injury with brief LOC. Note to follow. Patient is a good candidate for OP therapy as he has already exceeded criteria for rehab admission; he has walked 270' without a device. Would recommend PT/OT and add SLP since he has a TBI. Thank you for the consult.     Mauro Canales MD

## 2018-11-21 NOTE — PROGRESS NOTES
normal. EF was estimated in  the range of 55-65%. There were no regional wall motion abnormalities. Wall thickness was normal.       Restrictions/Precautions:  Fall Risk    Prior Level of Function:  ADL Assistance: Independent  Homemaking Assistance: Needs assistance  Ambulation Assistance: Independent  Transfer Assistance: Independent  Additional Comments: Pt did not use any AD for ambulation. Subjective  Subjective: Pleasant and cooperative reports not sleeping well  Overall Orientation Status: Within Functional Limits    Pain:  Pain Assessment  Patient Currently in Pain: Denies       Objective  Transfers  Sit to stand: Stand by assistance  Stand to sit: Stand by assistance    Balance  Sitting Balance: Supervision  Standing Balance: Stand by assistance     Activity: in prep to ambulate, static standing [id number] x2 trials at nursing station talking with RN Yonatan     Functional Mobility  Functional - Mobility Device: No device  Activity: Other  Assist Level: Contact guard assistance  Functional Mobility Comments: around unit at aslow pace, foward flexed posture, vcs to correct min unsteady no LOB. Educated patient on RW and patient reported not using one at home and declined need. Functional mobility and education given to increase safety to ambulate to/from bathroom for self care skills.      Activity Tolerance:  Activity Tolerance: Patient Tolerated treatment well    Assessment:  Performance deficits / Impairments: Decreased ADL status, Decreased endurance, Decreased high-level IADLs, Decreased strength  Prognosis: Good    Discharge Recommendations:  Discharge Recommendations: Continue to assess pending progress, Patient would benefit from continued therapy after discharge    Patient Education:  Patient Education: Role of OT, transfer training ambulation    Equipment Recommendations:  Equipment Needed: Yes  Mobility Devices: ADL Assistive Devices  ADL Assistive Devices: Reacher, Elastic Shoe Laces  Other:

## 2018-11-21 NOTE — PROGRESS NOTES
[address] THERAPY MISSED TREATMENT NOTE  [facility] 8A      Date: 2018  Patient Name: Mattie Figueroa        MRN: [id number]    : 1930  (80 y.o.)    REASON FOR MISSED TREATMENT:  Attempted to see patient for cognitive treatment. Upon attempt patient with street clothes on and son present reporting, \"We are just waiting on the nurse to sign the discharge papers. \"  Per chart review, \"Patient to call to set up OP SLP/PT/OT. \"  ST highly recommending continued OP ST services.     Inocencio Foy M.S. [address]

## 2018-11-21 NOTE — PROGRESS NOTES
NA  138  138   K  4.0  4.8   CL  99  102   CO2  25  26   BUN  38*  32*   CREATININE  0.9  0.7     COAGS:   Recent Labs      11/19/18   1728  11/20/18   1249  11/21/18   0543   PROT  6.8   --    --    INR  3.13*  2.73*  2.48*     Pancreas/HFP:  No results for input(s): LIPASE, AMYLASE in the last 72 hours. Recent Labs      11/19/18   1728   AST  11   ALT  <5*   BILITOT  0.5   ALKPHOS  103       RADIOLOGY:  No new results      Electronically signed by Ly Mcrae PA-C on 11/21/2018 at 4:15 PM    Patient seen and examined independently by me early this AM. Above discussed and I agree with OLGA Slater. See my additional comments below for updated orders and plan. Labs, cultures, and radiographs where available were reviewed. I discussed patient concerns with the patient's nurse and instructions were given. Please see our orders for the updated patient care plan. - Continue with therapy. Stable from a trauma standpoint. Home medications. Resumed Coumadin. Planning discharge to home today.     Electronically signed by Nic Noyola MD on 11/21/2018 at 10:19 PM

## 2018-11-21 NOTE — CONSULTS
positive for  fatigue and weight loss  EYES:  negative  HEENT:  negative  RESPIRATORY:  negative  CARDIOVASCULAR:  positive for edema  GASTROINTESTINAL:  negative  GENITOURINARY:  negative  SKIN:  Positive for bruising  HEMATOLOGIC/LYMPHATIC:  positive for swelling/edema  MUSCULOSKELETAL:  positive for  muscle weakness  NEUROLOGICAL:  positive for memory problems, gait problems, tremor and weakness  BEHAVIOR/PSYCH:  negative  10 point system review otherwise negative    Medications     Reviewed in EMR   warfarin  7.5 mg Oral Once    atorvastatin  40 mg Oral Q48H    carbidopa-levodopa  1 tablet Oral TID    clopidogrel  75 mg Oral Daily    furosemide  40 mg Oral Daily    metoprolol succinate  25 mg Oral Daily    potassium chloride  10 mEq Oral Daily    tamsulosin  0.4 mg Oral Daily    warfarin (COUMADIN) daily dosing (placeholder)   Other RX Placeholder    sodium chloride flush  10 mL Intravenous 2 times per day    bacitracin   Topical TID    famotidine  20 mg Oral Daily    docusate sodium  100 mg Oral BID     PRNs: sodium chloride flush, acetaminophen, magnesium hydroxide, ondansetron    Allergies:    Allergies   Allergen Reactions    Iodine Hives       Physical Exam:     Physical Exam:  /68   Pulse 90   Temp 97.8 °F (36.6 °C) (Oral)   Resp 16   Ht 5' 8\" (1.727 m)   Wt 118 lb 9.6 oz (53.8 kg)   SpO2 97%   BMI 18.03 kg/m²     awake  Orientation:   person, place, time, situation  Mood: within normal limits  Affect: calm  General appearance: well groomed and in no acute distress, appearing stated age, emaciated, glasses    Memory:   grossly normal  Attention/Concentration: normal  Language:  normal    Cranial Nerves:  cranial nerves II-XII are grossly intact  ROM:  normal  Motor Exam:  Motor exam is symmetrical 4 out of 5 all extremities bilaterally    Tone:  normal  Muscle bulk: decreased  Sensory:  Sensory intact  Coordination:   abnormal - Decreased FFM, DI intact, mild tremor with 11/19/2018 7:48 PM    Xr Pelvis (1-2 Views)    Result Date: 11/19/2018  PROCEDURE: XR PELVIS (1-2 VIEWS) CLINICAL INFORMATION: fall, . COMPARISON: No prior study. TECHNIQUE: AP view of the pelvis. FINDINGS: Bones/joints: Bones are osteopenic. No fracture or dislocation. Is moderate bilateral hip joint space narrowing consistent with DJD. The SI joints are unremarkable. The symphysis pubis is intact and unremarkable. There are degenerative changes of the lower lumbar spine. Soft tissues: There are aortoiliofemoral arterial atherosclerotic calcifications. There is moderate stool in the rectum. No acute fracture or dislocation. Bilateral hip joint DJD. Moderate stool in the rectum. **This report has been created using voice recognition software. It may contain minor errors which are inherent in voice recognition technology. ** Final report electronically signed by Dr. Sharee Michelle on 11/19/2018 7:10 PM    Xr Elbow Right (min 3 Views)    Result Date: 11/19/2018  PROCEDURE: XR ELBOW RIGHT (MIN 3 VIEWS) CLINICAL INFORMATION: fall/skin tears, . COMPARISON: No prior study. TECHNIQUE: 4 views of the right elbow. FINDINGS: Bones/joints: Bones are osteopenic. No fracture or dislocation. No joint space narrowing or erosive changes. There is no significant joint effusion. Soft tissues: No significant soft tissue swelling. No acute bone or joint abnormality. **This report has been created using voice recognition software. It may contain minor errors which are inherent in voice recognition technology. ** Final report electronically signed by Dr. Sharee Michelle on 11/19/2018 7:06 PM    Xr Femur Right (min 2 Views)    Result Date: 11/19/2018  PROCEDURE: XR FEMUR RIGHT (MIN 2 VIEWS) CLINICAL INFORMATION: fall, . COMPARISON: No prior study. TECHNIQUE: AP and crosstable lateral views of the right femur  FINDINGS: Bones/joints: The bones are osteopenic. No fracture or dislocation.  There is moderate right hip joint space

## 2018-11-22 NOTE — DISCHARGE SUMMARY
WDX:[id number]    Printed on:11/21/18 2046   Medication Information                      acetaminophen (TYLENOL) 500 MG tablet  Take 1,000 mg by mouth every 6 hours as needed. Don't take more than 3,000 mg per day. Indications: Pain             atorvastatin (LIPITOR) 40 MG tablet  Take 40 mg by mouth every 48 hours. Indications: Blood Cholesterol Abnormal             bacitracin 500 UNIT/GM ointment  Apply topically 2 times daily. bismuth subsalicylate (PEPTO BISMOL) 262 MG/15ML suspension  Take 15 mLs by mouth every 6 hours as needed for Indigestion             Blood Pressure KIT  1 Units by Does not apply route daily             carbidopa-levodopa (SINEMET)  MG per tablet  Take 1 tablet by mouth 3 times daily             clopidogrel (PLAVIX) 75 MG tablet  Take 1 tablet by mouth daily             furosemide (LASIX) 40 MG tablet  Take 40 mg by mouth daily             lansoprazole (PREVACID) 15 MG capsule  Take 15 mg by mouth daily. Indications: Gastroesophageal Reflux Disease             metoprolol succinate (TOPROL XL) 25 MG extended release tablet  Take 25 mg by mouth daily             potassium chloride (KLOR-CON) 20 MEQ packet  Take 10 mEq by mouth daily             tamsulosin (FLOMAX) 0.4 MG capsule  Take 0.4 mg by mouth daily             warfarin (COUMADIN) 5 MG tablet  Take 1 tablet by mouth daily                 Patient Instructions:    Discharge lab work:    Activity: activity as tolerated  Diet:      Code Status: Prior    Follow-up visits:   Rani Iqbal MD  [facility]  [phone]      November 28th at 9:40 AM.        Procedures:     Consults:   rehabilitation medicine    Examination:  Vitals:  Vitals:    11/20/18 2115 11/21/18 0336 11/21/18 0900 11/21/18 0957   BP: (!) 93/55 (!) 110/56 (!) 94/56 128/68   Pulse: 83 85 90    Resp: 18 16 16    Temp: 97.6 °F (36.4 °C) 97.9 °F (36.6 °C) 97.8 °F (36.6 °C)    TempSrc: Oral Oral Oral    SpO2: 98% 98% Microscopic Urinalysis    Collection Time: 11/20/18  2:20 AM   Result Value Ref Range    Glucose, Urine NEGATIVE NEGATIVE mg/dl    Bilirubin Urine NEGATIVE NEGATIVE    Ketones, Urine 15 (A) NEGATIVE    Specific Gravity, UA 1.024 1.002 - 1.03    Blood, Urine NEGATIVE NEGATIVE    pH, UA 5.5 5.0 - 9.0    Protein, UA TRACE (A) NEGATIVE mg/dl    Urobilinogen, Urine 0.2 0.0 - 1.0 eu/dl    Nitrite, Urine NEGATIVE NEGATIVE    Leukocytes, UA NEGATIVE NEGATIVE    Color, UA YELLOW YELLOW-STR    Character, Urine CLEAR [id number]    RBC, UA 0-2 0-2/hpf /hpf    WBC, UA 0-2 0-4/hpf /hpf    Epi Cells 0-2 3-5/hpf /hpf    Bacteria, UA NONE FEW/NONE S    Casts NONE SEEN NONE SEEN /lpf    Crystals NONE SEEN NONE SEEN    Renal Epithelial, Urine NONE SEEN NONE SEEN    Yeast, UA NONE SEEN NONE SEEN    Casts NONE SEEN /lpf    Miscellaneous Lab Test Result NONE SEEN    Basic Metabolic Panel w/ Reflex to MG    Collection Time: 11/20/18  6:28 AM   Result Value Ref Range    Sodium 138 135 - 145 meq/L    Potassium reflex Magnesium 4.8 3.5 - 5.2 meq/L    Chloride 102 98 - 111 meq/L    CO2 26 23 - 33 meq/L    Glucose 87 70 - 108 mg/dL    BUN 32 (H) 7 - 22 mg/dL    CREATININE 0.7 0.4 - 1.2 mg/dL    Calcium 9.4 8.5 - 10.5 mg/dL   CBC    Collection Time: 11/20/18  6:28 AM   Result Value Ref Range    WBC 9.4 4.8 - 10.8 thou/mm3    RBC 4.40 (L) 4.70 - 6.10 mill/mm3    Hemoglobin 13.3 (L) 14.0 - 18.0 gm/dl    Hematocrit 38.1 (L) 42.0 - 52.0 %    MCV 86.6 80.0 - 94.0 fL    MCH 30.2 26.0 - 33.0 pg    MCHC 34.9 32.2 - 35.5 gm/dl    RDW-CV 13.3 11.5 - 14.5 %    RDW-SD 42.3 35.0 - 45.0 fL    Platelets 227 677 - 634 thou/mm3    MPV 11.1 9.4 - 12.4 fL   Anion Gap    Collection Time: 11/20/18  6:28 AM   Result Value Ref Range    Anion Gap 10.0 8.0 - 16.0 meq/L   Glomerular Filtration Rate, Estimated    Collection Time: 11/20/18  6:28 AM   Result Value Ref Range    Est, Glom Filt Rate >90 ml/min/1.73m2   Protime-INR    Collection Time: 11/20/18 12:49 PM

## 2018-11-23 NOTE — CARE COORDINATION
11/23/18, 7:48 AM    Discharge plan discussed by  and . Discharge plan reviewed with patient/ family. Patient/ family verbalize understanding of discharge plan and are in agreement with plan. Understanding was demonstrated using the teach back method. IMM Letter  IMM Letter given to Patient/Family/Significant other/Guardian/POA/by[de-identified] Staff  IMM Letter date given[de-identified] 11/19/18  IMM Letter time given[de-identified] 2049       Discharged home on 11-21-18 with orders for OP therapy. Pt declined therapy at in facility as well as [facility] services.

## 2018-11-25 PROBLEM — E43 SEVERE MALNUTRITION (HCC): Status: RESOLVED | Noted: 2017-09-22 | Resolved: 2018-01-01

## 2018-12-11 NOTE — TELEPHONE ENCOUNTER
Called and talked to patient's daughter, Yadira Shaw. ]  She stated she is unsure if she will be able to get patient to echo appt tomorrow and appt with Lucía Huffman on Thursday. She is going to call our office back to let us know. Natasha called our office back and they are unable to bring patient to echo appt and follow-up with Dr. Lucía Huffman. Will let John Rod know.

## 2018-12-12 NOTE — TELEPHONE ENCOUNTER
Appointment scheduled for 1/9/19 at [address] [facility] with ECHO scheduled for 1/7/19 at 1030.

## 2019-01-01 ENCOUNTER — HOSPITAL ENCOUNTER (INPATIENT)
Age: 84
LOS: 1 days | DRG: 377 | End: 2019-07-09
Attending: INTERNAL MEDICINE | Admitting: INTERNAL MEDICINE
Payer: MEDICARE

## 2019-01-01 ENCOUNTER — APPOINTMENT (OUTPATIENT)
Dept: GENERAL RADIOLOGY | Age: 84
End: 2019-01-01
Payer: MEDICARE

## 2019-01-01 ENCOUNTER — OFFICE VISIT (OUTPATIENT)
Dept: CARDIOLOGY CLINIC | Age: 84
End: 2019-01-01
Payer: MEDICARE

## 2019-01-01 ENCOUNTER — HOSPITAL ENCOUNTER (EMERGENCY)
Age: 84
Discharge: HOME | End: 2019-05-21
Attending: FAMILY MEDICINE
Payer: MEDICARE

## 2019-01-01 ENCOUNTER — OUTSIDE SERVICES (OUTPATIENT)
Dept: FAMILY MEDICINE CLINIC | Age: 84
End: 2019-01-01
Payer: MEDICARE

## 2019-01-01 ENCOUNTER — HOSPITAL ENCOUNTER (OUTPATIENT)
Dept: NON INVASIVE DIAGNOSTICS | Age: 84
Discharge: HOME | End: 2019-01-04
Payer: MEDICARE

## 2019-01-01 ENCOUNTER — APPOINTMENT (OUTPATIENT)
Dept: GENERAL RADIOLOGY | Age: 84
DRG: 377 | End: 2019-01-01
Payer: MEDICARE

## 2019-01-01 ENCOUNTER — TELEPHONE (OUTPATIENT)
Dept: CARDIOLOGY CLINIC | Age: 84
End: 2019-01-01

## 2019-01-01 VITALS
OXYGEN SATURATION: 99 % | SYSTOLIC BLOOD PRESSURE: 164 MMHG | DIASTOLIC BLOOD PRESSURE: 86 MMHG | TEMPERATURE: 97.7 F | HEART RATE: 90 BPM | WEIGHT: 124 LBS | HEIGHT: 68 IN | BODY MASS INDEX: 18.79 KG/M2 | RESPIRATION RATE: 20 BRPM

## 2019-01-01 VITALS
DIASTOLIC BLOOD PRESSURE: 42 MMHG | BODY MASS INDEX: 21.49 KG/M2 | HEIGHT: 67 IN | OXYGEN SATURATION: 73 % | TEMPERATURE: 98.4 F | SYSTOLIC BLOOD PRESSURE: 81 MMHG | RESPIRATION RATE: 20 BRPM | HEART RATE: 118 BPM | WEIGHT: 136.9 LBS

## 2019-01-01 VITALS
BODY MASS INDEX: 18.88 KG/M2 | SYSTOLIC BLOOD PRESSURE: 159 MMHG | DIASTOLIC BLOOD PRESSURE: 99 MMHG | WEIGHT: 124.6 LBS | HEART RATE: 79 BPM | HEIGHT: 68 IN

## 2019-01-01 DIAGNOSIS — G20 PARKINSON'S DISEASE (HCC): Chronic | ICD-10-CM

## 2019-01-01 DIAGNOSIS — I50.23 NYHA CLASS 3 ACUTE ON CHRONIC SYSTOLIC HEART FAILURE (HCC): Chronic | ICD-10-CM

## 2019-01-01 DIAGNOSIS — I35.0 SEVERE AORTIC STENOSIS: Chronic | ICD-10-CM

## 2019-01-01 DIAGNOSIS — K92.2 GASTROINTESTINAL HEMORRHAGE, UNSPECIFIED GASTROINTESTINAL HEMORRHAGE TYPE: Primary | ICD-10-CM

## 2019-01-01 DIAGNOSIS — R11.2 NAUSEA AND VOMITING, INTRACTABILITY OF VOMITING NOT SPECIFIED, UNSPECIFIED VOMITING TYPE: ICD-10-CM

## 2019-01-01 DIAGNOSIS — Z95.2 S/P TAVR (TRANSCATHETER AORTIC VALVE REPLACEMENT): Primary | ICD-10-CM

## 2019-01-01 DIAGNOSIS — Z95.2 S/P TAVR (TRANSCATHETER AORTIC VALVE REPLACEMENT): ICD-10-CM

## 2019-01-01 DIAGNOSIS — E43 SEVERE MALNUTRITION (HCC): Chronic | ICD-10-CM

## 2019-01-01 DIAGNOSIS — S42.202D: Primary | ICD-10-CM

## 2019-01-01 DIAGNOSIS — I35.0 NONRHEUMATIC AORTIC VALVE STENOSIS: Primary | Chronic | ICD-10-CM

## 2019-01-01 DIAGNOSIS — I25.10 CORONARY ARTERY DISEASE INVOLVING NATIVE CORONARY ARTERY OF NATIVE HEART WITHOUT ANGINA PECTORIS: Chronic | ICD-10-CM

## 2019-01-01 DIAGNOSIS — Z79.01 ANTICOAGULATED ON COUMADIN: Chronic | ICD-10-CM

## 2019-01-01 DIAGNOSIS — I48.0 PAF (PAROXYSMAL ATRIAL FIBRILLATION) (HCC): Chronic | ICD-10-CM

## 2019-01-01 DIAGNOSIS — S42.202D CLOSED FRACTURE OF PROXIMAL END OF LEFT HUMERUS WITH ROUTINE HEALING, UNSPECIFIED FRACTURE MORPHOLOGY, SUBSEQUENT ENCOUNTER: Chronic | ICD-10-CM

## 2019-01-01 DIAGNOSIS — I10 ESSENTIAL HYPERTENSION: Chronic | ICD-10-CM

## 2019-01-01 LAB
ABO: NORMAL
ALBUMIN SERPL-MCNC: 4.3 G/DL (ref 3.5–5.1)
ALLEN TEST: ABNORMAL
ALP BLD-CCNC: 162 U/L (ref 38–126)
ALT SERPL-CCNC: 6 U/L (ref 11–66)
ANION GAP SERPL CALCULATED.3IONS-SCNC: 11 MEQ/L (ref 8–16)
ANION GAP SERPL CALCULATED.3IONS-SCNC: 17 MEQ/L (ref 8–16)
ANION GAP SERPL CALCULATED.3IONS-SCNC: 17 MEQ/L (ref 8–16)
ANTIBODY SCREEN: NORMAL
APTT: 33 SECONDS (ref 22–38)
AST SERPL-CCNC: 14 U/L (ref 5–40)
BASE EXCESS (CALCULATED): 5.1 MMOL/L (ref -2.5–2.5)
BASOPHILS # BLD: 0.1 %
BASOPHILS # BLD: 0.5 %
BASOPHILS ABSOLUTE: 0 THOU/MM3 (ref 0–0.1)
BASOPHILS ABSOLUTE: 0 THOU/MM3 (ref 0–0.1)
BILIRUB SERPL-MCNC: 0.9 MG/DL (ref 0.3–1.2)
BILIRUBIN DIRECT: < 0.2 MG/DL (ref 0–0.3)
BUN BLDV-MCNC: 29 MG/DL (ref 7–22)
BUN BLDV-MCNC: 33 MG/DL (ref 7–22)
BUN BLDV-MCNC: 38 MG/DL (ref 7–22)
CALCIUM SERPL-MCNC: 10 MG/DL (ref 8.5–10.5)
CALCIUM SERPL-MCNC: 9.2 MG/DL (ref 8.5–10.5)
CALCIUM SERPL-MCNC: 9.5 MG/DL (ref 8.5–10.5)
CHLORIDE BLD-SCNC: 101 MEQ/L (ref 98–111)
CHLORIDE BLD-SCNC: 101 MEQ/L (ref 98–111)
CHLORIDE BLD-SCNC: 97 MEQ/L (ref 98–111)
CO2: 26 MEQ/L (ref 23–33)
CO2: 27 MEQ/L (ref 23–33)
CO2: 27 MEQ/L (ref 23–33)
COLLECTED BY:: ABNORMAL
CREAT SERPL-MCNC: 0.9 MG/DL (ref 0.4–1.2)
CREAT SERPL-MCNC: 1.1 MG/DL (ref 0.4–1.2)
CREAT SERPL-MCNC: 1.1 MG/DL (ref 0.4–1.2)
DEVICE: ABNORMAL
EKG ATRIAL RATE: 119 BPM
EKG ATRIAL RATE: 131 BPM
EKG ATRIAL RATE: 91 BPM
EKG Q-T INTERVAL: 262 MS
EKG Q-T INTERVAL: 348 MS
EKG Q-T INTERVAL: 374 MS
EKG QRS DURATION: 86 MS
EKG QRS DURATION: 88 MS
EKG QRS DURATION: 94 MS
EKG QTC CALCULATION (BAZETT): 373 MS
EKG QTC CALCULATION (BAZETT): 445 MS
EKG QTC CALCULATION (BAZETT): 483 MS
EKG R AXIS: 21 DEGREES
EKG R AXIS: 21 DEGREES
EKG R AXIS: 44 DEGREES
EKG T AXIS: 148 DEGREES
EKG T AXIS: 57 DEGREES
EKG T AXIS: 77 DEGREES
EKG VENTRICULAR RATE: 116 BPM
EKG VENTRICULAR RATE: 122 BPM
EKG VENTRICULAR RATE: 85 BPM
EOSINOPHIL # BLD: 0 %
EOSINOPHIL # BLD: 4 %
EOSINOPHILS ABSOLUTE: 0 THOU/MM3 (ref 0–0.4)
EOSINOPHILS ABSOLUTE: 0.2 THOU/MM3 (ref 0–0.4)
ERYTHROCYTE [DISTWIDTH] IN BLOOD BY AUTOMATED COUNT: 12.6 % (ref 11.5–14.5)
ERYTHROCYTE [DISTWIDTH] IN BLOOD BY AUTOMATED COUNT: 13.2 % (ref 11.5–14.5)
ERYTHROCYTE [DISTWIDTH] IN BLOOD BY AUTOMATED COUNT: 13.5 % (ref 11.5–14.5)
ERYTHROCYTE [DISTWIDTH] IN BLOOD BY AUTOMATED COUNT: 41.8 FL (ref 35–45)
ERYTHROCYTE [DISTWIDTH] IN BLOOD BY AUTOMATED COUNT: 44.2 FL (ref 35–45)
ERYTHROCYTE [DISTWIDTH] IN BLOOD BY AUTOMATED COUNT: 45.5 FL (ref 35–45)
GFR SERPL CREATININE-BSD FRML MDRD: 63 ML/MIN/1.73M2
GFR SERPL CREATININE-BSD FRML MDRD: 63 ML/MIN/1.73M2
GFR SERPL CREATININE-BSD FRML MDRD: 80 ML/MIN/1.73M2
GLUCOSE BLD-MCNC: 113 MG/DL (ref 70–108)
GLUCOSE BLD-MCNC: 166 MG/DL (ref 70–108)
GLUCOSE BLD-MCNC: 195 MG/DL (ref 70–108)
HCO3: 29 MMOL/L (ref 23–28)
HCT VFR BLD CALC: 39.7 % (ref 42–52)
HCT VFR BLD CALC: 39.9 % (ref 42–52)
HCT VFR BLD CALC: 41.2 % (ref 42–52)
HEMOCCULT STL QL: NEGATIVE
HEMOGLOBIN: 12.2 GM/DL (ref 14–18)
HEMOGLOBIN: 12.8 GM/DL (ref 14–18)
HEMOGLOBIN: 12.9 GM/DL (ref 14–18)
HEMOGLOBIN: 12.9 GM/DL (ref 14–18)
HEMOGLOBIN: 13.3 GM/DL (ref 14–18)
HEMOGLOBIN: 13.4 GM/DL (ref 14–18)
IFIO2: 100
IMMATURE GRANS (ABS): 0.02 THOU/MM3 (ref 0–0.07)
IMMATURE GRANS (ABS): 0.08 THOU/MM3 (ref 0–0.07)
IMMATURE GRANULOCYTES: 0.3 %
IMMATURE GRANULOCYTES: 0.5 %
INR BLD: 2.47 (ref 0.85–1.13)
INR BLD: 2.59 (ref 0.85–1.13)
INR BLD: 3.45 (ref 0.85–1.13)
INR BLD: 8.57 (ref 0.85–1.13)
LACTIC ACID: 5.2 MMOL/L (ref 0.5–2.2)
LACTIC ACID: 5.4 MMOL/L (ref 0.5–2.2)
LIPASE: 50.5 U/L (ref 5.6–51.3)
LV EF: 43 %
LVEF MODALITY: NORMAL
LYMPHOCYTES # BLD: 13.6 %
LYMPHOCYTES # BLD: 3.2 %
LYMPHOCYTES ABSOLUTE: 0.5 THOU/MM3 (ref 1–4.8)
LYMPHOCYTES ABSOLUTE: 0.8 THOU/MM3 (ref 1–4.8)
MCH RBC QN AUTO: 29.4 PG (ref 26–33)
MCH RBC QN AUTO: 29.8 PG (ref 26–33)
MCH RBC QN AUTO: 30.1 PG (ref 26–33)
MCHC RBC AUTO-ENTMCNC: 32.1 GM/DL (ref 32.2–35.5)
MCHC RBC AUTO-ENTMCNC: 32.3 GM/DL (ref 32.2–35.5)
MCHC RBC AUTO-ENTMCNC: 32.5 GM/DL (ref 32.2–35.5)
MCV RBC AUTO: 91.5 FL (ref 80–94)
MCV RBC AUTO: 92.4 FL (ref 80–94)
MCV RBC AUTO: 92.5 FL (ref 80–94)
MONOCYTES # BLD: 3.2 %
MONOCYTES # BLD: 9.1 %
MONOCYTES ABSOLUTE: 0.5 THOU/MM3 (ref 0.4–1.3)
MONOCYTES ABSOLUTE: 0.5 THOU/MM3 (ref 0.4–1.3)
NUCLEATED RED BLOOD CELLS: 0 /100 WBC
NUCLEATED RED BLOOD CELLS: 0 /100 WBC
O2 SATURATION: 91 %
OSMOLALITY CALCULATION: 284.2 MOSMOL/KG (ref 275–300)
OSMOLALITY CALCULATION: 293.9 MOSMOL/KG (ref 275–300)
PCO2: 41 MMHG (ref 35–45)
PH BLOOD GAS: 7.46 (ref 7.35–7.45)
PLATELET # BLD: 165 THOU/MM3 (ref 130–400)
PLATELET # BLD: 218 THOU/MM3 (ref 130–400)
PLATELET # BLD: 237 THOU/MM3 (ref 130–400)
PMV BLD AUTO: 10.3 FL (ref 9.4–12.4)
PMV BLD AUTO: 10.5 FL (ref 9.4–12.4)
PMV BLD AUTO: 10.6 FL (ref 9.4–12.4)
PO2: 58 MMHG (ref 71–104)
POTASSIUM REFLEX MAGNESIUM: 4.2 MEQ/L (ref 3.5–5.2)
POTASSIUM SERPL-SCNC: 4.2 MEQ/L (ref 3.5–5.2)
POTASSIUM SERPL-SCNC: 4.6 MEQ/L (ref 3.5–5.2)
PRO-BNP: ABNORMAL PG/ML (ref 0–1800)
RBC # BLD: 4.29 MILL/MM3 (ref 4.7–6.1)
RBC # BLD: 4.36 MILL/MM3 (ref 4.7–6.1)
RBC # BLD: 4.46 MILL/MM3 (ref 4.7–6.1)
REASON FOR REJECTION: NORMAL
REJECTED TEST: NORMAL
RH FACTOR: NORMAL
SEG NEUTROPHILS: 72.5 %
SEG NEUTROPHILS: 93 %
SEGMENTED NEUTROPHILS ABSOLUTE COUNT: 15.3 THOU/MM3 (ref 1.8–7.7)
SEGMENTED NEUTROPHILS ABSOLUTE COUNT: 4.4 THOU/MM3 (ref 1.8–7.7)
SODIUM BLD-SCNC: 139 MEQ/L (ref 135–145)
SODIUM BLD-SCNC: 141 MEQ/L (ref 135–145)
SODIUM BLD-SCNC: 144 MEQ/L (ref 135–145)
SOURCE, BLOOD GAS: ABNORMAL
TOTAL PROTEIN: 7.6 G/DL (ref 6.1–8)
TROPONIN T: 0.02 NG/ML
TROPONIN T: 0.03 NG/ML
WBC # BLD: 16.4 THOU/MM3 (ref 4.8–10.8)
WBC # BLD: 21 THOU/MM3 (ref 4.8–10.8)
WBC # BLD: 6 THOU/MM3 (ref 4.8–10.8)

## 2019-01-01 PROCEDURE — 2580000003 HC RX 258: Performed by: INTERNAL MEDICINE

## 2019-01-01 PROCEDURE — 2580000003 HC RX 258: Performed by: PHYSICIAN ASSISTANT

## 2019-01-01 PROCEDURE — 80048 BASIC METABOLIC PNL TOTAL CA: CPT

## 2019-01-01 PROCEDURE — 99284 EMERGENCY DEPT VISIT MOD MDM: CPT

## 2019-01-01 PROCEDURE — 6370000000 HC RX 637 (ALT 250 FOR IP): Performed by: INTERNAL MEDICINE

## 2019-01-01 PROCEDURE — 6360000002 HC RX W HCPCS: Performed by: INTERNAL MEDICINE

## 2019-01-01 PROCEDURE — 6360000002 HC RX W HCPCS: Performed by: NURSE PRACTITIONER

## 2019-01-01 PROCEDURE — 2500000003 HC RX 250 WO HCPCS: Performed by: INTERNAL MEDICINE

## 2019-01-01 PROCEDURE — 93010 ELECTROCARDIOGRAM REPORT: CPT | Performed by: INTERNAL MEDICINE

## 2019-01-01 PROCEDURE — 36415 COLL VENOUS BLD VENIPUNCTURE: CPT

## 2019-01-01 PROCEDURE — 99305 1ST NF CARE MODERATE MDM 35: CPT | Performed by: FAMILY MEDICINE

## 2019-01-01 PROCEDURE — 86850 RBC ANTIBODY SCREEN: CPT

## 2019-01-01 PROCEDURE — 85025 COMPLETE CBC W/AUTO DIFF WBC: CPT

## 2019-01-01 PROCEDURE — 85610 PROTHROMBIN TIME: CPT

## 2019-01-01 PROCEDURE — 6360000002 HC RX W HCPCS: Performed by: NUCLEAR MEDICINE

## 2019-01-01 PROCEDURE — 96365 THER/PROPH/DIAG IV INF INIT: CPT

## 2019-01-01 PROCEDURE — 86900 BLOOD TYPING SEROLOGIC ABO: CPT

## 2019-01-01 PROCEDURE — 2709999900 HC NON-CHARGEABLE SUPPLY

## 2019-01-01 PROCEDURE — 80053 COMPREHEN METABOLIC PANEL: CPT

## 2019-01-01 PROCEDURE — 6370000000 HC RX 637 (ALT 250 FOR IP)

## 2019-01-01 PROCEDURE — 2060000000 HC ICU INTERMEDIATE R&B

## 2019-01-01 PROCEDURE — 6360000002 HC RX W HCPCS: Performed by: PHYSICIAN ASSISTANT

## 2019-01-01 PROCEDURE — 99223 1ST HOSP IP/OBS HIGH 75: CPT | Performed by: NUCLEAR MEDICINE

## 2019-01-01 PROCEDURE — 4040F PNEUMOC VAC/ADMIN/RCVD: CPT | Performed by: INTERNAL MEDICINE

## 2019-01-01 PROCEDURE — 99285 EMERGENCY DEPT VISIT HI MDM: CPT

## 2019-01-01 PROCEDURE — 83690 ASSAY OF LIPASE: CPT

## 2019-01-01 PROCEDURE — 6370000000 HC RX 637 (ALT 250 FOR IP): Performed by: PHYSICIAN ASSISTANT

## 2019-01-01 PROCEDURE — 82272 OCCULT BLD FECES 1-3 TESTS: CPT

## 2019-01-01 PROCEDURE — 93005 ELECTROCARDIOGRAM TRACING: CPT | Performed by: INTERNAL MEDICINE

## 2019-01-01 PROCEDURE — 2700000000 HC OXYGEN THERAPY PER DAY

## 2019-01-01 PROCEDURE — 94761 N-INVAS EAR/PLS OXIMETRY MLT: CPT

## 2019-01-01 PROCEDURE — 2580000003 HC RX 258: Performed by: HOSPITALIST

## 2019-01-01 PROCEDURE — 99238 HOSP IP/OBS DSCHRG MGMT 30/<: CPT | Performed by: INTERNAL MEDICINE

## 2019-01-01 PROCEDURE — 93005 ELECTROCARDIOGRAM TRACING: CPT | Performed by: FAMILY MEDICINE

## 2019-01-01 PROCEDURE — 1123F ACP DISCUSS/DSCN MKR DOCD: CPT | Performed by: INTERNAL MEDICINE

## 2019-01-01 PROCEDURE — 83605 ASSAY OF LACTIC ACID: CPT

## 2019-01-01 PROCEDURE — 83880 ASSAY OF NATRIURETIC PEPTIDE: CPT

## 2019-01-01 PROCEDURE — 2580000003 HC RX 258: Performed by: NURSE PRACTITIONER

## 2019-01-01 PROCEDURE — 99223 1ST HOSP IP/OBS HIGH 75: CPT | Performed by: INTERNAL MEDICINE

## 2019-01-01 PROCEDURE — 82248 BILIRUBIN DIRECT: CPT

## 2019-01-01 PROCEDURE — 86901 BLOOD TYPING SEROLOGIC RH(D): CPT

## 2019-01-01 PROCEDURE — 85027 COMPLETE CBC AUTOMATED: CPT

## 2019-01-01 PROCEDURE — 73030 X-RAY EXAM OF SHOULDER: CPT

## 2019-01-01 PROCEDURE — 93005 ELECTROCARDIOGRAM TRACING: CPT | Performed by: PHYSICIAN ASSISTANT

## 2019-01-01 PROCEDURE — 51702 INSERT TEMP BLADDER CATH: CPT

## 2019-01-01 PROCEDURE — C9113 INJ PANTOPRAZOLE SODIUM, VIA: HCPCS | Performed by: INTERNAL MEDICINE

## 2019-01-01 PROCEDURE — L3660 SO 8 AB RSTR CAN/WEB PRE OTS: HCPCS

## 2019-01-01 PROCEDURE — 85018 HEMOGLOBIN: CPT

## 2019-01-01 PROCEDURE — 6360000002 HC RX W HCPCS: Performed by: HOSPITALIST

## 2019-01-01 PROCEDURE — G8482 FLU IMMUNIZE ORDER/ADMIN: HCPCS | Performed by: INTERNAL MEDICINE

## 2019-01-01 PROCEDURE — 73060 X-RAY EXAM OF HUMERUS: CPT

## 2019-01-01 PROCEDURE — G8598 ASA/ANTIPLAT THER USED: HCPCS | Performed by: INTERNAL MEDICINE

## 2019-01-01 PROCEDURE — 82803 BLOOD GASES ANY COMBINATION: CPT

## 2019-01-01 PROCEDURE — 99233 SBSQ HOSP IP/OBS HIGH 50: CPT | Performed by: HOSPITALIST

## 2019-01-01 PROCEDURE — 85730 THROMBOPLASTIN TIME PARTIAL: CPT

## 2019-01-01 PROCEDURE — 1123F ACP DISCUSS/DSCN MKR DOCD: CPT | Performed by: FAMILY MEDICINE

## 2019-01-01 PROCEDURE — 1036F TOBACCO NON-USER: CPT | Performed by: INTERNAL MEDICINE

## 2019-01-01 PROCEDURE — 84484 ASSAY OF TROPONIN QUANT: CPT

## 2019-01-01 PROCEDURE — G8427 DOCREV CUR MEDS BY ELIG CLIN: HCPCS | Performed by: INTERNAL MEDICINE

## 2019-01-01 PROCEDURE — 1101F PT FALLS ASSESS-DOCD LE1/YR: CPT | Performed by: INTERNAL MEDICINE

## 2019-01-01 PROCEDURE — G8420 CALC BMI NORM PARAMETERS: HCPCS | Performed by: INTERNAL MEDICINE

## 2019-01-01 PROCEDURE — 93000 ELECTROCARDIOGRAM COMPLETE: CPT | Performed by: INTERNAL MEDICINE

## 2019-01-01 PROCEDURE — 36600 WITHDRAWAL OF ARTERIAL BLOOD: CPT

## 2019-01-01 PROCEDURE — 96375 TX/PRO/DX INJ NEW DRUG ADDON: CPT

## 2019-01-01 PROCEDURE — 99213 OFFICE O/P EST LOW 20 MIN: CPT | Performed by: INTERNAL MEDICINE

## 2019-01-01 PROCEDURE — 2500000003 HC RX 250 WO HCPCS

## 2019-01-01 PROCEDURE — C9113 INJ PANTOPRAZOLE SODIUM, VIA: HCPCS | Performed by: PHYSICIAN ASSISTANT

## 2019-01-01 PROCEDURE — 4500000002 HC ER NO CHARGE

## 2019-01-01 PROCEDURE — 71045 X-RAY EXAM CHEST 1 VIEW: CPT

## 2019-01-01 PROCEDURE — 93306 TTE W/DOPPLER COMPLETE: CPT

## 2019-01-01 RX ORDER — DIGOXIN 0.25 MG/ML
250 INJECTION INTRAMUSCULAR; INTRAVENOUS ONCE
Status: COMPLETED | OUTPATIENT
Start: 2019-01-01 | End: 2019-01-01

## 2019-01-01 RX ORDER — POLYETHYLENE GLYCOL 3350 17 G/17G
17 POWDER, FOR SOLUTION ORAL DAILY PRN
Status: DISCONTINUED | OUTPATIENT
Start: 2019-01-01 | End: 2019-07-10 | Stop reason: HOSPADM

## 2019-01-01 RX ORDER — MORPHINE SULFATE 2 MG/ML
1 INJECTION, SOLUTION INTRAMUSCULAR; INTRAVENOUS
Status: DISCONTINUED | OUTPATIENT
Start: 2019-01-01 | End: 2019-07-10 | Stop reason: HOSPADM

## 2019-01-01 RX ORDER — TAMSULOSIN HYDROCHLORIDE 0.4 MG/1
0.4 CAPSULE ORAL DAILY
Status: DISCONTINUED | OUTPATIENT
Start: 2019-01-01 | End: 2019-01-01

## 2019-01-01 RX ORDER — OMEPRAZOLE 20 MG/1
20 CAPSULE, DELAYED RELEASE ORAL DAILY
COMMUNITY

## 2019-01-01 RX ORDER — ACETAMINOPHEN 500 MG
1000 TABLET ORAL EVERY 6 HOURS PRN
Status: DISCONTINUED | OUTPATIENT
Start: 2019-01-01 | End: 2019-07-10 | Stop reason: HOSPADM

## 2019-01-01 RX ORDER — POTASSIUM CHLORIDE 750 MG/1
10 TABLET, FILM COATED, EXTENDED RELEASE ORAL DAILY
Status: DISCONTINUED | OUTPATIENT
Start: 2019-01-01 | End: 2019-01-01

## 2019-01-01 RX ORDER — METOPROLOL TARTRATE 5 MG/5ML
5 INJECTION INTRAVENOUS
Status: DISPENSED | OUTPATIENT
Start: 2019-01-01 | End: 2019-01-01

## 2019-01-01 RX ORDER — ONDANSETRON 2 MG/ML
4 INJECTION INTRAMUSCULAR; INTRAVENOUS EVERY 6 HOURS PRN
Status: DISCONTINUED | OUTPATIENT
Start: 2019-01-01 | End: 2019-07-10 | Stop reason: HOSPADM

## 2019-01-01 RX ORDER — ACETAMINOPHEN 500 MG
TABLET ORAL
Status: COMPLETED
Start: 2019-01-01 | End: 2019-01-01

## 2019-01-01 RX ORDER — ONDANSETRON 2 MG/ML
4 INJECTION INTRAMUSCULAR; INTRAVENOUS ONCE
Status: COMPLETED | OUTPATIENT
Start: 2019-01-01 | End: 2019-01-01

## 2019-01-01 RX ORDER — SODIUM CHLORIDE 0.9 % (FLUSH) 0.9 %
10 SYRINGE (ML) INJECTION EVERY 12 HOURS SCHEDULED
Status: DISCONTINUED | OUTPATIENT
Start: 2019-01-01 | End: 2019-07-10 | Stop reason: HOSPADM

## 2019-01-01 RX ORDER — ATORVASTATIN CALCIUM 40 MG/1
40 TABLET, FILM COATED ORAL
Status: DISCONTINUED | OUTPATIENT
Start: 2019-01-01 | End: 2019-01-01

## 2019-01-01 RX ORDER — SODIUM CHLORIDE 9 MG/ML
INJECTION, SOLUTION INTRAVENOUS CONTINUOUS
Status: DISCONTINUED | OUTPATIENT
Start: 2019-01-01 | End: 2019-07-10 | Stop reason: HOSPADM

## 2019-01-01 RX ORDER — ACETAMINOPHEN 500 MG
1000 TABLET ORAL ONCE
Status: COMPLETED | OUTPATIENT
Start: 2019-01-01 | End: 2019-01-01

## 2019-01-01 RX ORDER — SODIUM CHLORIDE 9 MG/ML
INJECTION, SOLUTION INTRAVENOUS CONTINUOUS
Status: DISCONTINUED | OUTPATIENT
Start: 2019-01-01 | End: 2019-01-01

## 2019-01-01 RX ORDER — WARFARIN SODIUM 6 MG/1
6 TABLET ORAL DAILY
COMMUNITY

## 2019-01-01 RX ORDER — METOCLOPRAMIDE HYDROCHLORIDE 5 MG/ML
10 INJECTION INTRAMUSCULAR; INTRAVENOUS ONCE
Status: COMPLETED | OUTPATIENT
Start: 2019-01-01 | End: 2019-01-01

## 2019-01-01 RX ORDER — METOPROLOL SUCCINATE 25 MG/1
25 TABLET, EXTENDED RELEASE ORAL DAILY
Status: DISCONTINUED | OUTPATIENT
Start: 2019-01-01 | End: 2019-07-10 | Stop reason: HOSPADM

## 2019-01-01 RX ORDER — PHYTONADIONE 5 MG/1
5 TABLET ORAL ONCE
Status: COMPLETED | OUTPATIENT
Start: 2019-01-01 | End: 2019-01-01

## 2019-01-01 RX ORDER — 0.9 % SODIUM CHLORIDE 0.9 %
500 INTRAVENOUS SOLUTION INTRAVENOUS ONCE
Status: COMPLETED | OUTPATIENT
Start: 2019-01-01 | End: 2019-01-01

## 2019-01-01 RX ORDER — DIGOXIN 0.25 MG/ML
500 INJECTION INTRAMUSCULAR; INTRAVENOUS ONCE
Status: COMPLETED | OUTPATIENT
Start: 2019-01-01 | End: 2019-01-01

## 2019-01-01 RX ORDER — SODIUM CHLORIDE 0.9 % (FLUSH) 0.9 %
10 SYRINGE (ML) INJECTION PRN
Status: DISCONTINUED | OUTPATIENT
Start: 2019-01-01 | End: 2019-07-10 | Stop reason: HOSPADM

## 2019-01-01 RX ORDER — METOPROLOL TARTRATE 5 MG/5ML
INJECTION INTRAVENOUS
Status: COMPLETED
Start: 2019-01-01 | End: 2019-01-01

## 2019-01-01 RX ORDER — POLYETHYLENE GLYCOL 3350 17 G/17G
17 POWDER, FOR SOLUTION ORAL DAILY PRN
COMMUNITY

## 2019-01-01 RX ORDER — LISINOPRIL 5 MG/1
5 TABLET ORAL DAILY
COMMUNITY

## 2019-01-01 RX ORDER — LISINOPRIL 5 MG/1
5 TABLET ORAL DAILY
Status: DISCONTINUED | OUTPATIENT
Start: 2019-01-01 | End: 2019-01-01

## 2019-01-01 RX ORDER — ACETAMINOPHEN 325 MG/1
650 TABLET ORAL EVERY 4 HOURS PRN
Status: DISCONTINUED | OUTPATIENT
Start: 2019-01-01 | End: 2019-01-01 | Stop reason: SDUPTHER

## 2019-01-01 RX ORDER — MORPHINE SULFATE 2 MG/ML
0.5 INJECTION, SOLUTION INTRAMUSCULAR; INTRAVENOUS
Status: DISCONTINUED | OUTPATIENT
Start: 2019-01-01 | End: 2019-07-10 | Stop reason: HOSPADM

## 2019-01-01 RX ADMIN — ACETAMINOPHEN 1000 MG: 500 TABLET ORAL at 16:57

## 2019-01-01 RX ADMIN — METOPROLOL TARTRATE 5 MG: 5 INJECTION, SOLUTION INTRAVENOUS at 00:43

## 2019-01-01 RX ADMIN — CARBIDOPA AND LEVODOPA 1 TABLET: 25; 100 TABLET ORAL at 09:42

## 2019-01-01 RX ADMIN — DIGOXIN 250 MCG: 0.25 INJECTION INTRAMUSCULAR; INTRAVENOUS at 13:31

## 2019-01-01 RX ADMIN — SODIUM CHLORIDE 500 ML: 9 INJECTION, SOLUTION INTRAVENOUS at 20:03

## 2019-01-01 RX ADMIN — ONDANSETRON 4 MG: 2 INJECTION INTRAMUSCULAR; INTRAVENOUS at 21:27

## 2019-01-01 RX ADMIN — SODIUM CHLORIDE 80 MG: 9 INJECTION, SOLUTION INTRAVENOUS at 20:16

## 2019-01-01 RX ADMIN — SODIUM CHLORIDE 8 MG/HR: 9 INJECTION, SOLUTION INTRAVENOUS at 05:29

## 2019-01-01 RX ADMIN — Medication 10 ML: at 09:43

## 2019-01-01 RX ADMIN — VANCOMYCIN HYDROCHLORIDE 1000 MG: 1 INJECTION, POWDER, LYOPHILIZED, FOR SOLUTION INTRAVENOUS at 12:53

## 2019-01-01 RX ADMIN — PIPERACILLIN SODIUM,TAZOBACTAM SODIUM 3.38 G: 3; .375 INJECTION, POWDER, FOR SOLUTION INTRAVENOUS at 15:39

## 2019-01-01 RX ADMIN — METOPROLOL TARTRATE 5 MG: 5 INJECTION, SOLUTION INTRAVENOUS at 00:53

## 2019-01-01 RX ADMIN — SODIUM CHLORIDE 500 ML: 9 INJECTION, SOLUTION INTRAVENOUS at 15:39

## 2019-01-01 RX ADMIN — PHYTONADIONE 5 MG: 5 TABLET ORAL at 00:40

## 2019-01-01 RX ADMIN — DIGOXIN 500 MCG: 0.25 INJECTION INTRAMUSCULAR; INTRAVENOUS at 17:14

## 2019-01-01 RX ADMIN — ONDANSETRON 4 MG: 2 INJECTION INTRAMUSCULAR; INTRAVENOUS at 11:33

## 2019-01-01 RX ADMIN — PIPERACILLIN SODIUM,TAZOBACTAM SODIUM 3.38 G: 3; .375 INJECTION, POWDER, FOR SOLUTION INTRAVENOUS at 06:51

## 2019-01-01 RX ADMIN — METOCLOPRAMIDE 10 MG: 5 INJECTION, SOLUTION INTRAMUSCULAR; INTRAVENOUS at 21:27

## 2019-01-01 RX ADMIN — SODIUM CHLORIDE 8 MG/HR: 9 INJECTION, SOLUTION INTRAVENOUS at 20:49

## 2019-01-01 RX ADMIN — ONDANSETRON 4 MG: 2 INJECTION INTRAMUSCULAR; INTRAVENOUS at 11:38

## 2019-01-01 RX ADMIN — SODIUM CHLORIDE: 9 INJECTION, SOLUTION INTRAVENOUS at 10:20

## 2019-01-01 RX ADMIN — SODIUM CHLORIDE: 9 INJECTION, SOLUTION INTRAVENOUS at 23:03

## 2019-01-01 RX ADMIN — Medication 1000 MG: at 16:57

## 2019-01-01 ASSESSMENT — PAIN DESCRIPTION - LOCATION
LOCATION: GENERALIZED
LOCATION: ARM;SHOULDER
LOCATION: ARM;SHOULDER

## 2019-01-01 ASSESSMENT — ENCOUNTER SYMPTOMS
EYE DISCHARGE: 0
NAUSEA: 0
SHORTNESS OF BREATH: 0
NAUSEA: 0
BACK PAIN: 0
EYE REDNESS: 0
RHINORRHEA: 0
BACK PAIN: 0
SORE THROAT: 0
COUGH: 0
SHORTNESS OF BREATH: 0
EYE REDNESS: 0
DIARRHEA: 0
EYE DISCHARGE: 0
VOMITING: 0
BLOOD IN STOOL: 0
EYE PAIN: 0
WHEEZING: 0
CONSTIPATION: 0
ABDOMINAL PAIN: 0
SORE THROAT: 0
VOMITING: 0
WHEEZING: 0
COUGH: 0
DIARRHEA: 0

## 2019-01-01 ASSESSMENT — PAIN DESCRIPTION - FREQUENCY
FREQUENCY: CONTINUOUS
FREQUENCY: CONTINUOUS

## 2019-01-01 ASSESSMENT — PAIN SCALES - GENERAL
PAINLEVEL_OUTOF10: 2
PAINLEVEL_OUTOF10: 8
PAINLEVEL_OUTOF10: 9

## 2019-01-01 ASSESSMENT — PAIN DESCRIPTION - ORIENTATION
ORIENTATION: LEFT
ORIENTATION: LEFT

## 2019-01-01 ASSESSMENT — PAIN DESCRIPTION - DESCRIPTORS: DESCRIPTORS: PATIENT UNABLE TO DESCRIBE

## 2019-01-01 ASSESSMENT — PAIN DESCRIPTION - PAIN TYPE
TYPE: ACUTE PAIN
TYPE: ACUTE PAIN
TYPE: CHRONIC PAIN

## 2019-04-30 NOTE — PROGRESS NOTES
Clinical Pharmacy Note    Warfarin consult follow-up    Recent Labs      09/23/17   0558   INR  2.64*     Recent Labs      09/21/17   0353  09/22/17   0756  09/23/17   0558   HGB  16.6  15.0  14.9   HCT  49.4  44.7  44.4   PLT  188  167  157       Significant Drug-Drug Interactions:  New warfarin drug-drug interactions: none  Discontinued drug-drug interactions: enoxaparin  Current warfarin drug-drug interactions: aspirin (home)     Date INR Warfarin Dose   9/16/2017 4.46 No Coumadin   9/17/2017 4.16 No Coumadin   9/18/2017 3.40 0.5 mg   9/19/2017 2.47 No Coumadin   9/20/2017 1.95 No Coumadin   9/21/2017 1.57  - Patient spit out   9/22/2017 1.77 5 mg   9/23/2017 2.64 2 mg                      Notes:                     Daily PT/INR until stable within therapeutic range.       Marty Ingram, PharmD 9/23/2017 1:06 PM MRI contacted to burn images for pt

## 2019-05-21 NOTE — ED NOTES
Sling and swath applied to left arm and shoulder. Patient tolerated well with no concerns.       Alexandrea Camarena RN  05/21/19 4141

## 2019-05-21 NOTE — ED PROVIDER NOTES
[facility]  eMERGENCY dEPARTMENT eNCOUnter          CHIEF COMPLAINT       Chief Complaint   Patient presents with    Fall     lost balance, hit left shoulder, on blood thinners    Shoulder Pain     left     Arm Pain     left       Nurses Notes reviewed and I agree except as noted in the HPI. HISTORY OF PRESENT ILLNESS    Fransisca Hager is a 80 y.o. male who presents to the Emergency Department for the evaluation of left shoulder pain after the patient lost his balance and fell landing on his left shoulder. Patient has a history of Parkinson's and was walking down the [place] when he lost his balance. His wife reports that the patient has been unsteady with ambulation. He denies head injury or loss of consciousness. Patient is currently on coumadin. He denies dizziness, light-headedness, vision changes, headache, nausea, emesis, shortness of breath, or chest pain. No further complaints at initial evaluation. The HPI was provided by the patient. REVIEW OF SYSTEMS     Review of Systems   Constitutional: Negative for appetite change, chills, fatigue and fever. HENT: Negative for congestion, ear pain, rhinorrhea and sore throat. Eyes: Negative for discharge, redness and visual disturbance. Respiratory: Negative for cough, shortness of breath and wheezing. Cardiovascular: Negative for chest pain, palpitations and leg swelling. Gastrointestinal: Negative for abdominal pain, diarrhea, nausea and vomiting. Genitourinary: Negative for decreased urine volume, difficulty urinating, dysuria and hematuria. Musculoskeletal: Positive for arthralgias (left shoulder) and myalgias. Negative for back pain, joint swelling and neck pain. Skin: Negative for pallor and rash. Allergic/Immunologic: Negative for environmental allergies. Neurological: Negative for dizziness, syncope, weakness, light-headedness, numbness and headaches. Fall   Hematological: Negative for adenopathy. Psychiatric/Behavioral: Negative for confusion and suicidal ideas. The patient is not nervous/anxious. PAST MEDICALHISTORY    has a past medical history of CAD (coronary artery disease), Concussion with brief LOC, GERD (gastroesophageal reflux disease), History of CVA (cerebrovascular accident) - noted per MRI-brain on 9/18/2017 which demonstrates old strokes, Hx of CABG, Hypertension, Myocardial infarct ([address] NYHA class 3 acute on chronic systolic heart failure ([address] Occasional tremors, Pulmonary embolism (HCC), PVC's (premature ventricular contractions), Retinal artery thrombosis, S/P TAVR (transcatheter aortic valve replacement), Thrombophlebitis, and Weakness. SURGICAL HISTORY    has a past surgical history that includes Coronary artery bypass graft; Appendectomy; hernia repair; transthoracic echocardiogram (01/10/2012); Cardiac catheterization (01/16/2012); Cardiac catheterization; Diagnostic Cardiac Cath Lab Procedure; and Aortic valve surgery (12/14/2017).     CURRENT MEDICATIONS       Discharge Medication List as of 5/21/2019  6:30 PM      CONTINUE these medications which have NOT CHANGED    Details   sacubitril-valsartan (ENTRESTO) 24-26 MG per tablet Take 1 tablet by mouth 2 times dailyHistorical Med      omeprazole (PRILOSEC) 20 MG delayed release capsule Take 20 mg by mouth dailyHistorical Med      carbidopa-levodopa (SINEMET)  MG per tablet Take 1 tablet by mouth 3 times dailyHistorical Med      potassium chloride (KLOR-CON) 20 MEQ packet Take 10 mEq by mouth dailyHistorical Med      metoprolol succinate (TOPROL XL) 25 MG extended release tablet Take 25 mg by mouth dailyHistorical Med      warfarin (COUMADIN) 5 MG tablet Take 1 tablet by mouth daily, Disp-30 tablet, R-3NO PRINT      Blood Pressure KIT DAILY Starting Fri 11/24/2017, Disp-1 kit, R-0, Normal      tamsulosin (FLOMAX) 0.4 MG capsule Take 0.4 mg by mouth dailyHistorical Med      atorvastatin (LIPITOR) 40 MG tablet Take 40 mg by mouth every 48 hours. Indications: Blood Cholesterol Abnormal, R-2      furosemide (LASIX) 40 MG tablet Take 20 mg by mouth daily Historical Med      bismuth subsalicylate (PEPTO BISMOL) 262 MG/15ML suspension Take 15 mLs by mouth every 6 hours as needed for Indigestion      acetaminophen (TYLENOL) 500 MG tablet Take 1,000 mg by mouth every 6 hours as needed. Don't take more than 3,000 mg per day. Indications: Pain             ALLERGIES     is allergic to iodine. FAMILY HISTORY     indicated that his mother is . He indicated that his father is . He indicated that both of his sisters are . He indicated that his maternal grandmother is . He indicated that his maternal grandfather is . He indicated that his paternal grandmother is . He indicated that his paternal grandfather is . family history includes Heart Disease in his father, mother, sister, and sister. SOCIAL HISTORY      reports that he has never smoked. He has never used smokeless tobacco. He reports that he does not drink alcohol or use drugs. PHYSICAL EXAM     INITIAL VITALS:  height is 5' 7.5\" (1.715 m) and weight is 124 lb (56.2 kg). His oral temperature is 97.7 °F (36.5 °C). His blood pressure is 164/86 (abnormal) and his pulse is 90. His respiration is 20 and oxygen saturation is 99%. Physical Exam   Constitutional: He is oriented to person, place, and time. He appears well-developed and well-nourished. Non-toxic appearance. HENT:   Head: Normocephalic and atraumatic. Right Ear: Tympanic membrane and external ear normal.   Left Ear: Tympanic membrane and external ear normal.   Nose: Nose normal.   Mouth/Throat: Oropharynx is clear and moist and mucous membranes are normal. No oropharyngeal exudate, posterior oropharyngeal edema or posterior oropharyngeal erythema. Eyes: Conjunctivae and EOM are normal.   Neck: Normal range of motion. Neck supple. No JVD present. Cardiovascular: Normal rate, regular rhythm, normal heart sounds, intact distal pulses and normal pulses. Exam reveals no gallop and no friction rub. No murmur heard. Pulmonary/Chest: Effort normal and breath sounds normal. No respiratory distress. He has no decreased breath sounds. He has no wheezes. He has no rhonchi. He has no rales. Abdominal: Soft. Bowel sounds are normal. He exhibits no distension. There is no tenderness. There is no rebound, no guarding and no CVA tenderness. Musculoskeletal: He exhibits no edema. Left shoulder: He exhibits decreased range of motion and tenderness. AC joint line tenderness on palpation with crepitus and decrease range of motion   Neurological: He is alert and oriented to person, place, and time. He has normal strength. No cranial nerve deficit or sensory deficit. He exhibits normal muscle tone. Coordination normal. GCS eye subscore is 4. GCS verbal subscore is 5. GCS motor subscore is 6. Skin: Skin is warm and dry. No rash noted. He is not diaphoretic. Nursing note and vitals reviewed. DIFFERENTIALDIAGNOSIS:   Fracture, dislocation, sprain, strain, contusion    DIAGNOSTIC RESULTS     EKG: All EKG's are interpreted by the Emergency Department Physician who either signs or Co-signs this chart in the absence of a cardiologist.  EKG interpreted by Mark Scott MD:    Vent. Rate: 85 bpm  ME interval: * ms  QRS duration: 86 ms  QTc: 445 ms  P-R-T axes: *, 44, 77  Atrial fibrillation with premature ventricular or aberrantly conducted complexes. No STEMI. Compared to old EKG on 2-Feb-2018     RADIOLOGY: non-plain film images(s) such as CT, Ultrasound and MRI are read by the radiologist.    XR HUMERUS LEFT (MIN 2 VIEWS)   Final Result   Acute mildly impacted mildly comminuted fracture proximal humeral metaphysis. **This report has been created using voice recognition software.   It may contain minor errors which are inherent in voice The patient arrived in no acute distress and in stable condition. Within the department, I observed the patient's vital signs to be within acceptable range. On exam, I appreciated heart and lungs clear to ascultation. AC joint line tenderness on palpation with crepitus and decrease range of motion. Radiological studies within the department revealed Acute mildly impacted mildly comminuted fracture proximal humeral metaphysis. Laboratory work was reassuring. Within the department, the patient was treated with Tylenol. I observed the patient's condition to improve during the duration of his stay. I explained my proposed course of treatment to the patient, who was amenable to my decision, and I answered all questions that were asked. He was discharged home in stable condition, and the patient will return to the ED if his symptoms become more severe in nature or otherwise change. I advised the patient to follow-up with [facility]. I also discussed return to ED precautions with the patient who verbalized understanding. CRITICAL CARE:   None     CONSULTS:  None    PROCEDURES:  None     FINAL IMPRESSION      1.  Traumatic closed nondisplaced fracture of proximal end of humerus with routine healing, left          DISPOSITION/PLAN   Discharge    PATIENT REFERRED TO:  [address] [place] [id number]  Schedule an appointment as soon as possible for a visit in 3 days        DISCHARGE MEDICATIONS:  Discharge Medication List as of 5/21/2019  6:30 PM          (Please note that portions of this note were completed with a voice recognition program.Efforts were made to edit thedictations but occasionally words are mis-transcribed.)    Scribe:  Angélica Sanchez 5/21/19 3:42 PM Scribing forand in the presence Beck Flores MD.    Signed by: Tanja Gonzalez, 05/24/19 11:05 AM    Provider:  I personally performed the services described in the documentation, reviewed and edited the documentation which was dictated to the scribe in my presence, and it accurately records mywords and actions.     Chesley Romberg, MD 5/21/19 11:05 AM                  Chesley Romberg, MD  05/24/19 0202

## 2019-05-21 NOTE — ED TRIAGE NOTES
Patient presents to the ED with wife after a fall at home. Patient is alert and oriented x3. Patient's wife states he has parkinson's, he was walking [place] the [place] and lost his balance and fall onto his left shoulder. Patient does take blood thinners. He did not hit his head and did not LOC. Patient does not meet trauma criteria per RN. Dr. Gaile Phoenix in to evaluate patient, provider approves that patient does not meet trauma criteria. EKG completed. Patient has constant right arm tremors.

## 2019-06-06 PROBLEM — R79.89 ELEVATED LFTS: Status: RESOLVED | Noted: 2017-09-17 | Resolved: 2019-01-01

## 2019-06-06 PROBLEM — S42.202D CLOSED FRACTURE OF PROXIMAL END OF LEFT HUMERUS WITH ROUTINE HEALING: Chronic | Status: ACTIVE | Noted: 2019-01-01

## 2019-06-06 PROBLEM — I48.0 PAF (PAROXYSMAL ATRIAL FIBRILLATION) (HCC): Chronic | Status: ACTIVE | Noted: 2017-09-16

## 2019-06-06 PROBLEM — S50.311A ABRASION OF RIGHT ELBOW: Status: RESOLVED | Noted: 2018-01-01 | Resolved: 2019-01-01
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
## 2019-06-06 PROBLEM — G20 PARKINSON'S DISEASE (HCC): Chronic | Status: ACTIVE | Noted: 2019-01-01
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
## 2019-06-06 PROBLEM — R06.02 SHORTNESS OF BREATH: Status: RESOLVED | Noted: 2017-09-16 | Resolved: 2019-01-01

## 2019-06-06 PROBLEM — R07.9 CHEST PAIN: Status: RESOLVED | Noted: 2017-09-16 | Resolved: 2019-01-01

## 2019-06-06 PROBLEM — I50.33 ACUTE ON CHRONIC DIASTOLIC HEART FAILURE (HCC): Status: RESOLVED | Noted: 2017-09-16 | Resolved: 2019-01-01

## 2019-06-06 PROBLEM — I95.9 HYPOTENSION: Status: RESOLVED | Noted: 2017-11-18 | Resolved: 2019-01-01

## 2019-06-06 PROBLEM — I50.23 NYHA CLASS 3 ACUTE ON CHRONIC SYSTOLIC HEART FAILURE (HCC): Chronic | Status: ACTIVE | Noted: 2017-11-18

## 2019-06-06 PROBLEM — S09.90XA CLOSED HEAD INJURY: Status: RESOLVED | Noted: 2018-01-01 | Resolved: 2019-01-01

## 2019-06-06 PROBLEM — J90 LOCULATED PLEURAL EFFUSION: Status: RESOLVED | Noted: 2017-09-16 | Resolved: 2019-01-01

## 2019-06-06 PROBLEM — R47.81 SLURRED SPEECH: Status: RESOLVED | Noted: 2017-09-18 | Resolved: 2019-01-01

## 2019-06-06 PROBLEM — R79.1 SUPRATHERAPEUTIC INR: Status: RESOLVED | Noted: 2017-11-19 | Resolved: 2019-01-01

## 2019-06-07 NOTE — PROGRESS NOTES
Ashlyn Chatterjee is a 80 y.o. male that is being seen at [facility] for [address]  9/18/1930 6/7/19      HPI:  Patient seen and examined at bedside. History obtained from patient and chart. Pt admitted form home. Had a fall on 5/21/19 and was seen in [facility] ER. XRay revealed a fracture of the proximal humerus at that time. He was placed in a splint and referred to [facility]. Ortho recommended treating conservatively and following up in several weeks. Today, patient states that he is feeling ok. He denies left arm/shoulder pain unless he is moving. He has a large splint on that arm. Says that his appetite is doing well. He has a history of Parkinsons and reports some difficulty with ambulation, but this seems to be improving recently. Does get a tremor of the RUE and RLE. Denies any further acute issues. Of note, patient's daughter's have stated that he has not been compliant with multiple medications recently. He was unable to afford Entresto so he has not been filling this. He also has not been compliant with his Lasix recently, however, in speaking with patient and daughter, his Lasix was stopped several months ago and he has not had any increase in LE edema or SOB. I have reviewed the patient's past medical history, past surgical history, allergies,medications, social and family history and I have made updates where appropriate.     Past Medical History:   Diagnosis Date    CAD (coronary artery disease)     Concussion with brief LOC     2-3 minutes    GERD (gastroesophageal reflux disease)     History of CVA (cerebrovascular accident) - noted per MRI-brain on 9/18/2017 which demonstrates old strokes     Hx of CABG 9/10/2012    Hypertension     [facility] [address]     NYHA class 3 acute on chronic systolic heart failure ([facility] 11/18/2017    Occasional tremors     Pulmonary embolism (HCC)     PVC's (premature ventricular contractions)     Retinal artery thrombosis     S/P TAVR (transcatheter aortic valve replacement) 12/14/2017    Dr. Elaine Morse Thrombophlebitis     Weakness        Past Surgical History:   Procedure Laterality Date    AORTIC VALVE SURGERY  12/14/2017    TAVR by Dr. Dona Major  01/16/2012    Status post successful PCI of SVG to OM2 branch with stent in the proximal area. Stent in the  mid area and balloon angioplasty of the distal anastomotic site.  CARDIAC CATHETERIZATION      CORONARY ARTERY BYPASS GRAFT      X2 1982 1984    DIAGNOSTIC CARDIAC CATH LAB PROCEDURE      HERNIA REPAIR      TRANSTHORACIC ECHOCARDIOGRAM  01/10/2012    LV was mildly dilated. Systolic function was normal. EF was estimated in  the range of 55-65%. There were no regional wall motion abnormalities. Wall thickness was normal.       Social History     Tobacco Use    Smoking status: Never Smoker    Smokeless tobacco: Never Used   Substance Use Topics    Alcohol use: No    Drug use: No       Family History   Problem Relation Age of Onset    Heart Disease Mother     Heart Disease Father     Heart Disease Sister     Heart Disease Sister          Review of Systems   Constitutional: Positive for fatigue. Negative for chills and fever. HENT: Negative for congestion, ear pain, nosebleeds, sore throat and tinnitus. Eyes: Negative for pain, discharge and redness. Respiratory: Negative for cough, shortness of breath and wheezing. Cardiovascular: Negative for chest pain, palpitations and leg swelling. Gastrointestinal: Negative for blood in stool, constipation, diarrhea, nausea and vomiting. Endocrine: Negative for polydipsia. Genitourinary: Negative for dysuria, frequency, hematuria and urgency. Musculoskeletal: Positive for arthralgias. Negative for back pain and myalgias. Skin: Negative for rash. Allergic/Immunologic: Negative for environmental allergies.    Neurological: Negative for dizziness, tremors, seizures, weakness and headaches. Hematological: Does not bruise/bleed easily. Psychiatric/Behavioral: Negative for hallucinations and suicidal ideas. The patient is not nervous/anxious. PHYSICAL EXAM:    Weight - 161 lbs  BP - 27031  Temp - 98.2  HR - 68  RR - 19    Physical Exam   Constitutional: He appears well-developed and well-nourished. No distress. HENT:   Head: Normocephalic and atraumatic. Right Ear: Hearing, tympanic membrane and external ear normal.   Left Ear: Hearing, tympanic membrane and external ear normal.   Nose: Nose normal. No rhinorrhea or septal deviation. Mouth/Throat: Uvula is midline, oropharynx is clear and moist and mucous membranes are normal. No oral lesions. Normal dentition. Eyes: Pupils are equal, round, and reactive to light. Conjunctivae, EOM and lids are normal. Right conjunctiva is not injected. Left conjunctiva is not injected. Right eye exhibits normal extraocular motion. Left eye exhibits normal extraocular motion. Right pupil is reactive. Left pupil is reactive. Neck: Trachea normal and full passive range of motion without pain. Neck supple. No thyroid mass and no thyromegaly present. Cardiovascular: Normal rate, regular rhythm, S1 normal, S2 normal, intact distal pulses and normal pulses. Exam reveals no gallop and no friction rub. No murmur heard. Pulmonary/Chest: No respiratory distress. He has no decreased breath sounds. He has no wheezes. He has no rhonchi. He has no rales. Abdominal: Soft. Normal appearance. He exhibits no distension and no mass. There is no tenderness. There is no rebound and no guarding. No hernia. Genitourinary: Testes normal.   Musculoskeletal: Normal range of motion. He exhibits no edema or tenderness. No clubbing, cyanosis or edema of the LEs bilaterally, LUE in a large sling/brace   Lymphadenopathy:        Head (right side): No tonsillar and no preauricular adenopathy present.         Head (left side): No tonsillar and no preauricular adenopathy present. He has no cervical adenopathy. Right: No supraclavicular adenopathy present. Left: No supraclavicular adenopathy present. Neurological: He is alert. He has normal strength. He displays no tremor. No sensory deficit. He exhibits normal muscle tone. 5/5 Strength globally and symmetrically   Skin: Skin is warm and dry. No abrasion and no ecchymosis noted. He is not diaphoretic. No erythema. Psychiatric: He has a normal mood and affect. His speech is normal and behavior is normal. Judgment and thought content normal. Cognition and memory are normal.   Nursing note and vitals reviewed. ASSESSMENT & PLAN  Brynn Hong was seen today for congestive heart failure. Diagnoses and all orders for this visit:    Nonrheumatic aortic valve stenosis    Closed fracture of proximal end of left humerus with routine healing, unspecified fracture morphology, subsequent encounter    Parkinson's disease ([address]    Severe aortic stenosis    Severe malnutrition ([address]    Essential hypertension    Coronary artery disease involving native coronary artery of native heart without angina pectoris    NYHA class 3 acute on chronic systolic heart failure (HCC)    PAF (paroxysmal atrial fibrillation) ([address]    Anticoagulated on Coumadin      Right Humerus Fracture:   Follow up with ortho  GERD:  Cont Prevacid  AFib:  Cont metoprolol, Coumadin  Parkinson's:  Cont Sinemet  CHF:  Stop Entresto due to cost, will start Valsartan alone

## 2019-07-08 PROBLEM — K92.2 UGIB (UPPER GASTROINTESTINAL BLEED): Status: ACTIVE | Noted: 2019-01-01

## 2019-07-08 NOTE — ED NOTES
Bed: 021A  Expected date: 7/8/19  Expected time: 7:12 PM  Means of arrival: [address] [facility]  Comments:     Berry Zapata RN  07/08/19 [id number]

## 2019-07-09 NOTE — PLAN OF CARE
Problem: Falls - Risk of:  Goal: Will remain free from falls  Description  Will remain free from falls  Outcome: Ongoing  Note:   Patient absent of falls this shift. Bed in lowest position, side rails up 2/4. Bed alarm remains on. Call-light within reach. Patient instructed to use call-light to get up. Non-skid footwear in place. Problem: Pain:  Goal: Control of chronic pain  Description  Control of chronic pain  Outcome: Ongoing  Note:   Patient able to use 0-10 pain scale. Patient states mild generalized chronic pain. Pain complaints as documented. Agreeable to take PRN pain medications if needed. Pain goal of \"No pain\". Problem: Risk for Impaired Skin Integrity  Goal: Tissue integrity - skin and mucous membranes  Description  Structural intactness and normal physiological function of skin and  mucous membranes. Outcome: Ongoing  Note:   No new skin breakdown noted this shift. Patient assisted to turn and reposition every 2 hours in bed. Problem: Nausea/Vomiting:  Goal: Absence of nausea/vomiting  Description  Absence of nausea/vomiting  Outcome: Ongoing  Note:   Patient remains with nausea. Coffee ground emesis noted this shift. Problem: Discharge Planning:  Goal: Discharged to appropriate level of care  Description  Discharged to appropriate level of care  Outcome: Ongoing  Note:   Discharge planning remains in progress. Patient from [facility]. Patient plans to return to nursing home at discharge. Care plan reviewed with patient. Patient verbalized understanding of the plan of care and contributed to goal setting.
become increasingly drowsy throughout the day. Problem: Nausea/Vomiting:  Goal: Absence of nausea/vomiting  Description  Absence of nausea/vomiting  Outcome: Ongoing  Note:   Patient has had intermittent nausea throughout the day. Zofran did not seem to help much when given. Problem: Nausea/Vomiting:  Goal: Able to drink  Description  Able to drink  Outcome: Ongoing  Note:   Patient NPO due to apparent bleed, tolerating ice chips. Problem: Nausea/Vomiting:  Goal: Able to eat  Description  Able to eat  Outcome: Ongoing  Note:   Patient NPO due to apparent bleed. Problem: Nausea/Vomiting:  Goal: Ability to achieve adequate nutritional intake will improve  Description  Ability to achieve adequate nutritional intake will improve  Outcome: Ongoing  Note:   Patient NPO as he has an apparent bleed. Problem: DISCHARGE BARRIERS  Goal: Patient's continuum of care needs are met  7/9/2019 1846 by Tayo Loja RN  Outcome: Ongoing  Note:   Family considering comfort care as patient has become increasingly drowsy throughout the day. Care plan reviewed with patient and his family. Patient has become increasingly lethargic throughout the shift and family verbalize understanding of the plan of care and contribute to goal setting.

## 2019-07-09 NOTE — ED NOTES
Called [employer] and spoke to Yesi Roberson to update on patient admitted to hospital. No other concerns at this time.       Vincent Grace RN  07/08/19 [id number]

## 2019-07-09 NOTE — ED NOTES
Patient's family arrived and patient's daughter stated patient has been vomiting since this morning. Patient's daughter stated within the last 3 hours patient has been vomiting blood that she knows of. Will PA will be updated.       Angelina Arellano RN  07/08/19 2039       Angelina Arellano RN  07/08/19 2040

## 2019-07-09 NOTE — CARE COORDINATION
7/9/19, 11:30 AM    DISCHARGE BARRIERS  Full assessment deferred as Mily Blackwood is from [facility]. SW spoke with Mily Blackwood and he would like to return to the ECF at discharge. [facility] called and spoke with Klaus Moe in admissions at [facility]. She reports that they will be holding [facility] bed. He will return medicaid pending vs Medicare (would need a 3 day stay and be appropriate to skill).

## 2019-07-09 NOTE — PROGRESS NOTES
Hospitalist Progress Note    Patient:  Tarsha Levin      Unit/Bed:4A-07/007-A    YOB: 1930    MRN: [id number]       Acct: [de-identified]     PCP: Virginia Dow MD    Date of Admission: 7/8/2019    Active Hospital Problems    Diagnosis Date Noted    UGIB (upper gastrointestinal bleed) [K92.2] 07/08/2019    Parkinson's disease ([facility] [address] Cortez Rollinss 06/06/2019    Severe malnutrition ([facility] [address] [E43] 11/20/2018    NYHA class 3 acute on chronic systolic heart failure ([facility] [address] [I50.23] 11/18/2017    PAF (paroxysmal atrial fibrillation) ([facility] [address] [I48.0] 09/16/2017    Anticoagulated on Coumadin [Z79.01] 11/18/2014    Nonrheumatic aortic valve stenosis [I35.0] 02/28/2013    Severe aortic stenosis [I35.0] 09/10/2012    Essential hypertension [I10]        Assessment/Plan:    - Acute hypoxic resp failure: likely d/t aspiration PNA POA, +/- acute on chronic CHF d/t A fib w/ RVR. On pip/tazo, also added Vanco. ABG showed hypoxia, PcCo2 41, pt on HFO might be fatiguing, will consider BiPAP if still hypoxic and drowsy. - Severe sepsis: ordered lactate 5.4. SBP in 80s, notified by RN IVF at 75 cc.hr stopped overnight for concerns for volume overload? Resumed NS at 150 cc/hr. Will place Peña to measure I/O.     - Coffee-ground emesis: UGIB in background of coagulopathy 2/2 warfarin for A fib with INR 3.45 on arrival. Vit K given overnight. On PPI infusion. Hb stable at 12.9. Will monitor H/H closely q6h. Seen by Gi already. Not medically stable for EGD. - Atrial fibrillation w/ RVR: difficult to distinguish if RVR is 2/2 hypotension or driving it. On BB. As also agreed by cardiology, started on Digoxin. - Query acutely ecompensated HFrEF: proBNP greater than 18K. Pt clinically euvolemic, no diuretics at this point.    - Rise in trops: likely demand-related. Will trend trops. - CKD III: Cr 1.1. Will monitor    - hx of PD: on home meds, high risk for aspiration, NPO. SLP to see    - Hx of AS/ s/p TAVR 2017.     - Hx of PE    - severe malnutrition: dietician to see when medically stable    - Palliative care to see and follow. Family Ok with active medical management at this point. Pt limited x4. Plan to update family if clinical status further deteriorates for consideration fo hospice. Expected discharge date:  TBD         Disposition:      [] Home                             [] TCU                             [] Rehab                             [] Psych                             [x] SNF                             [] [facility]                             [] Other-    Chief Complaint:   Chief Complaint   Patient presents with   [facility] Course: Patient was seen, examined and the medical chart was reviewed thoroughly today. In summary, 80 y.o.male admitted overnight for query UGIB. Subjective (past 24 hours): Increased WOB. Drowsy but arousable      Medications:  Reviewed    Infusion Medications    sodium chloride      pantoprozole (PROTONIX) infusion 8 mg/hr (07/09/19 0529)     Scheduled Medications    carbidopa-levodopa  1 tablet Oral 4x Daily    piperacillin-tazobactam  3.375 g Intravenous Q8H    vancomycin  1,000 mg Intravenous Q12H    metoprolol succinate  25 mg Oral Daily    sodium chloride flush  10 mL Intravenous 2 times per day     PRN Meds: magnesium hydroxide, polyethylene glycol, acetaminophen, sodium chloride flush, ondansetron      Intake/Output Summary (Last 24 hours) at 7/9/2019 1123  Last data filed at 7/9/2019 0431  Gross per 24 hour   Intake --   Output 100 ml   Net -100 ml       Diet:  Diet NPO Effective Now Exceptions are: Ice Chips, Sips with Meds    Exam:  /65   Pulse 116   Temp 98.1 °F (36.7 °C) (Oral)   Resp 20   Ht 5' 7\" (1.702 m)   Wt 136 lb 14.4 oz (62.1 kg)   SpO2 90%   BMI 21.44 kg/m²     General appearance: Emaciated, Increased WOB. Drowsy but arousable A&O x3.    A&O x3, Not ill or toxic, in no apparent distress  Neck: Supple, no JVD, no carotid bruits  Heart: Regular rhythm, bradycardic, normal S1 and S2, no rubs, murmurs or gallops  Lungs: upper airway sounds bilat  Abdomen: soft, non-tender, non-distended, no bruits, no masses  Extremities: no clubbing, cyanosis, trace edema bilat  Neurologic: alert and oriented x 3, cranial nerves 2-12 grossly intact, motor and sensory intact, moving all extremities  Skin: No rashes  Capillary Refill: Brisk,< 3 seconds   Peripheral Pulses: +2 palpable, equal bilaterally           Labs:   Recent Labs     07/08/19 1954 07/08/19  2343 07/09/19  0331   WBC 16.4*  --  21.0*   HGB 13.3* 13.4* 12.9*   HCT 41.2*  --  39.7*     --  218     Recent Labs     07/08/19 1954 07/09/19  0331    144   K 4.6 4.2   CL 97* 101   CO2 27 26   BUN 33* 38*   CREATININE 1.1 1.1   CALCIUM 10.0 9.5     Recent Labs     07/08/19 1954   AST 14   ALT 6*   BILIDIR <0.2   BILITOT 0.9   ALKPHOS 162*     Recent Labs     07/08/19 1954 07/09/19  0331   INR 2.47* 3.45*     No results for input(s): CKTOTAL, TROPONINI in the last 72 hours. Urinalysis:      Lab Results   Component Value Date    NITRU NEGATIVE 11/20/2018    WBCUA 0-2 11/20/2018    BACTERIA NONE 11/20/2018    RBCUA 0-2 11/20/2018    BLOODU NEGATIVE 11/20/2018    SPECGRAV 1.024 11/20/2018    GLUCOSEU NEGATIVE 11/21/2017       Radiology:  Xr Chest Portable    Result Date: 7/9/2019  PROCEDURE: XR CHEST PORTABLE CLINICAL INFORMATION: shortness of breath . COMPARISON: 11/20/2017 TECHNIQUE: Portable upright FINDINGS: Bilateral airspace consolidations. Left midlung dense airspace consolidation. Right lower lobe and right upper lung airspace infiltrates. Blunting left lateral costophrenic angle suggesting a pleural effusion. Heart size is difficult to assess. Patient has a large hiatal hernia. Underlying pulmonary vasculature does not appear congested.  Midline sternotomy from presumed prior CABG and EKG leads overlie the chest.    Bilateral pneumonic type airspace

## 2019-07-09 NOTE — PROGRESS NOTES
Pharmacy Note  Vancomycin Consult    Jennifer Lantigua is a 80 y.o. male started on Vancomycin; consult received from Dr. Fernando Reyes to manage therapy. Also receiving the following antibiotics: Zosyn.     Patient Active Problem List   Diagnosis    Frailty    Essential hypertension    PVC's (premature ventricular contractions)    History of thrombosis - PE, DVT    History of CVA (cerebrovascular accident)    Hx of CABG    Presence of coronary artery bypass graft stent    Dyslipidemia    Severe aortic stenosis    Moderate mitral regurgitation    History of TIA (transient ischemic attack)    History of myocardial infarction    Nonrheumatic aortic valve stenosis    Anticoagulated on Coumadin    PAF (paroxysmal atrial fibrillation) ([facility])    Low vitamin D level    Protein-calorie malnutrition, severe (HCC)    Acute renal failure, not POA    At risk for aspiration    Urinary retention    Neurogenic bladder    Stenosis of right internal carotid artery, POA    Carotid stenosis, right    NYHA class 3 acute on chronic systolic heart failure ([facility])    Anemia, normocytic normochromic    Coronary artery disease involving native coronary artery of native heart without angina pectoris    S/P TAVR (transcatheter aortic valve replacement) Dr. Jeramy Barroso 12/14/17    Traumatic hematoma of scalp    Severe malnutrition ([facility])    Closed fracture of proximal end of left humerus with routine healing    Parkinson's disease ([facility] [address]    UGIB (upper gastrointestinal bleed)       Allergies:  Iodine     Temp max: 99.7    Recent Labs     07/08/19 1954 07/09/19  0331   BUN 33* 38*       Recent Labs     07/08/19 1954 07/09/19  0331   CREATININE 1.1 1.1       Recent Labs     07/08/19 1954 07/09/19  0331   WBC 16.4* 21.0*         Intake/Output Summary (Last 24 hours) at 7/9/2019 1154  Last data filed at 7/9/2019 0431  Gross per 24 hour   Intake --   Output 100 ml   Net -100 ml       Culture Date      Source                       Results  Not available    Ht Readings from Last 1 Encounters:   07/08/19 5' 7\" (1.702 m)        Wt Readings from Last 1 Encounters:   07/09/19 136 lb 14.4 oz (62.1 kg)         Body mass index is 21.44 kg/m². Estimated Creatinine Clearance: 41 mL/min (based on SCr of 1.1 mg/dL). Goal Trough Level: 15-20 mcg/mL    Assessment/Plan:  Will initiate vancomycin 1000 mg q24h. Timing of trough level will be determined based on culture results, renal function, and clinical response. Thank you for the consult. Will continue to follow.

## 2019-07-09 NOTE — PROGRESS NOTES
Clinical Pharmacy Note    Tang Nguyễn is a 80 y.o. male for whom pharmacy has been asked to manage warfarin therapy. Reason for Admission: upper gi bleed    Consulting Physician: Dr. Luanna Bosworth  Warfarin dose prior to admission: 6 mg daily  Warfarin indication: afib  Target INR range: 2-3   Outpatient warfarin provider: [facility] physician    Past Medical History:   Diagnosis Date    CAD (coronary artery disease)     Concussion with brief LOC     2-3 minutes    GERD (gastroesophageal reflux disease)     History of CVA (cerebrovascular accident) - noted per MRI-brain on 9/18/2017 which demonstrates old strokes     Hx of CABG 9/10/2012    Hypertension     Myocardial infarct ([facility] [address]     NYHA class 3 acute on chronic systolic heart failure ([facility] [address] 11/18/2017    Occasional tremors     Pulmonary embolism (HCC)     PVC's (premature ventricular contractions)     Retinal artery thrombosis     S/P TAVR (transcatheter aortic valve replacement) 12/14/2017    Dr. Strong Favor    Thrombophlebitis     Weakness                 Recent Labs     07/09/19  0331   INR 3.45*     Recent Labs     07/08/19  1954 07/08/19  2343 07/09/19  0331   HGB 13.3* 13.4* 12.9*   HCT 41.2*  --  39.7*     --  218       Current warfarin drug-drug interactions: none      Date INR Warfarin Dose   7/9/2019 3.45 None                                   Daily PT/INR until stable within therapeutic range. Thank you for the consult.

## 2019-07-09 NOTE — PROGRESS NOTES
0-  Report received from YULIANA Castañeda and in room to meet pt and family. At this time, pt unresponsive to verbal or tactile stimuli and HR is in the 60's and pulse ox is in the 60's, family updated at this time regarding current status on pt. Also, critical lab (INR) received. At this time, pt's family does not wish to treat this. -  Pt has become very bradycardic with agonal HR and pulse in the 20's and pulse ox no longer reading. In room to make assessment of pt.    -  Pt asystole on monitor, no longer has a pulse or heart tones, and has absence of respirations. Verified these findings with Maddy, [facility], Shantelle Thompson Rn notified of pt's death. -  Call placed to hospitalist service to notify of pt's death. Message left. -  Dr Alvino Pace notified of pt's death. -  Malathi Young returned call regarding pt's death. States will update Dr Coby Delgadillo, as he is the one who admitted pt. -  OLGA Leo notifed of pt's death. -  Post mortem care completed and all IV's and medical devices removed. -  Pt left with .
